# Patient Record
Sex: FEMALE | Race: WHITE | NOT HISPANIC OR LATINO | Employment: OTHER | RURAL
[De-identification: names, ages, dates, MRNs, and addresses within clinical notes are randomized per-mention and may not be internally consistent; named-entity substitution may affect disease eponyms.]

---

## 2020-03-16 ENCOUNTER — HISTORICAL (OUTPATIENT)
Dept: ADMINISTRATIVE | Facility: HOSPITAL | Age: 50
End: 2020-03-16

## 2020-03-16 LAB
ALBUMIN SERPL BCP-MCNC: 3.9 G/DL (ref 3.5–5)
ALP SERPL-CCNC: 92 U/L (ref 39–100)
ALT SERPL W P-5'-P-CCNC: 27 U/L (ref 13–56)
AST SERPL W P-5'-P-CCNC: 21 U/L (ref 15–37)
BASOPHILS # BLD AUTO: 0.09 X10E3/UL (ref 0–0.2)
BASOPHILS NFR BLD AUTO: 1.6 % (ref 0–1)
BILIRUB DIRECT SERPL-MCNC: <0.1 MG/DL (ref 0–0.2)
BILIRUB SERPL-MCNC: 0.3 MG/DL (ref 0–1.2)
BILIRUB UR QL STRIP: ABNORMAL MG/DL
BUN SERPL-MCNC: 9 MG/DL (ref 7–18)
CALCIUM SERPL-MCNC: 8.6 MG/DL (ref 8.5–10.1)
CHLORIDE SERPL-SCNC: 106 MMOL/L (ref 98–107)
CHOLEST SERPL-MCNC: 173 MG/DL
CHOLEST/HDLC SERPL: 3.7 {RATIO}
CLARITY UR: CLEAR
CO2 SERPL-SCNC: 27 MMOL/L (ref 21–32)
COLOR UR: YELLOW
CREAT SERPL-MCNC: 0.91 MG/DL (ref 0.5–1.02)
EOSINOPHIL # BLD AUTO: 0.14 X10E3/UL (ref 0–0.5)
EOSINOPHIL NFR BLD AUTO: 2.4 % (ref 1–4)
ERYTHROCYTE [DISTWIDTH] IN BLOOD BY AUTOMATED COUNT: 15.7 % (ref 11.5–14.5)
GLUCOSE SERPL-MCNC: 81 MG/DL (ref 74–106)
GLUCOSE UR STRIP-MCNC: NEGATIVE MG/DL
HCT VFR BLD AUTO: 42.6 % (ref 38–47)
HDLC SERPL-MCNC: 47 MG/DL
HGB BLD-MCNC: 12.7 G/DL (ref 12–16)
IMM GRANULOCYTES # BLD AUTO: 0.01 X10E3/UL (ref 0–0.04)
IMM GRANULOCYTES NFR BLD: 0.2 % (ref 0–0.4)
KETONES UR STRIP-SCNC: ABNORMAL MG/DL
LDLC SERPL CALC-MCNC: 94 MG/DL
LEUKOCYTE ESTERASE UR QL STRIP: NEGATIVE LEU/UL
LYMPHOCYTES # BLD AUTO: 2.28 X10E3/UL (ref 1–4.8)
LYMPHOCYTES NFR BLD AUTO: 39.3 % (ref 27–41)
MCH RBC QN AUTO: 27.3 PG (ref 27–31)
MCHC RBC AUTO-ENTMCNC: 29.8 G/DL (ref 32–36)
MCV RBC AUTO: 91.4 FL (ref 80–96)
MONOCYTES # BLD AUTO: 0.43 X10E3/UL (ref 0–0.8)
MONOCYTES NFR BLD AUTO: 7.4 % (ref 2–6)
MPC BLD CALC-MCNC: 10.4 FL (ref 9.4–12.4)
NEUTROPHILS # BLD AUTO: 2.85 X10E3/UL (ref 1.8–7.7)
NEUTROPHILS NFR BLD AUTO: 49.1 % (ref 53–65)
NITRITE UR QL STRIP: NEGATIVE
NRBC # BLD AUTO: 0 X10E3/UL (ref 0–0)
NRBC, AUTO (.00): 0 /100 (ref 0–0)
PH UR STRIP: 5.5 PH UNITS (ref 5–8)
PLATELET # BLD AUTO: 373 X10E3/UL (ref 150–400)
POTASSIUM SERPL-SCNC: 3.7 MMOL/L (ref 3.5–5.1)
PROT SERPL-MCNC: 7.6 G/DL (ref 6.4–8.2)
PROT UR QL STRIP: ABNORMAL MG/DL
RBC # BLD AUTO: 4.66 X10E6/UL (ref 4.2–5.4)
RBC # UR STRIP: ABNORMAL ERY/UL
SODIUM SERPL-SCNC: 137 MMOL/L (ref 136–145)
SP GR UR STRIP: 1.02 (ref 1–1.03)
TRIGL SERPL-MCNC: 158 MG/DL
TSH SERPL DL<=0.005 MIU/L-ACNC: 8.51 UIU/ML (ref 0.36–3.74)
UROBILINOGEN UR STRIP-ACNC: 0.2 MG/DL
WBC # BLD AUTO: 5.8 X10E3/UL (ref 4.5–11)

## 2020-05-19 ENCOUNTER — HISTORICAL (OUTPATIENT)
Dept: ADMINISTRATIVE | Facility: HOSPITAL | Age: 50
End: 2020-05-19

## 2020-05-19 LAB
ALBUMIN SERPL BCP-MCNC: 3.2 G/DL (ref 3.5–5)
ALP SERPL-CCNC: 90 U/L (ref 39–100)
ALT SERPL W P-5'-P-CCNC: 22 U/L (ref 13–56)
AST SERPL W P-5'-P-CCNC: 18 U/L (ref 15–37)
BASOPHILS # BLD AUTO: 0.06 X10E3/UL (ref 0–0.2)
BASOPHILS NFR BLD AUTO: 1.1 % (ref 0–1)
BILIRUB DIRECT SERPL-MCNC: <0.1 MG/DL (ref 0–0.2)
BILIRUB SERPL-MCNC: 0.2 MG/DL (ref 0–1.2)
BUN SERPL-MCNC: 9 MG/DL (ref 7–18)
CALCIUM SERPL-MCNC: 8 MG/DL (ref 8.5–10.1)
CHLORIDE SERPL-SCNC: 110 MMOL/L (ref 98–107)
CO2 SERPL-SCNC: 26 MMOL/L (ref 21–32)
CREAT SERPL-MCNC: 0.85 MG/DL (ref 0.5–1.02)
EOSINOPHIL # BLD AUTO: 0.11 X10E3/UL (ref 0–0.5)
EOSINOPHIL NFR BLD AUTO: 2.1 % (ref 1–4)
ERYTHROCYTE [DISTWIDTH] IN BLOOD BY AUTOMATED COUNT: 14.9 % (ref 11.5–14.5)
GLUCOSE SERPL-MCNC: 92 MG/DL (ref 74–106)
HCT VFR BLD AUTO: 38.8 % (ref 38–47)
HGB BLD-MCNC: 12.2 G/DL (ref 12–16)
IMM GRANULOCYTES # BLD AUTO: 0.02 X10E3/UL (ref 0–0.04)
IMM GRANULOCYTES NFR BLD: 0.4 % (ref 0–0.4)
LYMPHOCYTES # BLD AUTO: 2.2 X10E3/UL (ref 1–4.8)
LYMPHOCYTES NFR BLD AUTO: 41.2 % (ref 27–41)
MCH RBC QN AUTO: 27.9 PG (ref 27–31)
MCHC RBC AUTO-ENTMCNC: 31.4 G/DL (ref 32–36)
MCV RBC AUTO: 88.8 FL (ref 80–96)
MONOCYTES # BLD AUTO: 0.48 X10E3/UL (ref 0–0.8)
MONOCYTES NFR BLD AUTO: 9 % (ref 2–6)
MPC BLD CALC-MCNC: 10 FL (ref 9.4–12.4)
NEUTROPHILS # BLD AUTO: 2.47 X10E3/UL (ref 1.8–7.7)
NEUTROPHILS NFR BLD AUTO: 46.2 % (ref 53–65)
NRBC # BLD AUTO: 0 X10E3/UL (ref 0–0)
NRBC, AUTO (.00): 0 /100 (ref 0–0)
PLATELET # BLD AUTO: 328 X10E3/UL (ref 150–400)
POTASSIUM SERPL-SCNC: 4 MMOL/L (ref 3.5–5.1)
PROT SERPL-MCNC: 6.5 G/DL (ref 6.4–8.2)
RBC # BLD AUTO: 4.37 X10E6/UL (ref 4.2–5.4)
SODIUM SERPL-SCNC: 141 MMOL/L (ref 136–145)
TSH SERPL DL<=0.005 MIU/L-ACNC: 0.25 UIU/ML (ref 0.36–3.74)
WBC # BLD AUTO: 5.34 X10E3/UL (ref 4.5–11)

## 2020-06-16 ENCOUNTER — HISTORICAL (OUTPATIENT)
Dept: ADMINISTRATIVE | Facility: HOSPITAL | Age: 50
End: 2020-06-16

## 2020-06-18 ENCOUNTER — HISTORICAL (OUTPATIENT)
Dept: ADMINISTRATIVE | Facility: HOSPITAL | Age: 50
End: 2020-06-18

## 2020-06-18 LAB
BUN SERPL-MCNC: 11 MG/DL (ref 7–18)
CALCIUM SERPL-MCNC: 8.8 MG/DL (ref 8.5–10.1)
CHLORIDE SERPL-SCNC: 110 MMOL/L (ref 98–107)
CO2 SERPL-SCNC: 20 MMOL/L (ref 21–32)
CREAT SERPL-MCNC: 1.1 MG/DL (ref 0.5–1.02)
GLUCOSE SERPL-MCNC: 83 MG/DL (ref 74–106)
POTASSIUM SERPL-SCNC: 4.4 MMOL/L (ref 3.5–5.1)
SODIUM SERPL-SCNC: 139 MMOL/L (ref 136–145)
TSH SERPL DL<=0.005 MIU/L-ACNC: 1.15 UIU/ML (ref 0.36–3.74)

## 2020-07-27 ENCOUNTER — HISTORICAL (OUTPATIENT)
Dept: ADMINISTRATIVE | Facility: HOSPITAL | Age: 50
End: 2020-07-27

## 2020-08-31 ENCOUNTER — HISTORICAL (OUTPATIENT)
Dept: ADMINISTRATIVE | Facility: HOSPITAL | Age: 50
End: 2020-08-31

## 2020-09-02 LAB
LAB AP DIAGNOSIS - HISTORICAL: NORMAL
LAB AP GROSS PATHOLOGY - HISTORICAL: NORMAL
LAB AP SPECIMEN SUBMITTED - HISTORICAL: NORMAL

## 2020-09-06 ENCOUNTER — HISTORICAL (OUTPATIENT)
Dept: ADMINISTRATIVE | Facility: HOSPITAL | Age: 50
End: 2020-09-06

## 2020-11-05 ENCOUNTER — HISTORICAL (OUTPATIENT)
Dept: ADMINISTRATIVE | Facility: HOSPITAL | Age: 50
End: 2020-11-05

## 2020-11-05 LAB — SARS-COV+SARS-COV-2 AG RESP QL IA.RAPID: NEGATIVE

## 2020-11-24 ENCOUNTER — HISTORICAL (OUTPATIENT)
Dept: ADMINISTRATIVE | Facility: HOSPITAL | Age: 50
End: 2020-11-24

## 2020-11-30 ENCOUNTER — HISTORICAL (OUTPATIENT)
Dept: ADMINISTRATIVE | Facility: HOSPITAL | Age: 50
End: 2020-11-30

## 2020-12-01 LAB

## 2020-12-02 LAB
HCG UR QL IA.RAPID: NEGATIVE
HCG UR QL IA.RAPID: NEGATIVE

## 2020-12-11 ENCOUNTER — HISTORICAL (OUTPATIENT)
Dept: ADMINISTRATIVE | Facility: HOSPITAL | Age: 50
End: 2020-12-11

## 2020-12-22 ENCOUNTER — HISTORICAL (OUTPATIENT)
Dept: ADMINISTRATIVE | Facility: HOSPITAL | Age: 50
End: 2020-12-22

## 2020-12-22 LAB
ALBUMIN SERPL BCP-MCNC: 3.3 G/DL (ref 3.5–5)
ALP SERPL-CCNC: 86 U/L (ref 39–100)
ALT SERPL W P-5'-P-CCNC: 32 U/L (ref 13–56)
ANION GAP SERPL CALCULATED.3IONS-SCNC: 6.1 MMOL/L (ref 7–16)
AST SERPL W P-5'-P-CCNC: 23 U/L (ref 15–37)
BASOPHILS # BLD AUTO: 0.08 X10E3/UL (ref 0–0.2)
BASOPHILS NFR BLD AUTO: 1.5 % (ref 0–1)
BILIRUB DIRECT SERPL-MCNC: <0.1 MG/DL (ref 0–0.2)
BILIRUB SERPL-MCNC: 0.2 MG/DL (ref 0–1.2)
BUN SERPL-MCNC: 7 MG/DL (ref 7–18)
CALCIUM SERPL-MCNC: 8.1 MG/DL (ref 8.5–10.1)
CHLORIDE SERPL-SCNC: 110 MMOL/L (ref 98–107)
CO2 SERPL-SCNC: 28 MMOL/L (ref 21–32)
CREAT SERPL-MCNC: 0.87 MG/DL (ref 0.5–1.02)
EOSINOPHIL # BLD AUTO: 0.25 X10E3/UL (ref 0–0.5)
EOSINOPHIL NFR BLD AUTO: 4.8 % (ref 1–4)
ERYTHROCYTE [DISTWIDTH] IN BLOOD BY AUTOMATED COUNT: 14.9 % (ref 11.5–14.5)
GLUCOSE SERPL-MCNC: 81 MG/DL (ref 74–106)
HCT VFR BLD AUTO: 40.1 % (ref 38–47)
HGB BLD-MCNC: 12.2 G/DL (ref 12–16)
IMM GRANULOCYTES # BLD AUTO: 0.01 X10E3/UL (ref 0–0.04)
IMM GRANULOCYTES NFR BLD: 0.2 % (ref 0–0.4)
LYMPHOCYTES # BLD AUTO: 2.25 X10E3/UL (ref 1–4.8)
LYMPHOCYTES NFR BLD AUTO: 42.9 % (ref 27–41)
MCH RBC QN AUTO: 27.2 PG (ref 27–31)
MCHC RBC AUTO-ENTMCNC: 30.4 G/DL (ref 32–36)
MCV RBC AUTO: 89.3 FL (ref 80–96)
MONOCYTES # BLD AUTO: 0.45 X10E3/UL (ref 0–0.8)
MONOCYTES NFR BLD AUTO: 8.6 % (ref 2–6)
MPC BLD CALC-MCNC: 9.2 FL (ref 9.4–12.4)
NEUTROPHILS # BLD AUTO: 2.2 X10E3/UL (ref 1.8–7.7)
NEUTROPHILS NFR BLD AUTO: 42 % (ref 53–65)
NRBC # BLD AUTO: 0 X10E3/UL (ref 0–0)
NRBC, AUTO (.00): 0 /100 (ref 0–0)
PLATELET # BLD AUTO: 335 X10E3/UL (ref 150–400)
POTASSIUM SERPL-SCNC: 5.1 MMOL/L (ref 3.5–5.1)
PROT SERPL-MCNC: 6.5 G/DL (ref 6.4–8.2)
RBC # BLD AUTO: 4.49 X10E6/UL (ref 4.2–5.4)
SODIUM SERPL-SCNC: 139 MMOL/L (ref 136–145)
TSH SERPL DL<=0.005 MIU/L-ACNC: 24.5 UIU/ML (ref 0.36–3.74)
WBC # BLD AUTO: 5.24 X10E3/UL (ref 4.5–11)

## 2021-04-02 ENCOUNTER — HISTORICAL (OUTPATIENT)
Dept: ADMINISTRATIVE | Facility: HOSPITAL | Age: 51
End: 2021-04-02

## 2021-04-05 ENCOUNTER — TELEPHONE (OUTPATIENT)
Dept: PAIN MEDICINE | Facility: HOSPITAL | Age: 51
End: 2021-04-05

## 2021-04-06 ENCOUNTER — HOSPITAL ENCOUNTER (OUTPATIENT)
Dept: RADIOLOGY | Facility: HOSPITAL | Age: 51
Discharge: HOME | End: 2021-04-06
Attending: UROLOGY
Payer: MEDICARE

## 2021-04-06 ENCOUNTER — OFFICE VISIT (OUTPATIENT)
Dept: UROLOGY | Facility: CLINIC | Age: 51
End: 2021-04-06
Payer: MEDICARE

## 2021-04-06 VITALS
BODY MASS INDEX: 40.67 KG/M2 | DIASTOLIC BLOOD PRESSURE: 82 MMHG | SYSTOLIC BLOOD PRESSURE: 126 MMHG | HEART RATE: 78 BPM | HEIGHT: 62 IN | WEIGHT: 221 LBS

## 2021-04-06 DIAGNOSIS — N20.2 CALCULUS OF KIDNEY AND URETER: Primary | ICD-10-CM

## 2021-04-06 DIAGNOSIS — N39.0 URINARY TRACT INFECTION WITH HEMATURIA, SITE UNSPECIFIED: ICD-10-CM

## 2021-04-06 DIAGNOSIS — R31.9 URINARY TRACT INFECTION WITH HEMATURIA, SITE UNSPECIFIED: ICD-10-CM

## 2021-04-06 DIAGNOSIS — N20.0 KIDNEY STONE: ICD-10-CM

## 2021-04-06 DIAGNOSIS — N23 URETERAL COLIC: ICD-10-CM

## 2021-04-06 PROCEDURE — 3008F PR BODY MASS INDEX (BMI) DOCUMENTED: ICD-10-PCS | Mod: CPTII,,, | Performed by: UROLOGY

## 2021-04-06 PROCEDURE — 3008F BODY MASS INDEX DOCD: CPT | Mod: CPTII,,, | Performed by: UROLOGY

## 2021-04-06 PROCEDURE — 1125F PR PAIN SEVERITY QUANTIFIED, PAIN PRESENT: ICD-10-PCS | Mod: ,,, | Performed by: UROLOGY

## 2021-04-06 PROCEDURE — 96372 THER/PROPH/DIAG INJ SC/IM: CPT | Mod: PBBFAC | Performed by: UROLOGY

## 2021-04-06 PROCEDURE — 99214 PR OFFICE/OUTPT VISIT, EST, LEVL IV, 30-39 MIN: ICD-10-PCS | Mod: S$PBB,,, | Performed by: UROLOGY

## 2021-04-06 PROCEDURE — 1125F AMNT PAIN NOTED PAIN PRSNT: CPT | Mod: ,,, | Performed by: UROLOGY

## 2021-04-06 PROCEDURE — 99214 OFFICE O/P EST MOD 30 MIN: CPT | Mod: S$PBB,,, | Performed by: UROLOGY

## 2021-04-06 PROCEDURE — 74018 XR KUB: ICD-10-PCS | Mod: 26,,, | Performed by: RADIOLOGY

## 2021-04-06 PROCEDURE — 99999 PR PBB SHADOW E&M-EST. PATIENT-LVL V: ICD-10-PCS | Mod: PBBFAC,,, | Performed by: UROLOGY

## 2021-04-06 PROCEDURE — 74018 RADEX ABDOMEN 1 VIEW: CPT | Mod: TC

## 2021-04-06 PROCEDURE — 74018 RADEX ABDOMEN 1 VIEW: CPT | Mod: 26,,, | Performed by: RADIOLOGY

## 2021-04-06 PROCEDURE — 99215 OFFICE O/P EST HI 40 MIN: CPT | Mod: PBBFAC,25 | Performed by: UROLOGY

## 2021-04-06 PROCEDURE — 99999 PR PBB SHADOW E&M-EST. PATIENT-LVL V: CPT | Mod: PBBFAC,,, | Performed by: UROLOGY

## 2021-04-06 RX ORDER — LEVOTHYROXINE SODIUM 112 UG/1
112 TABLET ORAL DAILY
Status: ON HOLD | COMMUNITY
Start: 2020-12-30 | End: 2021-07-15

## 2021-04-06 RX ORDER — TOPIRAMATE 100 MG/1
1 TABLET, FILM COATED ORAL NIGHTLY
COMMUNITY
Start: 2021-04-02 | End: 2022-01-10 | Stop reason: SDUPTHER

## 2021-04-06 RX ORDER — PROMETHAZINE HYDROCHLORIDE 25 MG/ML
25 INJECTION, SOLUTION INTRAMUSCULAR; INTRAVENOUS
Status: COMPLETED | OUTPATIENT
Start: 2021-04-06 | End: 2021-04-06

## 2021-04-06 RX ORDER — HYDROCODONE BITARTRATE AND ACETAMINOPHEN 10; 325 MG/1; MG/1
1 TABLET ORAL NIGHTLY
COMMUNITY
Start: 2021-03-05 | End: 2021-05-17 | Stop reason: SDUPTHER

## 2021-04-06 RX ORDER — METOPROLOL TARTRATE 25 MG/1
25 TABLET, FILM COATED ORAL DAILY
COMMUNITY
Start: 2021-01-12 | End: 2022-10-21

## 2021-04-06 RX ORDER — HYDROMORPHONE HYDROCHLORIDE 2 MG/ML
2 INJECTION, SOLUTION INTRAMUSCULAR; INTRAVENOUS; SUBCUTANEOUS ONCE
Status: COMPLETED | OUTPATIENT
Start: 2021-04-06 | End: 2021-04-06

## 2021-04-06 RX ORDER — POTASSIUM CITRATE AND CITRIC ACID MONOHYDRATE 1100; 334 MG/5ML; MG/5ML
SOLUTION ORAL
COMMUNITY
End: 2023-03-08

## 2021-04-06 RX ORDER — TIZANIDINE 4 MG/1
4-8 TABLET ORAL NIGHTLY PRN
COMMUNITY
Start: 2021-01-28 | End: 2021-05-17 | Stop reason: SDUPTHER

## 2021-04-06 RX ORDER — GABAPENTIN 600 MG/1
600 TABLET ORAL NIGHTLY
COMMUNITY
Start: 2021-01-28 | End: 2021-05-17 | Stop reason: SDUPTHER

## 2021-04-06 RX ORDER — OMEPRAZOLE 20 MG/1
20 CAPSULE, DELAYED RELEASE ORAL DAILY
COMMUNITY
Start: 2021-03-05 | End: 2022-10-21

## 2021-04-06 RX ORDER — FLUOXETINE HYDROCHLORIDE 20 MG/1
20 CAPSULE ORAL DAILY
COMMUNITY
Start: 2021-01-28 | End: 2022-08-18

## 2021-04-06 RX ADMIN — PROMETHAZINE HYDROCHLORIDE 25 MG: 25 INJECTION INTRAMUSCULAR; INTRAVENOUS at 04:04

## 2021-04-06 RX ADMIN — HYDROMORPHONE HYDROCHLORIDE 2 MG: 2 INJECTION, SOLUTION INTRAMUSCULAR; INTRAVENOUS; SUBCUTANEOUS at 04:04

## 2021-04-08 ENCOUNTER — ANESTHESIA (OUTPATIENT)
Dept: SURGERY | Facility: HOSPITAL | Age: 51
End: 2021-04-08
Payer: MEDICARE

## 2021-04-08 ENCOUNTER — ANESTHESIA EVENT (OUTPATIENT)
Dept: SURGERY | Facility: HOSPITAL | Age: 51
End: 2021-04-08
Payer: MEDICARE

## 2021-04-08 ENCOUNTER — HOSPITAL ENCOUNTER (OUTPATIENT)
Facility: HOSPITAL | Age: 51
Discharge: HOME | End: 2021-04-08
Attending: UROLOGY | Admitting: UROLOGY
Payer: MEDICARE

## 2021-04-08 VITALS
TEMPERATURE: 97 F | OXYGEN SATURATION: 96 % | HEIGHT: 62 IN | BODY MASS INDEX: 40.12 KG/M2 | SYSTOLIC BLOOD PRESSURE: 125 MMHG | RESPIRATION RATE: 16 BRPM | HEART RATE: 77 BPM | DIASTOLIC BLOOD PRESSURE: 76 MMHG | WEIGHT: 218 LBS

## 2021-04-08 VITALS — RESPIRATION RATE: 22 BRPM | HEART RATE: 85 BPM | OXYGEN SATURATION: 98 %

## 2021-04-08 DIAGNOSIS — I10 HYPERTENSION: ICD-10-CM

## 2021-04-08 DIAGNOSIS — Z01.812 ENCOUNTER FOR PREPROCEDURE SCREENING LABORATORY TESTING FOR COVID-19: Primary | ICD-10-CM

## 2021-04-08 DIAGNOSIS — Z11.52 ENCOUNTER FOR PREPROCEDURE SCREENING LABORATORY TESTING FOR COVID-19: Primary | ICD-10-CM

## 2021-04-08 DIAGNOSIS — N20.2 CALCULUS OF KIDNEY AND URETER: Primary | ICD-10-CM

## 2021-04-08 LAB — HCG UR QL IA.RAPID: NEGATIVE

## 2021-04-08 PROCEDURE — 71000033 HC RECOVERY, INTIAL HOUR: Performed by: UROLOGY

## 2021-04-08 PROCEDURE — 81025 URINE PREGNANCY TEST: CPT

## 2021-04-08 PROCEDURE — D9220A PRA ANESTHESIA: Mod: CRNA,,, | Performed by: NURSE ANESTHETIST, CERTIFIED REGISTERED

## 2021-04-08 PROCEDURE — 63600175 PHARM REV CODE 636 W HCPCS: Performed by: NURSE ANESTHETIST, CERTIFIED REGISTERED

## 2021-04-08 PROCEDURE — D9220A PRA ANESTHESIA: ICD-10-PCS | Mod: ANES,,, | Performed by: ANESTHESIOLOGY

## 2021-04-08 PROCEDURE — 25000003 PHARM REV CODE 250: Performed by: UROLOGY

## 2021-04-08 PROCEDURE — 93005 ELECTROCARDIOGRAM TRACING: CPT | Mod: 59

## 2021-04-08 PROCEDURE — 52356 PR CYSTO/URETERO W/LITHOTRIPSY: ICD-10-PCS | Mod: LT,,, | Performed by: UROLOGY

## 2021-04-08 PROCEDURE — 36000706: Performed by: UROLOGY

## 2021-04-08 PROCEDURE — 36000707: Performed by: UROLOGY

## 2021-04-08 PROCEDURE — 27201423 OPTIME MED/SURG SUP & DEVICES STERILE SUPPLY: Performed by: UROLOGY

## 2021-04-08 PROCEDURE — C2617 STENT, NON-COR, TEM W/O DEL: HCPCS | Performed by: UROLOGY

## 2021-04-08 PROCEDURE — 63600175 PHARM REV CODE 636 W HCPCS: Performed by: ANESTHESIOLOGY

## 2021-04-08 PROCEDURE — 71000015 HC POSTOP RECOV 1ST HR: Performed by: UROLOGY

## 2021-04-08 PROCEDURE — 71000016 HC POSTOP RECOV ADDL HR: Performed by: UROLOGY

## 2021-04-08 PROCEDURE — 93010 ELECTROCARDIOGRAM REPORT: CPT | Mod: ,,, | Performed by: INTERNAL MEDICINE

## 2021-04-08 PROCEDURE — 52356 CYSTO/URETERO W/LITHOTRIPSY: CPT | Mod: LT,,, | Performed by: UROLOGY

## 2021-04-08 PROCEDURE — 37000009 HC ANESTHESIA EA ADD 15 MINS: Performed by: UROLOGY

## 2021-04-08 PROCEDURE — C1769 GUIDE WIRE: HCPCS | Performed by: UROLOGY

## 2021-04-08 PROCEDURE — C1729 CATH, DRAINAGE: HCPCS | Performed by: UROLOGY

## 2021-04-08 PROCEDURE — 93010 EKG 12-LEAD: ICD-10-PCS | Mod: ,,, | Performed by: INTERNAL MEDICINE

## 2021-04-08 PROCEDURE — D9220A PRA ANESTHESIA: ICD-10-PCS | Mod: CRNA,,, | Performed by: NURSE ANESTHETIST, CERTIFIED REGISTERED

## 2021-04-08 PROCEDURE — D9220A PRA ANESTHESIA: Mod: ANES,,, | Performed by: ANESTHESIOLOGY

## 2021-04-08 PROCEDURE — 37000008 HC ANESTHESIA 1ST 15 MINUTES: Performed by: UROLOGY

## 2021-04-08 DEVICE — STENT URETERAL VL CONTOUR 7FR X 22CM X 30CM: Type: IMPLANTABLE DEVICE | Site: URETER | Status: FUNCTIONAL

## 2021-04-08 RX ORDER — LIDOCAINE HYDROCHLORIDE 10 MG/ML
1 INJECTION, SOLUTION EPIDURAL; INFILTRATION; INTRACAUDAL; PERINEURAL ONCE
Status: DISCONTINUED | OUTPATIENT
Start: 2021-04-08 | End: 2021-04-08 | Stop reason: HOSPADM

## 2021-04-08 RX ORDER — PROMETHAZINE HYDROCHLORIDE 25 MG/1
25 TABLET ORAL DAILY
COMMUNITY
End: 2021-10-21 | Stop reason: SDUPTHER

## 2021-04-08 RX ORDER — FENTANYL CITRATE 50 UG/ML
INJECTION, SOLUTION INTRAMUSCULAR; INTRAVENOUS
Status: DISCONTINUED | OUTPATIENT
Start: 2021-04-08 | End: 2021-04-08

## 2021-04-08 RX ORDER — HYDROCODONE BITARTRATE AND ACETAMINOPHEN 10; 325 MG/1; MG/1
1 TABLET ORAL EVERY 4 HOURS PRN
Status: DISCONTINUED | OUTPATIENT
Start: 2021-04-08 | End: 2021-04-08 | Stop reason: HOSPADM

## 2021-04-08 RX ORDER — INSULIN ASPART 100 [IU]/ML
0-3 INJECTION, SOLUTION INTRAVENOUS; SUBCUTANEOUS EVERY 4 HOURS PRN
Status: DISCONTINUED | OUTPATIENT
Start: 2021-04-08 | End: 2021-04-08 | Stop reason: HOSPADM

## 2021-04-08 RX ORDER — GENTAMICIN SULFATE 80 MG/100ML
INJECTION, SOLUTION INTRAVENOUS
Status: DISCONTINUED | OUTPATIENT
Start: 2021-04-08 | End: 2021-04-08

## 2021-04-08 RX ORDER — LIDOCAINE HYDROCHLORIDE 20 MG/ML
INJECTION, SOLUTION EPIDURAL; INFILTRATION; INTRACAUDAL; PERINEURAL
Status: DISCONTINUED | OUTPATIENT
Start: 2021-04-08 | End: 2021-04-08

## 2021-04-08 RX ORDER — ONDANSETRON 2 MG/ML
4 INJECTION INTRAMUSCULAR; INTRAVENOUS EVERY 12 HOURS PRN
Status: DISCONTINUED | OUTPATIENT
Start: 2021-04-08 | End: 2021-04-08 | Stop reason: HOSPADM

## 2021-04-08 RX ORDER — ONDANSETRON 2 MG/ML
INJECTION INTRAMUSCULAR; INTRAVENOUS
Status: DISCONTINUED | OUTPATIENT
Start: 2021-04-08 | End: 2021-04-08

## 2021-04-08 RX ORDER — DIPHENHYDRAMINE HYDROCHLORIDE 50 MG/ML
25 INJECTION INTRAMUSCULAR; INTRAVENOUS EVERY 6 HOURS PRN
Status: DISCONTINUED | OUTPATIENT
Start: 2021-04-08 | End: 2021-04-08 | Stop reason: HOSPADM

## 2021-04-08 RX ORDER — MORPHINE SULFATE 10 MG/ML
4 INJECTION INTRAMUSCULAR; INTRAVENOUS; SUBCUTANEOUS EVERY 5 MIN PRN
Status: DISCONTINUED | OUTPATIENT
Start: 2021-04-08 | End: 2021-04-08 | Stop reason: HOSPADM

## 2021-04-08 RX ORDER — PROMETHAZINE HYDROCHLORIDE 25 MG/ML
INJECTION, SOLUTION INTRAMUSCULAR; INTRAVENOUS
Status: DISCONTINUED | OUTPATIENT
Start: 2021-04-08 | End: 2021-04-08

## 2021-04-08 RX ORDER — HYDROMORPHONE HYDROCHLORIDE 2 MG/ML
0.5 INJECTION, SOLUTION INTRAMUSCULAR; INTRAVENOUS; SUBCUTANEOUS EVERY 5 MIN PRN
Status: DISCONTINUED | OUTPATIENT
Start: 2021-04-08 | End: 2021-04-08 | Stop reason: HOSPADM

## 2021-04-08 RX ORDER — ONDANSETRON 2 MG/ML
4 INJECTION INTRAMUSCULAR; INTRAVENOUS DAILY PRN
Status: DISCONTINUED | OUTPATIENT
Start: 2021-04-08 | End: 2021-04-08 | Stop reason: HOSPADM

## 2021-04-08 RX ORDER — DEXAMETHASONE SODIUM PHOSPHATE 4 MG/ML
INJECTION, SOLUTION INTRA-ARTICULAR; INTRALESIONAL; INTRAMUSCULAR; INTRAVENOUS; SOFT TISSUE
Status: DISCONTINUED | OUTPATIENT
Start: 2021-04-08 | End: 2021-04-08

## 2021-04-08 RX ORDER — SODIUM CHLORIDE, SODIUM LACTATE, POTASSIUM CHLORIDE, CALCIUM CHLORIDE 600; 310; 30; 20 MG/100ML; MG/100ML; MG/100ML; MG/100ML
INJECTION, SOLUTION INTRAVENOUS CONTINUOUS
Status: DISCONTINUED | OUTPATIENT
Start: 2021-04-08 | End: 2021-04-08 | Stop reason: HOSPADM

## 2021-04-08 RX ORDER — PROPOFOL 10 MG/ML
VIAL (ML) INTRAVENOUS
Status: DISCONTINUED | OUTPATIENT
Start: 2021-04-08 | End: 2021-04-08

## 2021-04-08 RX ORDER — CEFAZOLIN SODIUM 1 G/3ML
INJECTION, POWDER, FOR SOLUTION INTRAMUSCULAR; INTRAVENOUS
Status: DISCONTINUED | OUTPATIENT
Start: 2021-04-08 | End: 2021-04-08

## 2021-04-08 RX ORDER — MEPERIDINE HYDROCHLORIDE 25 MG/ML
25 INJECTION INTRAMUSCULAR; INTRAVENOUS; SUBCUTANEOUS EVERY 10 MIN PRN
Status: DISCONTINUED | OUTPATIENT
Start: 2021-04-08 | End: 2021-04-08 | Stop reason: HOSPADM

## 2021-04-08 RX ORDER — SODIUM CHLORIDE, SODIUM LACTATE, POTASSIUM CHLORIDE, CALCIUM CHLORIDE 600; 310; 30; 20 MG/100ML; MG/100ML; MG/100ML; MG/100ML
INJECTION, SOLUTION INTRAVENOUS CONTINUOUS PRN
Status: DISCONTINUED | OUTPATIENT
Start: 2021-04-08 | End: 2021-04-08

## 2021-04-08 RX ORDER — MIDAZOLAM HYDROCHLORIDE 1 MG/ML
INJECTION, SOLUTION INTRAMUSCULAR; INTRAVENOUS
Status: DISCONTINUED | OUTPATIENT
Start: 2021-04-08 | End: 2021-04-08

## 2021-04-08 RX ADMIN — HYDROMORPHONE HYDROCHLORIDE 0.5 MG: 2 INJECTION, SOLUTION INTRAMUSCULAR; INTRAVENOUS; SUBCUTANEOUS at 03:04

## 2021-04-08 RX ADMIN — HYDROCODONE BITARTRATE AND ACETAMINOPHEN 1 TABLET: 10; 325 TABLET ORAL at 04:04

## 2021-04-08 RX ADMIN — ONDANSETRON 4 MG: 2 INJECTION INTRAMUSCULAR; INTRAVENOUS at 02:04

## 2021-04-08 RX ADMIN — FENTANYL CITRATE 25 MCG: 50 INJECTION, SOLUTION INTRAMUSCULAR; INTRAVENOUS at 02:04

## 2021-04-08 RX ADMIN — FENTANYL CITRATE 50 MCG: 50 INJECTION, SOLUTION INTRAMUSCULAR; INTRAVENOUS at 02:04

## 2021-04-08 RX ADMIN — GENTAMICIN SULFATE 80 MG: 80 INJECTION, SOLUTION INTRAVENOUS at 01:04

## 2021-04-08 RX ADMIN — DEXAMETHASONE SODIUM PHOSPHATE 8 MG: 4 INJECTION, SOLUTION INTRA-ARTICULAR; INTRALESIONAL; INTRAMUSCULAR; INTRAVENOUS; SOFT TISSUE at 02:04

## 2021-04-08 RX ADMIN — Medication 100 MG: at 01:04

## 2021-04-08 RX ADMIN — MIDAZOLAM HYDROCHLORIDE 2 MG: 1 INJECTION, SOLUTION INTRAMUSCULAR; INTRAVENOUS at 01:04

## 2021-04-08 RX ADMIN — Medication 50 MG: at 01:04

## 2021-04-08 RX ADMIN — CEFAZOLIN 2 G: 1 INJECTION, POWDER, FOR SOLUTION INTRAMUSCULAR; INTRAVENOUS; PARENTERAL at 01:04

## 2021-04-08 RX ADMIN — LIDOCAINE HYDROCHLORIDE 40 MG: 20 INJECTION, SOLUTION EPIDURAL; INFILTRATION; INTRACAUDAL; PERINEURAL at 01:04

## 2021-04-08 RX ADMIN — SODIUM CHLORIDE, POTASSIUM CHLORIDE, SODIUM LACTATE AND CALCIUM CHLORIDE: 600; 310; 30; 20 INJECTION, SOLUTION INTRAVENOUS at 01:04

## 2021-04-08 RX ADMIN — PROMETHAZINE HYDROCHLORIDE 12.5 MG: 25 INJECTION INTRAMUSCULAR; INTRAVENOUS at 02:04

## 2021-04-08 RX ADMIN — Medication 150 MG: at 01:04

## 2021-04-08 RX ADMIN — SODIUM CHLORIDE, POTASSIUM CHLORIDE, SODIUM LACTATE AND CALCIUM CHLORIDE: 600; 310; 30; 20 INJECTION, SOLUTION INTRAVENOUS at 11:04

## 2021-04-11 ENCOUNTER — HISTORICAL (OUTPATIENT)
Dept: ADMINISTRATIVE | Facility: HOSPITAL | Age: 51
End: 2021-04-11

## 2021-04-11 LAB
ALBUMIN SERPL BCP-MCNC: 3.3 G/DL (ref 3.5–5)
ALBUMIN/GLOB SERPL: 1 {RATIO}
ALP SERPL-CCNC: 83 U/L (ref 41–108)
ALT SERPL W P-5'-P-CCNC: 28 U/L (ref 13–56)
ANION GAP SERPL CALCULATED.3IONS-SCNC: 16 MMOL/L
AST SERPL W P-5'-P-CCNC: 27 U/L (ref 15–37)
BACTERIA #/AREA URNS HPF: ABNORMAL /HPF
BASOPHILS # BLD AUTO: 0.08 X10E3/UL (ref 0–0.2)
BASOPHILS NFR BLD AUTO: 1.3 % (ref 0–1)
BILIRUB SERPL-MCNC: 0.2 MG/DL (ref 0–1.2)
BUN SERPL-MCNC: 10 MG/DL (ref 7–18)
BUN/CREAT SERPL: 8.2
CALCIUM SERPL-MCNC: 8.4 MG/DL (ref 8.5–10.1)
CHLORIDE SERPL-SCNC: 105 MMOL/L (ref 98–107)
CLARITY UR: ABNORMAL
CO2 SERPL-SCNC: 24 MMOL/L (ref 21–32)
COLOR UR: YELLOW
CREAT SERPL-MCNC: 1.22 MG/DL (ref 0.55–1.02)
EOSINOPHIL # BLD AUTO: 0.37 X10E3/UL (ref 0–0.5)
EOSINOPHIL NFR BLD AUTO: 5.8 % (ref 1–4)
ERYTHROCYTE [DISTWIDTH] IN BLOOD BY AUTOMATED COUNT: 15.4 % (ref 11.5–14.5)
GLOBULIN SER-MCNC: 3.3 G/DL (ref 2–4)
GLUCOSE SERPL-MCNC: 134 MG/DL (ref 74–106)
HCT VFR BLD AUTO: 37.1 % (ref 38–47)
HGB BLD-MCNC: 11.5 G/DL (ref 12–16)
IMM GRANULOCYTES # BLD AUTO: 0.01 X10E3/UL (ref 0–0.04)
IMM GRANULOCYTES NFR BLD: 0.2 % (ref 0–0.4)
LYMPHOCYTES # BLD AUTO: 2.2 X10E3/UL (ref 1–4.8)
LYMPHOCYTES NFR BLD AUTO: 34.8 % (ref 27–41)
MCH RBC QN AUTO: 26.5 PG (ref 27–31)
MCHC RBC AUTO-ENTMCNC: 31 G/DL (ref 32–36)
MCV RBC AUTO: 85.5 FL (ref 80–96)
MONOCYTES # BLD AUTO: 0.56 X10E3/UL (ref 0–0.8)
MONOCYTES NFR BLD AUTO: 8.8 % (ref 2–6)
MPC BLD CALC-MCNC: 9.8 FL (ref 9.4–12.4)
NEUTROPHILS # BLD AUTO: 3.11 X10E3/UL (ref 1.8–7.7)
NEUTROPHILS NFR BLD AUTO: 49.1 % (ref 53–65)
PLATELET # BLD AUTO: 346 X10E3/UL (ref 150–400)
POTASSIUM SERPL-SCNC: 3.6 MMOL/L (ref 3.5–5.1)
PROT SERPL-MCNC: 6.6 G/DL (ref 6.4–8.2)
RBC # BLD AUTO: 4.34 X10E6/UL (ref 4.2–5.4)
RBC #/AREA URNS HPF: ABNORMAL /HPF (ref 0–3)
SODIUM SERPL-SCNC: 141 MMOL/L (ref 136–145)
SQUAMOUS #/AREA URNS LPF: ABNORMAL /LPF
WBC # BLD AUTO: 6.33 X10E3/UL (ref 4.5–11)
WBC #/AREA URNS HPF: ABNORMAL /HPF (ref 0–5)

## 2021-04-12 ENCOUNTER — OFFICE VISIT (OUTPATIENT)
Dept: UROLOGY | Facility: CLINIC | Age: 51
End: 2021-04-12
Payer: MEDICARE

## 2021-04-12 VITALS
DIASTOLIC BLOOD PRESSURE: 74 MMHG | WEIGHT: 223 LBS | BODY MASS INDEX: 41.04 KG/M2 | HEART RATE: 81 BPM | HEIGHT: 62 IN | SYSTOLIC BLOOD PRESSURE: 105 MMHG

## 2021-04-12 DIAGNOSIS — Z48.89 POSTOPERATIVE VISIT: ICD-10-CM

## 2021-04-12 DIAGNOSIS — R31.9 URINARY TRACT INFECTION WITH HEMATURIA, SITE UNSPECIFIED: ICD-10-CM

## 2021-04-12 DIAGNOSIS — N20.0 KIDNEY STONE: Primary | ICD-10-CM

## 2021-04-12 DIAGNOSIS — N39.0 URINARY TRACT INFECTION WITH HEMATURIA, SITE UNSPECIFIED: ICD-10-CM

## 2021-04-12 DIAGNOSIS — R10.9 LEFT FLANK PAIN: ICD-10-CM

## 2021-04-12 PROCEDURE — 99024 POSTOP FOLLOW-UP VISIT: CPT | Mod: ,,, | Performed by: UROLOGY

## 2021-04-12 PROCEDURE — 99024 PR POST-OP FOLLOW-UP VISIT: ICD-10-PCS | Mod: ,,, | Performed by: UROLOGY

## 2021-04-12 PROCEDURE — 99999 PR PBB SHADOW E&M-EST. PATIENT-LVL IV: ICD-10-PCS | Mod: PBBFAC,,, | Performed by: UROLOGY

## 2021-04-12 PROCEDURE — 99999 PR PBB SHADOW E&M-EST. PATIENT-LVL IV: CPT | Mod: PBBFAC,,, | Performed by: UROLOGY

## 2021-04-12 PROCEDURE — 99214 OFFICE O/P EST MOD 30 MIN: CPT | Mod: PBBFAC,25 | Performed by: UROLOGY

## 2021-04-12 PROCEDURE — 96372 THER/PROPH/DIAG INJ SC/IM: CPT | Mod: PBBFAC | Performed by: UROLOGY

## 2021-04-12 RX ORDER — KETOROLAC TROMETHAMINE 30 MG/ML
60 INJECTION, SOLUTION INTRAMUSCULAR; INTRAVENOUS
Status: COMPLETED | OUTPATIENT
Start: 2021-04-12 | End: 2021-04-12

## 2021-04-12 RX ORDER — GENTAMICIN SULFATE 40 MG/ML
80 INJECTION, SOLUTION INTRAMUSCULAR; INTRAVENOUS ONCE
Status: COMPLETED | OUTPATIENT
Start: 2021-04-12 | End: 2021-04-12

## 2021-04-12 RX ADMIN — GENTAMICIN SULFATE 80 MG: 40 INJECTION, SOLUTION INTRAMUSCULAR; INTRAVENOUS at 04:04

## 2021-04-12 RX ADMIN — KETOROLAC TROMETHAMINE 60 MG: 30 INJECTION, SOLUTION INTRAMUSCULAR at 04:04

## 2021-04-15 ENCOUNTER — TELEPHONE (OUTPATIENT)
Dept: UROLOGY | Facility: CLINIC | Age: 51
End: 2021-04-15

## 2021-04-16 LAB
REPORT: 38
REPORT: NORMAL

## 2021-05-04 ENCOUNTER — TELEPHONE (OUTPATIENT)
Dept: PAIN MEDICINE | Facility: CLINIC | Age: 51
End: 2021-05-04

## 2021-05-17 ENCOUNTER — OFFICE VISIT (OUTPATIENT)
Dept: PAIN MEDICINE | Facility: CLINIC | Age: 51
End: 2021-05-17
Payer: MEDICARE

## 2021-05-17 ENCOUNTER — HOSPITAL ENCOUNTER (OUTPATIENT)
Dept: RADIOLOGY | Facility: HOSPITAL | Age: 51
Discharge: HOME | End: 2021-05-17
Attending: UROLOGY
Payer: MEDICARE

## 2021-05-17 ENCOUNTER — OFFICE VISIT (OUTPATIENT)
Dept: UROLOGY | Facility: CLINIC | Age: 51
End: 2021-05-17
Payer: MEDICAID

## 2021-05-17 VITALS
DIASTOLIC BLOOD PRESSURE: 87 MMHG | SYSTOLIC BLOOD PRESSURE: 119 MMHG | HEART RATE: 84 BPM | WEIGHT: 216 LBS | BODY MASS INDEX: 39.75 KG/M2 | HEIGHT: 62 IN

## 2021-05-17 VITALS
HEART RATE: 78 BPM | HEIGHT: 62 IN | BODY MASS INDEX: 39.68 KG/M2 | WEIGHT: 215.63 LBS | DIASTOLIC BLOOD PRESSURE: 87 MMHG | RESPIRATION RATE: 20 BRPM | SYSTOLIC BLOOD PRESSURE: 136 MMHG

## 2021-05-17 DIAGNOSIS — R31.29 MICROSCOPIC HEMATURIA: ICD-10-CM

## 2021-05-17 DIAGNOSIS — M54.17 LUMBOSACRAL RADICULOPATHY: Chronic | ICD-10-CM

## 2021-05-17 DIAGNOSIS — N20.0 KIDNEY STONE: Primary | ICD-10-CM

## 2021-05-17 DIAGNOSIS — G89.4 CHRONIC PAIN SYNDROME: Chronic | ICD-10-CM

## 2021-05-17 DIAGNOSIS — Z09 POSTOP CHECK: ICD-10-CM

## 2021-05-17 DIAGNOSIS — M96.1 POSTLAMINECTOMY SYNDROME, LUMBAR REGION: Chronic | ICD-10-CM

## 2021-05-17 DIAGNOSIS — N20.0 KIDNEY STONE: ICD-10-CM

## 2021-05-17 PROCEDURE — 99214 PR OFFICE/OUTPT VISIT, EST, LEVL IV, 30-39 MIN: ICD-10-PCS | Mod: S$PBB,,, | Performed by: PHYSICIAN ASSISTANT

## 2021-05-17 PROCEDURE — 99214 OFFICE O/P EST MOD 30 MIN: CPT | Mod: PBBFAC,25 | Performed by: UROLOGY

## 2021-05-17 PROCEDURE — 99024 PR POST-OP FOLLOW-UP VISIT: ICD-10-PCS | Mod: ,,, | Performed by: UROLOGY

## 2021-05-17 PROCEDURE — 99214 OFFICE O/P EST MOD 30 MIN: CPT | Mod: S$PBB,,, | Performed by: PHYSICIAN ASSISTANT

## 2021-05-17 PROCEDURE — 3008F BODY MASS INDEX DOCD: CPT | Mod: CPTII,,, | Performed by: PHYSICIAN ASSISTANT

## 2021-05-17 PROCEDURE — 1125F AMNT PAIN NOTED PAIN PRSNT: CPT | Mod: ,,, | Performed by: PHYSICIAN ASSISTANT

## 2021-05-17 PROCEDURE — 74018 XR KUB: ICD-10-PCS | Mod: 26,,,

## 2021-05-17 PROCEDURE — 74018 RADEX ABDOMEN 1 VIEW: CPT | Mod: TC

## 2021-05-17 PROCEDURE — 74018 RADEX ABDOMEN 1 VIEW: CPT | Mod: 26,,,

## 2021-05-17 PROCEDURE — 3008F PR BODY MASS INDEX (BMI) DOCUMENTED: ICD-10-PCS | Mod: CPTII,,, | Performed by: PHYSICIAN ASSISTANT

## 2021-05-17 PROCEDURE — 99214 OFFICE O/P EST MOD 30 MIN: CPT | Mod: PBBFAC,25 | Performed by: PHYSICIAN ASSISTANT

## 2021-05-17 PROCEDURE — 99024 POSTOP FOLLOW-UP VISIT: CPT | Mod: ,,, | Performed by: UROLOGY

## 2021-05-17 PROCEDURE — 1125F PR PAIN SEVERITY QUANTIFIED, PAIN PRESENT: ICD-10-PCS | Mod: ,,, | Performed by: PHYSICIAN ASSISTANT

## 2021-05-17 RX ORDER — HYDROCODONE BITARTRATE AND ACETAMINOPHEN 10; 325 MG/1; MG/1
1 TABLET ORAL EVERY 12 HOURS
Qty: 60 TABLET | Refills: 0 | Status: SHIPPED | OUTPATIENT
Start: 2021-05-17 | End: 2021-06-16

## 2021-05-17 RX ORDER — TIZANIDINE 4 MG/1
4-8 TABLET ORAL EVERY 12 HOURS PRN
Qty: 45 TABLET | Refills: 0 | Status: SHIPPED | OUTPATIENT
Start: 2021-05-17 | End: 2021-06-16

## 2021-05-17 RX ORDER — GABAPENTIN 600 MG/1
600 TABLET ORAL 3 TIMES DAILY
Qty: 90 TABLET | Refills: 0 | Status: SHIPPED | OUTPATIENT
Start: 2021-05-17 | End: 2021-06-24 | Stop reason: SDUPTHER

## 2021-06-22 ENCOUNTER — TELEPHONE (OUTPATIENT)
Dept: PAIN MEDICINE | Facility: CLINIC | Age: 51
End: 2021-06-22

## 2021-06-24 ENCOUNTER — OFFICE VISIT (OUTPATIENT)
Dept: PAIN MEDICINE | Facility: CLINIC | Age: 51
End: 2021-06-24
Payer: MEDICARE

## 2021-06-24 ENCOUNTER — CLINICAL SUPPORT (OUTPATIENT)
Dept: UROLOGY | Facility: CLINIC | Age: 51
End: 2021-06-24
Payer: MEDICARE

## 2021-06-24 VITALS
WEIGHT: 215 LBS | HEART RATE: 65 BPM | SYSTOLIC BLOOD PRESSURE: 123 MMHG | DIASTOLIC BLOOD PRESSURE: 86 MMHG | HEIGHT: 62 IN | RESPIRATION RATE: 18 BRPM | BODY MASS INDEX: 39.56 KG/M2

## 2021-06-24 DIAGNOSIS — M96.1 POSTLAMINECTOMY SYNDROME, LUMBAR REGION: Chronic | ICD-10-CM

## 2021-06-24 DIAGNOSIS — G89.4 CHRONIC PAIN SYNDROME: Chronic | ICD-10-CM

## 2021-06-24 DIAGNOSIS — M54.17 LUMBOSACRAL RADICULOPATHY: Primary | Chronic | ICD-10-CM

## 2021-06-24 DIAGNOSIS — Z79.899 ENCOUNTER FOR LONG-TERM (CURRENT) USE OF OTHER MEDICATIONS: ICD-10-CM

## 2021-06-24 DIAGNOSIS — N39.0 URINARY TRACT INFECTION WITHOUT HEMATURIA, SITE UNSPECIFIED: Primary | ICD-10-CM

## 2021-06-24 LAB
CTP QC/QA: YES
POC (AMP) AMPHETAMINE: NEGATIVE
POC (BAR) BARBITURATES: NEGATIVE
POC (BUP) BUPRENORPHINE: NEGATIVE
POC (BZO) BENZODIAZEPINES: NEGATIVE
POC (COC) COCAINE: NEGATIVE
POC (MDMA) METHYLENEDIOXYMETHAMPHETAMINE 3,4: NEGATIVE
POC (MET) METHAMPHETAMINE: NEGATIVE
POC (MOP) OPIATES: ABNORMAL
POC (MTD) METHADONE: NEGATIVE
POC (OXY) OXYCODONE: NEGATIVE
POC (PCP) PHENCYCLIDINE: NEGATIVE
POC (TCA) TRICYCLIC ANTIDEPRESSANTS: NEGATIVE
POC TEMPERATURE (URINE): 92
POC THC: NEGATIVE

## 2021-06-24 PROCEDURE — 99214 PR OFFICE/OUTPT VISIT, EST, LEVL IV, 30-39 MIN: ICD-10-PCS | Mod: S$PBB,,, | Performed by: PHYSICIAN ASSISTANT

## 2021-06-24 PROCEDURE — 99214 OFFICE O/P EST MOD 30 MIN: CPT | Mod: S$PBB,,, | Performed by: PHYSICIAN ASSISTANT

## 2021-06-24 PROCEDURE — 87086 URINE CULTURE/COLONY COUNT: CPT | Mod: ,,, | Performed by: CLINICAL MEDICAL LABORATORY

## 2021-06-24 PROCEDURE — 80305 DRUG TEST PRSMV DIR OPT OBS: CPT | Mod: PBBFAC | Performed by: PHYSICIAN ASSISTANT

## 2021-06-24 PROCEDURE — 99212 OFFICE O/P EST SF 10 MIN: CPT | Mod: PBBFAC,25,27 | Performed by: UROLOGY

## 2021-06-24 PROCEDURE — 99214 OFFICE O/P EST MOD 30 MIN: CPT | Mod: PBBFAC,25 | Performed by: PHYSICIAN ASSISTANT

## 2021-06-24 PROCEDURE — 87086 CULTURE, URINE: ICD-10-PCS | Mod: ,,, | Performed by: CLINICAL MEDICAL LABORATORY

## 2021-06-24 RX ORDER — TIZANIDINE HYDROCHLORIDE 4 MG/1
1 CAPSULE, GELATIN COATED ORAL EVERY 12 HOURS
Qty: 60 CAPSULE | Refills: 0 | Status: SHIPPED | OUTPATIENT
Start: 2021-06-24 | End: 2021-07-20 | Stop reason: SDUPTHER

## 2021-06-24 RX ORDER — HYDROCODONE BITARTRATE AND ACETAMINOPHEN 10; 325 MG/1; MG/1
1 TABLET ORAL EVERY 12 HOURS
Qty: 60 TABLET | Refills: 0 | Status: SHIPPED | OUTPATIENT
Start: 2021-06-24 | End: 2021-07-20

## 2021-06-24 RX ORDER — HYDROCODONE BITARTRATE AND ACETAMINOPHEN 10; 325 MG/1; MG/1
1 TABLET ORAL
COMMUNITY
End: 2021-06-24 | Stop reason: SDUPTHER

## 2021-06-24 RX ORDER — GABAPENTIN 600 MG/1
600 TABLET ORAL 3 TIMES DAILY
Qty: 90 TABLET | Refills: 0 | Status: SHIPPED | OUTPATIENT
Start: 2021-06-24 | End: 2021-07-20 | Stop reason: SDUPTHER

## 2021-06-26 LAB — UA COMPLETE W REFLEX CULTURE PNL UR: NORMAL

## 2021-06-29 ENCOUNTER — TELEPHONE (OUTPATIENT)
Dept: PAIN MEDICINE | Facility: CLINIC | Age: 51
End: 2021-06-29

## 2021-06-29 ENCOUNTER — TELEPHONE (OUTPATIENT)
Dept: UROLOGY | Facility: CLINIC | Age: 51
End: 2021-06-29

## 2021-06-30 ENCOUNTER — TELEPHONE (OUTPATIENT)
Dept: PAIN MEDICINE | Facility: CLINIC | Age: 51
End: 2021-06-30

## 2021-07-01 DIAGNOSIS — N39.0 URINARY TRACT INFECTION WITHOUT HEMATURIA, SITE UNSPECIFIED: Primary | ICD-10-CM

## 2021-07-01 RX ORDER — AMOXICILLIN AND CLAVULANATE POTASSIUM 875; 125 MG/1; MG/1
1 TABLET, FILM COATED ORAL EVERY 12 HOURS
Qty: 20 TABLET | Refills: 0 | Status: ON HOLD | OUTPATIENT
Start: 2021-07-01 | End: 2021-07-15 | Stop reason: ALTCHOICE

## 2021-07-15 ENCOUNTER — HOSPITAL ENCOUNTER (OUTPATIENT)
Facility: HOSPITAL | Age: 51
Discharge: HOME | End: 2021-07-15
Attending: PAIN MEDICINE | Admitting: PAIN MEDICINE
Payer: MEDICARE

## 2021-07-15 ENCOUNTER — ANESTHESIA EVENT (OUTPATIENT)
Dept: PAIN MEDICINE | Facility: HOSPITAL | Age: 51
End: 2021-07-15
Payer: MEDICARE

## 2021-07-15 ENCOUNTER — ANESTHESIA (OUTPATIENT)
Dept: PAIN MEDICINE | Facility: HOSPITAL | Age: 51
End: 2021-07-15
Payer: MEDICARE

## 2021-07-15 VITALS
DIASTOLIC BLOOD PRESSURE: 69 MMHG | HEIGHT: 62 IN | HEART RATE: 76 BPM | TEMPERATURE: 99 F | OXYGEN SATURATION: 99 % | WEIGHT: 220 LBS | SYSTOLIC BLOOD PRESSURE: 143 MMHG | RESPIRATION RATE: 13 BRPM | BODY MASS INDEX: 40.48 KG/M2

## 2021-07-15 DIAGNOSIS — M54.17 LUMBOSACRAL RADICULOPATHY: Primary | Chronic | ICD-10-CM

## 2021-07-15 PROCEDURE — C1897 LEAD, NEUROSTIM TEST KIT: HCPCS | Performed by: PAIN MEDICINE

## 2021-07-15 PROCEDURE — 63650 PR PERCUT IMPLNT NEUROELECT,EPIDURAL: ICD-10-PCS | Mod: ,,, | Performed by: PAIN MEDICINE

## 2021-07-15 PROCEDURE — 25000003 PHARM REV CODE 250: Performed by: PAIN MEDICINE

## 2021-07-15 PROCEDURE — 27100168 OPTIME MED/SURG SUP & DEVICES NON-STERILE SUPPLY: Performed by: PAIN MEDICINE

## 2021-07-15 PROCEDURE — 27201423 OPTIME MED/SURG SUP & DEVICES STERILE SUPPLY: Performed by: PAIN MEDICINE

## 2021-07-15 PROCEDURE — 25000003 PHARM REV CODE 250: Performed by: NURSE ANESTHETIST, CERTIFIED REGISTERED

## 2021-07-15 PROCEDURE — D9220A PRA ANESTHESIA: ICD-10-PCS | Mod: ,,, | Performed by: NURSE ANESTHETIST, CERTIFIED REGISTERED

## 2021-07-15 PROCEDURE — 37000009 HC ANESTHESIA EA ADD 15 MINS: Performed by: PAIN MEDICINE

## 2021-07-15 PROCEDURE — 63600175 PHARM REV CODE 636 W HCPCS: Performed by: NURSE ANESTHETIST, CERTIFIED REGISTERED

## 2021-07-15 PROCEDURE — 63650 IMPLANT NEUROELECTRODES: CPT | Mod: ,,, | Performed by: PAIN MEDICINE

## 2021-07-15 PROCEDURE — 37000008 HC ANESTHESIA 1ST 15 MINUTES: Performed by: PAIN MEDICINE

## 2021-07-15 PROCEDURE — D9220A PRA ANESTHESIA: Mod: ,,, | Performed by: NURSE ANESTHETIST, CERTIFIED REGISTERED

## 2021-07-15 PROCEDURE — 63650 IMPLANT NEUROELECTRODES: CPT | Performed by: PAIN MEDICINE

## 2021-07-15 PROCEDURE — 27000284 HC CANNULA NASAL: Performed by: NURSE ANESTHETIST, CERTIFIED REGISTERED

## 2021-07-15 DEVICE — IMPLANTABLE DEVICE: Type: IMPLANTABLE DEVICE | Site: BACK | Status: FUNCTIONAL

## 2021-07-15 RX ORDER — LEVOTHYROXINE SODIUM 125 UG/1
125 TABLET ORAL
COMMUNITY
End: 2022-08-23

## 2021-07-15 RX ORDER — CEPHALEXIN 500 MG/1
500 CAPSULE ORAL EVERY 12 HOURS
Qty: 14 CAPSULE | Refills: 0 | Status: SHIPPED | OUTPATIENT
Start: 2021-07-15 | End: 2021-07-22

## 2021-07-15 RX ORDER — SODIUM CHLORIDE 9 MG/ML
INJECTION, SOLUTION INTRAVENOUS CONTINUOUS
Status: DISCONTINUED | OUTPATIENT
Start: 2021-07-15 | End: 2021-07-15 | Stop reason: HOSPADM

## 2021-07-15 RX ORDER — LIDOCAINE HYDROCHLORIDE 20 MG/ML
INJECTION, SOLUTION EPIDURAL; INFILTRATION; INTRACAUDAL; PERINEURAL
Status: DISCONTINUED | OUTPATIENT
Start: 2021-07-15 | End: 2021-07-15

## 2021-07-15 RX ORDER — FAMOTIDINE 10 MG/ML
INJECTION INTRAVENOUS
Status: DISCONTINUED | OUTPATIENT
Start: 2021-07-15 | End: 2021-07-15

## 2021-07-15 RX ORDER — BUPIVACAINE HYDROCHLORIDE 2.5 MG/ML
INJECTION, SOLUTION INFILTRATION; PERINEURAL
Status: DISCONTINUED | OUTPATIENT
Start: 2021-07-15 | End: 2021-07-15 | Stop reason: HOSPADM

## 2021-07-15 RX ORDER — DEXAMETHASONE SODIUM PHOSPHATE 4 MG/ML
INJECTION, SOLUTION INTRA-ARTICULAR; INTRALESIONAL; INTRAMUSCULAR; INTRAVENOUS; SOFT TISSUE
Status: DISCONTINUED | OUTPATIENT
Start: 2021-07-15 | End: 2021-07-15

## 2021-07-15 RX ORDER — SODIUM CHLORIDE 9 MG/ML
INJECTION, SOLUTION INTRAVENOUS CONTINUOUS PRN
Status: COMPLETED | OUTPATIENT
Start: 2021-07-15 | End: 2021-07-15

## 2021-07-15 RX ORDER — FENTANYL CITRATE 50 UG/ML
INJECTION, SOLUTION INTRAMUSCULAR; INTRAVENOUS
Status: DISCONTINUED | OUTPATIENT
Start: 2021-07-15 | End: 2021-07-15

## 2021-07-15 RX ORDER — GLYCOPYRROLATE 0.2 MG/ML
INJECTION INTRAMUSCULAR; INTRAVENOUS
Status: DISCONTINUED | OUTPATIENT
Start: 2021-07-15 | End: 2021-07-15

## 2021-07-15 RX ORDER — METOCLOPRAMIDE HYDROCHLORIDE 5 MG/ML
INJECTION INTRAMUSCULAR; INTRAVENOUS
Status: DISCONTINUED | OUTPATIENT
Start: 2021-07-15 | End: 2021-07-15

## 2021-07-15 RX ORDER — ONDANSETRON 2 MG/ML
INJECTION INTRAMUSCULAR; INTRAVENOUS
Status: DISCONTINUED | OUTPATIENT
Start: 2021-07-15 | End: 2021-07-15

## 2021-07-15 RX ORDER — PROPOFOL 10 MG/ML
INJECTION, EMULSION INTRAVENOUS
Status: DISCONTINUED | OUTPATIENT
Start: 2021-07-15 | End: 2021-07-15

## 2021-07-15 RX ADMIN — GLYCOPYRROLATE 0.2 MG: 0.2 INJECTION INTRAMUSCULAR; INTRAVENOUS at 03:07

## 2021-07-15 RX ADMIN — CEFAZOLIN 2 G: 1 INJECTION, POWDER, FOR SOLUTION INTRAMUSCULAR; INTRAVENOUS; PARENTERAL at 03:07

## 2021-07-15 RX ADMIN — PROPOFOL 30 MG: 10 INJECTION, EMULSION INTRAVENOUS at 03:07

## 2021-07-15 RX ADMIN — PROPOFOL 50 MG: 10 INJECTION, EMULSION INTRAVENOUS at 03:07

## 2021-07-15 RX ADMIN — SODIUM CHLORIDE: 9 INJECTION, SOLUTION INTRAVENOUS at 04:07

## 2021-07-15 RX ADMIN — DEXAMETHASONE SODIUM PHOSPHATE 8 MG: 4 INJECTION, SOLUTION INTRA-ARTICULAR; INTRALESIONAL; INTRAMUSCULAR; INTRAVENOUS; SOFT TISSUE at 04:07

## 2021-07-15 RX ADMIN — PROPOFOL 20 MG: 10 INJECTION, EMULSION INTRAVENOUS at 02:07

## 2021-07-15 RX ADMIN — METOCLOPRAMIDE 10 MG: 5 INJECTION, SOLUTION INTRAMUSCULAR; INTRAVENOUS at 03:07

## 2021-07-15 RX ADMIN — LIDOCAINE HYDROCHLORIDE 60 MG: 20 INJECTION, SOLUTION INTRAVENOUS at 03:07

## 2021-07-15 RX ADMIN — FAMOTIDINE 20 MG: 10 INJECTION, SOLUTION INTRAVENOUS at 04:07

## 2021-07-15 RX ADMIN — FENTANYL CITRATE 50 MCG: 50 INJECTION INTRAMUSCULAR; INTRAVENOUS at 03:07

## 2021-07-15 RX ADMIN — SODIUM CHLORIDE: 9 INJECTION, SOLUTION INTRAVENOUS at 03:07

## 2021-07-15 RX ADMIN — ONDANSETRON 4 MG: 2 INJECTION INTRAMUSCULAR; INTRAVENOUS at 03:07

## 2021-07-15 RX ADMIN — PROPOFOL 20 MG: 10 INJECTION, EMULSION INTRAVENOUS at 04:07

## 2021-07-15 RX ADMIN — PROPOFOL 30 MG: 10 INJECTION, EMULSION INTRAVENOUS at 04:07

## 2021-07-15 RX ADMIN — PROPOFOL 40 MG: 10 INJECTION, EMULSION INTRAVENOUS at 03:07

## 2021-07-15 RX ADMIN — PROPOFOL 60 MG: 10 INJECTION, EMULSION INTRAVENOUS at 04:07

## 2021-07-15 RX ADMIN — PROPOFOL 60 MG: 10 INJECTION, EMULSION INTRAVENOUS at 03:07

## 2021-07-20 ENCOUNTER — OFFICE VISIT (OUTPATIENT)
Dept: PAIN MEDICINE | Facility: CLINIC | Age: 51
End: 2021-07-20
Payer: MEDICARE

## 2021-07-20 VITALS
DIASTOLIC BLOOD PRESSURE: 69 MMHG | HEIGHT: 62 IN | SYSTOLIC BLOOD PRESSURE: 136 MMHG | HEART RATE: 81 BPM | WEIGHT: 221 LBS | BODY MASS INDEX: 40.67 KG/M2

## 2021-07-20 DIAGNOSIS — G89.4 CHRONIC PAIN SYNDROME: Chronic | ICD-10-CM

## 2021-07-20 DIAGNOSIS — M96.1 POSTLAMINECTOMY SYNDROME, LUMBAR REGION: Primary | Chronic | ICD-10-CM

## 2021-07-20 DIAGNOSIS — M54.17 LUMBOSACRAL RADICULOPATHY: Chronic | ICD-10-CM

## 2021-07-20 PROCEDURE — 99024 POSTOP FOLLOW-UP VISIT: CPT | Mod: ,,, | Performed by: PAIN MEDICINE

## 2021-07-20 PROCEDURE — 3008F BODY MASS INDEX DOCD: CPT | Mod: CPTII,,, | Performed by: PAIN MEDICINE

## 2021-07-20 PROCEDURE — 1125F PR PAIN SEVERITY QUANTIFIED, PAIN PRESENT: ICD-10-PCS | Mod: CPTII,,, | Performed by: PAIN MEDICINE

## 2021-07-20 PROCEDURE — 99214 OFFICE O/P EST MOD 30 MIN: CPT | Mod: PBBFAC | Performed by: PAIN MEDICINE

## 2021-07-20 PROCEDURE — 3008F PR BODY MASS INDEX (BMI) DOCUMENTED: ICD-10-PCS | Mod: CPTII,,, | Performed by: PAIN MEDICINE

## 2021-07-20 PROCEDURE — 1125F AMNT PAIN NOTED PAIN PRSNT: CPT | Mod: CPTII,,, | Performed by: PAIN MEDICINE

## 2021-07-20 PROCEDURE — 99024 PR POST-OP FOLLOW-UP VISIT: ICD-10-PCS | Mod: ,,, | Performed by: PAIN MEDICINE

## 2021-07-20 RX ORDER — TIZANIDINE HYDROCHLORIDE 4 MG/1
1 CAPSULE, GELATIN COATED ORAL EVERY 12 HOURS
Qty: 60 CAPSULE | Refills: 3 | Status: SHIPPED | OUTPATIENT
Start: 2021-07-20 | End: 2021-10-25 | Stop reason: SDUPTHER

## 2021-07-20 RX ORDER — HYDROCODONE BITARTRATE AND ACETAMINOPHEN 10; 325 MG/1; MG/1
1 TABLET ORAL EVERY 12 HOURS PRN
Qty: 60 TABLET | Refills: 0 | Status: SHIPPED | OUTPATIENT
Start: 2021-08-23 | End: 2021-09-20 | Stop reason: SDUPTHER

## 2021-07-20 RX ORDER — HYDROCODONE BITARTRATE AND ACETAMINOPHEN 10; 325 MG/1; MG/1
1 TABLET ORAL EVERY 12 HOURS PRN
Qty: 60 TABLET | Refills: 0 | Status: SHIPPED | OUTPATIENT
Start: 2021-07-23 | End: 2021-08-22

## 2021-07-20 RX ORDER — GABAPENTIN 600 MG/1
600 TABLET ORAL 3 TIMES DAILY
Qty: 90 TABLET | Refills: 3 | Status: SHIPPED | OUTPATIENT
Start: 2021-07-20 | End: 2021-09-20 | Stop reason: SDUPTHER

## 2021-09-20 ENCOUNTER — OFFICE VISIT (OUTPATIENT)
Dept: PAIN MEDICINE | Facility: CLINIC | Age: 51
End: 2021-09-20
Payer: MEDICARE

## 2021-09-20 VITALS
SYSTOLIC BLOOD PRESSURE: 141 MMHG | HEART RATE: 90 BPM | BODY MASS INDEX: 40.3 KG/M2 | WEIGHT: 219 LBS | HEIGHT: 62 IN | RESPIRATION RATE: 20 BRPM | DIASTOLIC BLOOD PRESSURE: 75 MMHG

## 2021-09-20 DIAGNOSIS — M96.1 POSTLAMINECTOMY SYNDROME, LUMBAR REGION: Chronic | ICD-10-CM

## 2021-09-20 DIAGNOSIS — Z79.899 ENCOUNTER FOR LONG-TERM (CURRENT) USE OF OTHER MEDICATIONS: ICD-10-CM

## 2021-09-20 DIAGNOSIS — M53.3 DISORDER OF SACRUM: Chronic | ICD-10-CM

## 2021-09-20 DIAGNOSIS — M54.17 LUMBOSACRAL RADICULOPATHY: Primary | Chronic | ICD-10-CM

## 2021-09-20 LAB
CTP QC/QA: YES
POC (AMP) AMPHETAMINE: NEGATIVE
POC (BAR) BARBITURATES: NEGATIVE
POC (BUP) BUPRENORPHINE: NEGATIVE
POC (BZO) BENZODIAZEPINES: ABNORMAL
POC (COC) COCAINE: NEGATIVE
POC (MDMA) METHYLENEDIOXYMETHAMPHETAMINE 3,4: NEGATIVE
POC (MET) METHAMPHETAMINE: NEGATIVE
POC (MOP) OPIATES: ABNORMAL
POC (MTD) METHADONE: NEGATIVE
POC (OXY) OXYCODONE: NEGATIVE
POC (PCP) PHENCYCLIDINE: NEGATIVE
POC (TCA) TRICYCLIC ANTIDEPRESSANTS: NEGATIVE
POC TEMPERATURE (URINE): 92
POC THC: NEGATIVE

## 2021-09-20 PROCEDURE — 99214 PR OFFICE/OUTPT VISIT, EST, LEVL IV, 30-39 MIN: ICD-10-PCS | Mod: S$PBB,,, | Performed by: PHYSICIAN ASSISTANT

## 2021-09-20 PROCEDURE — 99214 OFFICE O/P EST MOD 30 MIN: CPT | Mod: S$PBB,,, | Performed by: PHYSICIAN ASSISTANT

## 2021-09-20 PROCEDURE — G0481 DRUG TEST DEF 8-14 CLASSES: HCPCS | Mod: ,,, | Performed by: CLINICAL MEDICAL LABORATORY

## 2021-09-20 PROCEDURE — G0481 DRUG SCREEN DEFINITIVE 14, URINE: ICD-10-PCS | Mod: ,,, | Performed by: CLINICAL MEDICAL LABORATORY

## 2021-09-20 PROCEDURE — 99214 OFFICE O/P EST MOD 30 MIN: CPT | Mod: PBBFAC | Performed by: PHYSICIAN ASSISTANT

## 2021-09-20 PROCEDURE — 80305 DRUG TEST PRSMV DIR OPT OBS: CPT | Mod: PBBFAC | Performed by: PHYSICIAN ASSISTANT

## 2021-09-20 RX ORDER — GABAPENTIN 600 MG/1
600 TABLET ORAL EVERY 8 HOURS
Qty: 90 TABLET | Refills: 1 | Status: SHIPPED | OUTPATIENT
Start: 2021-09-20 | End: 2021-10-25 | Stop reason: SDUPTHER

## 2021-09-20 RX ORDER — HYDROCODONE BITARTRATE AND ACETAMINOPHEN 10; 325 MG/1; MG/1
1 TABLET ORAL EVERY 12 HOURS
Qty: 60 TABLET | Refills: 0 | Status: SHIPPED | OUTPATIENT
Start: 2021-10-20 | End: 2021-10-25 | Stop reason: SDUPTHER

## 2021-09-20 RX ORDER — METHOCARBAMOL 500 MG/1
500 TABLET, FILM COATED ORAL EVERY 8 HOURS
Qty: 40 TABLET | Refills: 1 | Status: SHIPPED | OUTPATIENT
Start: 2021-09-20 | End: 2021-10-25

## 2021-09-20 RX ORDER — HYDROCODONE BITARTRATE AND ACETAMINOPHEN 10; 325 MG/1; MG/1
1 TABLET ORAL EVERY 12 HOURS
Qty: 60 TABLET | Refills: 0 | Status: SHIPPED | OUTPATIENT
Start: 2021-09-20 | End: 2021-10-20

## 2021-09-22 LAB
6-ACETYLMORPHINE, URINE (RUSH): NEGATIVE 10 NG/ML
7-AMINOCLONAZEPAM, URINE (RUSH): NEGATIVE 25 NG/ML
A-HYDROXYALPRAZOLAM, URINE (RUSH): NEGATIVE 25 NG/ML
ACETYL FENTANYL, URINE (RUSH): NEGATIVE 2.5 NG/ML
ACETYL NORFENTANYL OXALATE, URINE (RUSH): NEGATIVE 5 NG/ML
AMPHET UR QL SCN: NEGATIVE 100 NG/ML
BENZOYLECGONINE, URINE (RUSH): NEGATIVE 100 NG/ML
BUPRENORPHINE UR QL SCN: NEGATIVE 25 NG/ML
CODEINE, URINE (RUSH): NEGATIVE 25 NG/ML
CREAT UR-MCNC: 304 MG/DL (ref 28–219)
EDDP, URINE (RUSH): NEGATIVE 25 NG/ML
FENTANYL, URINE (RUSH): NEGATIVE 2.5 NG/ML
HYDROCODONE, URINE (RUSH): >250 25 NG/ML
HYDROMORPHONE, URINE (RUSH): 63.1 25 NG/ML
LORAZEPAM, URINE (RUSH): NEGATIVE 25 NG/ML
METHADONE UR QL SCN: NEGATIVE 25 NG/ML
METHAMPHET UR QL SCN: NEGATIVE 100 NG/ML
MORPHINE, URINE (RUSH): NEGATIVE 25 NG/ML
NORBUPRENORPHINE, URINE (RUSH): NEGATIVE 25 NG/ML
NORDIAZEPAM, URINE (RUSH): NEGATIVE 25 NG/ML
NORFENTANYL OXALATE, URINE (RUSH): NEGATIVE 5 NG/ML
NORHYDROCODONE, URINE (RUSH): >500 50 NG/ML
NOROXYCODONE HCL, URINE (RUSH): NEGATIVE 50 NG/ML
OXAZEPAM, URINE (RUSH): NEGATIVE 25 NG/ML
OXYCODONE UR QL SCN: NEGATIVE 25 NG/ML
OXYMORPHONE, URINE (RUSH): NEGATIVE 25 NG/ML
PH UR STRIP: 6 PH UNITS
SP GR UR STRIP: >=1.03
TAPENTADOL, URINE (RUSH): NEGATIVE 25 NG/ML
TEMAZEPAM, URINE (RUSH): NEGATIVE 25 NG/ML
THC-COOH, URINE (RUSH): NEGATIVE 25 NG/ML
TRAMADOL, URINE (RUSH): NEGATIVE 100 NG/ML

## 2021-10-03 ENCOUNTER — HOSPITAL ENCOUNTER (EMERGENCY)
Facility: HOSPITAL | Age: 51
Discharge: HOME | End: 2021-10-03
Attending: SURGERY
Payer: MEDICARE

## 2021-10-03 VITALS
HEIGHT: 62 IN | OXYGEN SATURATION: 98 % | SYSTOLIC BLOOD PRESSURE: 120 MMHG | DIASTOLIC BLOOD PRESSURE: 85 MMHG | WEIGHT: 221.88 LBS | TEMPERATURE: 98 F | RESPIRATION RATE: 20 BRPM | BODY MASS INDEX: 40.83 KG/M2 | HEART RATE: 66 BPM

## 2021-10-03 DIAGNOSIS — J32.9 SINUSITIS, UNSPECIFIED CHRONICITY, UNSPECIFIED LOCATION: Primary | ICD-10-CM

## 2021-10-03 LAB
FLUAV AG UPPER RESP QL IA.RAPID: NEGATIVE
FLUBV AG UPPER RESP QL IA.RAPID: NEGATIVE
SARS-COV+SARS-COV-2 AG RESP QL IA.RAPID: NEGATIVE

## 2021-10-03 PROCEDURE — 99283 EMERGENCY DEPT VISIT LOW MDM: CPT | Performed by: SURGERY

## 2021-10-03 PROCEDURE — 99284 EMERGENCY DEPT VISIT MOD MDM: CPT | Mod: 25,CS

## 2021-10-03 PROCEDURE — 87428 SARSCOV & INF VIR A&B AG IA: CPT | Performed by: SURGERY

## 2021-10-03 RX ORDER — FLUTICASONE PROPIONATE 50 MCG
1 SPRAY, SUSPENSION (ML) NASAL 2 TIMES DAILY PRN
Qty: 15.8 ML | Refills: 0 | Status: SHIPPED | OUTPATIENT
Start: 2021-10-03 | End: 2022-08-19

## 2021-10-03 RX ORDER — AZITHROMYCIN 250 MG/1
500 TABLET, FILM COATED ORAL DAILY
COMMUNITY
End: 2022-08-19 | Stop reason: ALTCHOICE

## 2021-10-04 ENCOUNTER — TELEPHONE (OUTPATIENT)
Dept: EMERGENCY MEDICINE | Facility: HOSPITAL | Age: 51
End: 2021-10-04

## 2021-10-21 ENCOUNTER — TELEPHONE (OUTPATIENT)
Dept: NEUROLOGY | Facility: CLINIC | Age: 51
End: 2021-10-21

## 2021-10-21 DIAGNOSIS — G43.719 INTRACTABLE CHRONIC MIGRAINE WITHOUT AURA AND WITHOUT STATUS MIGRAINOSUS: Primary | ICD-10-CM

## 2021-10-21 RX ORDER — PROMETHAZINE HYDROCHLORIDE 25 MG/1
TABLET ORAL
Qty: 20 TABLET | Refills: 2 | Status: SHIPPED | OUTPATIENT
Start: 2021-10-21 | End: 2022-01-10 | Stop reason: SDUPTHER

## 2021-10-25 ENCOUNTER — OFFICE VISIT (OUTPATIENT)
Dept: PAIN MEDICINE | Facility: CLINIC | Age: 51
End: 2021-10-25
Payer: MEDICARE

## 2021-10-25 ENCOUNTER — HOSPITAL ENCOUNTER (OUTPATIENT)
Dept: RADIOLOGY | Facility: HOSPITAL | Age: 51
Discharge: HOME | End: 2021-10-25
Attending: PHYSICIAN ASSISTANT
Payer: MEDICARE

## 2021-10-25 VITALS
BODY MASS INDEX: 39.75 KG/M2 | HEIGHT: 62 IN | DIASTOLIC BLOOD PRESSURE: 81 MMHG | RESPIRATION RATE: 18 BRPM | SYSTOLIC BLOOD PRESSURE: 119 MMHG | HEART RATE: 63 BPM | WEIGHT: 216 LBS

## 2021-10-25 DIAGNOSIS — M54.6 THORACIC SPINE PAIN: ICD-10-CM

## 2021-10-25 DIAGNOSIS — M53.3 DISORDER OF SACRUM: Chronic | ICD-10-CM

## 2021-10-25 DIAGNOSIS — M54.9 DORSALGIA, UNSPECIFIED: ICD-10-CM

## 2021-10-25 DIAGNOSIS — M54.6 PAIN IN THORACIC SPINE: ICD-10-CM

## 2021-10-25 DIAGNOSIS — M54.17 LUMBOSACRAL RADICULOPATHY: Chronic | ICD-10-CM

## 2021-10-25 DIAGNOSIS — M54.6 THORACIC SPINE PAIN: Primary | ICD-10-CM

## 2021-10-25 DIAGNOSIS — M96.1 POSTLAMINECTOMY SYNDROME, LUMBAR REGION: Chronic | ICD-10-CM

## 2021-10-25 PROCEDURE — 72100 X-RAY EXAM L-S SPINE 2/3 VWS: CPT | Mod: TC

## 2021-10-25 PROCEDURE — 72070 XR THORACIC SPINE AP LATERAL: ICD-10-PCS | Mod: 26,,, | Performed by: RADIOLOGY

## 2021-10-25 PROCEDURE — 1159F PR MEDICATION LIST DOCUMENTED IN MEDICAL RECORD: ICD-10-PCS | Mod: CPTII,,, | Performed by: PHYSICIAN ASSISTANT

## 2021-10-25 PROCEDURE — 3079F DIAST BP 80-89 MM HG: CPT | Mod: CPTII,,, | Performed by: PHYSICIAN ASSISTANT

## 2021-10-25 PROCEDURE — 99214 OFFICE O/P EST MOD 30 MIN: CPT | Mod: S$PBB,25,, | Performed by: PHYSICIAN ASSISTANT

## 2021-10-25 PROCEDURE — 72100 XR LUMBAR SPINE 2 OR 3 VIEWS: ICD-10-PCS | Mod: 26,,, | Performed by: RADIOLOGY

## 2021-10-25 PROCEDURE — 96372 THER/PROPH/DIAG INJ SC/IM: CPT | Mod: PBBFAC | Performed by: PHYSICIAN ASSISTANT

## 2021-10-25 PROCEDURE — 72070 X-RAY EXAM THORAC SPINE 2VWS: CPT | Mod: 26,,, | Performed by: RADIOLOGY

## 2021-10-25 PROCEDURE — 72100 X-RAY EXAM L-S SPINE 2/3 VWS: CPT | Mod: 26,,, | Performed by: RADIOLOGY

## 2021-10-25 PROCEDURE — 3008F PR BODY MASS INDEX (BMI) DOCUMENTED: ICD-10-PCS | Mod: CPTII,,, | Performed by: PHYSICIAN ASSISTANT

## 2021-10-25 PROCEDURE — 3074F PR MOST RECENT SYSTOLIC BLOOD PRESSURE < 130 MM HG: ICD-10-PCS | Mod: CPTII,,, | Performed by: PHYSICIAN ASSISTANT

## 2021-10-25 PROCEDURE — 1159F MED LIST DOCD IN RCRD: CPT | Mod: CPTII,,, | Performed by: PHYSICIAN ASSISTANT

## 2021-10-25 PROCEDURE — 99214 PR OFFICE/OUTPT VISIT, EST, LEVL IV, 30-39 MIN: ICD-10-PCS | Mod: S$PBB,25,, | Performed by: PHYSICIAN ASSISTANT

## 2021-10-25 PROCEDURE — 99215 OFFICE O/P EST HI 40 MIN: CPT | Mod: PBBFAC | Performed by: PHYSICIAN ASSISTANT

## 2021-10-25 PROCEDURE — 3079F PR MOST RECENT DIASTOLIC BLOOD PRESSURE 80-89 MM HG: ICD-10-PCS | Mod: CPTII,,, | Performed by: PHYSICIAN ASSISTANT

## 2021-10-25 PROCEDURE — 3074F SYST BP LT 130 MM HG: CPT | Mod: CPTII,,, | Performed by: PHYSICIAN ASSISTANT

## 2021-10-25 PROCEDURE — 3008F BODY MASS INDEX DOCD: CPT | Mod: CPTII,,, | Performed by: PHYSICIAN ASSISTANT

## 2021-10-25 PROCEDURE — 72070 X-RAY EXAM THORAC SPINE 2VWS: CPT | Mod: TC

## 2021-10-25 RX ORDER — GABAPENTIN 600 MG/1
600 TABLET ORAL EVERY 8 HOURS
Qty: 90 TABLET | Refills: 1 | Status: SHIPPED | OUTPATIENT
Start: 2021-10-25 | End: 2021-12-16 | Stop reason: SDUPTHER

## 2021-10-25 RX ORDER — TIZANIDINE HYDROCHLORIDE 4 MG/1
1 CAPSULE, GELATIN COATED ORAL EVERY 8 HOURS
Qty: 90 CAPSULE | Refills: 1 | Status: SHIPPED | OUTPATIENT
Start: 2021-10-25 | End: 2021-12-16 | Stop reason: SDUPTHER

## 2021-10-25 RX ORDER — KETOROLAC TROMETHAMINE 30 MG/ML
60 INJECTION, SOLUTION INTRAMUSCULAR; INTRAVENOUS
Status: COMPLETED | OUTPATIENT
Start: 2021-10-25 | End: 2021-10-25

## 2021-10-25 RX ORDER — HYDROCODONE BITARTRATE AND ACETAMINOPHEN 10; 325 MG/1; MG/1
1 TABLET ORAL EVERY 12 HOURS
Qty: 60 TABLET | Refills: 0 | Status: SHIPPED | OUTPATIENT
Start: 2021-12-18 | End: 2021-12-16 | Stop reason: SDUPTHER

## 2021-10-25 RX ORDER — HYDROCODONE BITARTRATE AND ACETAMINOPHEN 10; 325 MG/1; MG/1
1 TABLET ORAL EVERY 12 HOURS
Qty: 60 TABLET | Refills: 0 | Status: SHIPPED | OUTPATIENT
Start: 2021-11-19 | End: 2021-12-16 | Stop reason: SDUPTHER

## 2021-10-25 RX ADMIN — KETOROLAC TROMETHAMINE 60 MG: 30 INJECTION, SOLUTION INTRAMUSCULAR at 10:10

## 2021-10-26 ENCOUNTER — TELEPHONE (OUTPATIENT)
Dept: PAIN MEDICINE | Facility: CLINIC | Age: 51
End: 2021-10-26
Payer: MEDICARE

## 2021-11-02 ENCOUNTER — HOSPITAL ENCOUNTER (OUTPATIENT)
Dept: RADIOLOGY | Facility: HOSPITAL | Age: 51
Discharge: HOME | End: 2021-11-02
Attending: NEUROLOGICAL SURGERY
Payer: MEDICARE

## 2021-11-02 DIAGNOSIS — T85.122A: ICD-10-CM

## 2021-11-02 PROCEDURE — 72131 CT LUMBAR SPINE W/O DYE: CPT | Mod: TC

## 2021-11-02 PROCEDURE — 72128 CT CHEST SPINE W/O DYE: CPT | Mod: TC

## 2021-11-16 ENCOUNTER — PATIENT MESSAGE (OUTPATIENT)
Dept: PAIN MEDICINE | Facility: CLINIC | Age: 51
End: 2021-11-16
Payer: MEDICARE

## 2021-12-22 ENCOUNTER — OFFICE VISIT (OUTPATIENT)
Dept: PAIN MEDICINE | Facility: CLINIC | Age: 51
End: 2021-12-22
Payer: MEDICARE

## 2021-12-22 VITALS
HEART RATE: 61 BPM | HEIGHT: 62 IN | RESPIRATION RATE: 18 BRPM | SYSTOLIC BLOOD PRESSURE: 146 MMHG | WEIGHT: 216 LBS | DIASTOLIC BLOOD PRESSURE: 65 MMHG | OXYGEN SATURATION: 98 % | BODY MASS INDEX: 39.75 KG/M2

## 2021-12-22 DIAGNOSIS — G89.29 CHEST WALL PAIN, CHRONIC: Chronic | ICD-10-CM

## 2021-12-22 DIAGNOSIS — Z79.899 ENCOUNTER FOR LONG-TERM (CURRENT) USE OF OTHER MEDICATIONS: ICD-10-CM

## 2021-12-22 DIAGNOSIS — M53.3 DISORDER OF SACRUM: Chronic | ICD-10-CM

## 2021-12-22 DIAGNOSIS — R07.89 CHEST WALL PAIN, CHRONIC: Chronic | ICD-10-CM

## 2021-12-22 DIAGNOSIS — M54.17 LUMBOSACRAL RADICULOPATHY: Primary | Chronic | ICD-10-CM

## 2021-12-22 DIAGNOSIS — M96.1 POSTLAMINECTOMY SYNDROME, LUMBAR REGION: Chronic | ICD-10-CM

## 2021-12-22 LAB
CTP QC/QA: YES
POC (AMP) AMPHETAMINE: NEGATIVE
POC (BAR) BARBITURATES: NEGATIVE
POC (BUP) BUPRENORPHINE: NEGATIVE
POC (BZO) BENZODIAZEPINES: NEGATIVE
POC (COC) COCAINE: NEGATIVE
POC (MDMA) METHYLENEDIOXYMETHAMPHETAMINE 3,4: NEGATIVE
POC (MET) METHAMPHETAMINE: NEGATIVE
POC (MOP) OPIATES: ABNORMAL
POC (MTD) METHADONE: NEGATIVE
POC (OXY) OXYCODONE: NEGATIVE
POC (PCP) PHENCYCLIDINE: NEGATIVE
POC (TCA) TRICYCLIC ANTIDEPRESSANTS: NEGATIVE
POC TEMPERATURE (URINE): 90
POC THC: NEGATIVE

## 2021-12-22 PROCEDURE — 99214 PR OFFICE/OUTPT VISIT, EST, LEVL IV, 30-39 MIN: ICD-10-PCS | Mod: S$PBB,,, | Performed by: PHYSICIAN ASSISTANT

## 2021-12-22 PROCEDURE — 99215 OFFICE O/P EST HI 40 MIN: CPT | Mod: PBBFAC | Performed by: PHYSICIAN ASSISTANT

## 2021-12-22 PROCEDURE — 80305 DRUG TEST PRSMV DIR OPT OBS: CPT | Mod: PBBFAC | Performed by: PHYSICIAN ASSISTANT

## 2021-12-22 PROCEDURE — 99214 OFFICE O/P EST MOD 30 MIN: CPT | Mod: S$PBB,,, | Performed by: PHYSICIAN ASSISTANT

## 2021-12-22 RX ORDER — TIZANIDINE HYDROCHLORIDE 4 MG/1
1 CAPSULE, GELATIN COATED ORAL EVERY 8 HOURS
Qty: 90 CAPSULE | Refills: 1 | Status: SHIPPED | OUTPATIENT
Start: 2021-12-22 | End: 2022-04-20

## 2021-12-22 RX ORDER — HYDROCODONE BITARTRATE AND ACETAMINOPHEN 10; 325 MG/1; MG/1
1 TABLET ORAL EVERY 12 HOURS
Qty: 60 TABLET | Refills: 0 | Status: SHIPPED | OUTPATIENT
Start: 2022-01-18 | End: 2022-02-14 | Stop reason: SDUPTHER

## 2021-12-22 RX ORDER — TRAZODONE HYDROCHLORIDE 100 MG/1
100 TABLET ORAL NIGHTLY
COMMUNITY
End: 2022-08-18

## 2021-12-22 RX ORDER — HYDROCODONE BITARTRATE AND ACETAMINOPHEN 10; 325 MG/1; MG/1
1 TABLET ORAL EVERY 12 HOURS
Qty: 60 TABLET | Refills: 0 | Status: SHIPPED | OUTPATIENT
Start: 2022-02-17 | End: 2022-02-14 | Stop reason: SDUPTHER

## 2021-12-22 RX ORDER — GABAPENTIN 600 MG/1
600 TABLET ORAL EVERY 8 HOURS
Qty: 90 TABLET | Refills: 1 | Status: SHIPPED | OUTPATIENT
Start: 2021-12-22 | End: 2022-02-14 | Stop reason: SDUPTHER

## 2022-01-10 ENCOUNTER — OFFICE VISIT (OUTPATIENT)
Dept: NEUROLOGY | Facility: CLINIC | Age: 52
End: 2022-01-10
Payer: MEDICARE

## 2022-01-10 VITALS
BODY MASS INDEX: 39.75 KG/M2 | DIASTOLIC BLOOD PRESSURE: 86 MMHG | RESPIRATION RATE: 18 BRPM | WEIGHT: 216 LBS | HEIGHT: 62 IN | OXYGEN SATURATION: 99 % | SYSTOLIC BLOOD PRESSURE: 138 MMHG | HEART RATE: 69 BPM

## 2022-01-10 DIAGNOSIS — G43.719 INTRACTABLE CHRONIC MIGRAINE WITHOUT AURA AND WITHOUT STATUS MIGRAINOSUS: Primary | ICD-10-CM

## 2022-01-10 PROCEDURE — 1160F PR REVIEW ALL MEDS BY PRESCRIBER/CLIN PHARMACIST DOCUMENTED: ICD-10-PCS | Mod: CPTII,,, | Performed by: NURSE PRACTITIONER

## 2022-01-10 PROCEDURE — 1159F PR MEDICATION LIST DOCUMENTED IN MEDICAL RECORD: ICD-10-PCS | Mod: CPTII,,, | Performed by: NURSE PRACTITIONER

## 2022-01-10 PROCEDURE — 1160F RVW MEDS BY RX/DR IN RCRD: CPT | Mod: CPTII,,, | Performed by: NURSE PRACTITIONER

## 2022-01-10 PROCEDURE — 3075F SYST BP GE 130 - 139MM HG: CPT | Mod: CPTII,,, | Performed by: NURSE PRACTITIONER

## 2022-01-10 PROCEDURE — 3079F PR MOST RECENT DIASTOLIC BLOOD PRESSURE 80-89 MM HG: ICD-10-PCS | Mod: CPTII,,, | Performed by: NURSE PRACTITIONER

## 2022-01-10 PROCEDURE — 99214 PR OFFICE/OUTPT VISIT, EST, LEVL IV, 30-39 MIN: ICD-10-PCS | Mod: S$PBB,,, | Performed by: NURSE PRACTITIONER

## 2022-01-10 PROCEDURE — 99214 OFFICE O/P EST MOD 30 MIN: CPT | Mod: S$PBB,,, | Performed by: NURSE PRACTITIONER

## 2022-01-10 PROCEDURE — 3008F BODY MASS INDEX DOCD: CPT | Mod: CPTII,,, | Performed by: NURSE PRACTITIONER

## 2022-01-10 PROCEDURE — 99215 OFFICE O/P EST HI 40 MIN: CPT | Mod: PBBFAC | Performed by: NURSE PRACTITIONER

## 2022-01-10 PROCEDURE — 3008F PR BODY MASS INDEX (BMI) DOCUMENTED: ICD-10-PCS | Mod: CPTII,,, | Performed by: NURSE PRACTITIONER

## 2022-01-10 PROCEDURE — 3075F PR MOST RECENT SYSTOLIC BLOOD PRESS GE 130-139MM HG: ICD-10-PCS | Mod: CPTII,,, | Performed by: NURSE PRACTITIONER

## 2022-01-10 PROCEDURE — 1159F MED LIST DOCD IN RCRD: CPT | Mod: CPTII,,, | Performed by: NURSE PRACTITIONER

## 2022-01-10 PROCEDURE — 3079F DIAST BP 80-89 MM HG: CPT | Mod: CPTII,,, | Performed by: NURSE PRACTITIONER

## 2022-01-10 RX ORDER — PROMETHAZINE HYDROCHLORIDE 25 MG/1
TABLET ORAL
Qty: 20 TABLET | Refills: 2 | Status: SHIPPED | OUTPATIENT
Start: 2022-01-10 | End: 2022-07-29 | Stop reason: SDUPTHER

## 2022-01-10 RX ORDER — TOPIRAMATE 100 MG/1
100 TABLET, FILM COATED ORAL NIGHTLY
Qty: 30 TABLET | Refills: 11 | Status: SHIPPED | OUTPATIENT
Start: 2022-01-10 | End: 2022-07-20

## 2022-01-10 NOTE — PATIENT INSTRUCTIONS
1. Continue current topamax 100mg at night    2. Will submit to insurance to see if once monthly injections are covered

## 2022-01-25 ENCOUNTER — CLINICAL SUPPORT (OUTPATIENT)
Dept: UROLOGY | Facility: CLINIC | Age: 52
End: 2022-01-25
Payer: MEDICARE

## 2022-01-25 DIAGNOSIS — N39.0 URINARY TRACT INFECTION WITHOUT HEMATURIA, SITE UNSPECIFIED: Primary | ICD-10-CM

## 2022-01-25 PROCEDURE — 87186 SC STD MICRODIL/AGAR DIL: CPT | Mod: ,,, | Performed by: CLINICAL MEDICAL LABORATORY

## 2022-01-25 PROCEDURE — 87186 CULTURE, URINE: ICD-10-PCS | Mod: ,,, | Performed by: CLINICAL MEDICAL LABORATORY

## 2022-01-25 PROCEDURE — 87077 CULTURE, URINE: ICD-10-PCS | Mod: ,,, | Performed by: CLINICAL MEDICAL LABORATORY

## 2022-01-25 PROCEDURE — 99212 OFFICE O/P EST SF 10 MIN: CPT | Mod: PBBFAC | Performed by: UROLOGY

## 2022-01-25 PROCEDURE — 87086 URINE CULTURE/COLONY COUNT: CPT | Mod: ,,, | Performed by: CLINICAL MEDICAL LABORATORY

## 2022-01-25 PROCEDURE — 87077 CULTURE AEROBIC IDENTIFY: CPT | Mod: ,,, | Performed by: CLINICAL MEDICAL LABORATORY

## 2022-01-25 PROCEDURE — 87086 CULTURE, URINE: ICD-10-PCS | Mod: ,,, | Performed by: CLINICAL MEDICAL LABORATORY

## 2022-01-25 NOTE — PROGRESS NOTES
Gloria Mitchell came to the office to collect an urine specimen to be sent to clinic lab for culture.  Symptoms: hurting and burning when urinating.  No medications were given pending lab results. Urine specimen sent to clinic lab for urine culture and sensitivity.

## 2022-01-26 ENCOUNTER — PATIENT MESSAGE (OUTPATIENT)
Dept: PAIN MEDICINE | Facility: CLINIC | Age: 52
End: 2022-01-26
Payer: MEDICARE

## 2022-01-28 ENCOUNTER — TELEPHONE (OUTPATIENT)
Dept: UROLOGY | Facility: CLINIC | Age: 52
End: 2022-01-28
Payer: MEDICARE

## 2022-01-28 DIAGNOSIS — N39.0 URINARY TRACT INFECTION WITHOUT HEMATURIA, SITE UNSPECIFIED: Primary | ICD-10-CM

## 2022-01-28 LAB — UA COMPLETE W REFLEX CULTURE PNL UR: ABNORMAL

## 2022-01-28 RX ORDER — SULFAMETHOXAZOLE AND TRIMETHOPRIM 800; 160 MG/1; MG/1
1 TABLET ORAL 2 TIMES DAILY
Qty: 20 TABLET | Refills: 0 | Status: SHIPPED | OUTPATIENT
Start: 2022-01-28 | End: 2022-02-07

## 2022-02-14 NOTE — PROGRESS NOTES
Disclaimer:  This note has been generated using voice recognition software.  There may be type of graft focal areas that have been missed during a proof reading      Subjective:      Patient ID: Gloria Mitchell is a 51 y.o. female.    Chief Complaint: Low-back Pain and Leg Pain      Low-back Pain  This is a chronic problem. The current episode started more than 1 year ago. The problem occurs daily. The problem is unchanged. Associated symptoms include leg pain. Pertinent negatives include no bladder incontinence, chest pain, dysuria or fever.   Medication Refill  Associated symptoms include arthralgias. Pertinent negatives include no change in bowel habit, chest pain, coughing, diaphoresis, fatigue, fever, rash, sore throat, vertigo or vomiting.     Review of Systems   Constitutional: Negative for activity change, appetite change, diaphoresis, fatigue and fever.   HENT: Negative for drooling, ear discharge, ear pain, facial swelling, nosebleeds, sore throat, trouble swallowing, voice change and goiter.    Eyes: Negative for photophobia, pain, discharge, redness and visual disturbance.   Respiratory: Negative for apnea, cough, choking, chest tightness, shortness of breath, wheezing and stridor.    Cardiovascular: Negative for chest pain, palpitations and leg swelling.   Gastrointestinal: Negative for abdominal distention, change in bowel habit, diarrhea, rectal pain, vomiting, fecal incontinence and change in bowel habit.   Endocrine: Negative for cold intolerance, heat intolerance, polydipsia, polyphagia and polyuria.   Genitourinary: Negative for bladder incontinence, dysuria, flank pain, frequency and hot flashes.   Musculoskeletal: Positive for arthralgias, back pain, leg pain and neck stiffness.   Integumentary:  Negative for color change, pallor and rash.   Allergic/Immunologic: Negative for immunocompromised state.   Neurological: Negative for dizziness, vertigo, seizures, syncope, facial asymmetry, speech  "difficulty, light-headedness, disturbances in coordination, memory loss and coordination difficulties.   Hematological: Negative for adenopathy. Does not bruise/bleed easily.   Psychiatric/Behavioral: Negative for agitation, behavioral problems, confusion, decreased concentration, dysphoric mood, hallucinations, self-injury and suicidal ideas. The patient is not nervous/anxious and is not hyperactive.             Objective:  Vitals:    02/16/22 1109 02/16/22 1110   BP: 126/87    Pulse: 66    Resp: 19    Weight: 95.3 kg (210 lb)    Height: 5' 2" (1.575 m)    PainSc: 10-Worst pain ever 10-Worst pain ever         Physical Exam  Vitals and nursing note reviewed. Exam conducted with a chaperone present.   Constitutional:       General: She is awake. She is not in acute distress.     Appearance: Normal appearance.   HENT:      Head: Normocephalic and atraumatic.      Nose: Nose normal.      Mouth/Throat:      Mouth: Mucous membranes are moist.      Pharynx: Oropharynx is clear.   Eyes:      Conjunctiva/sclera: Conjunctivae normal.      Pupils: Pupils are equal, round, and reactive to light.   Cardiovascular:      Rate and Rhythm: Normal rate.   Pulmonary:      Effort: Pulmonary effort is normal. No respiratory distress.   Abdominal:      Palpations: Abdomen is soft.      Tenderness: There is no guarding.   Musculoskeletal:         General: Normal range of motion.      Cervical back: Normal range of motion and neck supple. No rigidity.   Skin:     General: Skin is warm and dry.      Coloration: Skin is not jaundiced or pale.   Neurological:      General: No focal deficit present.      Mental Status: She is alert and oriented to person, place, and time. Mental status is at baseline.      Cranial Nerves: Cranial nerves are intact. No cranial nerve deficit (II-XII).   Psychiatric:         Mood and Affect: Mood normal.         Behavior: Behavior normal. Behavior is cooperative.         Thought Content: Thought content normal. "           Orders Placed This Encounter   Procedures    MRI Thoracic Spine W WO Cont     Standing Status:   Future     Standing Expiration Date:   2/16/2023     Order Specific Question:   Does the patient have a pacemaker or a defibrilator (Note: Some facilities may not be able to schedule an MRI for patients with pacemakers and defibrillators. You should contact your local radiology department to determine if this is the case.)?     Answer:   No     Order Specific Question:   Does the patient have an aneurysm or surgical clip, pump, nerve/brain stimulator, middle/inner ear prosthesis, or other metal implant or foreign object (bullet, shrapnel)? If they have a card related to their implant, ask them to bring it. Issues related to the implant may cause the MRI to be delayed.     Answer:   No     Order Specific Question:   Is the patient claustrophobic?     Answer:   No     Order Specific Question:   Will the patient require sedation?     Answer:   No     Order Specific Question:   Does the patient have any of the following conditions? Diabetes, History of Renal Disease or Hypertension requiring medical therapy?     Answer:   No     Order Specific Question:   Is the patient pregnant?     Answer:   No     Order Specific Question:   May the Radiologist modify the order per protocol to meet the clinical needs of the patient?     Answer:   Yes     Order Specific Question:   Is this part of a Research Study?     Answer:   No     Order Specific Question:   Recist criteria?     Answer:   No     Order Specific Question:   Will this service be billed to a Worker's Comp policy?     Answer:   No     Order Specific Question:   Does the patient have on a skin patch for medication with aluminized backing?     Answer:   No        CT Lumbar Spine Without Contrast  Narrative: EXAMINATION:  CT LUMBAR SPINE WITHOUT CONTRAST    CLINICAL HISTORY:  Displacement of implanted electronic neurostimulator of spinal cord electrode (lead),  initial encounter    TECHNIQUE:  Axial CT imaging of the lumbar spine is performed without contrast.  Computer reformatting is viewed in the sagittal and coronal planes.    CT dose reduction technique used - Dose Rite and tube current modulation.    COMPARISON:  25 October 2021    FINDINGS:  No fracture is seen.  Alignment of the spine is within normal limits.  Vertebral body heights are normal.    There is loss of disc space and degenerative change in mild to moderate at L4-5.  The remaining levels show mild changes.  Moderate amount of facet joint degenerative change present lower lumbar spine.  No other abnormality is demonstrated.  Impression: Degenerative changes as described above.    Electronically signed by: Hernán Bo  Date:    11/02/2021  Time:    14:18  CT Thoracic Spine Without Contrast  Narrative: EXAMINATION:  CT THORACIC SPINE WITHOUT CONTRAST    CLINICAL HISTORY:  Displacement of implanted electronic neurostimulator of spinal cord electrode (lead), initial encounter    TECHNIQUE:  Axial CT imaging of the thoracic spine is performed without contrast.  Computer reformatting is viewed in the sagittal and coronal planes.    CT dose reduction technique used - Dose Rite and tube current modulation.    COMPARISON:  None available    FINDINGS:  No fracture is seen.  Alignment of the thoracic spine is within normal limits.  Vertebral body heights are normal.  Spinal leads overlie the posterior spinal canal at the T7-T8 level.  Laminectomy changes posteriorly at the T8 level.  No other abnormality is demonstrated.  Impression: Spinal leads present in the posterior spinal canal at T7-T8 level.    Electronically signed by: Hernán Bo  Date:    11/02/2021  Time:    13:54       Clinical Support on 01/25/2022   Component Date Value Ref Range Status    Culture, Urine 01/25/2022 2,000 Escherichia coli*  Final   Office Visit on 12/22/2021   Component Date Value Ref Range Status    POC Amphetamines  12/22/2021 Negative  Negative, Inconclusive Final    POC Barbiturates 12/22/2021 Negative  Negative, Inconclusive Final    POC Benzodiazepines 12/22/2021 Negative  Negative, Inconclusive Final    POC Cocaine 12/22/2021 Negative  Negative, Inconclusive Final    POC THC 12/22/2021 Negative  Negative, Inconclusive Final    POC Methadone 12/22/2021 Negative  Negative, Inconclusive Final    POC Methamphetamine 12/22/2021 Negative  Negative, Inconclusive Final    POC Opiates 12/22/2021 Presumptive Positive* Negative, Inconclusive Final    POC Oxycodone 12/22/2021 Negative  Negative, Inconclusive Final    POC Phencyclidine 12/22/2021 Negative  Negative, Inconclusive Final    POC Methylenedioxymethamphetamine * 12/22/2021 Negative  Negative, Inconclusive Final    POC Tricyclic Antidepressants 12/22/2021 Negative  Negative, Inconclusive Final    POC Buprenorphine 12/22/2021 Negative   Final     Acceptable 12/22/2021 Yes   Final    POC Temperature (Urine) 12/22/2021 90   Final   Lab Requisition on 10/11/2021   Component Date Value Ref Range Status    Sodium 10/11/2021 135* 136 - 145 mmol/L Final    Potassium 10/11/2021 4.1  3.5 - 5.1 mmol/L Final    Chloride 10/11/2021 106  98 - 107 mmol/L Final    CO2 10/11/2021 27  21 - 32 mmol/L Final    Anion Gap 10/11/2021 6* 7 - 16 mmol/L Final    Glucose 10/11/2021 87  74 - 106 mg/dL Final    BUN 10/11/2021 9  7 - 18 mg/dL Final    Creatinine 10/11/2021 0.98  0.55 - 1.02 mg/dL Final    BUN/Creatinine Ratio 10/11/2021 9  6 - 20 Final    Calcium 10/11/2021 9.3  8.5 - 10.1 mg/dL Final    eGFR 10/11/2021 64  >=60 mL/min/1.73m² Final    Total Protein 10/11/2021 7.5  6.4 - 8.2 g/dL Final    Albumin 10/11/2021 3.6  3.5 - 5.0 g/dL Final    Bilirubin, Total 10/11/2021 0.3  >0.0 - 1.2 mg/dL Final    Bilirubin, Direct 10/11/2021 0.1  0.0 - 0.2 mg/dL Final    AST 10/11/2021 24  15 - 37 U/L Final    ALT 10/11/2021 33  13 - 56 U/L Final    Alk Phos  10/11/2021 94  41 - 108 U/L Final    Triglycerides 10/11/2021 198* 35 - 150 mg/dL Final    Cholesterol 10/11/2021 244* 0 - 200 mg/dL Final    HDL Cholesterol 10/11/2021 56  40 - 60 mg/dL Final    Cholesterol/HDL Ratio (Risk Factor) 10/11/2021 4.4   Final    Non-HDL 10/11/2021 188  mg/dL Final    LDL Calculated 10/11/2021 148  mg/dL Final    LDL/HDL 10/11/2021 2.6   Final    VLDL 10/11/2021 40  mg/dL Final    Free T4 10/11/2021 1.18  0.76 - 1.46 ng/dL Final    TSH 10/11/2021 2.190  0.358 - 3.740 uIU/mL Final    WBC 10/11/2021 5.37  4.50 - 11.00 K/uL Final    RBC 10/11/2021 4.28  4.20 - 5.40 M/uL Final    Hemoglobin 10/11/2021 11.1* 12.0 - 16.0 g/dL Final    Hematocrit 10/11/2021 37.0* 38.0 - 47.0 % Final    MCV 10/11/2021 86.4  80.0 - 96.0 fL Final    MCH 10/11/2021 25.9* 27.0 - 31.0 pg Final    MCHC 10/11/2021 30.0* 32.0 - 36.0 g/dL Final    RDW 10/11/2021 16.1* 11.5 - 14.5 % Final    Platelet Count 10/11/2021 322  150 - 400 K/uL Final    MPV 10/11/2021 10.1  9.4 - 12.4 fL Final    Neutrophils % 10/11/2021 56.2  53.0 - 65.0 % Final    Lymphocytes % 10/11/2021 32.6  27.0 - 41.0 % Final    Monocytes % 10/11/2021 6.0  2.0 - 6.0 % Final    Eosinophils % 10/11/2021 3.5  1.0 - 4.0 % Final    Basophils % 10/11/2021 1.3* 0.0 - 1.0 % Final    Immature Granulocytes % 10/11/2021 0.4  0.0 - 0.4 % Final    nRBC, Auto 10/11/2021 0.0  <=0.0 % Final    Neutrophils, Abs 10/11/2021 3.02  1.80 - 7.70 K/uL Final    Lymphocytes, Absolute 10/11/2021 1.75  1.00 - 4.80 K/uL Final    Monocytes, Absolute 10/11/2021 0.32  0.00 - 0.80 K/uL Final    Eosinophils, Absolute 10/11/2021 0.19  0.00 - 0.50 K/uL Final    Basophils, Absolute 10/11/2021 0.07  0.00 - 0.20 K/uL Final    Immature Granulocytes, Absolute 10/11/2021 0.02  0.00 - 0.04 K/uL Final    nRBC, Absolute 10/11/2021 0.00  <=0.00 x10e3/uL Final    Diff Type 10/11/2021 Auto   Final   Admission on 10/03/2021, Discharged on 10/03/2021   Component Date  Value Ref Range Status    Influenza A 10/03/2021 Negative  Negative, Invalid Final    Influenza B 10/03/2021 Negative  Negative, Invalid Final    COVID-19 Ag 10/03/2021 Negative  Negative, Invalid Final   Appointment on 09/30/2021   Component Date Value Ref Range Status    SARS Coronavirus 2 Antigen 09/30/2021 Negative  Negative Final     Acceptable 09/30/2021 Yes   Final    Rapid Influenza A Ag 09/30/2021 Negative  Negative Final    Rapid Influenza B Ag 09/30/2021 Negative  Negative Final     Acceptable 09/30/2021 Yes   Final   Office Visit on 09/20/2021   Component Date Value Ref Range Status    POC Amphetamines 09/20/2021 Negative  Negative, Inconclusive Final    POC Barbiturates 09/20/2021 Negative  Negative, Inconclusive Final    POC Benzodiazepines 09/20/2021 Presumptive Positive* Negative, Inconclusive Final    POC Cocaine 09/20/2021 Negative  Negative, Inconclusive Final    POC THC 09/20/2021 Negative  Negative, Inconclusive Final    POC Methadone 09/20/2021 Negative  Negative, Inconclusive Final    POC Methamphetamine 09/20/2021 Negative  Negative, Inconclusive Final    POC Opiates 09/20/2021 Presumptive Positive* Negative, Inconclusive Final    POC Oxycodone 09/20/2021 Negative  Negative, Inconclusive Final    POC Phencyclidine 09/20/2021 Negative  Negative, Inconclusive Final    POC Methylenedioxymethamphetamine * 09/20/2021 Negative  Negative, Inconclusive Final    POC Tricyclic Antidepressants 09/20/2021 Negative  Negative, Inconclusive Final    POC Buprenorphine 09/20/2021 Negative   Final     Acceptable 09/20/2021 Yes   Final    POC Temperature (Urine) 09/20/2021 92   Final    pH, UA 09/20/2021 6.0  5.0, 5.5, 6.0, 6.5, 7.0, 7.5, 8.0 pH Units Final    Specific Gravity, UA 09/20/2021 >=1.030* <=1.005, 1.010, 1.015, 1.020, 1.025, 1.030 Final    Creatinine, Urine 09/20/2021 304* 28 - 219 mg/dL Final    6-Acetylmorphine 09/20/2021  Negative  10 ng/mL Final    7-Aminoclonazepam 09/20/2021 Negative  Negative 25 ng/mL Final    a-Hydroxyalprazolam 09/20/2021 Negative  25 ng/mL Final    Acetyl Fentanyl 09/20/2021 Negative  2.5 ng/mL Final    Acetyl Norfentanyl Oxalate 09/20/2021 Negative  5 ng/mL Final    Benzoylecgonine 09/20/2021 Negative  100 ng/mL Final    Buprenorphine 09/20/2021 Negative  25 ng/mL Final    Codeine 09/20/2021 Negative  25 ng/mL Final    EDDP 09/20/2021 Negative  25 ng/mL Final    Fentanyl 09/20/2021 Negative  2.5 ng/mL Final    Hydrocodone 09/20/2021 >250.0* <25.0 25 ng/mL Final    Hydromorphone 09/20/2021 63.1* <25.0 25 ng/mL Final    Lorazepam 09/20/2021 Negative  25 ng/mL Final    Morphine 09/20/2021 Negative  25 ng/mL Final    Norbuprenorphine 09/20/2021 Negative  25 ng/mL Final    Nordiazepam 09/20/2021 Negative  25 ng/mL Final    Norfentanyl Oxalate 09/20/2021 Negative  5 ng/mL Final    Norhydrocodone 09/20/2021 >500.0* <50.0 50 ng/mL Final    Noroxycodone HCL 09/20/2021 Negative  50 ng/mL Final    Oxazepam 09/20/2021 Negative  25 ng/mL Final    Oxymorphone 09/20/2021 Negative  25 ng/mL Final    Tapentadol 09/20/2021 Negative  25 ng/mL Final    Temazepam 09/20/2021 Negative  25 ng/mL Final    THC-COOH 09/20/2021 Negative  25 ng/mL Final    Tramadol 09/20/2021 Negative  100 ng/mL Final    Amphetamine, Urine 09/20/2021 Negative  Negative 100 ng/mL Final    Methamphetamines, Urine 09/20/2021 Negative  Negative 100 ng/mL Final    Methadone, Urine 09/20/2021 Negative  Negative 25 ng/mL Final    Oxycodone, Urine 09/20/2021 Negative  Negative 25 ng/mL Final           Assessment:      1. Thoracic radiculopathy    2. Postlaminectomy syndrome, lumbar region    3. Lumbosacral radiculopathy    4. Disorder of sacrum    5. Pain in thoracic spine            A's of Opioid Risk Assessment  Activity:Patient can perform ADL.   Analgesia:Patients pain is partially controlled by current medication. Patient has  tried OTC medications such as Tylenol and Ibuprofen with out relief.   Adverse Effects: Patient denies constipation or sedation.  Aberrant Behavior:  reviewed with no aberrant drug seeking/taking behavior.  Overdose reversal drug naloxone discussed    Drug screen reviewed      Requested Prescriptions     Signed Prescriptions Disp Refills    gabapentin (NEURONTIN) 600 MG tablet 90 tablet 1     Sig: Take 1 tablet (600 mg total) by mouth every 8 (eight) hours.    HYDROcodone-acetaminophen (NORCO)  mg per tablet 90 tablet 0     Sig: Take 1 tablet by mouth every 8 (eight) hours.    HYDROcodone-acetaminophen (NORCO)  mg per tablet 90 tablet 0     Sig: Take 1 tablet by mouth every 8 (eight) hours.         Plan:    Postop spinal cord stimulator placement, having thoracic pain mostly right-sided radicular in nature she states that painis becoming worse since last visit    Radiating from the site of insertion midthoracic area around to the chest wall anterior chest wall area left greater than right     Has appointment to see neurology [facility] evaluation of this pain    Neuro surgery [facility] would not see her until 3 years postop date    Requesting further evaluation for thoracic radiculopathy    Requesting adjustment pain medication due to severe left-sided thoracic radiculopathy    Increase [place] 10 1 p.o. q.8 hours    MRI thoracic spine with and without contrast    MRI for consideration procedure/surgery     I have given the patient medically directed home exercise program    She will take the MRI and the disc with her to neurology [facility]    Would like to continue with conservative management from our standpoint    Follow-up 2 months , discuss resuming [place] 10 1 p.o. q.12 hours    Dr. Moon, July 2022    Bring original prescription medication bottles/container/box with labels to each visit

## 2022-02-16 ENCOUNTER — OFFICE VISIT (OUTPATIENT)
Dept: PAIN MEDICINE | Facility: CLINIC | Age: 52
End: 2022-02-16
Payer: MEDICARE

## 2022-02-16 VITALS
HEIGHT: 62 IN | RESPIRATION RATE: 19 BRPM | BODY MASS INDEX: 38.64 KG/M2 | SYSTOLIC BLOOD PRESSURE: 126 MMHG | HEART RATE: 66 BPM | DIASTOLIC BLOOD PRESSURE: 87 MMHG | WEIGHT: 210 LBS

## 2022-02-16 DIAGNOSIS — M53.3 DISORDER OF SACRUM: Chronic | ICD-10-CM

## 2022-02-16 DIAGNOSIS — M54.17 LUMBOSACRAL RADICULOPATHY: Chronic | ICD-10-CM

## 2022-02-16 DIAGNOSIS — M54.14 THORACIC RADICULOPATHY: Primary | Chronic | ICD-10-CM

## 2022-02-16 DIAGNOSIS — M54.6 PAIN IN THORACIC SPINE: ICD-10-CM

## 2022-02-16 DIAGNOSIS — M96.1 POSTLAMINECTOMY SYNDROME, LUMBAR REGION: Chronic | ICD-10-CM

## 2022-02-16 PROCEDURE — 3074F SYST BP LT 130 MM HG: CPT | Mod: CPTII,,, | Performed by: PHYSICIAN ASSISTANT

## 2022-02-16 PROCEDURE — 1159F MED LIST DOCD IN RCRD: CPT | Mod: CPTII,,, | Performed by: PHYSICIAN ASSISTANT

## 2022-02-16 PROCEDURE — 99215 OFFICE O/P EST HI 40 MIN: CPT | Mod: PBBFAC | Performed by: PHYSICIAN ASSISTANT

## 2022-02-16 PROCEDURE — 3008F PR BODY MASS INDEX (BMI) DOCUMENTED: ICD-10-PCS | Mod: CPTII,,, | Performed by: PHYSICIAN ASSISTANT

## 2022-02-16 PROCEDURE — 99214 PR OFFICE/OUTPT VISIT, EST, LEVL IV, 30-39 MIN: ICD-10-PCS | Mod: S$PBB,,, | Performed by: PHYSICIAN ASSISTANT

## 2022-02-16 PROCEDURE — 3079F PR MOST RECENT DIASTOLIC BLOOD PRESSURE 80-89 MM HG: ICD-10-PCS | Mod: CPTII,,, | Performed by: PHYSICIAN ASSISTANT

## 2022-02-16 PROCEDURE — 3074F PR MOST RECENT SYSTOLIC BLOOD PRESSURE < 130 MM HG: ICD-10-PCS | Mod: CPTII,,, | Performed by: PHYSICIAN ASSISTANT

## 2022-02-16 PROCEDURE — 1159F PR MEDICATION LIST DOCUMENTED IN MEDICAL RECORD: ICD-10-PCS | Mod: CPTII,,, | Performed by: PHYSICIAN ASSISTANT

## 2022-02-16 PROCEDURE — 3079F DIAST BP 80-89 MM HG: CPT | Mod: CPTII,,, | Performed by: PHYSICIAN ASSISTANT

## 2022-02-16 PROCEDURE — 3008F BODY MASS INDEX DOCD: CPT | Mod: CPTII,,, | Performed by: PHYSICIAN ASSISTANT

## 2022-02-16 PROCEDURE — 99214 OFFICE O/P EST MOD 30 MIN: CPT | Mod: S$PBB,,, | Performed by: PHYSICIAN ASSISTANT

## 2022-02-16 RX ORDER — GABAPENTIN 600 MG/1
600 TABLET ORAL EVERY 8 HOURS
Qty: 90 TABLET | Refills: 1 | Status: SHIPPED | OUTPATIENT
Start: 2022-02-16 | End: 2022-04-20

## 2022-02-16 RX ORDER — HYDROCODONE BITARTRATE AND ACETAMINOPHEN 10; 325 MG/1; MG/1
1 TABLET ORAL EVERY 8 HOURS
Qty: 90 TABLET | Refills: 0 | Status: SHIPPED | OUTPATIENT
Start: 2022-03-22 | End: 2022-04-20 | Stop reason: SDUPTHER

## 2022-02-16 RX ORDER — HYDROCODONE BITARTRATE AND ACETAMINOPHEN 10; 325 MG/1; MG/1
1 TABLET ORAL EVERY 8 HOURS
Qty: 90 TABLET | Refills: 0 | Status: SHIPPED | OUTPATIENT
Start: 2022-02-19 | End: 2022-03-21

## 2022-03-02 ENCOUNTER — TELEPHONE (OUTPATIENT)
Dept: UROLOGY | Facility: CLINIC | Age: 52
End: 2022-03-02
Payer: MEDICARE

## 2022-03-02 NOTE — TELEPHONE ENCOUNTER
On Tuesday 2/22 Patient called stating she had been to see Dr. Lange for abdominal and bladder pain and was started on an antibiotic for a possible UTI and was told it was probably a stone since she had blood in her urine that was excessive and has been hurting to left side for a week. She called us asking if she needed an xray and if she could have it done at Dr. Lange's office.  Dr. Ruvalcaba notified, order given for KUB to be done at Dr. Lange's office; however once done the KUB was negative.  Patient was called back and notified and she stated she was feeling better at that time on 2/24 except when she voids, she stated she is taking Cipro as prescribed by Dr. Lange. Urine culture came back negative. Dr. Ruvalcaba was notified, he stated she will need a CT scan if she continues to have pain; patient was called and notified, she requested to make an appointment to come in to speak with him about her pain. Appt made for Monday 3/21/22 at 2:15, patient notified

## 2022-03-03 ENCOUNTER — CLINICAL SUPPORT (OUTPATIENT)
Dept: UROLOGY | Facility: CLINIC | Age: 52
End: 2022-03-03
Payer: MEDICARE

## 2022-03-03 DIAGNOSIS — N39.0 URINARY TRACT INFECTION WITHOUT HEMATURIA, SITE UNSPECIFIED: Primary | ICD-10-CM

## 2022-03-03 PROCEDURE — 87086 URINE CULTURE/COLONY COUNT: CPT | Mod: ,,, | Performed by: CLINICAL MEDICAL LABORATORY

## 2022-03-03 PROCEDURE — 87086 CULTURE, URINE: ICD-10-PCS | Mod: ,,, | Performed by: CLINICAL MEDICAL LABORATORY

## 2022-03-03 PROCEDURE — 99212 OFFICE O/P EST SF 10 MIN: CPT | Mod: PBBFAC | Performed by: UROLOGY

## 2022-03-05 LAB — UA COMPLETE W REFLEX CULTURE PNL UR: NORMAL

## 2022-03-12 ENCOUNTER — HOSPITAL ENCOUNTER (EMERGENCY)
Facility: HOSPITAL | Age: 52
Discharge: HOME | End: 2022-03-12
Payer: MEDICARE

## 2022-03-12 VITALS
RESPIRATION RATE: 18 BRPM | TEMPERATURE: 98 F | HEART RATE: 89 BPM | HEIGHT: 62 IN | SYSTOLIC BLOOD PRESSURE: 111 MMHG | BODY MASS INDEX: 38.64 KG/M2 | OXYGEN SATURATION: 99 % | DIASTOLIC BLOOD PRESSURE: 57 MMHG | WEIGHT: 210 LBS

## 2022-03-12 DIAGNOSIS — N39.0 URINARY TRACT INFECTION WITH HEMATURIA, SITE UNSPECIFIED: ICD-10-CM

## 2022-03-12 DIAGNOSIS — J13 PNEUMONIA OF RIGHT LOWER LOBE DUE TO STREPTOCOCCUS PNEUMONIAE: Primary | ICD-10-CM

## 2022-03-12 DIAGNOSIS — R31.9 URINARY TRACT INFECTION WITH HEMATURIA, SITE UNSPECIFIED: ICD-10-CM

## 2022-03-12 LAB
ALBUMIN SERPL BCP-MCNC: 3.6 G/DL (ref 3.5–5)
ALBUMIN/GLOB SERPL: 1.1 {RATIO}
ALP SERPL-CCNC: 88 U/L (ref 41–108)
ALT SERPL W P-5'-P-CCNC: 58 U/L (ref 13–56)
ANION GAP SERPL CALCULATED.3IONS-SCNC: 15 MMOL/L (ref 7–16)
AST SERPL W P-5'-P-CCNC: 82 U/L (ref 15–37)
BACTERIA #/AREA URNS HPF: ABNORMAL /HPF
BASOPHILS # BLD AUTO: 0.03 K/UL (ref 0–0.2)
BASOPHILS NFR BLD AUTO: 0.4 % (ref 0–1)
BILIRUB SERPL-MCNC: 0.7 MG/DL (ref 0–1.2)
BILIRUB UR QL STRIP: NEGATIVE
BUN SERPL-MCNC: 10 MG/DL (ref 7–18)
BUN/CREAT SERPL: 9 (ref 6–20)
CALCIUM SERPL-MCNC: 8.2 MG/DL (ref 8.5–10.1)
CHLORIDE SERPL-SCNC: 103 MMOL/L (ref 98–107)
CLARITY UR: CLEAR
CO2 SERPL-SCNC: 23 MMOL/L (ref 21–32)
COLOR UR: YELLOW
CREAT SERPL-MCNC: 1.12 MG/DL (ref 0.55–1.02)
DIFFERENTIAL METHOD BLD: ABNORMAL
EOSINOPHIL # BLD AUTO: 0.09 K/UL (ref 0–0.5)
EOSINOPHIL NFR BLD AUTO: 1.3 % (ref 1–4)
ERYTHROCYTE [DISTWIDTH] IN BLOOD BY AUTOMATED COUNT: 16.6 % (ref 11.5–14.5)
FLUAV AG UPPER RESP QL IA.RAPID: NEGATIVE
FLUBV AG UPPER RESP QL IA.RAPID: NEGATIVE
GLOBULIN SER-MCNC: 3.2 G/DL (ref 2–4)
GLUCOSE SERPL-MCNC: 112 MG/DL (ref 74–106)
GLUCOSE UR STRIP-MCNC: NEGATIVE MG/DL
HCT VFR BLD AUTO: 36.4 % (ref 38–47)
HGB BLD-MCNC: 11.3 G/DL (ref 12–16)
IMM GRANULOCYTES # BLD AUTO: 0.01 K/UL (ref 0–0.04)
IMM GRANULOCYTES NFR BLD: 0.1 % (ref 0–0.4)
KETONES UR STRIP-SCNC: NEGATIVE MG/DL
LACTATE SERPL-SCNC: 0.8 MMOL/L (ref 0.4–2)
LEUKOCYTE ESTERASE UR QL STRIP: NEGATIVE
LYMPHOCYTES # BLD AUTO: 0.91 K/UL (ref 1–4.8)
LYMPHOCYTES NFR BLD AUTO: 13 % (ref 27–41)
MCH RBC QN AUTO: 27.6 PG (ref 27–31)
MCHC RBC AUTO-ENTMCNC: 31 G/DL (ref 32–36)
MCV RBC AUTO: 89 FL (ref 80–96)
MONOCYTES # BLD AUTO: 0.49 K/UL (ref 0–0.8)
MONOCYTES NFR BLD AUTO: 7 % (ref 2–6)
MPC BLD CALC-MCNC: 9.3 FL (ref 9.4–12.4)
NEUTROPHILS # BLD AUTO: 5.48 K/UL (ref 1.8–7.7)
NEUTROPHILS NFR BLD AUTO: 78.2 % (ref 53–65)
NITRITE UR QL STRIP: NEGATIVE
PH UR STRIP: 8.5 PH UNITS
PLATELET # BLD AUTO: 210 K/UL (ref 150–400)
POTASSIUM SERPL-SCNC: 3.8 MMOL/L (ref 3.5–5.1)
PROT SERPL-MCNC: 6.8 G/DL (ref 6.4–8.2)
PROT UR QL STRIP: 30
RAPID GROUP A STREP: NEGATIVE
RBC # BLD AUTO: 4.09 M/UL (ref 4.2–5.4)
RBC # UR STRIP: ABNORMAL /UL
RBC #/AREA URNS HPF: ABNORMAL /HPF
SARS-COV+SARS-COV-2 AG RESP QL IA.RAPID: NEGATIVE
SODIUM SERPL-SCNC: 137 MMOL/L (ref 136–145)
SP GR UR STRIP: 1.02
SQUAMOUS #/AREA URNS LPF: ABNORMAL /LPF
UROBILINOGEN UR STRIP-ACNC: 0.2 MG/DL
WBC # BLD AUTO: 7.01 K/UL (ref 4.5–11)
WBC #/AREA URNS HPF: ABNORMAL /HPF

## 2022-03-12 PROCEDURE — 94640 AIRWAY INHALATION TREATMENT: CPT

## 2022-03-12 PROCEDURE — 25000003 PHARM REV CODE 250: Performed by: FAMILY MEDICINE

## 2022-03-12 PROCEDURE — 63600175 PHARM REV CODE 636 W HCPCS: Performed by: FAMILY MEDICINE

## 2022-03-12 PROCEDURE — 83605 ASSAY OF LACTIC ACID: CPT | Performed by: FAMILY MEDICINE

## 2022-03-12 PROCEDURE — 87880 STREP A ASSAY W/OPTIC: CPT | Performed by: FAMILY MEDICINE

## 2022-03-12 PROCEDURE — 87040 BLOOD CULTURE FOR BACTERIA: CPT | Performed by: FAMILY MEDICINE

## 2022-03-12 PROCEDURE — 94760 N-INVAS EAR/PLS OXIMETRY 1: CPT

## 2022-03-12 PROCEDURE — 36415 COLL VENOUS BLD VENIPUNCTURE: CPT | Performed by: FAMILY MEDICINE

## 2022-03-12 PROCEDURE — 80053 COMPREHEN METABOLIC PANEL: CPT | Performed by: FAMILY MEDICINE

## 2022-03-12 PROCEDURE — 87428 SARSCOV & INF VIR A&B AG IA: CPT | Performed by: FAMILY MEDICINE

## 2022-03-12 PROCEDURE — 96365 THER/PROPH/DIAG IV INF INIT: CPT

## 2022-03-12 PROCEDURE — 85025 COMPLETE CBC W/AUTO DIFF WBC: CPT | Performed by: FAMILY MEDICINE

## 2022-03-12 PROCEDURE — 81001 URINALYSIS AUTO W/SCOPE: CPT | Performed by: FAMILY MEDICINE

## 2022-03-12 PROCEDURE — 87081 CULTURE SCREEN ONLY: CPT | Performed by: FAMILY MEDICINE

## 2022-03-12 PROCEDURE — 99285 EMERGENCY DEPT VISIT HI MDM: CPT | Mod: 25,CS

## 2022-03-12 PROCEDURE — 96375 TX/PRO/DX INJ NEW DRUG ADDON: CPT

## 2022-03-12 PROCEDURE — 25000242 PHARM REV CODE 250 ALT 637 W/ HCPCS: Mod: GY | Performed by: FAMILY MEDICINE

## 2022-03-12 PROCEDURE — 99284 EMERGENCY DEPT VISIT MOD MDM: CPT | Performed by: FAMILY MEDICINE

## 2022-03-12 RX ORDER — LEVOFLOXACIN 500 MG/1
500 TABLET, FILM COATED ORAL DAILY
Qty: 5 TABLET | Refills: 0 | Status: SHIPPED | OUTPATIENT
Start: 2022-03-12 | End: 2022-03-17

## 2022-03-12 RX ORDER — METHYLPREDNISOLONE 4 MG/1
TABLET ORAL
Qty: 12 TABLET | Refills: 0 | Status: SHIPPED | OUTPATIENT
Start: 2022-03-12 | End: 2022-03-24

## 2022-03-12 RX ORDER — GABAPENTIN 800 MG/1
TABLET ORAL
COMMUNITY
End: 2022-04-20

## 2022-03-12 RX ORDER — OMEPRAZOLE 20 MG/1
CAPSULE, DELAYED RELEASE ORAL
COMMUNITY

## 2022-03-12 RX ORDER — IPRATROPIUM BROMIDE AND ALBUTEROL SULFATE 2.5; .5 MG/3ML; MG/3ML
3 SOLUTION RESPIRATORY (INHALATION)
Status: COMPLETED | OUTPATIENT
Start: 2022-03-12 | End: 2022-03-12

## 2022-03-12 RX ORDER — ALBUTEROL SULFATE 90 UG/1
2 AEROSOL, METERED RESPIRATORY (INHALATION) EVERY 6 HOURS PRN
Qty: 1 G | Refills: 0 | Status: SHIPPED | OUTPATIENT
Start: 2022-03-12 | End: 2022-08-23 | Stop reason: ALTCHOICE

## 2022-03-12 RX ORDER — LEVOTHYROXINE SODIUM 75 UG/1
TABLET ORAL
COMMUNITY
End: 2022-08-23

## 2022-03-12 RX ORDER — TOPIRAMATE 100 MG/1
TABLET, FILM COATED ORAL
COMMUNITY
End: 2022-07-20

## 2022-03-12 RX ORDER — KETOROLAC TROMETHAMINE 30 MG/ML
30 INJECTION, SOLUTION INTRAMUSCULAR; INTRAVENOUS
Status: COMPLETED | OUTPATIENT
Start: 2022-03-12 | End: 2022-03-12

## 2022-03-12 RX ORDER — METHYLPREDNISOLONE SOD SUCC 125 MG
125 VIAL (EA) INJECTION
Status: COMPLETED | OUTPATIENT
Start: 2022-03-12 | End: 2022-03-12

## 2022-03-12 RX ORDER — TIZANIDINE 4 MG/1
TABLET ORAL
COMMUNITY
Start: 2022-02-21 | End: 2022-04-20

## 2022-03-12 RX ORDER — TRAMADOL HYDROCHLORIDE 50 MG/1
50 TABLET ORAL
Status: COMPLETED | OUTPATIENT
Start: 2022-03-12 | End: 2022-03-12

## 2022-03-12 RX ORDER — TRAZODONE HYDROCHLORIDE 50 MG/1
TABLET ORAL NIGHTLY
COMMUNITY
Start: 2021-09-30 | End: 2022-08-18

## 2022-03-12 RX ADMIN — KETOROLAC TROMETHAMINE 30 MG: 30 INJECTION, SOLUTION INTRAMUSCULAR at 04:03

## 2022-03-12 RX ADMIN — IPRATROPIUM BROMIDE AND ALBUTEROL SULFATE 3 ML: .5; 3 SOLUTION RESPIRATORY (INHALATION) at 05:03

## 2022-03-12 RX ADMIN — TRAMADOL HYDROCHLORIDE 50 MG: 50 TABLET, COATED ORAL at 05:03

## 2022-03-12 RX ADMIN — METHYLPREDNISOLONE SODIUM SUCCINATE 125 MG: 125 INJECTION, POWDER, FOR SOLUTION INTRAMUSCULAR; INTRAVENOUS at 05:03

## 2022-03-12 RX ADMIN — CEFTRIAXONE SODIUM 1 G: 1 INJECTION, POWDER, FOR SOLUTION INTRAMUSCULAR; INTRAVENOUS at 05:03

## 2022-03-12 NOTE — ED TRIAGE NOTES
Pt received to er with c/o n,v, and d that started 2 nights ago- pt c/o fever 104 at home- pt states she took a laxative 2 nights ago for constipation- pt reports headache and sob now- now sob resolved after ems arrived- pt c/o headache now - pt requesting med- pt receiving ns bolus to right hand- pt states she took a home covid test and it was negative

## 2022-03-12 NOTE — ED PROVIDER NOTES
Encounter Date: 3/12/2022       History     Chief Complaint   Patient presents with    Fever    Nausea    Vomiting    Diarrhea    Shortness of Breath     Pt received via [facility] with c/o fever 104 at home with n,v, and d that started 2 nights ago - last vomited 1200 today - last loose stool this am - pt states she took mag 07 2 days ago states worked on yesterday     Headache     Presents with a c/o fever at 104, sinus drainage and loose stools x 2 days pta.        Review of patient's allergies indicates:   Allergen Reactions    Oxycodone-acetaminophen Swelling and Edema     Past Medical History:   Diagnosis Date    Acid reflux     Calculus of kidney     Depression     Hypertension     Hypothyroidism     Low back pain     Lumbosacral radiculopathy 5/17/2021    Migraines     Pain     Postlaminectomy syndrome, lumbar region 5/17/2021     Past Surgical History:   Procedure Laterality Date    BACK SURGERY      INSERTION OF DORSAL COLUMN NERVE STIMULATOR FOR TRIAL N/A 7/15/2021    Procedure: INSERTION, NEUROSTIMULATOR, SPINAL CORD, DORSAL COLUMN, FOR TRIAL;  Surgeon: Elba Moon MD;  Location: [facility];  Service: Pain Management;  Laterality: N/A;  spoke to Dr Calin Burgess nurse, urine culture negative (results in Epic), pt cleared.    LITHOTRIPSY       Family History   Problem Relation Age of Onset    Stroke Father     Heart attack Father     Heart attack Brother     Cancer Brother      Social History     Tobacco Use    Smoking status: Never Smoker    Smokeless tobacco: Never Used   Substance Use Topics    Alcohol use: Never    Drug use: Never     Review of Systems   Constitutional: Negative.    HENT: Negative.    Eyes: Negative.    Respiratory: Negative.    Cardiovascular: Negative.    Gastrointestinal: Negative.    Endocrine: Negative.    Genitourinary: Negative.    Musculoskeletal: Negative.    Skin: Negative.    Allergic/Immunologic: Negative.    Neurological: Negative.     Hematological: Negative.    Psychiatric/Behavioral: Negative.        Physical Exam     Initial Vitals   BP Pulse Resp Temp SpO2   03/12/22 1523 03/12/22 1523 03/12/22 1523 03/12/22 1523 03/12/22 1535   110/63 90 18 99.2 °F (37.3 °C) 99 %      MAP       --                Physical Exam    Nursing note and vitals reviewed.  Constitutional: She appears well-developed and well-nourished.   HENT:   Head: Normocephalic and atraumatic.   Right Ear: External ear normal.   Left Ear: External ear normal.   Nose: Nose normal.   Mouth/Throat: Oropharynx is clear and moist.   Eyes: Conjunctivae and EOM are normal. Pupils are equal, round, and reactive to light.   Neck: Neck supple.   Normal range of motion.  Cardiovascular: Normal rate, regular rhythm, normal heart sounds and intact distal pulses.   Pulmonary/Chest: Breath sounds normal.   Abdominal: Abdomen is soft. Bowel sounds are normal.   Musculoskeletal:         General: Normal range of motion.      Cervical back: Normal range of motion and neck supple.     Neurological: She is alert and oriented to person, place, and time. She has normal strength and normal reflexes. GCS score is 15. GCS eye subscore is 4. GCS verbal subscore is 5. GCS motor subscore is 6.   Skin: Skin is warm and dry. Capillary refill takes 2 to 3 seconds.   Psychiatric: She has a normal mood and affect. Her behavior is normal. Judgment and thought content normal.         Medical Screening Exam   See Full Note    ED Course   Procedures  Labs Reviewed - No data to display       Imaging Results    None          Medications - No data to display                    Clinical Impression:        1. Pneumonia  2. UTI        Farhan Granados MD  03/12/22 4937       Farhan Granados MD  03/12/22 2478

## 2022-03-13 ENCOUNTER — TELEPHONE (OUTPATIENT)
Dept: EMERGENCY MEDICINE | Facility: HOSPITAL | Age: 52
End: 2022-03-13
Payer: MEDICARE

## 2022-03-15 LAB — DEPRECATED S PYO AG THROAT QL EIA: NORMAL

## 2022-03-19 LAB
BACTERIA BLD CULT: NORMAL
BACTERIA BLD CULT: NORMAL

## 2022-04-20 ENCOUNTER — OFFICE VISIT (OUTPATIENT)
Dept: NEUROLOGY | Facility: CLINIC | Age: 52
End: 2022-04-20
Payer: MEDICARE

## 2022-04-20 ENCOUNTER — OFFICE VISIT (OUTPATIENT)
Dept: PAIN MEDICINE | Facility: CLINIC | Age: 52
End: 2022-04-20
Payer: MEDICARE

## 2022-04-20 VITALS
HEIGHT: 62 IN | WEIGHT: 202 LBS | HEART RATE: 62 BPM | DIASTOLIC BLOOD PRESSURE: 73 MMHG | RESPIRATION RATE: 16 BRPM | SYSTOLIC BLOOD PRESSURE: 112 MMHG | BODY MASS INDEX: 37.17 KG/M2

## 2022-04-20 VITALS
SYSTOLIC BLOOD PRESSURE: 110 MMHG | BODY MASS INDEX: 37.17 KG/M2 | HEART RATE: 75 BPM | WEIGHT: 202 LBS | DIASTOLIC BLOOD PRESSURE: 78 MMHG | OXYGEN SATURATION: 99 % | RESPIRATION RATE: 18 BRPM | HEIGHT: 62 IN

## 2022-04-20 DIAGNOSIS — E55.9 VITAMIN D DEFICIENCY: ICD-10-CM

## 2022-04-20 DIAGNOSIS — E55.9 VITAMIN D DEFICIENCY: Primary | ICD-10-CM

## 2022-04-20 DIAGNOSIS — M54.17 LUMBOSACRAL RADICULOPATHY: Primary | Chronic | ICD-10-CM

## 2022-04-20 DIAGNOSIS — Z79.899 ENCOUNTER FOR LONG-TERM (CURRENT) USE OF OTHER MEDICATIONS: ICD-10-CM

## 2022-04-20 DIAGNOSIS — M96.1 POSTLAMINECTOMY SYNDROME, LUMBAR REGION: Chronic | ICD-10-CM

## 2022-04-20 DIAGNOSIS — G43.719 INTRACTABLE CHRONIC MIGRAINE WITHOUT AURA AND WITHOUT STATUS MIGRAINOSUS: Primary | ICD-10-CM

## 2022-04-20 DIAGNOSIS — M53.3 DISORDER OF SACRUM: Chronic | ICD-10-CM

## 2022-04-20 PROCEDURE — 3074F SYST BP LT 130 MM HG: CPT | Mod: CPTII,,, | Performed by: PHYSICIAN ASSISTANT

## 2022-04-20 PROCEDURE — 80305 DRUG TEST PRSMV DIR OPT OBS: CPT | Mod: PBBFAC | Performed by: PHYSICIAN ASSISTANT

## 2022-04-20 PROCEDURE — 1159F MED LIST DOCD IN RCRD: CPT | Mod: CPTII,,, | Performed by: PHYSICIAN ASSISTANT

## 2022-04-20 PROCEDURE — 1159F PR MEDICATION LIST DOCUMENTED IN MEDICAL RECORD: ICD-10-PCS | Mod: CPTII,,, | Performed by: PHYSICIAN ASSISTANT

## 2022-04-20 PROCEDURE — 3074F PR MOST RECENT SYSTOLIC BLOOD PRESSURE < 130 MM HG: ICD-10-PCS | Mod: CPTII,,, | Performed by: PHYSICIAN ASSISTANT

## 2022-04-20 PROCEDURE — 3074F PR MOST RECENT SYSTOLIC BLOOD PRESSURE < 130 MM HG: ICD-10-PCS | Mod: CPTII,,, | Performed by: NURSE PRACTITIONER

## 2022-04-20 PROCEDURE — 99214 OFFICE O/P EST MOD 30 MIN: CPT | Mod: PBBFAC | Performed by: NURSE PRACTITIONER

## 2022-04-20 PROCEDURE — 3078F PR MOST RECENT DIASTOLIC BLOOD PRESSURE < 80 MM HG: ICD-10-PCS | Mod: CPTII,,, | Performed by: NURSE PRACTITIONER

## 2022-04-20 PROCEDURE — 99213 PR OFFICE/OUTPT VISIT, EST, LEVL III, 20-29 MIN: ICD-10-PCS | Mod: S$PBB,,, | Performed by: NURSE PRACTITIONER

## 2022-04-20 PROCEDURE — 3008F BODY MASS INDEX DOCD: CPT | Mod: CPTII,,, | Performed by: NURSE PRACTITIONER

## 2022-04-20 PROCEDURE — 99214 OFFICE O/P EST MOD 30 MIN: CPT | Mod: S$PBB,,, | Performed by: PHYSICIAN ASSISTANT

## 2022-04-20 PROCEDURE — 3008F PR BODY MASS INDEX (BMI) DOCUMENTED: ICD-10-PCS | Mod: CPTII,,, | Performed by: PHYSICIAN ASSISTANT

## 2022-04-20 PROCEDURE — 3008F PR BODY MASS INDEX (BMI) DOCUMENTED: ICD-10-PCS | Mod: CPTII,,, | Performed by: NURSE PRACTITIONER

## 2022-04-20 PROCEDURE — 3074F SYST BP LT 130 MM HG: CPT | Mod: CPTII,,, | Performed by: NURSE PRACTITIONER

## 2022-04-20 PROCEDURE — 3078F DIAST BP <80 MM HG: CPT | Mod: CPTII,,, | Performed by: NURSE PRACTITIONER

## 2022-04-20 PROCEDURE — 3078F DIAST BP <80 MM HG: CPT | Mod: CPTII,,, | Performed by: PHYSICIAN ASSISTANT

## 2022-04-20 PROCEDURE — 99214 OFFICE O/P EST MOD 30 MIN: CPT | Mod: PBBFAC | Performed by: PHYSICIAN ASSISTANT

## 2022-04-20 PROCEDURE — 3008F BODY MASS INDEX DOCD: CPT | Mod: CPTII,,, | Performed by: PHYSICIAN ASSISTANT

## 2022-04-20 PROCEDURE — 99213 OFFICE O/P EST LOW 20 MIN: CPT | Mod: S$PBB,,, | Performed by: NURSE PRACTITIONER

## 2022-04-20 PROCEDURE — 3078F PR MOST RECENT DIASTOLIC BLOOD PRESSURE < 80 MM HG: ICD-10-PCS | Mod: CPTII,,, | Performed by: PHYSICIAN ASSISTANT

## 2022-04-20 PROCEDURE — 99214 PR OFFICE/OUTPT VISIT, EST, LEVL IV, 30-39 MIN: ICD-10-PCS | Mod: S$PBB,,, | Performed by: PHYSICIAN ASSISTANT

## 2022-04-20 RX ORDER — GABAPENTIN 600 MG/1
600 TABLET ORAL EVERY 8 HOURS
Qty: 90 TABLET | Refills: 1 | Status: SHIPPED | OUTPATIENT
Start: 2022-04-20 | End: 2022-06-16 | Stop reason: SDUPTHER

## 2022-04-20 RX ORDER — ERGOCALCIFEROL 1.25 MG/1
50000 CAPSULE ORAL
Qty: 4 CAPSULE | Refills: 11 | Status: SHIPPED | OUTPATIENT
Start: 2022-04-20 | End: 2022-10-21

## 2022-04-20 RX ORDER — HYDROCODONE BITARTRATE AND ACETAMINOPHEN 10; 325 MG/1; MG/1
1 TABLET ORAL EVERY 8 HOURS
Qty: 90 TABLET | Refills: 0 | Status: SHIPPED | OUTPATIENT
Start: 2022-04-22 | End: 2022-05-22

## 2022-04-20 RX ORDER — HYDROCODONE BITARTRATE AND ACETAMINOPHEN 10; 325 MG/1; MG/1
1 TABLET ORAL EVERY 12 HOURS
Qty: 60 TABLET | Refills: 0 | Status: SHIPPED | OUTPATIENT
Start: 2022-05-20 | End: 2022-06-16 | Stop reason: SDUPTHER

## 2022-04-20 RX ORDER — TIZANIDINE HYDROCHLORIDE 4 MG/1
1 CAPSULE, GELATIN COATED ORAL EVERY 8 HOURS
Qty: 90 CAPSULE | Refills: 1 | Status: SHIPPED | OUTPATIENT
Start: 2022-04-20 | End: 2022-06-16 | Stop reason: SDUPTHER

## 2022-04-20 NOTE — PROGRESS NOTES
Disclaimer:  This note has been generated using voice recognition software.  There may be type of graft focal areas that have been missed during a proof reading      Subjective:      Patient ID: Gloria Mitchell is a 52 y.o. female.    Chief Complaint: Low-back Pain and Leg Pain      Low-back Pain  This is a chronic problem. The current episode started more than 1 year ago. The problem occurs daily. The problem has been waxing and waning since onset. Associated symptoms include leg pain. Pertinent negatives include no bladder incontinence, chest pain or dysuria.   Medication Refill  Associated symptoms include arthralgias. Pertinent negatives include no change in bowel habit, chest pain, coughing, diaphoresis, fatigue, rash, sore throat, vertigo or vomiting.     Review of Systems   Constitutional: Negative for activity change, appetite change, diaphoresis, fatigue and unexpected weight change.   HENT: Negative for drooling, ear discharge, ear pain, facial swelling, nosebleeds, sore throat, trouble swallowing, voice change and goiter.    Eyes: Negative for photophobia, pain, discharge, redness and visual disturbance.   Respiratory: Negative for apnea, cough, choking, chest tightness, shortness of breath, wheezing and stridor.    Cardiovascular: Negative for chest pain, palpitations and leg swelling.   Gastrointestinal: Negative for abdominal distention, change in bowel habit, diarrhea, rectal pain, vomiting, fecal incontinence and change in bowel habit.   Endocrine: Negative for cold intolerance, heat intolerance, polydipsia, polyphagia and polyuria.   Genitourinary: Negative for bladder incontinence, dysuria, flank pain, frequency and hot flashes.   Musculoskeletal: Positive for arthralgias, back pain, leg pain and neck stiffness.   Integumentary:  Negative for color change, pallor and rash.   Allergic/Immunologic: Negative for immunocompromised state.   Neurological: Negative for dizziness, vertigo, seizures,  "syncope, facial asymmetry, speech difficulty, light-headedness, disturbances in coordination, memory loss and coordination difficulties.   Hematological: Negative for adenopathy. Does not bruise/bleed easily.   Psychiatric/Behavioral: Negative for agitation, behavioral problems, confusion, decreased concentration, dysphoric mood, hallucinations, self-injury and suicidal ideas. The patient is not nervous/anxious and is not hyperactive.             Objective:  Vitals:    04/20/22 1022   BP: 112/73   Pulse: 62   Resp: 16   Weight: 91.6 kg (202 lb)   Height: 5' 2" (1.575 m)   PainSc:   9         Physical Exam  Vitals and nursing note reviewed. Exam conducted with a chaperone present.   Constitutional:       General: She is awake. She is not in acute distress.     Appearance: Normal appearance. She is not toxic-appearing.   HENT:      Head: Normocephalic and atraumatic.      Nose: Nose normal.      Mouth/Throat:      Mouth: Mucous membranes are moist.      Pharynx: Oropharynx is clear.   Eyes:      Conjunctiva/sclera: Conjunctivae normal.      Pupils: Pupils are equal, round, and reactive to light.   Cardiovascular:      Rate and Rhythm: Normal rate.   Pulmonary:      Effort: Pulmonary effort is normal. No respiratory distress.   Abdominal:      Palpations: Abdomen is soft.      Tenderness: There is no guarding.   Musculoskeletal:         General: Normal range of motion.      Cervical back: Normal range of motion and neck supple. Tenderness present. No rigidity.      Thoracic back: Tenderness present.      Lumbar back: Tenderness present.   Skin:     General: Skin is warm and dry.      Coloration: Skin is not jaundiced or pale.   Neurological:      General: No focal deficit present.      Mental Status: She is alert and oriented to person, place, and time. Mental status is at baseline.      Cranial Nerves: Cranial nerves are intact. No cranial nerve deficit (II-XII).   Psychiatric:         Mood and Affect: Mood normal.    "      Behavior: Behavior normal. Behavior is cooperative.         Thought Content: Thought content normal.           Orders Placed This Encounter   Procedures    POCT Urine Drug Screen Presump     Interpretive Information:     Negative:  No drug detected at the cut off level.   Positive:  This result represents presumptive positive for the   tested drug, other substances may yield a positive response other   than the analyte of interest. This result should be utilized for   diagnostic purpose only. Confirmation testing will be performed upon physician request only.           X-Ray Chest AP Portable  Narrative: EXAMINATION:  XR CHEST AP PORTABLE    CLINICAL HISTORY:  fever, cough;.    COMPARISON:  October 3, 2021    TECHNIQUE:  Chest x-ray single view    FINDINGS:  The cardiac silhouette is not enlarged.  There is no mediastinal mass.  There is no pulmonary vascular engorgement.    There is some mild patchy infiltrate in the right upper lung.  Lungs and pleural spaces are otherwise clear.    Neurostimulator leads overlie the mid to lower thoracic spinal canal.    Osseous structures are unchanged  Impression: Right upper lung pneumonia    Electronically signed by: Helder Pemberton  Date:    03/12/2022  Time:    16:24  CT Head Without Contrast  Narrative: EXAMINATION:  CT head without contrast    CLINICAL HISTORY:  Headache, new or worsening (Age >= 50y); nausea and vomiting.  Fever    TECHNIQUE:  Transaxial CT sections were obtained through the brain without contrast.    The CT examination was performed using one or more of the following dose reduction techniques: Automated exposure control, adjustment of the mA and kV according to patient's size, use of acute or iterative reconstruction techniques.    COMPARISON:  No previous head CT available    FINDINGS:  The ventricles are midline in position without evidence of hydrocephalus. There is no mass or area of parenchymal hemorrhage. There is no gross CT evidence of  acute cortical stroke. There is no extra-axial hematoma. The partially visualized sinuses are generally clear. There is no obvious skull fracture.  Impression: No acute intracranial process    Electronically signed by: Helder Pemberton  Date:    03/12/2022  Time:    16:22       Admission on 03/12/2022, Discharged on 03/12/2022   Component Date Value Ref Range Status    Color, UA 03/12/2022 Yellow  Colorless, Straw, Yellow, Dark Yellow Final    Clarity, UA 03/12/2022 Clear  Clear Final    pH, UA 03/12/2022 8.5 (A) 5.0, 5.5, 6.0, 6.5, 7.0, 7.5, 8.0 pH Units Final    Leukocytes, UA 03/12/2022 Negative  Negative Final    Nitrites, UA 03/12/2022 Negative  Negative Final    Protein, UA 03/12/2022 30  (A) Negative Final    Glucose, UA 03/12/2022 Negative  Negative mg/dL Final    Ketones, UA 03/12/2022 Negative  Negative, Trace mg/dL Final    Urobilinogen, UA 03/12/2022 0.2  0.2, 1.0 mg/dL Final    Bilirubin, UA 03/12/2022 Negative  Negative Final    Blood, UA 03/12/2022 Moderate (A) Negative Final    Specific Gravity, UA 03/12/2022 1.020  <=1.005, 1.010, 1.015, 1.020, 1.025, 1.030 Final    Culture, Blood 03/12/2022 No Growth at 5 days   Final    Culture, Blood 03/12/2022 No Growth at 5 days   Final    Rapid Group A Strep 03/12/2022 Negative  Negative, Invalid Final    Influenza A 03/12/2022 Negative  Negative, Invalid Final    Influenza B 03/12/2022 Negative  Negative, Invalid Final    COVID-19 Ag 03/12/2022 Negative  Negative, Invalid Final    Sodium 03/12/2022 137  136 - 145 mmol/L Final    Potassium 03/12/2022 3.8  3.5 - 5.1 mmol/L Final    Chloride 03/12/2022 103  98 - 107 mmol/L Final    CO2 03/12/2022 23  21 - 32 mmol/L Final    Anion Gap 03/12/2022 15  7 - 16 mmol/L Final    Glucose 03/12/2022 112 (A) 74 - 106 mg/dL Final    BUN 03/12/2022 10  7 - 18 mg/dL Final    Creatinine 03/12/2022 1.12 (A) 0.55 - 1.02 mg/dL Final    BUN/Creatinine Ratio 03/12/2022 9  6 - 20 Final    Calcium  03/12/2022 8.2 (A) 8.5 - 10.1 mg/dL Final    Total Protein 03/12/2022 6.8  6.4 - 8.2 g/dL Final    Albumin 03/12/2022 3.6  3.5 - 5.0 g/dL Final    Globulin 03/12/2022 3.2  2.0 - 4.0 g/dL Final    A/G Ratio 03/12/2022 1.1   Final    Bilirubin, Total 03/12/2022 0.7  0.0 - 1.2 mg/dL Final    Alk Phos 03/12/2022 88  41 - 108 U/L Final    ALT 03/12/2022 58 (A) 13 - 56 U/L Final    AST 03/12/2022 82 (A) 15 - 37 U/L Final    eGFR 03/12/2022 55 (A) >=60 mL/min/1.73m² Final    WBC 03/12/2022 7.01  4.50 - 11.00 K/uL Final    RBC 03/12/2022 4.09 (A) 4.20 - 5.40 M/uL Final    Hemoglobin 03/12/2022 11.3 (A) 12.0 - 16.0 g/dL Final    Hematocrit 03/12/2022 36.4 (A) 38.0 - 47.0 % Final    MCV 03/12/2022 89.0  80.0 - 96.0 fL Final    MCH 03/12/2022 27.6  27.0 - 31.0 pg Final    MCHC 03/12/2022 31.0 (A) 32.0 - 36.0 g/dL Final    RDW 03/12/2022 16.6 (A) 11.5 - 14.5 % Final    Platelet Count 03/12/2022 210  150 - 400 K/uL Final    MPV 03/12/2022 9.3 (A) 9.4 - 12.4 fL Final    Neutrophils % 03/12/2022 78.2 (A) 53.0 - 65.0 % Final    Lymphocytes % 03/12/2022 13.0 (A) 27.0 - 41.0 % Final    Neutrophils, Abs 03/12/2022 5.48  1.80 - 7.70 K/uL Final    Lymphocytes, Absolute 03/12/2022 0.91 (A) 1.00 - 4.80 K/uL Final    Diff Type 03/12/2022 Auto   Final    Monocytes % 03/12/2022 7.0 (A) 2.0 - 6.0 % Final    Eosinophils % 03/12/2022 1.3  1.0 - 4.0 % Final    Basophils % 03/12/2022 0.4  0.0 - 1.0 % Final    Immature Granulocytes % 03/12/2022 0.1  0.0 - 0.4 % Final    Monocytes, Absolute 03/12/2022 0.49  0.00 - 0.80 K/uL Final    Eosinophils, Absolute 03/12/2022 0.09  0.00 - 0.50 K/uL Final    Immature Granulocytes, Absolute 03/12/2022 0.01  0.00 - 0.04 K/uL Final    Basophils, Absolute 03/12/2022 0.03  0.00 - 0.20 K/uL Final    WBC, UA 03/12/2022 0-5  None Seen, 0-5 /hpf Final    RBC, UA 03/12/2022 25-50 (A) None Seen, 0-3 /hpf Final    Bacteria, UA 03/12/2022 Few (A) None Seen, None Seen To Occasional, Rare  /hpf Final    Squamous Epithelial Cells, UA 03/12/2022 Moderate (A) None Seen, Rare, None Seen To Occasional /lpf Final    Culture, Group A Strep 03/12/2022 Negative for Group A Streptococcus   Corrected    Lactic Acid 03/12/2022 0.8  0.4 - 2.0 mmol/L Final   Clinical Support on 03/03/2022   Component Date Value Ref Range Status    Culture, Urine 03/03/2022 Skin/Urogenital Lisa Isolated, no further workup.   Final   Lab Requisition on 02/22/2022   Component Date Value Ref Range Status    Sodium 02/22/2022 140  136 - 145 mmol/L Final    Potassium 02/22/2022 4.6  3.5 - 5.1 mmol/L Final    Chloride 02/22/2022 108 (A) 98 - 107 mmol/L Final    CO2 02/22/2022 26  21 - 32 mmol/L Final    Anion Gap 02/22/2022 11  7 - 16 mmol/L Final    Glucose 02/22/2022 84  74 - 106 mg/dL Final    BUN 02/22/2022 11  7 - 18 mg/dL Final    Creatinine 02/22/2022 1.07 (A) 0.55 - 1.02 mg/dL Final    BUN/Creatinine Ratio 02/22/2022 10  6 - 20 Final    Calcium 02/22/2022 9.0  8.5 - 10.1 mg/dL Final    eGFR 02/22/2022 57 (A) >=60 mL/min/1.73m² Final   Appointment on 02/22/2022   Component Date Value Ref Range Status    Color, UA 02/22/2022 Yellow   Final    Spec Grav UA 02/22/2022 1.025   Final    pH, UA 02/22/2022 5.0   Final    WBC, UA 02/22/2022 TRACE   Final    Nitrite, UA 02/22/2022 NEG   Final    Protein, POC 02/22/2022 30   Final    Glucose, UA 02/22/2022 NEG   Final    Ketones, UA 02/22/2022 TRACE   Final    Bilirubin, POC 02/22/2022 NEG   Final    Urobilinogen, UA 02/22/2022 0.2   Final    Blood, UA 02/22/2022 SMALL   Final   Lab Requisition on 02/22/2022   Component Date Value Ref Range Status    Culture, Urine 02/22/2022 Mixed Skin/Urogenital Lisa Isolated, no further workup.   Final   Clinical Support on 01/25/2022   Component Date Value Ref Range Status    Culture, Urine 01/25/2022 2,000 Escherichia coli (A)  Final   Office Visit on 12/22/2021   Component Date Value Ref Range Status    POC Amphetamines  12/22/2021 Negative  Negative, Inconclusive Final    POC Barbiturates 12/22/2021 Negative  Negative, Inconclusive Final    POC Benzodiazepines 12/22/2021 Negative  Negative, Inconclusive Final    POC Cocaine 12/22/2021 Negative  Negative, Inconclusive Final    POC THC 12/22/2021 Negative  Negative, Inconclusive Final    POC Methadone 12/22/2021 Negative  Negative, Inconclusive Final    POC Methamphetamine 12/22/2021 Negative  Negative, Inconclusive Final    POC Opiates 12/22/2021 Presumptive Positive (A) Negative, Inconclusive Final    POC Oxycodone 12/22/2021 Negative  Negative, Inconclusive Final    POC Phencyclidine 12/22/2021 Negative  Negative, Inconclusive Final    POC Methylenedioxymethamphetamine * 12/22/2021 Negative  Negative, Inconclusive Final    POC Tricyclic Antidepressants 12/22/2021 Negative  Negative, Inconclusive Final    POC Buprenorphine 12/22/2021 Negative   Final     Acceptable 12/22/2021 Yes   Final    POC Temperature (Urine) 12/22/2021 90   Final           Assessment:      1. Lumbosacral radiculopathy    2. Encounter for long-term (current) use of other medications    3. Postlaminectomy syndrome, lumbar region    4. Disorder of sacrum            A's of Opioid Risk Assessment  Activity:Patient can perform ADL.   Analgesia:Patients pain is partially controlled by current medication. Patient has tried OTC medications such as Tylenol and Ibuprofen with out relief.   Adverse Effects: Patient denies constipation or sedation.  Aberrant Behavior:  reviewed with no aberrant drug seeking/taking behavior.  Overdose reversal drug naloxone discussed    Drug screen reviewed      Requested Prescriptions     Signed Prescriptions Disp Refills    gabapentin (NEURONTIN) 600 MG tablet 90 tablet 1     Sig: Take 1 tablet (600 mg total) by mouth every 8 (eight) hours.    tiZANidine 4 mg Cap 90 capsule 1     Sig: Take 1 capsule by mouth every 8 (eight) hours.     HYDROcodone-acetaminophen (NORCO)  mg per tablet 90 tablet 0     Sig: Take 1 tablet by mouth every 8 (eight) hours.    HYDROcodone-acetaminophen (NORCO)  mg per tablet 60 tablet 0     Sig: Take 1 tablet by mouth every 12 (twelve) hours.         Plan:    Spinal cord stimulator helping control her lower extremity discomfort    She states her thoracic spine pain is improved    We discussed the MRI thoracic spine results degenerative changes stimulator in place no significant neural foraminal/central canal stenosis noted    Continue Norco 10 1 p.o. q.8 hours    Next month's refill [place] 10 1 p.o. q.12 hours    Would like to continue with conservative management from our standpoint    Follow-up 2 months     Dr. Moon, July 2022    Bring original prescription medication bottles/container/box with labels to each visit

## 2022-04-20 NOTE — PATIENT INSTRUCTIONS
1. Continue current topamax 100mg at night for now    2. Will submit to insurance to see if once monthly injections are covered    3. CMP, vitamin d level    4. Giving Aimovig injection sample in clinic

## 2022-04-20 NOTE — PROGRESS NOTES
Subjective:       Patient ID: Gloria Mitchell is a 52 y.o. female     Chief Complaint:    Chief Complaint   Patient presents with    Follow-up        Allergies:  Oxycodone-acetaminophen    Current Medications:    Outpatient Encounter Medications as of 4/20/2022   Medication Sig Dispense Refill    albuterol (PROVENTIL/VENTOLIN HFA) 90 mcg/actuation inhaler Inhale 2 puffs into the lungs every 6 (six) hours as needed for Wheezing. Rescue 1 g 0    azithromycin (Z-FLAQUITO) 250 MG tablet Take 500 mg by mouth once daily.      citric acid-potassium citrate (POLYCITRA) 1,100-334 mg/5 mL solution Take by mouth 3 (three) times daily with meals.      FLUoxetine 20 MG capsule Take 20 mg by mouth once daily.      fluticasone propionate (FLONASE) 50 mcg/actuation nasal spray 1 spray (50 mcg total) by Each Nostril route 2 (two) times daily as needed for Rhinitis. 15.8 mL 0    levothyroxine (SYNTHROID) 125 MCG tablet Take 125 mcg by mouth before breakfast.      levothyroxine (SYNTHROID) 75 MCG tablet 1 tablet in the morning on an empty stomach      metoprolol tartrate (LOPRESSOR) 25 MG tablet Take 25 mg by mouth once daily.      omeprazole (PRILOSEC) 20 MG capsule Take 20 mg by mouth once daily.      omeprazole (PRILOSEC) 20 MG capsule 1 capsule 30 minutes before morning meal      promethazine (PHENERGAN) 25 MG tablet Take 1 tablet every 8 hours as needed for migraine.  Not more than 3 days of the week 20 tablet 2    topiramate (TOPAMAX) 100 MG tablet Take 1 tablet (100 mg total) by mouth every evening. 30 tablet 11    topiramate (TOPAMAX) 100 MG tablet 1 tablet      traZODone (DESYREL) 100 MG tablet Take 100 mg by mouth every evening.      traZODone (DESYREL) 50 MG tablet Take by mouth nightly.      [DISCONTINUED] gabapentin (NEURONTIN) 600 MG tablet Take 1 tablet (600 mg total) by mouth every 8 (eight) hours. 90 tablet 1    [DISCONTINUED] gabapentin (NEURONTIN) 800 MG tablet 1 tablet      [DISCONTINUED]  HYDROcodone-acetaminophen (NORCO)  mg per tablet Take 1 tablet by mouth every 8 (eight) hours. 90 tablet 0    [DISCONTINUED] tiZANidine (ZANAFLEX) 4 MG tablet       [DISCONTINUED] tiZANidine 4 mg Cap Take 1 capsule by mouth every 8 (eight) hours. 90 capsule 1     No facility-administered encounter medications on file as of 4/20/2022.       History of Present Illness  51 yr old white female presents with headaches that started as a teenager.     Prior to topamax she reports was having between 12 and 16 headache days a month, with the topamax 100mg QHS she has maybe 3.  She tolerates very well, no reported side effects.  However, in October 2021 was found to have kidney stones.  Initially she declined to change medication, but at last followup in January I had discussed option of once monthly injections and she was open to idea of seeing if insurance would cover.  She was also on metoprolol per primary care.  Prior on Effexor in [id number].    I do not see since that visit where we got any information from her insurance.  Have already requested again about her injection.  She is not on any other CGRP meds.  Today I am going ahead with giving her an Aimovig injection here in the clinic to get her started.    She asks me about rechecking her labs.  Reports was in the hospital in [place], [place] in march with pneumonia and states they told her that her liver enzymes were elevated.  She is due for labs at any rate so we will get CMP along with vitamin d level.         Review of Systems  Review of Systems   Constitutional: Negative for diaphoresis and fever.   HENT: Negative for congestion, hearing loss and tinnitus.    Eyes: Negative for blurred vision, double vision, photophobia, discharge and redness.   Respiratory: Negative for cough and shortness of breath.    Cardiovascular: Negative for chest pain.   Gastrointestinal: Negative for abdominal pain, nausea and vomiting.   Musculoskeletal: Negative for back pain,  joint pain, myalgias and neck pain.   Skin: Negative for itching and rash.   Neurological: Positive for headaches. Negative for dizziness, tremors, sensory change, speech change, focal weakness, seizures, loss of consciousness and weakness.   Psychiatric/Behavioral: Negative for depression, hallucinations and memory loss. The patient does not have insomnia.    All other systems reviewed and are negative.     Objective:     NEUROLOGICAL EXAMINATION:     MENTAL STATUS   Oriented to person, place, and time.   Registration: recalls 3 of 3 objects. Recall at 5 minutes: recalls 3 of 3 objects.   Attention: normal. Concentration: normal.   Speech: speech is normal   Level of consciousness: alert  Knowledge: good and consistent with education.   Normal comprehension.     CRANIAL NERVES     CN II   Visual fields full to confrontation.   Visual acuity: normal  Right visual field deficit: none  Left visual field deficit: none     CN III, IV, VI   Pupils are equal, round, and reactive to light.  Extraocular motions are normal.   Right pupil: Size: 3 mm. Shape: regular. Reactivity: brisk. Consensual response: intact. Accommodation: intact.   Left pupil: Size: 3 mm. Shape: regular. Reactivity: brisk. Consensual response: intact. Accommodation: intact.   CN III: no CN III palsy  CN VI: no CN VI palsy  Nystagmus: none   Diplopia: none  Upgaze: normal  Downgaze: normal  Conjugate gaze: present  Vestibulo-ocular reflex: present    CN V   Facial sensation intact.   Right facial sensation deficit: none  Left facial sensation deficit: none  Right corneal reflex: normal  Left corneal reflex: normal    CN VII   Facial expression full, symmetric.   Right facial weakness: none  Left facial weakness: none  Right taste: normal  Left taste: normal    CN VIII   CN VIII normal.   Hearing: intact    CN IX, X   CN IX normal.   CN X normal.   Palate: symmetric    CN XI   CN XI normal.   Right sternocleidomastoid strength: normal  Left  sternocleidomastoid strength: normal  Right trapezius strength: normal  Left trapezius strength: normal    CN XII   CN XII normal.   Tongue: not atrophic  Fasciculations: absent  Tongue deviation: none    MOTOR EXAM   Muscle bulk: normal  Overall muscle tone: normal  Right arm tone: normal  Left arm tone: normal  Right arm pronator drift: absent  Left arm pronator drift: absent  Right leg tone: normal  Left leg tone: normal    Strength   Right neck flexion: 5/5  Left neck flexion: 5/5  Right neck extension: 5/5  Left neck extension: 5/5  Right deltoid: 5/5  Left deltoid: 5/5  Right biceps: 5/5  Left biceps: 5/5  Right triceps: 5/5  Left triceps: 5/5  Right wrist flexion: 5/5  Left wrist flexion: 5/5  Right wrist extension: 5/5  Left wrist extension: 5/5  Right interossei: 5/5  Left interossei: 5/5  Right iliopsoas: 5/5  Left iliopsoas: 5/5  Right quadriceps: 5/5  Left quadriceps: 5/5  Right hamstrin/5  Left hamstrin/5  Right anterior tibial: 5/5  Left anterior tibial: 5/5  Right posterior tibial: 5/5  Left posterior tibial: 5/5  Right gastroc: 5/5  Left gastroc: 5/5    REFLEXES     Reflexes   Right brachioradialis: 2+  Left brachioradialis: 2+  Right biceps: 2+  Left biceps: 2+  Right triceps: 2+  Left triceps: 2+  Right patellar: 2+  Left patellar: 2+  Right achilles: 2+  Left achilles: 2+  Right plantar: normal  Left plantar: normal  Right Hernandez: absent  Left Hernandez: absent  Right ankle clonus: absent  Left ankle clonus: absent  Right pendular knee jerk: absent  Left pendular knee jerk: absent    SENSORY EXAM   Light touch normal.   Right arm light touch: normal  Left arm light touch: normal  Right leg light touch: normal  Left leg light touch: normal  Vibration normal.   Right arm vibration: normal  Left arm vibration: normal  Right leg vibration: normal  Left leg vibration: normal  Proprioception normal.   Right arm proprioception: normal  Left arm proprioception: normal  Right leg proprioception:  normal  Left leg proprioception: normal  Pinprick normal.   Right arm pinprick: normal  Left arm pinprick: normal  Right leg pinprick: normal  Left leg pinprick: normal  Graphesthesia: normal  Romberg: negative  Stereognosis: normal    GAIT AND COORDINATION     Gait  Gait: normal     Coordination   Finger to nose coordination: normal  Heel to shin coordination: normal  Tandem walking coordination: normal    Tremor   Resting tremor: absent  Intention tremor: absent  Action tremor: absent       Physical Exam  Vitals and nursing note reviewed.   Constitutional:       Appearance: Normal appearance.   HENT:      Head: Normocephalic.   Eyes:      Extraocular Movements: Extraocular movements intact and EOM normal.      Pupils: Pupils are equal, round, and reactive to light.   Cardiovascular:      Rate and Rhythm: Normal rate and regular rhythm.   Pulmonary:      Effort: Pulmonary effort is normal.      Breath sounds: Normal breath sounds.   Musculoskeletal:         General: No swelling or tenderness. Normal range of motion.      Cervical back: Normal range of motion and neck supple.      Right lower leg: No edema.      Left lower leg: No edema.   Skin:     General: Skin is warm and dry.      Coloration: Skin is not jaundiced.      Findings: No rash.   Neurological:      General: No focal deficit present.      Mental Status: She is alert and oriented to person, place, and time.      GCS: GCS eye subscore is 4. GCS verbal subscore is 5. GCS motor subscore is 6.      Cranial Nerves: No cranial nerve deficit.      Sensory: No sensory deficit.      Motor: Motor function is intact. No weakness.      Coordination: Coordination is intact. Coordination normal. Finger-Nose-Finger Test, Heel to Shin Test and Romberg Test normal.      Gait: Gait is intact. Gait and tandem walk normal.      Deep Tendon Reflexes: Reflexes normal.      Reflex Scores:       Tricep reflexes are 2+ on the right side and 2+ on the left side.       Bicep  reflexes are 2+ on the right side and 2+ on the left side.       Brachioradialis reflexes are 2+ on the right side and 2+ on the left side.       Patellar reflexes are 2+ on the right side and 2+ on the left side.       Achilles reflexes are 2+ on the right side and 2+ on the left side.  Psychiatric:         Mood and Affect: Mood normal.         Speech: Speech normal.         Behavior: Behavior normal.          Assessment:     Problem List Items Addressed This Visit        Neuro    Intractable chronic migraine without aura and without status migrainosus - Primary    Relevant Orders    Comprehensive Metabolic Panel (Completed)      Other Visit Diagnoses     Vitamin D deficiency        Relevant Orders    Vitamin D (Completed)           Primary Diagnosis and ICD10  Intractable chronic migraine without aura and without status migrainosus [G43.719]    Plan:     Patient Instructions   1. Continue current topamax 100mg at night for now    2. Will submit to insurance to see if once monthly injections are covered    3. CMP, vitamin d level    4. Giving Aimovig injection sample in clinic      There are no discontinued medications.    Requested Prescriptions      No prescriptions requested or ordered in this encounter       Orders Placed This Encounter   Procedures    Comprehensive Metabolic Panel    Vitamin D

## 2022-04-20 NOTE — PROGRESS NOTES
Reviewed, demonstrated and administered sample of Aimovig injection 140mg/ml in clinic. Patient gave verbal understanding. Tolerated injection well, no s/s of reaction noted.

## 2022-04-21 DIAGNOSIS — G43.719 INTRACTABLE CHRONIC MIGRAINE WITHOUT AURA AND WITHOUT STATUS MIGRAINOSUS: Primary | ICD-10-CM

## 2022-04-21 RX ORDER — ERENUMAB-AOOE 140 MG/ML
140 INJECTION, SOLUTION SUBCUTANEOUS
Qty: 1 EACH | Refills: 11 | Status: SHIPPED | OUTPATIENT
Start: 2022-04-21 | End: 2023-03-28 | Stop reason: SDUPTHER

## 2022-05-06 ENCOUNTER — PATIENT MESSAGE (OUTPATIENT)
Dept: PAIN MEDICINE | Facility: CLINIC | Age: 52
End: 2022-05-06
Payer: MEDICARE

## 2022-05-11 ENCOUNTER — TELEPHONE (OUTPATIENT)
Dept: PAIN MEDICINE | Facility: CLINIC | Age: 52
End: 2022-05-11
Payer: MEDICARE

## 2022-05-11 NOTE — TELEPHONE ENCOUNTER
"Patient called and was inquiring about "gummies" and to know if [facility] was giving out medical marijuana cards and I told her no. And she said she wanted to try them cause she heard that they was good to help her pain. I informed her that she will not be able to take those and her meds without the card and she states she was gone try to obtain one. I told her if her DDS come back and she has THC in it she could possibly lose her pain medications. She verbalize understanding.    ----- Message from Zulma Bedoya sent at 5/10/2022 11:43 AM CDT -----  Regarding: Question on meds  Pt. Has question about some medicines. Please call [phone]      "

## 2022-05-12 ENCOUNTER — HOSPITAL ENCOUNTER (OUTPATIENT)
Dept: RADIOLOGY | Facility: HOSPITAL | Age: 52
Discharge: HOME | End: 2022-05-12
Attending: FAMILY MEDICINE
Payer: MEDICARE

## 2022-05-12 DIAGNOSIS — R79.89 ELEVATED TSH: ICD-10-CM

## 2022-05-12 DIAGNOSIS — R79.89 ELEVATED TSH: Primary | ICD-10-CM

## 2022-05-12 PROCEDURE — 76536 US EXAM OF HEAD AND NECK: CPT | Mod: TC

## 2022-06-06 ENCOUNTER — HOSPITAL ENCOUNTER (OUTPATIENT)
Dept: RADIOLOGY | Facility: HOSPITAL | Age: 52
Discharge: HOME | End: 2022-06-06
Attending: UROLOGY
Payer: MEDICARE

## 2022-06-06 ENCOUNTER — OFFICE VISIT (OUTPATIENT)
Dept: UROLOGY | Facility: CLINIC | Age: 52
End: 2022-06-06
Payer: MEDICARE

## 2022-06-06 VITALS
SYSTOLIC BLOOD PRESSURE: 116 MMHG | DIASTOLIC BLOOD PRESSURE: 87 MMHG | WEIGHT: 197 LBS | HEIGHT: 62 IN | BODY MASS INDEX: 36.25 KG/M2 | HEART RATE: 88 BPM

## 2022-06-06 DIAGNOSIS — R31.29 MICROSCOPIC HEMATURIA: ICD-10-CM

## 2022-06-06 DIAGNOSIS — R31.9 URINARY TRACT INFECTION WITH HEMATURIA, SITE UNSPECIFIED: ICD-10-CM

## 2022-06-06 DIAGNOSIS — N20.0 KIDNEY STONE: Primary | ICD-10-CM

## 2022-06-06 DIAGNOSIS — N39.0 URINARY TRACT INFECTION WITH HEMATURIA, SITE UNSPECIFIED: ICD-10-CM

## 2022-06-06 DIAGNOSIS — N20.0 KIDNEY STONE: ICD-10-CM

## 2022-06-06 PROCEDURE — 3008F BODY MASS INDEX DOCD: CPT | Mod: CPTII,,, | Performed by: UROLOGY

## 2022-06-06 PROCEDURE — 74018 XR KUB: ICD-10-PCS | Mod: 26,,, | Performed by: STUDENT IN AN ORGANIZED HEALTH CARE EDUCATION/TRAINING PROGRAM

## 2022-06-06 PROCEDURE — 74018 RADEX ABDOMEN 1 VIEW: CPT | Mod: TC

## 2022-06-06 PROCEDURE — 3074F PR MOST RECENT SYSTOLIC BLOOD PRESSURE < 130 MM HG: ICD-10-PCS | Mod: CPTII,,, | Performed by: UROLOGY

## 2022-06-06 PROCEDURE — 87086 URINE CULTURE/COLONY COUNT: CPT | Mod: ,,, | Performed by: CLINICAL MEDICAL LABORATORY

## 2022-06-06 PROCEDURE — 1159F PR MEDICATION LIST DOCUMENTED IN MEDICAL RECORD: ICD-10-PCS | Mod: CPTII,,, | Performed by: UROLOGY

## 2022-06-06 PROCEDURE — 99213 OFFICE O/P EST LOW 20 MIN: CPT | Mod: S$PBB,,, | Performed by: UROLOGY

## 2022-06-06 PROCEDURE — 74018 RADEX ABDOMEN 1 VIEW: CPT | Mod: 26,,, | Performed by: STUDENT IN AN ORGANIZED HEALTH CARE EDUCATION/TRAINING PROGRAM

## 2022-06-06 PROCEDURE — 87086 CULTURE, URINE: ICD-10-PCS | Mod: ,,, | Performed by: CLINICAL MEDICAL LABORATORY

## 2022-06-06 PROCEDURE — 99215 OFFICE O/P EST HI 40 MIN: CPT | Mod: PBBFAC | Performed by: UROLOGY

## 2022-06-06 PROCEDURE — 99213 PR OFFICE/OUTPT VISIT, EST, LEVL III, 20-29 MIN: ICD-10-PCS | Mod: S$PBB,,, | Performed by: UROLOGY

## 2022-06-06 PROCEDURE — 3079F DIAST BP 80-89 MM HG: CPT | Mod: CPTII,,, | Performed by: UROLOGY

## 2022-06-06 PROCEDURE — 3079F PR MOST RECENT DIASTOLIC BLOOD PRESSURE 80-89 MM HG: ICD-10-PCS | Mod: CPTII,,, | Performed by: UROLOGY

## 2022-06-06 PROCEDURE — 3008F PR BODY MASS INDEX (BMI) DOCUMENTED: ICD-10-PCS | Mod: CPTII,,, | Performed by: UROLOGY

## 2022-06-06 PROCEDURE — 1159F MED LIST DOCD IN RCRD: CPT | Mod: CPTII,,, | Performed by: UROLOGY

## 2022-06-06 PROCEDURE — 3074F SYST BP LT 130 MM HG: CPT | Mod: CPTII,,, | Performed by: UROLOGY

## 2022-06-06 RX ORDER — VORTIOXETINE 10 MG/1
10 TABLET, FILM COATED ORAL DAILY
COMMUNITY
End: 2022-08-18

## 2022-06-06 NOTE — PATIENT INSTRUCTIONS
KUB and previous CT reviewed.  There are at least 2 stones present with 1 being in the right upper pole and 1  in the left lower pole.  According to previous CT there are other stones present also.  No definite stone seen in line with the ureters.   Urine sent for culture.  We will treat specifically if still infected.  Gave samples of Cipro and culture cup see in case she needs to submit another culture.  She will collect a culture and refrigerate until she can bring it to the office before starting the Cipro if needed.  1 year appointment with KUB or sooner if needed.

## 2022-06-06 NOTE — PROGRESS NOTES
Subjective:       Patient ID: Gloria Mitchell is a 52 y.o. female.    Chief Complaint: Follow-up (1 year KUB with OV)    Ms. Mitchell presented to the clinic today because of left ureteral colic.  She had onset pain on April 2nd and went to the emergency room at [facility].  She was found to have a large stone at the left ureteropelvic junction it measures about 6 x 10 mm.  CT scan was done but she did not have a KUB.  She had a lot pain that day and then did okay for couple of days but today has been having increasing left flank pain again.  She did require Dilaudid and has only a few Dilaudid left.  Presented to the clinic in acute pain as she says she did not hurt any until after she arrived in [place].  She desires to go ahead with definitive treatment because the amount of pain she has had on intermittent basis.  Patient had left ureteroscopy with laser lithotripsy of stone in the distal left ureter in July 2020.  That was the last active stones she has had until this one.  She has a long history of stone disease dating back a good many years.  Previous stone risk profile revealed her to have low citrate and she has been taking her potassium citrate as directed which is two 10 mEq tablets b.i.d..  She admits she does not drink as much water she should.  Has had multiple procedures in the past.  (April 6, 2021  ---------------------------------------------------------------------------------------------------------------------------------------------------------------------     Ms. Mitchell underwent left ureteroscopy with laser lithotripsy of stone and stent insertion on April 8. She was supposed to keep her stent until Monday but accidentally pulled it out on Saturday.  She has had a lot of pain since then and continues to have a lot of pain.  She presented clinic with severe pain on left side.  She took her last Dilaudid at 9:30 a.m. today.  She is also concerned she may have some infection.  The urine  culture that was collected initially at her clinic visit grew 30,000 colonies Citrobacter and E coli and 20,000 colonies of Enterococcus faecalis.  She did get Bactrim DS which is appropriate and she is not febrile.  Presented to clinic with her mother.  (April 12, 2021)     Ms. Mitchell is in for KUB and follow-up since her left ureteroscopy.  She is not having any more stone pain.  She passed a stone on or about April 25th which apparently was a small fragment.  She did not have any pain associated with it.  Comfortable now and back to her baseline.  She is going to pain clinic for her back problems.  Nothing that sounds like any additional stone issues.  She is drinking only water and trying to drink more.  She is taking her potassium citrate to help with stone prevention..  In today for KUB and follow-up.  Her mother was with her today.  (May 17, 2021)    Ms. Mitchell is seen in clinic for the 1st time in over a year but has had at least 3 urinary tract infection during the past year.  We treated her for at least 2 and she was treated in [facility] for infection about 2 weeks ago.  That treatment was with Cipro and clinically seemed to improve although we are not sure what culture showed or if a culture was done.  Now off the Cipro for the last few days with no symptoms.  She has had no recent stone problems.  No other health problems that she is aware of except for pneumonia that was treated as outpatient.  (June 6, 2022)    Review of Systems      Objective:      Physical Exam  Constitutional:       Appearance: Normal appearance. She is normal weight.   Neurological:      Mental Status: She is alert.   Psychiatric:         Mood and Affect: Mood normal.         Behavior: Behavior normal.       urinalysis revealed several red blood cells occasional pus.  Dipstick had moderate amount of blood with trace of protein, pH 6.0, specific gravity 1.020  Assessment:       Problem List Items Addressed This Visit     None     Visit Diagnoses     Kidney stone    -  Primary    Relevant Orders    X-Ray KUB    Urinary tract infection with hematuria, site unspecified        Relevant Orders    Urine culture    Microscopic hematuria              Plan:     KUB and previous CT reviewed.  There are at least 2 stones present with 1 being in the right upper pole and 1  in the left lower pole.  According to previous CT there are other stones present also.  No definite stone seen in line with the ureters.   Urine sent for culture.  We will treat specifically if still infected.  Gave samples of Cipro and culture cup see in case she needs to submit another culture.  She will collect a culture and refrigerate until she can bring it to the office before starting the Cipro if needed.  1 year appointment with KUB or sooner if needed.  *       show

## 2022-06-08 LAB — UA COMPLETE W REFLEX CULTURE PNL UR: NORMAL

## 2022-06-16 NOTE — PROGRESS NOTES
"Subjective:      Patient ID: Golria Mitchell is a 52 y.o. female.    Chief Complaint: Low-back Pain      Pain  This is a chronic problem. The current episode started more than 1 year ago. The problem occurs daily. The problem has been waxing and waning. Associated symptoms include arthralgias and neck pain.     Review of Systems   Constitutional: Negative for activity change, appetite change and unexpected weight change.   HENT: Negative for drooling, ear discharge, ear pain, facial swelling, nosebleeds, trouble swallowing, voice change and goiter.    Eyes: Negative for photophobia, pain, discharge, redness and visual disturbance.   Respiratory: Negative for apnea, choking, chest tightness, shortness of breath, wheezing and stridor.    Cardiovascular: Negative for palpitations and leg swelling.   Gastrointestinal: Negative for abdominal distention, diarrhea, rectal pain and fecal incontinence.   Endocrine: Negative for cold intolerance, heat intolerance, polydipsia, polyphagia and polyuria.   Genitourinary: Negative for flank pain, frequency and hot flashes.   Musculoskeletal: Positive for arthralgias, back pain, neck pain and neck stiffness.   Integumentary:  Negative for color change and pallor.   Allergic/Immunologic: Negative for immunocompromised state.   Neurological: Negative for dizziness, seizures, syncope, facial asymmetry, speech difficulty, light-headedness, disturbances in coordination, memory loss and coordination difficulties.   Hematological: Negative for adenopathy. Does not bruise/bleed easily.   Psychiatric/Behavioral: Negative for agitation, behavioral problems, confusion, decreased concentration, dysphoric mood, hallucinations, self-injury and suicidal ideas. The patient is not nervous/anxious and is not hyperactive.             Objective:  Vitals:    06/20/22 1103   BP: 119/75   Pulse: 69   Resp: 18   Weight: 89.4 kg (197 lb)   Height: 5' 2" (1.575 m)   PainSc:   8         Physical " Exam  Vitals and nursing note reviewed. Exam conducted with a chaperone present.   Constitutional:       General: She is awake. She is not in acute distress.     Appearance: Normal appearance. She is not toxic-appearing.   HENT:      Head: Normocephalic and atraumatic.      Nose: Nose normal.      Mouth/Throat:      Mouth: Mucous membranes are moist.      Pharynx: Oropharynx is clear.   Eyes:      Conjunctiva/sclera: Conjunctivae normal.      Pupils: Pupils are equal, round, and reactive to light.   Cardiovascular:      Rate and Rhythm: Normal rate.   Pulmonary:      Effort: Pulmonary effort is normal. No respiratory distress.   Abdominal:      Palpations: Abdomen is soft.      Tenderness: There is no guarding.   Musculoskeletal:         General: Normal range of motion.      Cervical back: Normal range of motion and neck supple. Tenderness present. No rigidity.      Thoracic back: Tenderness present.      Lumbar back: Tenderness present.   Skin:     General: Skin is warm and dry.      Coloration: Skin is not jaundiced or pale.   Neurological:      General: No focal deficit present.      Mental Status: She is alert and oriented to person, place, and time. Mental status is at baseline.      Cranial Nerves: Cranial nerves are intact. No cranial nerve deficit (II-XII).   Psychiatric:         Mood and Affect: Mood normal.         Behavior: Behavior normal. Behavior is cooperative.         Thought Content: Thought content normal.           No orders of the defined types were placed in this encounter.       X-Ray KUB  Narrative: EXAMINATION:  XR KUB    CLINICAL HISTORY:  Calculus of kidney    TECHNIQUE:  XR KUB    COMPARISON:  2021    FINDINGS:  Multiple subcentimeter nonobstructing renal calculi.    No bowel obstruction or free air.    Degenerative change.  Impression: Multiple subcentimeter nonobstructing renal calculi.    Electronically signed by: Colton Camacho  Date:    06/06/2022  Time:    14:40       Office Visit  on 06/06/2022   Component Date Value Ref Range Status    Culture, Urine 06/06/2022 Skin/Urogenital Lisa Isolated, no further workup.   Final   Appointment on 05/11/2022   Component Date Value Ref Range Status    Color, UA 05/11/2022 Yellow   Final    Spec Grav UA 05/11/2022 1.025   Final    pH, UA 05/11/2022 5.5   Final    WBC, UA 05/11/2022 NEG   Final    Nitrite, UA 05/11/2022 NEG   Final    Protein, POC 05/11/2022 NEG   Final    Glucose, UA 05/11/2022 NEG   Final    Ketones, UA 05/11/2022 TRACE   Final    Bilirubin, POC 05/11/2022 NEG   Final    Urobilinogen, UA 05/11/2022 0.2   Final    Blood, UA 05/11/2022 TRACE   Final   Lab Requisition on 05/11/2022   Component Date Value Ref Range Status    Triglycerides 05/11/2022 123  35 - 150 mg/dL Final    Cholesterol 05/11/2022 180  0 - 200 mg/dL Final    HDL Cholesterol 05/11/2022 40  40 - 60 mg/dL Final    Cholesterol/HDL Ratio (Risk Factor) 05/11/2022 4.5   Final    Non-HDL 05/11/2022 140  mg/dL Final    LDL Calculated 05/11/2022 115  mg/dL Final    LDL/HDL 05/11/2022 2.9   Final    VLDL 05/11/2022 25  mg/dL Final    Sodium 05/11/2022 138  136 - 145 mmol/L Final    Potassium 05/11/2022 4.0  3.5 - 5.1 mmol/L Final    Chloride 05/11/2022 108 (A) 98 - 107 mmol/L Final    CO2 05/11/2022 22  21 - 32 mmol/L Final    Anion Gap 05/11/2022 12  7 - 16 mmol/L Final    Glucose 05/11/2022 126 (A) 74 - 106 mg/dL Final    BUN 05/11/2022 10  7 - 18 mg/dL Final    Creatinine 05/11/2022 1.18 (A) 0.55 - 1.02 mg/dL Final    BUN/Creatinine Ratio 05/11/2022 8  6 - 20 Final    Calcium 05/11/2022 8.9  8.5 - 10.1 mg/dL Final    eGFR 05/11/2022 51 (A) >=60 mL/min/1.73m² Final    TSH 05/11/2022 34.100 (A) 0.358 - 3.740 uIU/mL Final    Amylase 05/11/2022 50  25 - 115 U/L Final    Lipase 05/11/2022 252  73 - 393 U/L Final    Total Protein 05/11/2022 6.9  6.4 - 8.2 g/dL Final    Albumin 05/11/2022 3.8  3.5 - 5.0 g/dL Final    Bilirubin, Total 05/11/2022 0.3   0.0 - 1.2 mg/dL Final    Bilirubin, Direct 05/11/2022 0.1  0.0 - 0.2 mg/dL Final    AST 05/11/2022 27  15 - 37 U/L Final    ALT 05/11/2022 35  13 - 56 U/L Final    Alk Phos 05/11/2022 81  41 - 108 U/L Final    WBC 05/11/2022 4.05 (A) 4.50 - 11.00 K/uL Final    RBC 05/11/2022 4.46  4.20 - 5.40 M/uL Final    Hemoglobin 05/11/2022 13.0  12.0 - 16.0 g/dL Final    Hematocrit 05/11/2022 40.7  38.0 - 47.0 % Final    MCV 05/11/2022 91.3  80.0 - 96.0 fL Final    MCH 05/11/2022 29.1  27.0 - 31.0 pg Final    MCHC 05/11/2022 31.9 (A) 32.0 - 36.0 g/dL Final    RDW 05/11/2022 16.1 (A) 11.5 - 14.5 % Final    Platelet Count 05/11/2022 257  150 - 400 K/uL Final    MPV 05/11/2022 10.5  9.4 - 12.4 fL Final    Neutrophils % 05/11/2022 46.2 (A) 53.0 - 65.0 % Final    Lymphocytes % 05/11/2022 41.5 (A) 27.0 - 41.0 % Final    Monocytes % 05/11/2022 6.4 (A) 2.0 - 6.0 % Final    Eosinophils % 05/11/2022 4.2 (A) 1.0 - 4.0 % Final    Basophils % 05/11/2022 1.5 (A) 0.0 - 1.0 % Final    Immature Granulocytes % 05/11/2022 0.2  0.0 - 0.4 % Final    nRBC, Auto 05/11/2022 0.0  <=0.0 % Final    Neutrophils, Abs 05/11/2022 1.87  1.80 - 7.70 K/uL Final    Lymphocytes, Absolute 05/11/2022 1.68  1.00 - 4.80 K/uL Final    Monocytes, Absolute 05/11/2022 0.26  0.00 - 0.80 K/uL Final    Eosinophils, Absolute 05/11/2022 0.17  0.00 - 0.50 K/uL Final    Basophils, Absolute 05/11/2022 0.06  0.00 - 0.20 K/uL Final    Immature Granulocytes, Absolute 05/11/2022 0.01  0.00 - 0.04 K/uL Final    nRBC, Absolute 05/11/2022 0.00  <=0.00 x10e3/uL Final    Diff Type 05/11/2022 Auto   Final   Office Visit on 04/20/2022   Component Date Value Ref Range Status    POC Amphetamines 04/20/2022 Negative  Negative, Inconclusive Final    POC Barbiturates 04/20/2022 Negative  Negative, Inconclusive Final    POC Benzodiazepines 04/20/2022 Negative  Negative, Inconclusive Final    POC Cocaine 04/20/2022 Negative  Negative, Inconclusive Final    POC  THC 04/20/2022 Negative  Negative, Inconclusive Final    POC Methadone 04/20/2022 Negative  Negative, Inconclusive Final    POC Methamphetamine 04/20/2022 Negative  Negative, Inconclusive Final    POC Opiates 04/20/2022 Presumptive Positive (A) Negative, Inconclusive Final    POC Oxycodone 04/20/2022 Negative  Negative, Inconclusive Final    POC Phencyclidine 04/20/2022 Negative  Negative, Inconclusive Final    POC Methylenedioxymethamphetamine * 04/20/2022 Negative  Negative, Inconclusive Final    POC Tricyclic Antidepressants 04/20/2022 Negative  Negative, Inconclusive Final    POC Buprenorphine 04/20/2022 Negative   Final     Acceptable 04/20/2022 Yes   Final    POC Temperature (Urine) 04/20/2022 90   Final   Lab Visit on 04/20/2022   Component Date Value Ref Range Status    Sodium 04/20/2022 143  136 - 145 mmol/L Final    Potassium 04/20/2022 3.9  3.5 - 5.1 mmol/L Final    Chloride 04/20/2022 113 (A) 98 - 107 mmol/L Final    CO2 04/20/2022 28  21 - 32 mmol/L Final    Anion Gap 04/20/2022 6 (A) 7 - 16 mmol/L Final    Glucose 04/20/2022 87  74 - 106 mg/dL Final    BUN 04/20/2022 10  7 - 18 mg/dL Final    Creatinine 04/20/2022 1.20 (A) 0.55 - 1.02 mg/dL Final    BUN/Creatinine Ratio 04/20/2022 8  6 - 20 Final    Calcium 04/20/2022 8.7  8.5 - 10.1 mg/dL Final    Total Protein 04/20/2022 6.5  6.4 - 8.2 g/dL Final    Albumin 04/20/2022 3.5  3.5 - 5.0 g/dL Final    Globulin 04/20/2022 3.0  2.0 - 4.0 g/dL Final    A/G Ratio 04/20/2022 1.2   Final    Bilirubin, Total 04/20/2022 0.3  0.0 - 1.2 mg/dL Final    Alk Phos 04/20/2022 78  41 - 108 U/L Final    ALT 04/20/2022 33  13 - 56 U/L Final    AST 04/20/2022 26  15 - 37 U/L Final    eGFR 04/20/2022 50 (A) >=60 mL/min/1.73m² Final    Vitamin D 25-Hydroxy, Blood 04/20/2022 17.3  ng/mL Final   Admission on 03/12/2022, Discharged on 03/12/2022   Component Date Value Ref Range Status    Color, UA 03/12/2022 Yellow  Colorless, Straw,  Yellow, Dark Yellow Final    Clarity, UA 03/12/2022 Clear  Clear Final    pH, UA 03/12/2022 8.5 (A) 5.0, 5.5, 6.0, 6.5, 7.0, 7.5, 8.0 pH Units Final    Leukocytes, UA 03/12/2022 Negative  Negative Final    Nitrites, UA 03/12/2022 Negative  Negative Final    Protein, UA 03/12/2022 30  (A) Negative Final    Glucose, UA 03/12/2022 Negative  Negative mg/dL Final    Ketones, UA 03/12/2022 Negative  Negative, Trace mg/dL Final    Urobilinogen, UA 03/12/2022 0.2  0.2, 1.0 mg/dL Final    Bilirubin, UA 03/12/2022 Negative  Negative Final    Blood, UA 03/12/2022 Moderate (A) Negative Final    Specific Gravity, UA 03/12/2022 1.020  <=1.005, 1.010, 1.015, 1.020, 1.025, 1.030 Final    Culture, Blood 03/12/2022 No Growth at 5 days   Final    Culture, Blood 03/12/2022 No Growth at 5 days   Final    Rapid Group A Strep 03/12/2022 Negative  Negative, Invalid Final    Influenza A 03/12/2022 Negative  Negative, Invalid Final    Influenza B 03/12/2022 Negative  Negative, Invalid Final    COVID-19 Ag 03/12/2022 Negative  Negative, Invalid Final    Sodium 03/12/2022 137  136 - 145 mmol/L Final    Potassium 03/12/2022 3.8  3.5 - 5.1 mmol/L Final    Chloride 03/12/2022 103  98 - 107 mmol/L Final    CO2 03/12/2022 23  21 - 32 mmol/L Final    Anion Gap 03/12/2022 15  7 - 16 mmol/L Final    Glucose 03/12/2022 112 (A) 74 - 106 mg/dL Final    BUN 03/12/2022 10  7 - 18 mg/dL Final    Creatinine 03/12/2022 1.12 (A) 0.55 - 1.02 mg/dL Final    BUN/Creatinine Ratio 03/12/2022 9  6 - 20 Final    Calcium 03/12/2022 8.2 (A) 8.5 - 10.1 mg/dL Final    Total Protein 03/12/2022 6.8  6.4 - 8.2 g/dL Final    Albumin 03/12/2022 3.6  3.5 - 5.0 g/dL Final    Globulin 03/12/2022 3.2  2.0 - 4.0 g/dL Final    A/G Ratio 03/12/2022 1.1   Final    Bilirubin, Total 03/12/2022 0.7  0.0 - 1.2 mg/dL Final    Alk Phos 03/12/2022 88  41 - 108 U/L Final    ALT 03/12/2022 58 (A) 13 - 56 U/L Final    AST 03/12/2022 82 (A) 15 - 37 U/L Final     eGFR 03/12/2022 55 (A) >=60 mL/min/1.73m² Final    WBC 03/12/2022 7.01  4.50 - 11.00 K/uL Final    RBC 03/12/2022 4.09 (A) 4.20 - 5.40 M/uL Final    Hemoglobin 03/12/2022 11.3 (A) 12.0 - 16.0 g/dL Final    Hematocrit 03/12/2022 36.4 (A) 38.0 - 47.0 % Final    MCV 03/12/2022 89.0  80.0 - 96.0 fL Final    MCH 03/12/2022 27.6  27.0 - 31.0 pg Final    MCHC 03/12/2022 31.0 (A) 32.0 - 36.0 g/dL Final    RDW 03/12/2022 16.6 (A) 11.5 - 14.5 % Final    Platelet Count 03/12/2022 210  150 - 400 K/uL Final    MPV 03/12/2022 9.3 (A) 9.4 - 12.4 fL Final    Neutrophils % 03/12/2022 78.2 (A) 53.0 - 65.0 % Final    Lymphocytes % 03/12/2022 13.0 (A) 27.0 - 41.0 % Final    Neutrophils, Abs 03/12/2022 5.48  1.80 - 7.70 K/uL Final    Lymphocytes, Absolute 03/12/2022 0.91 (A) 1.00 - 4.80 K/uL Final    Diff Type 03/12/2022 Auto   Final    Monocytes % 03/12/2022 7.0 (A) 2.0 - 6.0 % Final    Eosinophils % 03/12/2022 1.3  1.0 - 4.0 % Final    Basophils % 03/12/2022 0.4  0.0 - 1.0 % Final    Immature Granulocytes % 03/12/2022 0.1  0.0 - 0.4 % Final    Monocytes, Absolute 03/12/2022 0.49  0.00 - 0.80 K/uL Final    Eosinophils, Absolute 03/12/2022 0.09  0.00 - 0.50 K/uL Final    Immature Granulocytes, Absolute 03/12/2022 0.01  0.00 - 0.04 K/uL Final    Basophils, Absolute 03/12/2022 0.03  0.00 - 0.20 K/uL Final    WBC, UA 03/12/2022 0-5  None Seen, 0-5 /hpf Final    RBC, UA 03/12/2022 25-50 (A) None Seen, 0-3 /hpf Final    Bacteria, UA 03/12/2022 Few (A) None Seen, None Seen To Occasional, Rare /hpf Final    Squamous Epithelial Cells, UA 03/12/2022 Moderate (A) None Seen, Rare, None Seen To Occasional /lpf Final    Culture, Group A Strep 03/12/2022 Negative for Group A Streptococcus   Corrected    Lactic Acid 03/12/2022 0.8  0.4 - 2.0 mmol/L Final   Clinical Support on 03/03/2022   Component Date Value Ref Range Status    Culture, Urine 03/03/2022 Skin/Urogenital Lisa Isolated, no further workup.   Final   Lab  Requisition on 02/22/2022   Component Date Value Ref Range Status    Sodium 02/22/2022 140  136 - 145 mmol/L Final    Potassium 02/22/2022 4.6  3.5 - 5.1 mmol/L Final    Chloride 02/22/2022 108 (A) 98 - 107 mmol/L Final    CO2 02/22/2022 26  21 - 32 mmol/L Final    Anion Gap 02/22/2022 11  7 - 16 mmol/L Final    Glucose 02/22/2022 84  74 - 106 mg/dL Final    BUN 02/22/2022 11  7 - 18 mg/dL Final    Creatinine 02/22/2022 1.07 (A) 0.55 - 1.02 mg/dL Final    BUN/Creatinine Ratio 02/22/2022 10  6 - 20 Final    Calcium 02/22/2022 9.0  8.5 - 10.1 mg/dL Final    eGFR 02/22/2022 57 (A) >=60 mL/min/1.73m² Final   Appointment on 02/22/2022   Component Date Value Ref Range Status    Color, UA 02/22/2022 Yellow   Final    Spec Grav UA 02/22/2022 1.025   Final    pH, UA 02/22/2022 5.0   Final    WBC, UA 02/22/2022 TRACE   Final    Nitrite, UA 02/22/2022 NEG   Final    Protein, POC 02/22/2022 30   Final    Glucose, UA 02/22/2022 NEG   Final    Ketones, UA 02/22/2022 TRACE   Final    Bilirubin, POC 02/22/2022 NEG   Final    Urobilinogen, UA 02/22/2022 0.2   Final    Blood, UA 02/22/2022 SMALL   Final   Lab Requisition on 02/22/2022   Component Date Value Ref Range Status    Culture, Urine 02/22/2022 Mixed Skin/Urogenital Lisa Isolated, no further workup.   Final   There may be more visits with results that are not included.           Assessment:      1. Lumbosacral radiculopathy    2. Postlaminectomy syndrome, lumbar region    3. Disorder of sacrum            A's of Opioid Risk Assessment  Activity:Patient can perform ADL.   Analgesia:Patients pain is partially controlled by current medication. Patient has tried OTC medications such as Tylenol and Ibuprofen with out relief.   Adverse Effects: Patient denies constipation or sedation.  Aberrant Behavior:  reviewed with no aberrant drug seeking/taking behavior.  Overdose reversal drug naloxone discussed    Drug screen reviewed      Requested Prescriptions      Signed Prescriptions Disp Refills    gabapentin (NEURONTIN) 600 MG tablet 90 tablet 1     Sig: Take 1 tablet (600 mg total) by mouth every 8 (eight) hours.    HYDROcodone-acetaminophen (NORCO)  mg per tablet 60 tablet 0     Sig: Take 1 tablet by mouth every 12 (twelve) hours.    tiZANidine 4 mg Cap 90 capsule 1     Sig: Take 1 capsule by mouth every 8 (eight) hours.    HYDROcodone-acetaminophen (NORCO)  mg per tablet 60 tablet 0     Sig: Take 1 tablet by mouth every 12 (twelve) hours.         Plan:    Spinal cord stimulator helping control her lower extremity discomfort    She states her thoracic spine pain is improved    Currently dealing with kidney stone requesting a Toradol injection    Toradol 60 mg IM, tolerated well    Follows urology [facility] she will contact our office    Otherwise she would like to continue with conservative management    Continue home exercise program as directed    Follow-up 2 months     Dr. Moon, July 2022    Bring original prescription medication bottles/container/box with labels to each visit

## 2022-06-20 ENCOUNTER — OFFICE VISIT (OUTPATIENT)
Dept: UROLOGY | Facility: CLINIC | Age: 52
End: 2022-06-20
Payer: MEDICARE

## 2022-06-20 ENCOUNTER — OFFICE VISIT (OUTPATIENT)
Dept: PAIN MEDICINE | Facility: CLINIC | Age: 52
End: 2022-06-20
Payer: MEDICARE

## 2022-06-20 ENCOUNTER — HOSPITAL ENCOUNTER (OUTPATIENT)
Dept: RADIOLOGY | Facility: HOSPITAL | Age: 52
Discharge: HOME | End: 2022-06-20
Attending: UROLOGY
Payer: MEDICARE

## 2022-06-20 VITALS
HEIGHT: 62 IN | TEMPERATURE: 98 F | HEART RATE: 77 BPM | WEIGHT: 199 LBS | BODY MASS INDEX: 36.62 KG/M2 | SYSTOLIC BLOOD PRESSURE: 121 MMHG | DIASTOLIC BLOOD PRESSURE: 85 MMHG

## 2022-06-20 VITALS
DIASTOLIC BLOOD PRESSURE: 75 MMHG | SYSTOLIC BLOOD PRESSURE: 119 MMHG | HEIGHT: 62 IN | BODY MASS INDEX: 36.25 KG/M2 | WEIGHT: 197 LBS | HEART RATE: 69 BPM | RESPIRATION RATE: 18 BRPM

## 2022-06-20 DIAGNOSIS — R31.9 URINARY TRACT INFECTION WITH HEMATURIA, SITE UNSPECIFIED: ICD-10-CM

## 2022-06-20 DIAGNOSIS — R31.29 MICROSCOPIC HEMATURIA: ICD-10-CM

## 2022-06-20 DIAGNOSIS — N20.0 KIDNEY STONES: Primary | ICD-10-CM

## 2022-06-20 DIAGNOSIS — R10.9 LEFT FLANK PAIN: Primary | ICD-10-CM

## 2022-06-20 DIAGNOSIS — N20.0 KIDNEY STONES: ICD-10-CM

## 2022-06-20 DIAGNOSIS — M96.1 POSTLAMINECTOMY SYNDROME, LUMBAR REGION: Chronic | ICD-10-CM

## 2022-06-20 DIAGNOSIS — M53.3 DISORDER OF SACRUM: Chronic | ICD-10-CM

## 2022-06-20 DIAGNOSIS — M54.17 LUMBOSACRAL RADICULOPATHY: Primary | Chronic | ICD-10-CM

## 2022-06-20 DIAGNOSIS — N39.0 URINARY TRACT INFECTION WITH HEMATURIA, SITE UNSPECIFIED: ICD-10-CM

## 2022-06-20 DIAGNOSIS — N20.0 NEPHROLITHIASIS: ICD-10-CM

## 2022-06-20 PROCEDURE — 74018 RADEX ABDOMEN 1 VIEW: CPT | Mod: TC

## 2022-06-20 PROCEDURE — 1159F PR MEDICATION LIST DOCUMENTED IN MEDICAL RECORD: ICD-10-PCS | Mod: CPTII,,, | Performed by: PHYSICIAN ASSISTANT

## 2022-06-20 PROCEDURE — 74018 RADEX ABDOMEN 1 VIEW: CPT | Mod: 26,,, | Performed by: RADIOLOGY

## 2022-06-20 PROCEDURE — 87186 CULTURE, URINE: ICD-10-PCS | Mod: ,,, | Performed by: CLINICAL MEDICAL LABORATORY

## 2022-06-20 PROCEDURE — 3008F PR BODY MASS INDEX (BMI) DOCUMENTED: ICD-10-PCS | Mod: CPTII,,, | Performed by: UROLOGY

## 2022-06-20 PROCEDURE — 3079F PR MOST RECENT DIASTOLIC BLOOD PRESSURE 80-89 MM HG: ICD-10-PCS | Mod: CPTII,,, | Performed by: UROLOGY

## 2022-06-20 PROCEDURE — 1159F PR MEDICATION LIST DOCUMENTED IN MEDICAL RECORD: ICD-10-PCS | Mod: CPTII,,, | Performed by: UROLOGY

## 2022-06-20 PROCEDURE — 99215 OFFICE O/P EST HI 40 MIN: CPT | Mod: PBBFAC | Performed by: UROLOGY

## 2022-06-20 PROCEDURE — 99214 PR OFFICE/OUTPT VISIT, EST, LEVL IV, 30-39 MIN: ICD-10-PCS | Mod: S$PBB,,, | Performed by: UROLOGY

## 2022-06-20 PROCEDURE — 1159F MED LIST DOCD IN RCRD: CPT | Mod: CPTII,,, | Performed by: PHYSICIAN ASSISTANT

## 2022-06-20 PROCEDURE — 99214 PR OFFICE/OUTPT VISIT, EST, LEVL IV, 30-39 MIN: ICD-10-PCS | Mod: S$PBB,25,, | Performed by: PHYSICIAN ASSISTANT

## 2022-06-20 PROCEDURE — 99214 OFFICE O/P EST MOD 30 MIN: CPT | Mod: PBBFAC | Performed by: PHYSICIAN ASSISTANT

## 2022-06-20 PROCEDURE — 87086 URINE CULTURE/COLONY COUNT: CPT | Mod: ,,, | Performed by: CLINICAL MEDICAL LABORATORY

## 2022-06-20 PROCEDURE — 74018 XR KUB: ICD-10-PCS | Mod: 26,,, | Performed by: RADIOLOGY

## 2022-06-20 PROCEDURE — 3074F PR MOST RECENT SYSTOLIC BLOOD PRESSURE < 130 MM HG: ICD-10-PCS | Mod: CPTII,,, | Performed by: PHYSICIAN ASSISTANT

## 2022-06-20 PROCEDURE — 87086 CULTURE, URINE: ICD-10-PCS | Mod: ,,, | Performed by: CLINICAL MEDICAL LABORATORY

## 2022-06-20 PROCEDURE — 87186 SC STD MICRODIL/AGAR DIL: CPT | Mod: ,,, | Performed by: CLINICAL MEDICAL LABORATORY

## 2022-06-20 PROCEDURE — 87077 CULTURE, URINE: ICD-10-PCS | Mod: ,,, | Performed by: CLINICAL MEDICAL LABORATORY

## 2022-06-20 PROCEDURE — 3074F PR MOST RECENT SYSTOLIC BLOOD PRESSURE < 130 MM HG: ICD-10-PCS | Mod: CPTII,,, | Performed by: UROLOGY

## 2022-06-20 PROCEDURE — 99214 OFFICE O/P EST MOD 30 MIN: CPT | Mod: S$PBB,25,, | Performed by: PHYSICIAN ASSISTANT

## 2022-06-20 PROCEDURE — 3074F SYST BP LT 130 MM HG: CPT | Mod: CPTII,,, | Performed by: PHYSICIAN ASSISTANT

## 2022-06-20 PROCEDURE — 1159F MED LIST DOCD IN RCRD: CPT | Mod: CPTII,,, | Performed by: UROLOGY

## 2022-06-20 PROCEDURE — 3008F BODY MASS INDEX DOCD: CPT | Mod: CPTII,,, | Performed by: PHYSICIAN ASSISTANT

## 2022-06-20 PROCEDURE — 3008F PR BODY MASS INDEX (BMI) DOCUMENTED: ICD-10-PCS | Mod: CPTII,,, | Performed by: PHYSICIAN ASSISTANT

## 2022-06-20 PROCEDURE — 96372 THER/PROPH/DIAG INJ SC/IM: CPT | Mod: PBBFAC | Performed by: PHYSICIAN ASSISTANT

## 2022-06-20 PROCEDURE — 3074F SYST BP LT 130 MM HG: CPT | Mod: CPTII,,, | Performed by: UROLOGY

## 2022-06-20 PROCEDURE — 3078F DIAST BP <80 MM HG: CPT | Mod: CPTII,,, | Performed by: PHYSICIAN ASSISTANT

## 2022-06-20 PROCEDURE — 3008F BODY MASS INDEX DOCD: CPT | Mod: CPTII,,, | Performed by: UROLOGY

## 2022-06-20 PROCEDURE — 3078F PR MOST RECENT DIASTOLIC BLOOD PRESSURE < 80 MM HG: ICD-10-PCS | Mod: CPTII,,, | Performed by: PHYSICIAN ASSISTANT

## 2022-06-20 PROCEDURE — 99214 OFFICE O/P EST MOD 30 MIN: CPT | Mod: S$PBB,,, | Performed by: UROLOGY

## 2022-06-20 PROCEDURE — 87077 CULTURE AEROBIC IDENTIFY: CPT | Mod: ,,, | Performed by: CLINICAL MEDICAL LABORATORY

## 2022-06-20 PROCEDURE — 3079F DIAST BP 80-89 MM HG: CPT | Mod: CPTII,,, | Performed by: UROLOGY

## 2022-06-20 RX ORDER — KETOROLAC TROMETHAMINE 30 MG/ML
60 INJECTION, SOLUTION INTRAMUSCULAR; INTRAVENOUS
Status: COMPLETED | OUTPATIENT
Start: 2022-06-20 | End: 2022-06-20

## 2022-06-20 RX ORDER — HYDROMORPHONE HYDROCHLORIDE 2 MG/1
2 TABLET ORAL EVERY 4 HOURS PRN
Qty: 20 TABLET | Refills: 0 | OUTPATIENT
Start: 2022-06-20 | End: 2022-07-29

## 2022-06-20 RX ORDER — HYDROCODONE BITARTRATE AND ACETAMINOPHEN 10; 325 MG/1; MG/1
1 TABLET ORAL EVERY 12 HOURS
Qty: 60 TABLET | Refills: 0 | Status: SHIPPED | OUTPATIENT
Start: 2022-07-22 | End: 2022-08-22 | Stop reason: SDUPTHER

## 2022-06-20 RX ORDER — GABAPENTIN 600 MG/1
600 TABLET ORAL EVERY 8 HOURS
Qty: 90 TABLET | Refills: 1 | Status: SHIPPED | OUTPATIENT
Start: 2022-06-20 | End: 2022-08-22 | Stop reason: SDUPTHER

## 2022-06-20 RX ORDER — TIZANIDINE HYDROCHLORIDE 4 MG/1
1 CAPSULE, GELATIN COATED ORAL EVERY 8 HOURS
Qty: 90 CAPSULE | Refills: 1 | Status: SHIPPED | OUTPATIENT
Start: 2022-06-20 | End: 2022-08-23 | Stop reason: SDUPTHER

## 2022-06-20 RX ORDER — HYDROCODONE BITARTRATE AND ACETAMINOPHEN 10; 325 MG/1; MG/1
1 TABLET ORAL EVERY 12 HOURS
Qty: 60 TABLET | Refills: 0 | Status: SHIPPED | OUTPATIENT
Start: 2022-06-23 | End: 2022-07-23

## 2022-06-20 RX ORDER — PROMETHAZINE HYDROCHLORIDE 25 MG/1
25 TABLET ORAL EVERY 4 HOURS PRN
Qty: 20 TABLET | Refills: 0 | Status: SHIPPED | OUTPATIENT
Start: 2022-06-20 | End: 2022-07-29 | Stop reason: SDUPTHER

## 2022-06-20 RX ADMIN — KETOROLAC TROMETHAMINE 60 MG: 30 INJECTION, SOLUTION INTRAMUSCULAR at 11:06

## 2022-06-20 NOTE — PROGRESS NOTES
Subjective:       Patient ID: Gloria Mitchell is a 52 y.o. female.    Chief Complaint: Flank Pain (Patient came in today  with left sided pain, nausea; KUB  )    Ms. Mitchell presented to the clinic today because of left ureteral colic.  She had onset pain on April 2nd and went to the emergency room at [facility].  She was found to have a large stone at the left ureteropelvic junction it measures about 6 x 10 mm.  CT scan was done but she did not have a KUB.  She had a lot pain that day and then did okay for couple of days but today has been having increasing left flank pain again.  She did require Dilaudid and has only a few Dilaudid left.  Presented to the clinic in acute pain as she says she did not hurt any until after she arrived in [place].  She desires to go ahead with definitive treatment because the amount of pain she has had on intermittent basis.  Patient had left ureteroscopy with laser lithotripsy of stone in the distal left ureter in July 2020.  That was the last active stones she has had until this one.  She has a long history of stone disease dating back a good many years.  Previous stone risk profile revealed her to have low citrate and she has been taking her potassium citrate as directed which is two 10 mEq tablets b.i.d..  She admits she does not drink as much water she should.  Has had multiple procedures in the past.  (April 6, 2021  ---------------------------------------------------------------------------------------------------------------------------------------------------------------------     Ms. Mitchell underwent left ureteroscopy with laser lithotripsy of stone and stent insertion on April 8. She was supposed to keep her stent until Monday but accidentally pulled it out on Saturday.  She has had a lot of pain since then and continues to have a lot of pain.  She presented clinic with severe pain on left side.  She took her last Dilaudid at 9:30 a.m. today.  She is also concerned  she may have some infection.  The urine culture that was collected initially at her clinic visit grew 30,000 colonies Citrobacter and E coli and 20,000 colonies of Enterococcus faecalis.  She did get Bactrim DS which is appropriate and she is not febrile.  Presented to clinic with her mother.  (April 12, 2021)     Ms. Mitchell is in for KUB and follow-up since her left ureteroscopy.  She is not having any more stone pain.  She passed a stone on or about April 25th which apparently was a small fragment.  She did not have any pain associated with it.  Comfortable now and back to her baseline.  She is going to pain clinic for her back problems.  Nothing that sounds like any additional stone issues.  She is drinking only water and trying to drink more.  She is taking her potassium citrate to help with stone prevention..  In today for KUB and follow-up.  Her mother was with her today.  (May 17, 2021)     Ms. Mitchell is seen in clinic for the 1st time in over a year but has had at least 3 urinary tract infection during the past year.  We treated her for at least 2 and she was treated in [facility] for infection about 2 weeks ago.  That treatment was with Cipro and clinically seemed to improve although we are not sure what culture showed or if a culture was done.  Now off the Cipro for the last few days with no symptoms.  She has had no recent stone problems.  No other health problems that she is aware of except for pneumonia that was treated as outpatient.  (June 6, 2022)     Ms. Mitchell was seen is work-in patient today because she says she feels she has a stone as she has been hurting in her left flank since it began Saturday night and also feels she has another infection.  She is also having dysuria. (June 20, 2022)    Review of Systems      Objective:      Physical Exam  Constitutional:       Appearance: She is normal weight.   Abdominal:      Tenderness: There is left CVA tenderness.   Neurological:      Mental  Status: She is alert.   Psychiatric:         Mood and Affect: Mood normal.         Behavior: Behavior normal.       urinalysis shows 2 or 3 pus cells and 2-3 red cells per high-powered field.  Do not see bacteria.  The dipstick had 2+ blood, 2+ leukocytes, 1+ protein, pH of 6.0 and specific gravity 1.020.  Stool  Assessment:       Problem List Items Addressed This Visit    None     Visit Diagnoses     Left flank pain    -  Primary    Urinary tract infection with hematuria, site unspecified        Relevant Orders    Urine culture    Nephrolithiasis        Microscopic hematuria              Plan:     Urine sent for culture.  KUB shows no definite stone in line with the ureter but we know she has several stones in the left kidney so clinically suspect 1 of those has dropped out.  She is to let us know she does not pass stone in the next couple of days we will get a CT scan as she has not had 1 for a year.   done.  She has a pain contract with pain clinic.  Patient given prescription for Dilaudid and Phenergan.   Shows notified of written prescription.  Patient given Cipro 500 mg b.i.d. for 3 days while awaiting culture results.  She is to let us know if she passes the stone otherwise will see back with CT scan.  Another strainer given.  *

## 2022-06-21 NOTE — PATIENT INSTRUCTIONS
Urine sent for culture.  KUB shows no definite stone in line with the ureter but we know she has several stones in the left kidney so clinically suspect 1 of those has dropped out.  She is to let us know she does not pass stone in the next couple of days we will get a CT scan as she has not had 1 for a year.   done.  She has a pain contract with pain clinic.  Patient given prescription for Dilaudid and Phenergan.   Shows notified of written prescription.  Patient given Cipro 500 mg b.i.d. for 3 days while awaiting culture results.  She is to let us know if she passes the stone otherwise will see back with CT scan.  Another strainer given.

## 2022-06-22 DIAGNOSIS — R31.9 URINARY TRACT INFECTION WITH HEMATURIA, SITE UNSPECIFIED: Primary | ICD-10-CM

## 2022-06-22 DIAGNOSIS — N39.0 URINARY TRACT INFECTION WITH HEMATURIA, SITE UNSPECIFIED: Primary | ICD-10-CM

## 2022-06-22 LAB — UA COMPLETE W REFLEX CULTURE PNL UR: ABNORMAL

## 2022-06-22 RX ORDER — AMOXICILLIN AND CLAVULANATE POTASSIUM 875; 125 MG/1; MG/1
1 TABLET, FILM COATED ORAL EVERY 12 HOURS
Qty: 20 TABLET | Refills: 0 | Status: SHIPPED | OUTPATIENT
Start: 2022-06-22 | End: 2022-07-02

## 2022-07-20 ENCOUNTER — OFFICE VISIT (OUTPATIENT)
Dept: NEUROLOGY | Facility: CLINIC | Age: 52
End: 2022-07-20
Payer: MEDICARE

## 2022-07-20 VITALS
SYSTOLIC BLOOD PRESSURE: 130 MMHG | BODY MASS INDEX: 36.62 KG/M2 | HEIGHT: 62 IN | RESPIRATION RATE: 18 BRPM | HEART RATE: 102 BPM | OXYGEN SATURATION: 99 % | DIASTOLIC BLOOD PRESSURE: 82 MMHG | WEIGHT: 199 LBS

## 2022-07-20 DIAGNOSIS — G43.719 INTRACTABLE CHRONIC MIGRAINE WITHOUT AURA AND WITHOUT STATUS MIGRAINOSUS: Primary | ICD-10-CM

## 2022-07-20 PROCEDURE — 1159F MED LIST DOCD IN RCRD: CPT | Mod: CPTII,,, | Performed by: NURSE PRACTITIONER

## 2022-07-20 PROCEDURE — 3008F PR BODY MASS INDEX (BMI) DOCUMENTED: ICD-10-PCS | Mod: CPTII,,, | Performed by: NURSE PRACTITIONER

## 2022-07-20 PROCEDURE — 99215 OFFICE O/P EST HI 40 MIN: CPT | Mod: PBBFAC | Performed by: NURSE PRACTITIONER

## 2022-07-20 PROCEDURE — 1160F PR REVIEW ALL MEDS BY PRESCRIBER/CLIN PHARMACIST DOCUMENTED: ICD-10-PCS | Mod: CPTII,,, | Performed by: NURSE PRACTITIONER

## 2022-07-20 PROCEDURE — 3079F DIAST BP 80-89 MM HG: CPT | Mod: CPTII,,, | Performed by: NURSE PRACTITIONER

## 2022-07-20 PROCEDURE — 99212 OFFICE O/P EST SF 10 MIN: CPT | Mod: S$PBB,,, | Performed by: NURSE PRACTITIONER

## 2022-07-20 PROCEDURE — 1160F RVW MEDS BY RX/DR IN RCRD: CPT | Mod: CPTII,,, | Performed by: NURSE PRACTITIONER

## 2022-07-20 PROCEDURE — 1159F PR MEDICATION LIST DOCUMENTED IN MEDICAL RECORD: ICD-10-PCS | Mod: CPTII,,, | Performed by: NURSE PRACTITIONER

## 2022-07-20 PROCEDURE — 3079F PR MOST RECENT DIASTOLIC BLOOD PRESSURE 80-89 MM HG: ICD-10-PCS | Mod: CPTII,,, | Performed by: NURSE PRACTITIONER

## 2022-07-20 PROCEDURE — 3075F PR MOST RECENT SYSTOLIC BLOOD PRESS GE 130-139MM HG: ICD-10-PCS | Mod: CPTII,,, | Performed by: NURSE PRACTITIONER

## 2022-07-20 PROCEDURE — 3008F BODY MASS INDEX DOCD: CPT | Mod: CPTII,,, | Performed by: NURSE PRACTITIONER

## 2022-07-20 PROCEDURE — 3075F SYST BP GE 130 - 139MM HG: CPT | Mod: CPTII,,, | Performed by: NURSE PRACTITIONER

## 2022-07-20 PROCEDURE — 99212 PR OFFICE/OUTPT VISIT, EST, LEVL II, 10-19 MIN: ICD-10-PCS | Mod: S$PBB,,, | Performed by: NURSE PRACTITIONER

## 2022-07-20 RX ORDER — LEVOTHYROXINE SODIUM 112 UG/1
112 TABLET ORAL
COMMUNITY

## 2022-07-20 NOTE — PROGRESS NOTES
Subjective:       Patient ID: Gloria Mitchell is a 52 y.o. female     Chief Complaint:    Chief Complaint   Patient presents with    Follow-up    Migraine        Allergies:  Oxycodone-acetaminophen    Current Medications:    Outpatient Encounter Medications as of 7/20/2022   Medication Sig Dispense Refill    citric acid-potassium citrate (POLYCITRA) 1,100-334 mg/5 mL solution Take by mouth 3 (three) times daily with meals.      erenumab-aooe (AIMOVIG AUTOINJECTOR) 140 mg/mL autoinjector Inject 1 mL (140 mg total) into the skin every 28 days. 1 each 11    ergocalciferol (ERGOCALCIFEROL) 50,000 unit Cap Take 1 capsule (50,000 Units total) by mouth every 7 days. 4 capsule 11    gabapentin (NEURONTIN) 600 MG tablet Take 1 tablet (600 mg total) by mouth every 8 (eight) hours. 90 tablet 1    HYDROcodone-acetaminophen (NORCO)  mg per tablet Take 1 tablet by mouth every 12 (twelve) hours. 60 tablet 0    [START ON 7/22/2022] HYDROcodone-acetaminophen (NORCO)  mg per tablet Take 1 tablet by mouth every 12 (twelve) hours. 60 tablet 0    levothyroxine (SYNTHROID) 112 MCG tablet Take 112 mcg by mouth before breakfast.      metoprolol tartrate (LOPRESSOR) 25 MG tablet Take 25 mg by mouth once daily.      omeprazole (PRILOSEC) 20 MG capsule Take 20 mg by mouth once daily.      promethazine (PHENERGAN) 25 MG tablet Take 1 tablet every 8 hours as needed for migraine.  Not more than 3 days of the week 20 tablet 2    tiZANidine 4 mg Cap Take 1 capsule by mouth every 8 (eight) hours. 90 capsule 1    [DISCONTINUED] topiramate (TOPAMAX) 100 MG tablet Take 1 tablet (100 mg total) by mouth every evening. 30 tablet 11    albuterol (PROVENTIL/VENTOLIN HFA) 90 mcg/actuation inhaler Inhale 2 puffs into the lungs every 6 (six) hours as needed for Wheezing. Rescue 1 g 0    azithromycin (Z-FLAQUITO) 250 MG tablet Take 500 mg by mouth once daily.      FLUoxetine 20 MG capsule Take 20 mg by mouth once daily.       fluticasone propionate (FLONASE) 50 mcg/actuation nasal spray 1 spray (50 mcg total) by Each Nostril route 2 (two) times daily as needed for Rhinitis. 15.8 mL 0    HYDROmorphone (DILAUDID) 2 MG tablet Take 1 tablet (2 mg total) by mouth every 4 (four) hours as needed for Pain. (Patient not taking: Reported on 7/20/2022) 20 tablet 0    levothyroxine (SYNTHROID) 125 MCG tablet Take 125 mcg by mouth before breakfast.      levothyroxine (SYNTHROID) 75 MCG tablet 1 tablet in the morning on an empty stomach      omeprazole (PRILOSEC) 20 MG capsule 1 capsule 30 minutes before morning meal      promethazine (PHENERGAN) 25 MG tablet Take 1 tablet (25 mg total) by mouth every 4 (four) hours as needed for Nausea. (Patient not taking: Reported on 7/20/2022) 20 tablet 0    traZODone (DESYREL) 100 MG tablet Take 100 mg by mouth every evening.      traZODone (DESYREL) 50 MG tablet Take by mouth nightly.      vortioxetine (TRINTELLIX) 10 mg Tab Take 10 mg by mouth once daily.      [DISCONTINUED] topiramate (TOPAMAX) 100 MG tablet 1 tablet       No facility-administered encounter medications on file as of 7/20/2022.       History of Present Illness  52 yr old white female presents with headaches that started as a teenager.     Prior to topamax she reports was having between 12 and 16 headache days a month, with the topamax 100mg QHS she has maybe 3.  She tolerated very well, no reported side effects.  However, in October 2021 was found to have kidney stones.  Prior treated with Effexor in [id number] but was found not effective for migraines.  Initially she declined to change medication, but at last followup in January I had discussed option of once monthly injections and she was open to this idea.  Her current PA has her covered for this month, she will need new PA done for insurance.  She feels it is working very well for her headaches.  States had one milder headache last week, and really other than that not had any severe  headaches since her last visit 3 months ago.    She is not on any other CGRP medications.  Denied overuse headaches.  No current botox.         Review of Systems  Review of Systems   Constitutional: Negative for diaphoresis and fever.   HENT: Negative for congestion, hearing loss and tinnitus.    Eyes: Negative for blurred vision, double vision, photophobia, discharge and redness.   Respiratory: Negative for cough and shortness of breath.    Cardiovascular: Negative for chest pain.   Gastrointestinal: Negative for abdominal pain, nausea and vomiting.   Musculoskeletal: Negative for back pain, joint pain, myalgias and neck pain.   Skin: Negative for itching and rash.   Neurological: Positive for headaches. Negative for dizziness, tremors, sensory change, speech change, focal weakness, seizures, loss of consciousness and weakness.   Psychiatric/Behavioral: Negative for depression, hallucinations and memory loss. The patient does not have insomnia.    All other systems reviewed and are negative.     Objective:     NEUROLOGICAL EXAMINATION:     MENTAL STATUS   Oriented to person, place, and time.   Registration: recalls 3 of 3 objects. Recall at 5 minutes: recalls 3 of 3 objects.   Attention: normal. Concentration: normal.   Speech: speech is normal   Level of consciousness: alert  Knowledge: good and consistent with education.   Normal comprehension.     CRANIAL NERVES     CN II   Visual fields full to confrontation.   Visual acuity: normal  Right visual field deficit: none  Left visual field deficit: none     CN III, IV, VI   Pupils are equal, round, and reactive to light.  Extraocular motions are normal.   Right pupil: Size: 3 mm. Shape: regular. Reactivity: brisk. Consensual response: intact. Accommodation: intact.   Left pupil: Size: 3 mm. Shape: regular. Reactivity: brisk. Consensual response: intact. Accommodation: intact.   CN III: no CN III palsy  CN VI: no CN VI palsy  Nystagmus: none   Diplopia: none  Upgaze:  normal  Downgaze: normal  Conjugate gaze: present  Vestibulo-ocular reflex: present    CN V   Facial sensation intact.   Right facial sensation deficit: none  Left facial sensation deficit: none  Right corneal reflex: normal  Left corneal reflex: normal    CN VII   Facial expression full, symmetric.   Right facial weakness: none  Left facial weakness: none  Right taste: normal  Left taste: normal    CN VIII   CN VIII normal.   Hearing: intact    CN IX, X   CN IX normal.   CN X normal.   Palate: symmetric    CN XI   CN XI normal.   Right sternocleidomastoid strength: normal  Left sternocleidomastoid strength: normal  Right trapezius strength: normal  Left trapezius strength: normal    CN XII   CN XII normal.   Tongue: not atrophic  Fasciculations: absent  Tongue deviation: none    MOTOR EXAM   Muscle bulk: normal  Overall muscle tone: normal  Right arm tone: normal  Left arm tone: normal  Right arm pronator drift: absent  Left arm pronator drift: absent  Right leg tone: normal  Left leg tone: normal    Strength   Right neck flexion: 5/5  Left neck flexion: 5/5  Right neck extension: 5/5  Left neck extension: 5/5  Right deltoid: 5/5  Left deltoid: 5/5  Right biceps: 5/5  Left biceps: 5/5  Right triceps: 5/5  Left triceps: 5/5  Right wrist flexion: 5/5  Left wrist flexion: 5/5  Right wrist extension: 5/5  Left wrist extension: 5/5  Right interossei: 5/5  Left interossei: 5/5  Right iliopsoas: 5/5  Left iliopsoas: 5/5  Right quadriceps: 5/5  Left quadriceps: 5/5  Right hamstrin/5  Left hamstrin/5  Right anterior tibial: 5/5  Left anterior tibial: 5/5  Right posterior tibial: 5/5  Left posterior tibial: 5/5  Right gastroc: 5/5  Left gastroc: 5/5    REFLEXES     Reflexes   Right brachioradialis: 2+  Left brachioradialis: 2+  Right biceps: 2+  Left biceps: 2+  Right triceps: 2+  Left triceps: 2+  Right patellar: 2+  Left patellar: 2+  Right achilles: 2+  Left achilles: 2+  Right plantar: normal  Left plantar:  normal  Right Ehrnandez: absent  Left Hernandez: absent  Right ankle clonus: absent  Left ankle clonus: absent  Right pendular knee jerk: absent  Left pendular knee jerk: absent    SENSORY EXAM   Light touch normal.   Right arm light touch: normal  Left arm light touch: normal  Right leg light touch: normal  Left leg light touch: normal  Vibration normal.   Right arm vibration: normal  Left arm vibration: normal  Right leg vibration: normal  Left leg vibration: normal  Proprioception normal.   Right arm proprioception: normal  Left arm proprioception: normal  Right leg proprioception: normal  Left leg proprioception: normal  Pinprick normal.   Right arm pinprick: normal  Left arm pinprick: normal  Right leg pinprick: normal  Left leg pinprick: normal  Graphesthesia: normal  Romberg: negative  Stereognosis: normal    GAIT AND COORDINATION     Gait  Gait: normal     Coordination   Finger to nose coordination: normal  Heel to shin coordination: normal  Tandem walking coordination: normal    Tremor   Resting tremor: absent  Intention tremor: absent  Action tremor: absent       Physical Exam  Vitals and nursing note reviewed.   Constitutional:       Appearance: Normal appearance.   HENT:      Head: Normocephalic.   Eyes:      Extraocular Movements: Extraocular movements intact and EOM normal.      Pupils: Pupils are equal, round, and reactive to light.   Cardiovascular:      Rate and Rhythm: Normal rate and regular rhythm.   Pulmonary:      Effort: Pulmonary effort is normal.      Breath sounds: Normal breath sounds.   Musculoskeletal:         General: No swelling or tenderness. Normal range of motion.      Cervical back: Normal range of motion and neck supple.      Right lower leg: No edema.      Left lower leg: No edema.   Skin:     General: Skin is warm and dry.      Coloration: Skin is not jaundiced.      Findings: No rash.   Neurological:      General: No focal deficit present.      Mental Status: She is alert and  oriented to person, place, and time.      GCS: GCS eye subscore is 4. GCS verbal subscore is 5. GCS motor subscore is 6.      Cranial Nerves: No cranial nerve deficit.      Sensory: No sensory deficit.      Motor: Motor function is intact. No weakness.      Coordination: Coordination is intact. Coordination normal. Finger-Nose-Finger Test, Heel to Shin Test and Romberg Test normal.      Gait: Gait is intact. Gait and tandem walk normal.      Deep Tendon Reflexes: Reflexes normal.      Reflex Scores:       Tricep reflexes are 2+ on the right side and 2+ on the left side.       Bicep reflexes are 2+ on the right side and 2+ on the left side.       Brachioradialis reflexes are 2+ on the right side and 2+ on the left side.       Patellar reflexes are 2+ on the right side and 2+ on the left side.       Achilles reflexes are 2+ on the right side and 2+ on the left side.  Psychiatric:         Mood and Affect: Mood normal.         Speech: Speech normal.         Behavior: Behavior normal.          Assessment:     Problem List Items Addressed This Visit        Neuro    Intractable chronic migraine without aura and without status migrainosus - Primary           Primary Diagnosis and ICD10  Intractable chronic migraine without aura and without status migrainosus [G43.719]    Plan:     Patient Instructions   1. Continue the current Aimovig 140mg once monthly  2. Decrease topamax to 50mg at night for 2 weeks and then stop  3. Continue other medications as directed      Medications Discontinued During This Encounter   Medication Reason    topiramate (TOPAMAX) 100 MG tablet     topiramate (TOPAMAX) 100 MG tablet        Requested Prescriptions      No prescriptions requested or ordered in this encounter       No orders of the defined types were placed in this encounter.

## 2022-07-20 NOTE — PATIENT INSTRUCTIONS
Continue the current Aimovig 140mg once monthly  Decrease topamax to 50mg at night for 2 weeks and then stop  Continue other medications as directed

## 2022-07-29 ENCOUNTER — HOSPITAL ENCOUNTER (EMERGENCY)
Facility: HOSPITAL | Age: 52
Discharge: HOME | End: 2022-07-29
Attending: SPECIALIST
Payer: MEDICARE

## 2022-07-29 VITALS
WEIGHT: 195.38 LBS | TEMPERATURE: 100 F | HEART RATE: 78 BPM | SYSTOLIC BLOOD PRESSURE: 116 MMHG | RESPIRATION RATE: 20 BRPM | DIASTOLIC BLOOD PRESSURE: 77 MMHG | BODY MASS INDEX: 35.95 KG/M2 | OXYGEN SATURATION: 98 % | HEIGHT: 62 IN

## 2022-07-29 DIAGNOSIS — U07.1 COVID: ICD-10-CM

## 2022-07-29 DIAGNOSIS — R05.9 COUGH: ICD-10-CM

## 2022-07-29 DIAGNOSIS — J06.9 UPPER RESPIRATORY TRACT INFECTION, UNSPECIFIED TYPE: Primary | ICD-10-CM

## 2022-07-29 LAB
BASOPHILS # BLD AUTO: 0.02 K/UL (ref 0–0.2)
BASOPHILS NFR BLD AUTO: 0.6 % (ref 0–1)
DIFFERENTIAL METHOD BLD: ABNORMAL
EOSINOPHIL # BLD AUTO: 0.06 K/UL (ref 0–0.5)
EOSINOPHIL NFR BLD AUTO: 1.8 % (ref 1–4)
ERYTHROCYTE [DISTWIDTH] IN BLOOD BY AUTOMATED COUNT: 14.4 % (ref 11.5–14.5)
FLUAV AG UPPER RESP QL IA.RAPID: NEGATIVE
FLUBV AG UPPER RESP QL IA.RAPID: NEGATIVE
HCT VFR BLD AUTO: 39 % (ref 38–47)
HGB BLD-MCNC: 12.4 G/DL (ref 12–16)
IMM GRANULOCYTES # BLD AUTO: 0.01 K/UL (ref 0–0.04)
IMM GRANULOCYTES NFR BLD: 0.3 % (ref 0–0.4)
LYMPHOCYTES # BLD AUTO: 0.98 K/UL (ref 1–4.8)
LYMPHOCYTES NFR BLD AUTO: 29.3 % (ref 27–41)
MCH RBC QN AUTO: 29.7 PG (ref 27–31)
MCHC RBC AUTO-ENTMCNC: 31.8 G/DL (ref 32–36)
MCV RBC AUTO: 93.3 FL (ref 80–96)
MONOCYTES # BLD AUTO: 0.25 K/UL (ref 0–0.8)
MONOCYTES NFR BLD AUTO: 7.5 % (ref 2–6)
MPC BLD CALC-MCNC: 9.9 FL (ref 9.4–12.4)
NEUTROPHILS # BLD AUTO: 2.02 K/UL (ref 1.8–7.7)
NEUTROPHILS NFR BLD AUTO: 60.5 % (ref 53–65)
PLATELET # BLD AUTO: 207 K/UL (ref 150–400)
RBC # BLD AUTO: 4.18 M/UL (ref 4.2–5.4)
SARS-COV+SARS-COV-2 AG RESP QL IA.RAPID: POSITIVE
WBC # BLD AUTO: 3.34 K/UL (ref 4.5–11)

## 2022-07-29 PROCEDURE — 99284 EMERGENCY DEPT VISIT MOD MDM: CPT | Mod: 25,CS

## 2022-07-29 PROCEDURE — 87428 SARSCOV & INF VIR A&B AG IA: CPT | Performed by: SPECIALIST

## 2022-07-29 PROCEDURE — 36415 COLL VENOUS BLD VENIPUNCTURE: CPT | Performed by: SPECIALIST

## 2022-07-29 PROCEDURE — 85025 COMPLETE CBC W/AUTO DIFF WBC: CPT | Performed by: SPECIALIST

## 2022-07-29 PROCEDURE — 99283 EMERGENCY DEPT VISIT LOW MDM: CPT | Performed by: SPECIALIST

## 2022-07-29 PROCEDURE — 63600175 PHARM REV CODE 636 W HCPCS: Performed by: SPECIALIST

## 2022-07-29 PROCEDURE — 96372 THER/PROPH/DIAG INJ SC/IM: CPT

## 2022-07-29 RX ORDER — PROMETHAZINE HYDROCHLORIDE AND DEXTROMETHORPHAN HYDROBROMIDE 6.25; 15 MG/5ML; MG/5ML
5 SYRUP ORAL EVERY 4 HOURS PRN
Qty: 180 ML | Refills: 0 | Status: SHIPPED | OUTPATIENT
Start: 2022-07-29 | End: 2022-08-03 | Stop reason: ALTCHOICE

## 2022-07-29 RX ORDER — METOCLOPRAMIDE HYDROCHLORIDE 5 MG/ML
10 INJECTION INTRAMUSCULAR; INTRAVENOUS
Status: COMPLETED | OUTPATIENT
Start: 2022-07-29 | End: 2022-07-29

## 2022-07-29 RX ORDER — KETOROLAC TROMETHAMINE 30 MG/ML
30 INJECTION, SOLUTION INTRAMUSCULAR; INTRAVENOUS
Status: COMPLETED | OUTPATIENT
Start: 2022-07-29 | End: 2022-07-29

## 2022-07-29 RX ORDER — METHYLPREDNISOLONE SOD SUCC 125 MG
125 VIAL (EA) INJECTION
Status: COMPLETED | OUTPATIENT
Start: 2022-07-29 | End: 2022-07-29

## 2022-07-29 RX ADMIN — METOCLOPRAMIDE HYDROCHLORIDE 10 MG: 5 INJECTION INTRAMUSCULAR; INTRAVENOUS at 10:07

## 2022-07-29 RX ADMIN — KETOROLAC TROMETHAMINE 30 MG: 30 INJECTION, SOLUTION INTRAMUSCULAR at 10:07

## 2022-07-29 RX ADMIN — METHYLPREDNISOLONE SODIUM SUCCINATE 125 MG: 125 INJECTION, POWDER, FOR SOLUTION INTRAMUSCULAR; INTRAVENOUS at 10:07

## 2022-07-29 NOTE — DISCHARGE INSTRUCTIONS
Please  your prescriptions awaiting at pharmacy from dr. Lange for COVID  Additionally,  phenergan DM cough syrup from ER visit  Increase fluids  Take Tylenol or Motrin for fever as directed  You received a steroid, Reglan and Toradol shot for your headache - DO NOT TAKE ANY MOTRIN TODAY

## 2022-07-29 NOTE — ED PROVIDER NOTES
Encounter Date: 7/29/2022       History     Chief Complaint   Patient presents with    Cough     Patient is a very nice 53 yo wf who is having severe frontal headaches, fever (started weds) and body aches.  She was given the medicine for COVID by her physician but told not to take it until she has a positive test.  She has tested 2 - 3 times and negative but patient appears ill.  She is congested and coughing.  Temp is 99.9.  Sat is 98%.  Her main concern is that she has had pneumonia previously and is worried that she may be heading in that directed.  Patient has seen neurology for headache this week.  Dr. Lange is out of her office on fridays.        Review of patient's allergies indicates:   Allergen Reactions    Oxycodone-acetaminophen Swelling and Edema     Past Medical History:   Diagnosis Date    Acid reflux     Calculus of kidney     Depression     Hypertension     Hypothyroidism     Low back pain     Lumbosacral radiculopathy 5/17/2021    Migraines     Pain     Postlaminectomy syndrome, lumbar region 5/17/2021     Past Surgical History:   Procedure Laterality Date    BACK SURGERY      INSERTION OF DORSAL COLUMN NERVE STIMULATOR FOR TRIAL N/A 7/15/2021    Procedure: INSERTION, NEUROSTIMULATOR, SPINAL CORD, DORSAL COLUMN, FOR TRIAL;  Surgeon: Elba Moon MD;  Location: [facility];  Service: Pain Management;  Laterality: N/A;  spoke to Dr Calin Burgess nurse, urine culture negative (results in Epic), pt cleared.    LITHOTRIPSY       Family History   Problem Relation Age of Onset    Stroke Father     Heart attack Father     Heart attack Brother     Cancer Brother      Social History     Tobacco Use    Smoking status: Never Smoker    Smokeless tobacco: Never Used   Substance Use Topics    Alcohol use: Never    Drug use: Never     Review of Systems   Constitutional: Positive for chills, fatigue and fever.   HENT: Positive for congestion, postnasal drip, rhinorrhea and sinus  pressure.    Eyes: Negative.    Respiratory: Positive for cough and shortness of breath.    Cardiovascular: Negative.    Gastrointestinal: Negative.    Endocrine: Negative.    Genitourinary: Negative.    Musculoskeletal: Negative.         Bodyaches    Neurological: Positive for headaches.   Hematological: Negative.    Psychiatric/Behavioral: Negative.        Physical Exam     Initial Vitals [07/29/22 0932]   BP Pulse Resp Temp SpO2   116/77 78 20 99.9 °F (37.7 °C) 98 %      MAP       --         Physical Exam    Nursing note and vitals reviewed.  Constitutional: She appears well-developed and well-nourished. No distress.   HENT:   Head: Normocephalic and atraumatic.   Eyes: Pupils are equal, round, and reactive to light.   Neck: Neck supple.   Normal range of motion.  Cardiovascular: Normal rate, regular rhythm and normal heart sounds.   Pulmonary/Chest: Breath sounds normal.   Abdominal: Abdomen is soft.   Musculoskeletal:         General: No tenderness. Normal range of motion.      Cervical back: Normal range of motion and neck supple.     Neurological: She is alert and oriented to person, place, and time. She has normal strength.   Skin: Skin is warm and dry.   Psychiatric: She has a normal mood and affect. Her behavior is normal.         Medical Screening Exam   See Full Note    ED Course   Procedures  Labs Reviewed   SARS-COV2 (COVID) W/ FLU ANTIGEN - Abnormal; Notable for the following components:       Result Value    COVID-19 Ag Positive (*)     All other components within normal limits    Narrative:     Positive SARS-CoV antigen results indicate the presence of viral antigens; correlation with patient history and presence of clinical signs & symptoms consistent with COVID-19 are necessary to determine infection status.   CBC WITH DIFFERENTIAL - Abnormal; Notable for the following components:    WBC 3.34 (*)     RBC 4.18 (*)     MCHC 31.8 (*)     Lymphocytes, Absolute 0.98 (*)     Monocytes % 7.5 (*)     All  other components within normal limits   CBC W/ AUTO DIFFERENTIAL    Narrative:     The following orders were created for panel order CBC auto differential.  Procedure                               Abnormality         Status                     ---------                               -----------         ------                     CBC with Differential[[id number]]        Abnormal            Final result                 Please view results for these tests on the individual orders.          Imaging Results          X-Ray Chest PA And Lateral (Final result)  Result time 07/29/22 09:50:26    Final result by Colton Camacho DO (07/29/22 09:50:26)                 Impression:      No acute pulmonary disease      Electronically signed by: Colton Camacho  Date:    07/29/2022  Time:    09:50             Narrative:    EXAMINATION:  XR CHEST PA AND LATERAL    CLINICAL HISTORY:  Cough, unspecified    TECHNIQUE:  XR CHEST PA AND LATERAL    COMPARISON:  Comparisons were reviewed, if available. 3/12/22    FINDINGS:  No lines or tubes.    Lungs are clear.    Normal pleura.    Cardiac silhouette is similar to comparison exam.    No new acute bone findings.                                 Medications   methylPREDNISolone sodium succinate injection 125 mg (125 mg Intramuscular Given 7/29/22 1012)   metoclopramide HCl injection 10 mg (10 mg Intramuscular Given 7/29/22 1013)   ketorolac injection 30 mg (30 mg Intramuscular Given 7/29/22 1013)                       Clinical Impression:   Final diagnoses:  [R05.9] Cough  [J06.9] Upper respiratory tract infection, unspecified type (Primary)  [U07.1] COVID          ED Disposition Condition    Discharge Stable        ED Prescriptions     Medication Sig Dispense Start Date End Date Auth. Provider    promethazine-dextromethorphan (PROMETHAZINE-DM) 6.25-15 mg/5 mL Syrp Take 5 mLs by mouth every 4 (four) hours as needed. 180 mL 7/29/2022 8/8/2022 Rani Diaz MD        Follow-up  Information     Follow up With Specialties Details Why Contact Info    Shannon Lange  In 3 days If symptoms worsen [address]  [place] [id number]             Rani Diaz MD  07/29/22 9228

## 2022-08-02 ENCOUNTER — TELEPHONE (OUTPATIENT)
Dept: EMERGENCY MEDICINE | Facility: HOSPITAL | Age: 52
End: 2022-08-02

## 2022-08-03 ENCOUNTER — HOSPITAL ENCOUNTER (EMERGENCY)
Facility: HOSPITAL | Age: 52
Discharge: HOME | End: 2022-08-03
Attending: EMERGENCY MEDICINE
Payer: MEDICARE

## 2022-08-03 VITALS
WEIGHT: 194 LBS | HEART RATE: 72 BPM | OXYGEN SATURATION: 10 % | RESPIRATION RATE: 18 BRPM | BODY MASS INDEX: 35.7 KG/M2 | SYSTOLIC BLOOD PRESSURE: 142 MMHG | DIASTOLIC BLOOD PRESSURE: 80 MMHG | TEMPERATURE: 99 F | HEIGHT: 62 IN

## 2022-08-03 DIAGNOSIS — A49.9 BACTERIAL URINARY INFECTION: ICD-10-CM

## 2022-08-03 DIAGNOSIS — N39.0 BACTERIAL URINARY INFECTION: ICD-10-CM

## 2022-08-03 DIAGNOSIS — R11.2 NON-INTRACTABLE VOMITING WITH NAUSEA, UNSPECIFIED VOMITING TYPE: Primary | ICD-10-CM

## 2022-08-03 DIAGNOSIS — E87.6 HYPOKALEMIA: ICD-10-CM

## 2022-08-03 LAB
ALBUMIN SERPL BCP-MCNC: 2.8 G/DL (ref 3.5–5)
ALBUMIN/GLOB SERPL: 1 {RATIO}
ALP SERPL-CCNC: 55 U/L (ref 41–108)
ALT SERPL W P-5'-P-CCNC: 27 U/L (ref 13–56)
ANION GAP SERPL CALCULATED.3IONS-SCNC: 14 MMOL/L (ref 7–16)
AST SERPL W P-5'-P-CCNC: 19 U/L (ref 15–37)
BACTERIA #/AREA URNS HPF: ABNORMAL /HPF
BASOPHILS # BLD AUTO: 0.03 K/UL (ref 0–0.2)
BASOPHILS NFR BLD AUTO: 0.5 % (ref 0–1)
BILIRUB SERPL-MCNC: 0.2 MG/DL (ref 0–1.2)
BILIRUB UR QL STRIP: NEGATIVE
BUN SERPL-MCNC: 11 MG/DL (ref 7–18)
BUN/CREAT SERPL: 12 (ref 6–20)
CALCIUM SERPL-MCNC: 7.6 MG/DL (ref 8.5–10.1)
CHLORIDE SERPL-SCNC: 105 MMOL/L (ref 98–107)
CLARITY UR: CLEAR
CO2 SERPL-SCNC: 29 MMOL/L (ref 21–32)
COLOR UR: YELLOW
CREAT SERPL-MCNC: 0.95 MG/DL (ref 0.55–1.02)
DIFFERENTIAL METHOD BLD: ABNORMAL
EOSINOPHIL # BLD AUTO: 0.27 K/UL (ref 0–0.5)
EOSINOPHIL NFR BLD AUTO: 4.8 % (ref 1–4)
ERYTHROCYTE [DISTWIDTH] IN BLOOD BY AUTOMATED COUNT: 14.2 % (ref 11.5–14.5)
GLOBULIN SER-MCNC: 2.9 G/DL (ref 2–4)
GLUCOSE SERPL-MCNC: 98 MG/DL (ref 74–106)
GLUCOSE UR STRIP-MCNC: NEGATIVE MG/DL
HCT VFR BLD AUTO: 38.9 % (ref 38–47)
HGB BLD-MCNC: 12.6 G/DL (ref 12–16)
IMM GRANULOCYTES # BLD AUTO: 0.01 K/UL (ref 0–0.04)
IMM GRANULOCYTES NFR BLD: 0.2 % (ref 0–0.4)
KETONES UR STRIP-SCNC: NEGATIVE MG/DL
LEUKOCYTE ESTERASE UR QL STRIP: NEGATIVE
LYMPHOCYTES # BLD AUTO: 1.73 K/UL (ref 1–4.8)
LYMPHOCYTES NFR BLD AUTO: 30.5 % (ref 27–41)
MAGNESIUM SERPL-MCNC: 1.8 MG/DL (ref 1.7–2.3)
MCH RBC QN AUTO: 29.9 PG (ref 27–31)
MCHC RBC AUTO-ENTMCNC: 32.4 G/DL (ref 32–36)
MCV RBC AUTO: 92.2 FL (ref 80–96)
MONOCYTES # BLD AUTO: 0.37 K/UL (ref 0–0.8)
MONOCYTES NFR BLD AUTO: 6.5 % (ref 2–6)
MPC BLD CALC-MCNC: 9.5 FL (ref 9.4–12.4)
NEUTROPHILS # BLD AUTO: 3.27 K/UL (ref 1.8–7.7)
NEUTROPHILS NFR BLD AUTO: 57.5 % (ref 53–65)
NITRITE UR QL STRIP: NEGATIVE
PH UR STRIP: 7 PH UNITS
PLATELET # BLD AUTO: 292 K/UL (ref 150–400)
POTASSIUM SERPL-SCNC: 3 MMOL/L (ref 3.5–5.1)
PROT SERPL-MCNC: 5.7 G/DL (ref 6.4–8.2)
PROT UR QL STRIP: NEGATIVE
RBC # BLD AUTO: 4.22 M/UL (ref 4.2–5.4)
RBC # UR STRIP: ABNORMAL /UL
RBC #/AREA URNS HPF: ABNORMAL /HPF
SODIUM SERPL-SCNC: 145 MMOL/L (ref 136–145)
SP GR UR STRIP: 1.01
SQUAMOUS #/AREA URNS LPF: ABNORMAL /LPF
UROBILINOGEN UR STRIP-ACNC: 0.2 MG/DL
WBC # BLD AUTO: 5.68 K/UL (ref 4.5–11)
WBC #/AREA URNS HPF: ABNORMAL /HPF
YEAST #/AREA URNS HPF: ABNORMAL /HPF

## 2022-08-03 PROCEDURE — 63600175 PHARM REV CODE 636 W HCPCS: Performed by: EMERGENCY MEDICINE

## 2022-08-03 PROCEDURE — 96368 THER/DIAG CONCURRENT INF: CPT

## 2022-08-03 PROCEDURE — 80053 COMPREHEN METABOLIC PANEL: CPT | Performed by: EMERGENCY MEDICINE

## 2022-08-03 PROCEDURE — 85025 COMPLETE CBC W/AUTO DIFF WBC: CPT | Performed by: EMERGENCY MEDICINE

## 2022-08-03 PROCEDURE — 83735 ASSAY OF MAGNESIUM: CPT | Performed by: EMERGENCY MEDICINE

## 2022-08-03 PROCEDURE — 99283 EMERGENCY DEPT VISIT LOW MDM: CPT | Performed by: EMERGENCY MEDICINE

## 2022-08-03 PROCEDURE — 36415 COLL VENOUS BLD VENIPUNCTURE: CPT | Performed by: EMERGENCY MEDICINE

## 2022-08-03 PROCEDURE — 25000003 PHARM REV CODE 250: Performed by: EMERGENCY MEDICINE

## 2022-08-03 PROCEDURE — 96361 HYDRATE IV INFUSION ADD-ON: CPT

## 2022-08-03 PROCEDURE — 99284 EMERGENCY DEPT VISIT MOD MDM: CPT | Mod: 25

## 2022-08-03 PROCEDURE — 81001 URINALYSIS AUTO W/SCOPE: CPT | Performed by: EMERGENCY MEDICINE

## 2022-08-03 PROCEDURE — 96365 THER/PROPH/DIAG IV INF INIT: CPT

## 2022-08-03 RX ORDER — POTASSIUM CHLORIDE 20 MEQ/1
20 TABLET, EXTENDED RELEASE ORAL
Status: COMPLETED | OUTPATIENT
Start: 2022-08-03 | End: 2022-08-03

## 2022-08-03 RX ORDER — CEFDINIR 300 MG/1
300 CAPSULE ORAL 2 TIMES DAILY
Qty: 20 CAPSULE | Refills: 0 | Status: SHIPPED | OUTPATIENT
Start: 2022-08-03 | End: 2022-08-13

## 2022-08-03 RX ORDER — ONDANSETRON 4 MG/1
4 TABLET, ORALLY DISINTEGRATING ORAL EVERY 6 HOURS PRN
Qty: 12 TABLET | Refills: 0 | Status: SHIPPED | OUTPATIENT
Start: 2022-08-03 | End: 2022-08-06

## 2022-08-03 RX ORDER — POTASSIUM CHLORIDE 7.45 MG/ML
10 INJECTION INTRAVENOUS
Status: COMPLETED | OUTPATIENT
Start: 2022-08-03 | End: 2022-08-03

## 2022-08-03 RX ADMIN — POTASSIUM CHLORIDE 20 MEQ: 20 TABLET, EXTENDED RELEASE ORAL at 10:08

## 2022-08-03 RX ADMIN — SODIUM CHLORIDE 500 ML: 9 INJECTION, SOLUTION INTRAVENOUS at 10:08

## 2022-08-03 RX ADMIN — SODIUM CHLORIDE 1000 ML: 9 INJECTION, SOLUTION INTRAVENOUS at 08:08

## 2022-08-03 RX ADMIN — POTASSIUM CHLORIDE 10 MEQ: 7.46 INJECTION, SOLUTION INTRAVENOUS at 10:08

## 2022-08-03 RX ADMIN — CEFTRIAXONE SODIUM 1 G: 1 INJECTION, POWDER, FOR SOLUTION INTRAMUSCULAR; INTRAVENOUS at 10:08

## 2022-08-04 ENCOUNTER — TELEPHONE (OUTPATIENT)
Dept: EMERGENCY MEDICINE | Facility: HOSPITAL | Age: 52
End: 2022-08-04
Payer: MEDICARE

## 2022-08-04 NOTE — ED PROVIDER NOTES
Encounter Date: 8/3/2022       History     Chief Complaint   Patient presents with    Vomiting    Nausea    Diarrhea     Diarrhea x5 in past 24 hours.  Severe nausea with dry heaves and vomited x1 in past 24hours     Patient presents with nausea vomiting and diarrhea as well as body aches.  She has been feeling poorly with fever body aches for 8 days, had a positive COVID test on 07/29/2022.  She just completed a course of Paxlovid yesterday.        Review of patient's allergies indicates:   Allergen Reactions    Oxycodone-acetaminophen Swelling and Edema    Oxycodone hcl      Past Medical History:   Diagnosis Date    Acid reflux     Calculus of kidney     Depression     Hypertension     Hypothyroidism     Low back pain     Lumbosacral radiculopathy 5/17/2021    Migraines     Pain     Postlaminectomy syndrome, lumbar region 5/17/2021     Past Surgical History:   Procedure Laterality Date    BACK SURGERY      INSERTION OF DORSAL COLUMN NERVE STIMULATOR FOR TRIAL N/A 7/15/2021    Procedure: INSERTION, NEUROSTIMULATOR, SPINAL CORD, DORSAL COLUMN, FOR TRIAL;  Surgeon: Elba Moon MD;  Location: [facility];  Service: Pain Management;  Laterality: N/A;  spoke to Dr Calin Burgess nurse, urine culture negative (results in Epic), pt cleared.    LITHOTRIPSY       Family History   Problem Relation Age of Onset    Stroke Father     Heart attack Father     Heart attack Brother     Cancer Brother      Social History     Tobacco Use    Smoking status: Never Smoker    Smokeless tobacco: Never Used   Substance Use Topics    Alcohol use: Never    Drug use: Never     Review of Systems   Constitutional: Positive for fatigue and fever. Negative for appetite change, chills, diaphoresis and unexpected weight change.   HENT: Positive for congestion. Negative for ear discharge, ear pain, facial swelling, hearing loss, mouth sores, nosebleeds, postnasal drip, rhinorrhea, sinus pressure, sinus pain, sore  throat and trouble swallowing.    Eyes: Negative.    Respiratory: Negative for apnea, cough, choking, chest tightness, shortness of breath, wheezing and stridor.    Cardiovascular: Negative.  Negative for chest pain.   Gastrointestinal: Positive for abdominal pain (Has had some mild abdominal cramping.), diarrhea, nausea and vomiting. Negative for abdominal distention, blood in stool and constipation.   Genitourinary: Negative.    Musculoskeletal: Negative.    Skin: Negative.    Neurological: Positive for headaches.   Psychiatric/Behavioral: Negative.    All other systems reviewed and are negative.      Physical Exam     Initial Vitals [08/03/22 2010]   BP Pulse Resp Temp SpO2   (!) 127/99 81 18 98.7 °F (37.1 °C) 100 %      MAP       --         Physical Exam    Nursing note and vitals reviewed.  Constitutional: She appears well-developed and well-nourished.   Patient has a calculated BMI of 35.5   HENT:   Head: Normocephalic.   Right Ear: External ear normal.   Left Ear: External ear normal.   Nose: Nose normal.   Mouth/Throat: Mucous membranes are dry.   Eyes: Conjunctivae and EOM are normal. Pupils are equal, round, and reactive to light.   Neck: Neck supple. No JVD present.   Normal range of motion.  Cardiovascular: Normal rate, regular rhythm, normal heart sounds and intact distal pulses.   No murmur heard.  Pulmonary/Chest: Breath sounds normal. No stridor. No respiratory distress. She has no wheezes. She has no rhonchi. She has no rales.   Abdominal: Abdomen is soft. Bowel sounds are normal. She exhibits no distension. There is no abdominal tenderness.   Musculoskeletal:         General: No tenderness or edema. Normal range of motion.      Cervical back: Normal range of motion and neck supple.     Lymphadenopathy:     She has no cervical adenopathy.   Neurological: She is alert and oriented to person, place, and time. She has normal strength. No cranial nerve deficit. GCS score is 15. GCS eye subscore is 4.  GCS verbal subscore is 5. GCS motor subscore is 6.   Skin: Skin is warm and dry. Capillary refill takes 2 to 3 seconds. No rash noted. No erythema. No pallor.   Psychiatric: She has a normal mood and affect. Her behavior is normal.         Medical Screening Exam   See Full Note    ED Course   Procedures  Labs Reviewed   COMPREHENSIVE METABOLIC PANEL - Abnormal; Notable for the following components:       Result Value    Potassium 3.0 (*)     Calcium 7.6 (*)     Total Protein 5.7 (*)     Albumin 2.8 (*)     All other components within normal limits   URINALYSIS, REFLEX TO URINE CULTURE - Abnormal; Notable for the following components:    Blood, UA Trace-Intact (*)     All other components within normal limits   CBC WITH DIFFERENTIAL - Abnormal; Notable for the following components:    Monocytes % 6.5 (*)     Eosinophils % 4.8 (*)     All other components within normal limits   URINALYSIS, MICROSCOPIC - Abnormal; Notable for the following components:    Bacteria, UA Occasional (*)     Yeast, UA Occasional (*)     All other components within normal limits   MAGNESIUM - Normal   CBC W/ AUTO DIFFERENTIAL    Narrative:     The following orders were created for panel order CBC auto differential.  Procedure                               Abnormality         Status                     ---------                               -----------         ------                     CBC with Differential[[id number]]        Abnormal            Final result                 Please view results for these tests on the individual orders.          Imaging Results    None          Medications   potassium chloride 10 mEq in 100 mL IVPB (has no administration in time range)   potassium chloride SA CR tablet 20 mEq (has no administration in time range)   cefTRIAXone (ROCEPHIN) 1 g in dextrose 5 % in water (D5W) 5 % 50 mL IVPB (MB+) (has no administration in time range)   sodium chloride 0.9% bolus 500 mL (has no administration in time range)   sodium  chloride 0.9% bolus 1,000 mL (0 mLs Intravenous Stopped 8/3/22 2151)     Medical Decision Making:   Clinical Tests:   Lab Tests: Reviewed       <> Summary of Lab: Potassium was low at 3.0, patient treated with IV and oral potassium.  Urinalysis was consistent with urinary infection.  Await culture results, begin empiric treatment with Rocephin followed by p.o. cefdinir.  ED Management:  Patient was given IV fluids, IV Rocephin, and IV potassium as well as IV Zofran.                   Clinical Impression:   Final diagnoses:  [R11.2] Non-intractable vomiting with nausea, unspecified vomiting type - Patient's symptoms may be due to Paxlovid treatment or due to COVID infection itself (Primary)  [E87.6] Hypokalemia  [N39.0, A49.9] Bacterial urinary infection          ED Disposition Condition    Discharge Stable        ED Prescriptions     Medication Sig Dispense Start Date End Date Auth. Provider    cefdinir (OMNICEF) 300 MG capsule Take 1 capsule (300 mg total) by mouth 2 (two) times daily. for 10 days 20 capsule 8/3/2022 8/13/2022 Srini Angulo DO    ondansetron (ZOFRAN-ODT) 4 MG TbDL Take 1 tablet (4 mg total) by mouth every 6 (six) hours as needed (Nausea or vomiting.). 12 tablet 8/3/2022 8/6/2022 Srini Angulo DO        Follow-up Information     Follow up With Specialties Details Why Contact Info    Shannon Lange MD Family Medicine Schedule an appointment as soon as possible for a visit in 1 day To recheck; sooner if worse, not improving, or if any new symptoms. [id number]  [facility]   [place] [id number]  [phone]             Srini Angulo DO  08/03/22 2262

## 2022-08-04 NOTE — DISCHARGE INSTRUCTIONS
Recheck potassium level with your doctor next week.  Repeat urinalysis in 10 days to be sure it has returned to normal.

## 2022-08-16 DIAGNOSIS — R39.89 BLADDER PAIN: Primary | ICD-10-CM

## 2022-08-16 RX ORDER — PHENAZOPYRIDINE HYDROCHLORIDE 200 MG/1
200 TABLET, FILM COATED ORAL 3 TIMES DAILY PRN
Qty: 30 TABLET | Refills: 5 | Status: SHIPPED | OUTPATIENT
Start: 2022-08-16 | End: 2022-10-21

## 2022-08-18 ENCOUNTER — OFFICE VISIT (OUTPATIENT)
Dept: NEUROLOGY | Facility: CLINIC | Age: 52
End: 2022-08-18
Payer: MEDICARE

## 2022-08-18 VITALS
SYSTOLIC BLOOD PRESSURE: 138 MMHG | HEIGHT: 62 IN | RESPIRATION RATE: 18 BRPM | DIASTOLIC BLOOD PRESSURE: 82 MMHG | WEIGHT: 194 LBS | BODY MASS INDEX: 35.7 KG/M2

## 2022-08-18 DIAGNOSIS — G43.719 INTRACTABLE CHRONIC MIGRAINE WITHOUT AURA AND WITHOUT STATUS MIGRAINOSUS: Primary | ICD-10-CM

## 2022-08-18 DIAGNOSIS — N20.0 KIDNEY STONE: Primary | ICD-10-CM

## 2022-08-18 PROCEDURE — 1160F RVW MEDS BY RX/DR IN RCRD: CPT | Mod: CPTII,,, | Performed by: NURSE PRACTITIONER

## 2022-08-18 PROCEDURE — 99213 PR OFFICE/OUTPT VISIT, EST, LEVL III, 20-29 MIN: ICD-10-PCS | Mod: S$PBB,,, | Performed by: NURSE PRACTITIONER

## 2022-08-18 PROCEDURE — 3079F PR MOST RECENT DIASTOLIC BLOOD PRESSURE 80-89 MM HG: ICD-10-PCS | Mod: CPTII,,, | Performed by: NURSE PRACTITIONER

## 2022-08-18 PROCEDURE — 99213 OFFICE O/P EST LOW 20 MIN: CPT | Mod: S$PBB,,, | Performed by: NURSE PRACTITIONER

## 2022-08-18 PROCEDURE — 3075F PR MOST RECENT SYSTOLIC BLOOD PRESS GE 130-139MM HG: ICD-10-PCS | Mod: CPTII,,, | Performed by: NURSE PRACTITIONER

## 2022-08-18 PROCEDURE — 1160F PR REVIEW ALL MEDS BY PRESCRIBER/CLIN PHARMACIST DOCUMENTED: ICD-10-PCS | Mod: CPTII,,, | Performed by: NURSE PRACTITIONER

## 2022-08-18 PROCEDURE — 3008F BODY MASS INDEX DOCD: CPT | Mod: CPTII,,, | Performed by: NURSE PRACTITIONER

## 2022-08-18 PROCEDURE — 3079F DIAST BP 80-89 MM HG: CPT | Mod: CPTII,,, | Performed by: NURSE PRACTITIONER

## 2022-08-18 PROCEDURE — 1159F PR MEDICATION LIST DOCUMENTED IN MEDICAL RECORD: ICD-10-PCS | Mod: CPTII,,, | Performed by: NURSE PRACTITIONER

## 2022-08-18 PROCEDURE — 3075F SYST BP GE 130 - 139MM HG: CPT | Mod: CPTII,,, | Performed by: NURSE PRACTITIONER

## 2022-08-18 PROCEDURE — 1159F MED LIST DOCD IN RCRD: CPT | Mod: CPTII,,, | Performed by: NURSE PRACTITIONER

## 2022-08-18 PROCEDURE — 3008F PR BODY MASS INDEX (BMI) DOCUMENTED: ICD-10-PCS | Mod: CPTII,,, | Performed by: NURSE PRACTITIONER

## 2022-08-18 PROCEDURE — 99214 OFFICE O/P EST MOD 30 MIN: CPT | Mod: PBBFAC | Performed by: NURSE PRACTITIONER

## 2022-08-18 RX ORDER — TOPIRAMATE 50 MG/1
50 TABLET, FILM COATED ORAL 2 TIMES DAILY
Qty: 60 TABLET | Refills: 11 | Status: SHIPPED | OUTPATIENT
Start: 2022-08-18 | End: 2022-09-21

## 2022-08-18 NOTE — PROGRESS NOTES
Subjective:       Patient ID: Gloria Mitchell is a 52 y.o. female     Chief Complaint:    Chief Complaint   Patient presents with    Follow-up    Migraine        Allergies:  Oxycodone-acetaminophen and Oxycodone hcl    Current Medications:    Outpatient Encounter Medications as of 2022   Medication Sig Dispense Refill    citric acid-potassium citrate (POLYCITRA) 1,100-334 mg/5 mL solution Take by mouth 3 (three) times daily with meals.      erenumab-aooe (AIMOVIG AUTOINJECTOR) 140 mg/mL autoinjector Inject 1 mL (140 mg total) into the skin every 28 days. 1 each 11    ergocalciferol (ERGOCALCIFEROL) 50,000 unit Cap Take 1 capsule (50,000 Units total) by mouth every 7 days. 4 capsule 11    gabapentin (NEURONTIN) 600 MG tablet Take 1 tablet (600 mg total) by mouth every 8 (eight) hours. 90 tablet 1    HYDROcodone-acetaminophen (NORCO)  mg per tablet Take 1 tablet by mouth every 12 (twelve) hours. 60 tablet 0    levothyroxine (SYNTHROID) 112 MCG tablet Take 112 mcg by mouth before breakfast.      metoprolol tartrate (LOPRESSOR) 25 MG tablet Take 25 mg by mouth once daily.      omeprazole (PRILOSEC) 20 MG capsule Take 20 mg by mouth once daily.      phenazopyridine (PYRIDIUM) 200 MG tablet Take 1 tablet (200 mg total) by mouth 3 (three) times daily as needed for Pain. 30 tablet 5    tiZANidine 4 mg Cap Take 1 capsule by mouth every 8 (eight) hours. 90 capsule 1    albuterol (PROVENTIL/VENTOLIN HFA) 90 mcg/actuation inhaler Inhale 2 puffs into the lungs every 6 (six) hours as needed for Wheezing. Rescue 1 g 0    azithromycin (Z-FLAQUITO) 250 MG tablet Take 500 mg by mouth once daily.      [] cefdinir (OMNICEF) 300 MG capsule Take 1 capsule (300 mg total) by mouth 2 (two) times daily. for 10 days 20 capsule 0    fluticasone propionate (FLONASE) 50 mcg/actuation nasal spray 1 spray (50 mcg total) by Each Nostril route 2 (two) times daily as needed for Rhinitis. 15.8 mL 0     levothyroxine (SYNTHROID) 125 MCG tablet Take 125 mcg by mouth before breakfast.      levothyroxine (SYNTHROID) 75 MCG tablet 1 tablet in the morning on an empty stomach      omeprazole (PRILOSEC) 20 MG capsule 1 capsule 30 minutes before morning meal      [] ondansetron (ZOFRAN-ODT) 4 MG TbDL Take 1 tablet (4 mg total) by mouth every 6 (six) hours as needed (Nausea or vomiting.). 12 tablet 0    topiramate (TOPAMAX) 50 MG tablet Take 1 tablet (50 mg total) by mouth 2 (two) times daily. 60 tablet 11    [DISCONTINUED] FLUoxetine 20 MG capsule Take 20 mg by mouth once daily.      [DISCONTINUED] promethazine-dextromethorphan (PROMETHAZINE-DM) 6.25-15 mg/5 mL Syrp Take 5 mLs by mouth every 4 (four) hours as needed. 180 mL 0    [DISCONTINUED] traZODone (DESYREL) 100 MG tablet Take 100 mg by mouth every evening.      [DISCONTINUED] traZODone (DESYREL) 50 MG tablet Take by mouth nightly.      [DISCONTINUED] vortioxetine (TRINTELLIX) 10 mg Tab Take 10 mg by mouth once daily.       No facility-administered encounter medications on file as of 2022.       History of Present Illness  52 yr old white female presents with headaches that started as a teenager.     Prior to topamax she reports was having between 12 and 16 headache days a month, with the topamax 100mg QHS she has maybe 3.  She tolerated very well, no reported side effects.  However, in 2021 was found to have kidney stones.  Prior treated with Effexor in [id number] but was found not effective for migraines.  Initially she declined to change medication, but in January I had discussed option of once monthly injections and she was open to this idea.  She was started on Aimovig once monthly injections and reported significant improvement, reported essentially her migraines had stopped, only one actually in the prior 3 months.  She needs new PA done.    We decreased her topamax to 50mg BID with intent to slow taper off to see if it was still  needed.  However, she got covid the week after she saw us, was put on paxlovid and was told by her primary care to suddenly stop her topamax?  Topamax is not one of the 3 antiepileptic medications recommended to stop that is tegretol, dilantin, and phenobarb.  She was reporting symptoms of shakes, diaphoresis, and was thankfully told by ED physician to restart it and this has helped symptoms.  Currently on 50mg bid and will continue this for now and let her get fully over the infection and current UTI before trying further.    She does ask about something for insomnia, will recommend starting with melatonin.  She has underlying depression, if this is not effective will start her with Elavil next visit    She is not on any other CGRP medications.  Denied overuse headaches.  No current botox.         Review of Systems  Review of Systems   Constitutional: Negative for diaphoresis and fever.   HENT: Negative for congestion, hearing loss and tinnitus.    Eyes: Negative for blurred vision, double vision, photophobia, discharge and redness.   Respiratory: Negative for cough and shortness of breath.    Cardiovascular: Negative for chest pain.   Gastrointestinal: Negative for abdominal pain, nausea and vomiting.   Musculoskeletal: Negative for back pain, joint pain, myalgias and neck pain.   Skin: Negative for itching and rash.   Neurological: Positive for headaches. Negative for dizziness, tremors, sensory change, speech change, focal weakness, seizures, loss of consciousness and weakness.   Psychiatric/Behavioral: Negative for depression, hallucinations and memory loss. The patient does not have insomnia.    All other systems reviewed and are negative.     Objective:     NEUROLOGICAL EXAMINATION:     MENTAL STATUS   Oriented to person, place, and time.   Registration: recalls 3 of 3 objects. Recall at 5 minutes: recalls 3 of 3 objects.   Attention: normal. Concentration: normal.   Speech: speech is normal   Level of  consciousness: alert  Knowledge: good and consistent with education.   Normal comprehension.     CRANIAL NERVES     CN II   Visual fields full to confrontation.   Visual acuity: normal  Right visual field deficit: none  Left visual field deficit: none     CN III, IV, VI   Pupils are equal, round, and reactive to light.  Extraocular motions are normal.   Right pupil: Size: 3 mm. Shape: regular. Reactivity: brisk. Consensual response: intact. Accommodation: intact.   Left pupil: Size: 3 mm. Shape: regular. Reactivity: brisk. Consensual response: intact. Accommodation: intact.   CN III: no CN III palsy  CN VI: no CN VI palsy  Nystagmus: none   Diplopia: none  Upgaze: normal  Downgaze: normal  Conjugate gaze: present  Vestibulo-ocular reflex: present    CN V   Facial sensation intact.   Right facial sensation deficit: none  Left facial sensation deficit: none  Right corneal reflex: normal  Left corneal reflex: normal    CN VII   Facial expression full, symmetric.   Right facial weakness: none  Left facial weakness: none  Right taste: normal  Left taste: normal    CN VIII   CN VIII normal.   Hearing: intact    CN IX, X   CN IX normal.   CN X normal.   Palate: symmetric    CN XI   CN XI normal.   Right sternocleidomastoid strength: normal  Left sternocleidomastoid strength: normal  Right trapezius strength: normal  Left trapezius strength: normal    CN XII   CN XII normal.   Tongue: not atrophic  Fasciculations: absent  Tongue deviation: none    MOTOR EXAM   Muscle bulk: normal  Overall muscle tone: normal  Right arm tone: normal  Left arm tone: normal  Right arm pronator drift: absent  Left arm pronator drift: absent  Right leg tone: normal  Left leg tone: normal    Strength   Right neck flexion: 5/5  Left neck flexion: 5/5  Right neck extension: 5/5  Left neck extension: 5/5  Right deltoid: 5/5  Left deltoid: 5/5  Right biceps: 5/5  Left biceps: 5/5  Right triceps: 5/5  Left triceps: 5/5  Right wrist flexion: 5/5  Left  wrist flexion: 5/5  Right wrist extension: 5/5  Left wrist extension: 5/5  Right interossei: 5/5  Left interossei: 5/5  Right iliopsoas: 5/5  Left iliopsoas: 5/5  Right quadriceps: 5/5  Left quadriceps: 5/5  Right hamstrin/5  Left hamstrin/5  Right anterior tibial: 5/5  Left anterior tibial: 5/5  Right posterior tibial: 5/5  Left posterior tibial: 5/5  Right gastroc: 5/5  Left gastroc: 5/5    REFLEXES     Reflexes   Right brachioradialis: 2+  Left brachioradialis: 2+  Right biceps: 2+  Left biceps: 2+  Right triceps: 2+  Left triceps: 2+  Right patellar: 2+  Left patellar: 2+  Right achilles: 2+  Left achilles: 2+  Right plantar: normal  Left plantar: normal  Right Hernandez: absent  Left Hernandez: absent  Right ankle clonus: absent  Left ankle clonus: absent  Right pendular knee jerk: absent  Left pendular knee jerk: absent    SENSORY EXAM   Light touch normal.   Right arm light touch: normal  Left arm light touch: normal  Right leg light touch: normal  Left leg light touch: normal  Vibration normal.   Right arm vibration: normal  Left arm vibration: normal  Right leg vibration: normal  Left leg vibration: normal  Proprioception normal.   Right arm proprioception: normal  Left arm proprioception: normal  Right leg proprioception: normal  Left leg proprioception: normal  Pinprick normal.   Right arm pinprick: normal  Left arm pinprick: normal  Right leg pinprick: normal  Left leg pinprick: normal  Graphesthesia: normal  Romberg: negative  Stereognosis: normal    GAIT AND COORDINATION     Gait  Gait: normal     Coordination   Finger to nose coordination: normal  Heel to shin coordination: normal  Tandem walking coordination: normal    Tremor   Resting tremor: absent  Intention tremor: absent  Action tremor: absent       Physical Exam  Vitals and nursing note reviewed.   Constitutional:       Appearance: Normal appearance.   HENT:      Head: Normocephalic.   Eyes:      Extraocular Movements: Extraocular movements  intact and EOM normal.      Pupils: Pupils are equal, round, and reactive to light.   Cardiovascular:      Rate and Rhythm: Normal rate and regular rhythm.   Pulmonary:      Effort: Pulmonary effort is normal.      Breath sounds: Normal breath sounds.   Musculoskeletal:         General: No swelling or tenderness. Normal range of motion.      Cervical back: Normal range of motion and neck supple.      Right lower leg: No edema.      Left lower leg: No edema.   Skin:     General: Skin is warm and dry.      Coloration: Skin is not jaundiced.      Findings: No rash.   Neurological:      General: No focal deficit present.      Mental Status: She is alert and oriented to person, place, and time.      GCS: GCS eye subscore is 4. GCS verbal subscore is 5. GCS motor subscore is 6.      Cranial Nerves: No cranial nerve deficit.      Sensory: No sensory deficit.      Motor: Motor function is intact. No weakness.      Coordination: Coordination is intact. Coordination normal. Finger-Nose-Finger Test, Heel to Shin Test and Romberg Test normal.      Gait: Gait is intact. Gait and tandem walk normal.      Deep Tendon Reflexes: Reflexes normal.      Reflex Scores:       Tricep reflexes are 2+ on the right side and 2+ on the left side.       Bicep reflexes are 2+ on the right side and 2+ on the left side.       Brachioradialis reflexes are 2+ on the right side and 2+ on the left side.       Patellar reflexes are 2+ on the right side and 2+ on the left side.       Achilles reflexes are 2+ on the right side and 2+ on the left side.  Psychiatric:         Mood and Affect: Mood normal.         Speech: Speech normal.         Behavior: Behavior normal.          Assessment:     Problem List Items Addressed This Visit        Neuro    Intractable chronic migraine without aura and without status migrainosus - Primary    Relevant Medications    topiramate (TOPAMAX) 50 MG tablet           Primary Diagnosis and ICD10  Intractable chronic migraine  without aura and without status migrainosus [G43.719]    Plan:     Patient Instructions   1. Continue current topamax 50mg twice daily  2. Continue Aimovig 140mg once monthly  3. Start Melatonin 5mg nightly      Medications Discontinued During This Encounter   Medication Reason    traZODone (DESYREL) 100 MG tablet     traZODone (DESYREL) 50 MG tablet     vortioxetine (TRINTELLIX) 10 mg Tab     FLUoxetine 20 MG capsule        Requested Prescriptions     Signed Prescriptions Disp Refills    topiramate (TOPAMAX) 50 MG tablet 60 tablet 11     Sig: Take 1 tablet (50 mg total) by mouth 2 (two) times daily.       No orders of the defined types were placed in this encounter.

## 2022-08-18 NOTE — PATIENT INSTRUCTIONS
Continue current topamax 50mg twice daily  Continue Aimovig 140mg once monthly  Start Melatonin 5mg nightly

## 2022-08-19 ENCOUNTER — HOSPITAL ENCOUNTER (OUTPATIENT)
Dept: RADIOLOGY | Facility: HOSPITAL | Age: 52
Discharge: HOME | End: 2022-08-19
Attending: UROLOGY
Payer: MEDICARE

## 2022-08-19 ENCOUNTER — OFFICE VISIT (OUTPATIENT)
Dept: UROLOGY | Facility: CLINIC | Age: 52
End: 2022-08-19
Payer: MEDICARE

## 2022-08-19 VITALS
HEIGHT: 62 IN | SYSTOLIC BLOOD PRESSURE: 122 MMHG | BODY MASS INDEX: 34.96 KG/M2 | TEMPERATURE: 98 F | DIASTOLIC BLOOD PRESSURE: 90 MMHG | OXYGEN SATURATION: 98 % | WEIGHT: 190 LBS | HEART RATE: 73 BPM

## 2022-08-19 DIAGNOSIS — R10.9 LEFT FLANK PAIN: Primary | ICD-10-CM

## 2022-08-19 DIAGNOSIS — G89.4 CHRONIC PAIN SYNDROME: Chronic | ICD-10-CM

## 2022-08-19 DIAGNOSIS — B37.41 YEAST CYSTITIS: ICD-10-CM

## 2022-08-19 DIAGNOSIS — N39.0 URINARY TRACT INFECTION WITHOUT HEMATURIA, SITE UNSPECIFIED: ICD-10-CM

## 2022-08-19 DIAGNOSIS — N20.0 KIDNEY STONE: ICD-10-CM

## 2022-08-19 DIAGNOSIS — N20.0 CALCULUS OF KIDNEY: ICD-10-CM

## 2022-08-19 DIAGNOSIS — R10.9 ABDOMINAL PAIN, UNSPECIFIED ABDOMINAL LOCATION: ICD-10-CM

## 2022-08-19 PROBLEM — N20.2 CALCULUS OF KIDNEY AND URETER: Status: RESOLVED | Noted: 2021-04-08 | Resolved: 2022-08-19

## 2022-08-19 LAB
BACTERIA #/AREA URNS HPF: ABNORMAL /HPF
BILIRUB UR QL STRIP: NEGATIVE
CAOX CRY URNS QL MICRO: ABNORMAL /HPF
CLARITY UR: ABNORMAL
COLOR UR: ABNORMAL
GLUCOSE UR STRIP-MCNC: NORMAL MG/DL
KETONES UR STRIP-SCNC: NEGATIVE MG/DL
LEUKOCYTE ESTERASE UR QL STRIP: ABNORMAL
MUCOUS, UA: ABNORMAL /LPF
NITRITE UR QL STRIP: POSITIVE
PH UR STRIP: 6 PH UNITS
PROT UR QL STRIP: 50
RBC # UR STRIP: ABNORMAL /UL
RBC #/AREA URNS HPF: 10 /HPF
SP GR UR STRIP: 1.01
SQUAMOUS #/AREA URNS LPF: ABNORMAL /HPF
UROBILINOGEN UR STRIP-ACNC: NORMAL MG/DL
WBC #/AREA URNS HPF: 31 /HPF
YEAST #/AREA URNS HPF: ABNORMAL /HPF

## 2022-08-19 PROCEDURE — 87086 URINE CULTURE/COLONY COUNT: CPT | Mod: ,,, | Performed by: CLINICAL MEDICAL LABORATORY

## 2022-08-19 PROCEDURE — 99214 PR OFFICE/OUTPT VISIT, EST, LEVL IV, 30-39 MIN: ICD-10-PCS | Mod: S$PBB,,, | Performed by: NURSE PRACTITIONER

## 2022-08-19 PROCEDURE — 87077 CULTURE AEROBIC IDENTIFY: CPT | Mod: ,,, | Performed by: CLINICAL MEDICAL LABORATORY

## 2022-08-19 PROCEDURE — 3008F BODY MASS INDEX DOCD: CPT | Mod: CPTII,,, | Performed by: NURSE PRACTITIONER

## 2022-08-19 PROCEDURE — 87186 SC STD MICRODIL/AGAR DIL: CPT | Mod: ,,, | Performed by: CLINICAL MEDICAL LABORATORY

## 2022-08-19 PROCEDURE — 99214 OFFICE O/P EST MOD 30 MIN: CPT | Mod: PBBFAC | Performed by: NURSE PRACTITIONER

## 2022-08-19 PROCEDURE — 1159F MED LIST DOCD IN RCRD: CPT | Mod: CPTII,,, | Performed by: NURSE PRACTITIONER

## 2022-08-19 PROCEDURE — 3080F PR MOST RECENT DIASTOLIC BLOOD PRESSURE >= 90 MM HG: ICD-10-PCS | Mod: CPTII,,, | Performed by: NURSE PRACTITIONER

## 2022-08-19 PROCEDURE — 81001 URINALYSIS AUTO W/SCOPE: CPT | Mod: ,,, | Performed by: CLINICAL MEDICAL LABORATORY

## 2022-08-19 PROCEDURE — 3074F SYST BP LT 130 MM HG: CPT | Mod: CPTII,,, | Performed by: NURSE PRACTITIONER

## 2022-08-19 PROCEDURE — 3074F PR MOST RECENT SYSTOLIC BLOOD PRESSURE < 130 MM HG: ICD-10-PCS | Mod: CPTII,,, | Performed by: NURSE PRACTITIONER

## 2022-08-19 PROCEDURE — 87086 CULTURE, URINE: ICD-10-PCS | Mod: ,,, | Performed by: CLINICAL MEDICAL LABORATORY

## 2022-08-19 PROCEDURE — 74018 XR KUB: ICD-10-PCS | Mod: 26,,, | Performed by: RADIOLOGY

## 2022-08-19 PROCEDURE — 87186 CULTURE, URINE: ICD-10-PCS | Mod: ,,, | Performed by: CLINICAL MEDICAL LABORATORY

## 2022-08-19 PROCEDURE — 87077 CULTURE, URINE: ICD-10-PCS | Mod: ,,, | Performed by: CLINICAL MEDICAL LABORATORY

## 2022-08-19 PROCEDURE — 99214 OFFICE O/P EST MOD 30 MIN: CPT | Mod: S$PBB,,, | Performed by: NURSE PRACTITIONER

## 2022-08-19 PROCEDURE — 81001 URINALYSIS: ICD-10-PCS | Mod: ,,, | Performed by: CLINICAL MEDICAL LABORATORY

## 2022-08-19 PROCEDURE — 3080F DIAST BP >= 90 MM HG: CPT | Mod: CPTII,,, | Performed by: NURSE PRACTITIONER

## 2022-08-19 PROCEDURE — 74018 RADEX ABDOMEN 1 VIEW: CPT | Mod: 26,,, | Performed by: RADIOLOGY

## 2022-08-19 PROCEDURE — 1160F RVW MEDS BY RX/DR IN RCRD: CPT | Mod: CPTII,,, | Performed by: NURSE PRACTITIONER

## 2022-08-19 PROCEDURE — 1160F PR REVIEW ALL MEDS BY PRESCRIBER/CLIN PHARMACIST DOCUMENTED: ICD-10-PCS | Mod: CPTII,,, | Performed by: NURSE PRACTITIONER

## 2022-08-19 PROCEDURE — 74018 RADEX ABDOMEN 1 VIEW: CPT | Mod: TC

## 2022-08-19 PROCEDURE — 3008F PR BODY MASS INDEX (BMI) DOCUMENTED: ICD-10-PCS | Mod: CPTII,,, | Performed by: NURSE PRACTITIONER

## 2022-08-19 PROCEDURE — 1159F PR MEDICATION LIST DOCUMENTED IN MEDICAL RECORD: ICD-10-PCS | Mod: CPTII,,, | Performed by: NURSE PRACTITIONER

## 2022-08-19 NOTE — PROGRESS NOTES
Subjective:       Patient ID: Gloria Mitchell is a 52 y.o. female.    Chief Complaint: Other (Pt of Dr Ruvalcaba--here for possible UTI or possible kidney stone--rep[orts urgency, leakage of urine, little dysuria,, low back pain all the way across back, sometimes difficulty emptying bladder, strong smell to urine, urgency, up 2 to 3 times a night to urinate and has history of kidney stones-)    PAST UROLOGICAL HISTORY DR. RUVALCABA:    Ms. Mitchell presented to the clinic today because of left ureteral colic.  She had onset pain on April 2nd and went to the emergency room at [facility].  She was found to have a large stone at the left ureteropelvic junction it measures about 6 x 10 mm.  CT scan was done but she did not have a KUB.  She had a lot pain that day and then did okay for couple of days but today has been having increasing left flank pain again.  She did require Dilaudid and has only a few Dilaudid left.  Presented to the clinic in acute pain as she says she did not hurt any until after she arrived in [place].  She desires to go ahead with definitive treatment because the amount of pain she has had on intermittent basis.  Patient had left ureteroscopy with laser lithotripsy of stone in the distal left ureter in July 2020.  That was the last active stones she has had until this one.  She has a long history of stone disease dating back a good many years.  Previous stone risk profile revealed her to have low citrate and she has been taking her potassium citrate as directed which is two 10 mEq tablets b.i.d..  She admits she does not drink as much water she should.  Has had multiple procedures in the past.  (April 6, 2021  ---------------------------------------------------------------------------------------------------------------------------------------------------------------------     Ms. Mitchell underwent left ureteroscopy with laser lithotripsy of stone and stent insertion on April 8. She was supposed to keep  her stent until Monday but accidentally pulled it out on Saturday.  She has had a lot of pain since then and continues to have a lot of pain.  She presented clinic with severe pain on left side.  She took her last Dilaudid at 9:30 a.m. today.  She is also concerned she may have some infection.  The urine culture that was collected initially at her clinic visit grew 30,000 colonies Citrobacter and E coli and 20,000 colonies of Enterococcus faecalis.  She did get Bactrim DS which is appropriate and she is not febrile.  Presented to clinic with her mother.  (April 12, 2021)     Ms. Mitchell is in for KUB and follow-up since her left ureteroscopy.  She is not having any more stone pain.  She passed a stone on or about April 25th which apparently was a small fragment.  She did not have any pain associated with it.  Comfortable now and back to her baseline.  She is going to pain clinic for her back problems.  Nothing that sounds like any additional stone issues.  She is drinking only water and trying to drink more.  She is taking her potassium citrate to help with stone prevention..  In today for KUB and follow-up.  Her mother was with her today.  (May 17, 2021)     Ms. Mitchell is seen in clinic for the 1st time in over a year but has had at least 3 urinary tract infection during the past year.  We treated her for at least 2 and she was treated in [facility] for infection about 2 weeks ago.  That treatment was with Cipro and clinically seemed to improve although we are not sure what culture showed or if a culture was done.  Now off the Cipro for the last few days with no symptoms.  She has had no recent stone problems.  No other health problems that she is aware of except for pneumonia that was treated as outpatient.  (June 6, 2022)     Ms. Mitchell was seen is work-in patient today because she says she feels she has a stone as she has been hurting in her left flank since it began Saturday night and also feels she has  another infection.  She is also having dysuria. (June 20, 2022)  [id number]XXXXXXXXXXXXXXXXXXXXXXXXXXXX    Ms. Mitchell is an established patient of Dr. Onofre with history of renal stones.  She has been worked into clinic today for c/o left flank and pelvic pain x 4 days.  She   Left Ureteroscopy with laser lithotripsy 4/8/21  Seen for left flank pain 6/20, no ureteral stone seen on KUB, and was to f/u if pain persisted for updated CT - last one year prior.  Urine positive for 30,000 E. Coli    Review of Systems   Constitutional: Negative.    Gastrointestinal: Negative.    Genitourinary: Positive for dysuria, flank pain, pelvic pain and urgency. Negative for decreased urine volume, difficulty urinating, frequency, genital sores, hematuria, menstrual problem, vaginal bleeding, vaginal discharge and vaginal pain.   Skin: Negative.    Neurological: Negative.        Objective:      Physical Exam  Vitals and nursing note reviewed.   Constitutional:       Appearance: Normal appearance. She is obese.   Cardiovascular:      Rate and Rhythm: Normal rate.   Pulmonary:      Effort: Pulmonary effort is normal.   Abdominal:      General: There is no distension.      Palpations: Abdomen is soft.      Tenderness: There is no abdominal tenderness. There is no right CVA tenderness or left CVA tenderness.   Neurological:      Mental Status: She is alert and oriented to person, place, and time.   Psychiatric:         Mood and Affect: Mood normal.         Behavior: Behavior normal.         Assessment:       1. Left flank pain    2. Calculus of kidney    3. Urinary tract infection without hematuria, site unspecified    4. Abdominal pain, unspecified abdominal location    5. Chronic pain syndrome    6. Yeast cystitis        Plan:       Chronic pain syndrome  · Followed by pain tx for chronic low back pain, treated with Norco 10 BID.    Left flank pain  · UA, CX  · No stones  identified on KUB  · CT Stone Protocol, f/u after    UTI (urinary tract infection)  · Urine culture returned positive for significant E. Coli and Yeast infections.  Will treat with 10 day course of Ceftin 250 mg po BID with daily probiotics.  · Instruct patient not to take Omeprazole (Prilosec) in conjunction with this antx.  · Urine was also positive for microhematuria.  This may be present b/c of UTI, and/or secondary UTI from active renal stone.    Yeast cystitis  · Significant growth seen in urine studies.  tx with diflucan x one.

## 2022-08-21 LAB
UA COMPLETE W REFLEX CULTURE PNL UR: ABNORMAL
UA COMPLETE W REFLEX CULTURE PNL UR: ABNORMAL

## 2022-08-22 ENCOUNTER — TELEPHONE (OUTPATIENT)
Dept: UROLOGY | Facility: CLINIC | Age: 52
End: 2022-08-22
Payer: MEDICARE

## 2022-08-22 NOTE — TELEPHONE ENCOUNTER
----- Message from Darlene Blair sent at 8/22/2022 11:41 AM CDT -----  Regarding: call back  Hey this pt is calling about the CT scan yall ordered. She has questions about it. Her call back number is [phone]  I spoke with zack ept.  She has a 10:30am appointment with jameel Kerr NP in morning so she has to come to [place] anyway.  She request CT Scan in [place], to see if they can do her tomorrow and she will come by here to see DAKOTA Gamino tomorrow after having CT done in [place].  I called [facility] in [place] and spoke with  Krystal.  Krystal can do pt in morning at 8:30am.  I relayed this to pt.  Nothing to eat or drink after midnight braden  Told her NP Stevenson's schedule is pretty full in the morning but come on up here and we will try to get her worked in, BUT, it may be a bit of a wait.  Pt voiced understanding, says she will come on to urology, and when it is time for her 10:30am appointment she will leave and may have to come back later.

## 2022-08-22 NOTE — PROGRESS NOTES
Subjective:      Patient ID: Gloria Mitchell is a 52 y.o. female.    Chief Complaint: Low-back Pain      Pain  This is a chronic problem. The current episode started more than 1 year ago. The problem occurs daily. The problem has been waxing and waning. Associated symptoms include arthralgias and neck pain.     Review of Systems   Constitutional: Negative for activity change, appetite change and unexpected weight change.   HENT: Negative for drooling, ear discharge, ear pain, facial swelling, nosebleeds, trouble swallowing, voice change and goiter.    Eyes: Negative for photophobia, pain, discharge, redness and visual disturbance.   Respiratory: Negative for apnea, choking, chest tightness, shortness of breath, wheezing and stridor.    Cardiovascular: Negative for palpitations and leg swelling.   Gastrointestinal: Negative for abdominal distention, diarrhea, rectal pain and fecal incontinence.   Endocrine: Negative for cold intolerance, heat intolerance, polydipsia, polyphagia and polyuria.   Genitourinary: Negative for flank pain, frequency and hot flashes.   Musculoskeletal: Positive for arthralgias, back pain, neck pain and neck stiffness.   Integumentary:  Negative for color change and pallor.   Allergic/Immunologic: Negative for immunocompromised state.   Neurological: Negative for dizziness, seizures, syncope, facial asymmetry, speech difficulty, light-headedness, disturbances in coordination, memory loss and coordination difficulties.   Hematological: Negative for adenopathy. Does not bruise/bleed easily.   Psychiatric/Behavioral: Negative for agitation, behavioral problems, confusion, decreased concentration, dysphoric mood, hallucinations, self-injury and suicidal ideas. The patient is not nervous/anxious and is not hyperactive.            Past Surgical History:   Procedure Laterality Date    BACK SURGERY      INSERTION OF DORSAL COLUMN NERVE STIMULATOR FOR TRIAL N/A 7/15/2021    Procedure: INSERTION,  "NEUROSTIMULATOR, SPINAL CORD, DORSAL COLUMN, FOR TRIAL;  Surgeon: Elba Moon MD;  Location: [facility];  Service: Pain Management;  Laterality: N/A;  spoke to Dr Calin Burgess nurse, urine culture negative (results in Epic), pt cleared.    LITHOTRIPSY            Objective:  Vitals:    08/23/22 1034 08/23/22 1035   BP: (!) 130/93    Pulse: 70    Weight: 86.9 kg (191 lb 9.6 oz)    Height: 5' 2.4" (1.585 m)    PainSc:   8   8         Physical Exam  Vitals and nursing note reviewed. Exam conducted with a chaperone present.   Constitutional:       General: She is awake. She is not in acute distress.     Appearance: Normal appearance. She is not toxic-appearing.   HENT:      Head: Normocephalic and atraumatic.      Nose: Nose normal.      Mouth/Throat:      Mouth: Mucous membranes are moist.      Pharynx: Oropharynx is clear.   Eyes:      Conjunctiva/sclera: Conjunctivae normal.      Pupils: Pupils are equal, round, and reactive to light.   Cardiovascular:      Rate and Rhythm: Normal rate.   Pulmonary:      Effort: Pulmonary effort is normal. No respiratory distress.   Abdominal:      Palpations: Abdomen is soft.      Tenderness: There is no guarding.   Musculoskeletal:         General: Normal range of motion.      Cervical back: Normal range of motion and neck supple. Tenderness present. No rigidity.      Thoracic back: Tenderness present.      Lumbar back: Tenderness present.   Skin:     General: Skin is warm and dry.      Coloration: Skin is not jaundiced or pale.   Neurological:      General: No focal deficit present.      Mental Status: She is alert and oriented to person, place, and time. Mental status is at baseline.      Cranial Nerves: No cranial nerve deficit (II-XII).   Psychiatric:         Mood and Affect: Mood normal.         Behavior: Behavior normal. Behavior is cooperative.         Thought Content: Thought content normal.           Orders Placed This Encounter   Procedures    Ambulatory " referral/consult to Physical/Occupational Therapy     Standing Status:   Future     Standing Expiration Date:   9/23/2023     Referral Priority:   Routine     Referral Type:   Physical Medicine     Referral Reason:   Specialty Services Required     Number of Visits Requested:   1    POCT Urine Drug Screen Presump     Interpretive Information:     Negative:  No drug detected at the cut off level.   Positive:  This result represents presumptive positive for the   tested drug, other substances may yield a positive response other   than the analyte of interest. This result should be utilized for   diagnostic purpose only. Confirmation testing will be performed upon physician request only.           CT Abdomen Pelvis  Without Contrast  Narrative: EXAMINATION:  CT ABDOMEN PELVIS WITHOUT CONTRAST    CLINICAL HISTORY:  Unspecified abdominal painFlank pain, kidney stone suspected;left flank pain;    COMPARISON:  CT abdomen pelvis April 2, 2021    TECHNIQUE:  Multiple axial tomographic images of the abdomen and pelvis were obtained without the use of intravenous contrast.    FINDINGS:  Mild dependent changes of the lungs noted.    Hypoattenuation of the liver suspicious for steatosis.  Prior cholecystectomy.  Visualized pancreas appears unremarkable.  Granulomatous calcification of the spleen.    Bilateral adrenal glands grossly unremarkable.  Similar mild-to-moderate left-sided hydronephrosis.  There has been some distal migration of previously described left UPJ stone measuring a mm which is now at the S1 level.  A few punctate calculi are adjacent to this within the more proximal left ureter.  Similar 2 mm calculus within the distal right ureter at the inferior sacral level without significant hydronephrosis on the right.  Multiple subcentimeter nonobstructing bilateral renal calculi are present.  Urinary bladder incompletely distended.  Uterus and adnexa grossly unremarkable.    No convincing evidence of gastrointestinal  obstruction or acute appendicitis.  Vasculature grossly unremarkable.  Neurostimulator device implanted within the left lower back with electrodes extending into the dorsal lower thoracic spinal canal and partially visualized.  Impression: Similar mild-to-moderate left-sided hydronephrosis. There has been some distal migration of previously described left UPJ stone measuring a mm which is now at the S1 level. A few punctate calculi are adjacent to this within the more proximal left ureter.  Similar 2 mm calculus within the distal right ureter at the inferior sacral level without significant hydronephrosis on the right. Multiple subcentimeter nonobstructing bilateral renal calculi are present.  Hepatic steatosis, prior cholecystectomy, and other detailed findings as above.    The CT exam was performed using one or more of the following dose    reduction techniques- Automated exposure control, adjustment of the mA    and/or kV according to patient size, and/or use of iterative    reconstructed technique.    Point of Service: [facility]    Electronically signed by: Carlos Still  Date:    08/23/2022  Time:    08:59       Office Visit on 08/19/2022   Component Date Value Ref Range Status    Color, UA 08/19/2022 Dark-Yellow  Colorless, Straw, Yellow, Light Yellow, Dark Yellow Final    Clarity, UA 08/19/2022 Turbid  Clear Final    pH, UA 08/19/2022 6.0  5.0 to 8.0 pH Units Final    Leukocytes, UA 08/19/2022 Large (A) Negative Final    Nitrites, UA 08/19/2022 Positive (A) Negative Final    Protein, UA 08/19/2022 50  (A) Negative Final    Glucose, UA 08/19/2022 Normal  Normal mg/dL Final    Ketones, UA 08/19/2022 Negative  Negative mg/dL Final    Urobilinogen, UA 08/19/2022 Normal  0.2, 1.0, Normal mg/dL Final    Bilirubin, UA 08/19/2022 Negative  Negative Final    Blood, UA 08/19/2022 Small (A) Negative Final    Specific Gravity, UA 08/19/2022 1.015  <=1.030 Final    Culture, Urine 08/19/2022  >100,000 Escherichia coli (A)  Final    Culture, Urine 08/19/2022 >100,000 Yeast (A)  Final    WBC, UA 08/19/2022 31 (A) <=5 /hpf Final    RBC, UA 08/19/2022 10 (A) <=3 /hpf Final    Bacteria, UA 08/19/2022 Occasional (A) None Seen /hpf Final    Squamous Epithelial Cells, UA 08/19/2022 Occasional (A) None Seen /HPF Final    Calcium Oxalate Crystals, UA 08/19/2022 Moderate (A) None Seen /HPF Final    Yeast, UA 08/19/2022 Few (A) None Seen /hpf Final    Mucous 08/19/2022 Occasional (A) None Seen /LPF Final   Admission on 08/03/2022, Discharged on 08/03/2022   Component Date Value Ref Range Status    Sodium 08/03/2022 145  136 - 145 mmol/L Final    Potassium 08/03/2022 3.0 (A) 3.5 - 5.1 mmol/L Final    Chloride 08/03/2022 105  98 - 107 mmol/L Final    CO2 08/03/2022 29  21 - 32 mmol/L Final    Anion Gap 08/03/2022 14  7 - 16 mmol/L Final    Glucose 08/03/2022 98  74 - 106 mg/dL Final    BUN 08/03/2022 11  7 - 18 mg/dL Final    Creatinine 08/03/2022 0.95  0.55 - 1.02 mg/dL Final    BUN/Creatinine Ratio 08/03/2022 12  6 - 20 Final    Calcium 08/03/2022 7.6 (A) 8.5 - 10.1 mg/dL Final    Total Protein 08/03/2022 5.7 (A) 6.4 - 8.2 g/dL Final    Albumin 08/03/2022 2.8 (A) 3.5 - 5.0 g/dL Final    Globulin 08/03/2022 2.9  2.0 - 4.0 g/dL Final    A/G Ratio 08/03/2022 1.0   Final    Bilirubin, Total 08/03/2022 0.2  0.0 - 1.2 mg/dL Final    Alk Phos 08/03/2022 55  41 - 108 U/L Final    ALT 08/03/2022 27  13 - 56 U/L Final    AST 08/03/2022 19  15 - 37 U/L Final    eGFR 08/03/2022 66  >=60 mL/min/1.73m² Final    Magnesium 08/03/2022 1.8  1.7 - 2.3 mg/dL Final    Color, UA 08/03/2022 Yellow  Colorless, Straw, Yellow, Light Yellow, Dark Yellow Final    Clarity, UA 08/03/2022 Clear  Clear Final    pH, UA 08/03/2022 7.0  5.0 to 8.0 pH Units Final    Leukocytes, UA 08/03/2022 Negative  Negative Final    Nitrites, UA 08/03/2022 Negative  Negative Final    Protein, UA 08/03/2022 Negative  Negative Final     Glucose, UA 08/03/2022 Negative  Normal mg/dL Final    Ketones, UA 08/03/2022 Negative  Negative mg/dL Final    Urobilinogen, UA 08/03/2022 0.2  0.2, 1.0, Normal mg/dL Final    Bilirubin, UA 08/03/2022 Negative  Negative Final    Blood, UA 08/03/2022 Trace-Intact (A) Negative Final    Specific Gravity, UA 08/03/2022 1.015  <=1.005, 1.010, 1.015, 1.020, 1.025, 1.030 Final    WBC 08/03/2022 5.68  4.50 - 11.00 K/uL Final    RBC 08/03/2022 4.22  4.20 - 5.40 M/uL Final    Hemoglobin 08/03/2022 12.6  12.0 - 16.0 g/dL Final    Hematocrit 08/03/2022 38.9  38.0 - 47.0 % Final    MCV 08/03/2022 92.2  80.0 - 96.0 fL Final    MCH 08/03/2022 29.9  27.0 - 31.0 pg Final    MCHC 08/03/2022 32.4  32.0 - 36.0 g/dL Final    RDW 08/03/2022 14.2  11.5 - 14.5 % Final    Platelet Count 08/03/2022 292  150 - 400 K/uL Final    MPV 08/03/2022 9.5  9.4 - 12.4 fL Final    Neutrophils % 08/03/2022 57.5  53.0 - 65.0 % Final    Lymphocytes % 08/03/2022 30.5  27.0 - 41.0 % Final    Neutrophils, Abs 08/03/2022 3.27  1.80 - 7.70 K/uL Final    Lymphocytes, Absolute 08/03/2022 1.73  1.00 - 4.80 K/uL Final    Diff Type 08/03/2022 Auto   Final    Monocytes % 08/03/2022 6.5 (A) 2.0 - 6.0 % Final    Eosinophils % 08/03/2022 4.8 (A) 1.0 - 4.0 % Final    Basophils % 08/03/2022 0.5  0.0 - 1.0 % Final    Immature Granulocytes % 08/03/2022 0.2  0.0 - 0.4 % Final    Monocytes, Absolute 08/03/2022 0.37  0.00 - 0.80 K/uL Final    Eosinophils, Absolute 08/03/2022 0.27  0.00 - 0.50 K/uL Final    Immature Granulocytes, Absolute 08/03/2022 0.01  0.00 - 0.04 K/uL Final    Basophils, Absolute 08/03/2022 0.03  0.00 - 0.20 K/uL Final    WBC, UA 08/03/2022 0-5  None Seen, 0-5 /hpf Final    RBC, UA 08/03/2022 0-3  None Seen, 0-3 /hpf Final    Bacteria, UA 08/03/2022 Occasional (A) None Seen /hpf Final    Yeast, UA 08/03/2022 Occasional (A) None Seen /hpf Final    Squamous Epithelial Cells, UA 08/03/2022 Rare  None Seen, Rare, None Seen  To Occasional /lpf Final   Admission on 07/29/2022, Discharged on 07/29/2022   Component Date Value Ref Range Status    Influenza A 07/29/2022 Negative  Negative, Invalid Final    Influenza B 07/29/2022 Negative  Negative, Invalid Final    COVID-19 Ag 07/29/2022 Positive (A) Negative, Invalid Final    WBC 07/29/2022 3.34 (A) 4.50 - 11.00 K/uL Final    RBC 07/29/2022 4.18 (A) 4.20 - 5.40 M/uL Final    Hemoglobin 07/29/2022 12.4  12.0 - 16.0 g/dL Final    Hematocrit 07/29/2022 39.0  38.0 - 47.0 % Final    MCV 07/29/2022 93.3  80.0 - 96.0 fL Final    MCH 07/29/2022 29.7  27.0 - 31.0 pg Final    MCHC 07/29/2022 31.8 (A) 32.0 - 36.0 g/dL Final    RDW 07/29/2022 14.4  11.5 - 14.5 % Final    Platelet Count 07/29/2022 207  150 - 400 K/uL Final    MPV 07/29/2022 9.9  9.4 - 12.4 fL Final    Neutrophils % 07/29/2022 60.5  53.0 - 65.0 % Final    Lymphocytes % 07/29/2022 29.3  27.0 - 41.0 % Final    Neutrophils, Abs 07/29/2022 2.02  1.80 - 7.70 K/uL Final    Lymphocytes, Absolute 07/29/2022 0.98 (A) 1.00 - 4.80 K/uL Final    Diff Type 07/29/2022 Auto   Final    Monocytes % 07/29/2022 7.5 (A) 2.0 - 6.0 % Final    Eosinophils % 07/29/2022 1.8  1.0 - 4.0 % Final    Basophils % 07/29/2022 0.6  0.0 - 1.0 % Final    Immature Granulocytes % 07/29/2022 0.3  0.0 - 0.4 % Final    Monocytes, Absolute 07/29/2022 0.25  0.00 - 0.80 K/uL Final    Eosinophils, Absolute 07/29/2022 0.06  0.00 - 0.50 K/uL Final    Immature Granulocytes, Absolute 07/29/2022 0.01  0.00 - 0.04 K/uL Final    Basophils, Absolute 07/29/2022 0.02  0.00 - 0.20 K/uL Final   Lab Requisition on 07/13/2022   Component Date Value Ref Range Status    Total Protein 07/13/2022 6.7  6.4 - 8.2 g/dL Final    Albumin 07/13/2022 3.9  3.5 - 5.0 g/dL Final    Bilirubin, Total 07/13/2022 0.3  0.0 - 1.2 mg/dL Final    Bilirubin, Direct 07/13/2022 0.2  0.0 - 0.2 mg/dL Final    AST 07/13/2022 21  15 - 37 U/L Final    ALT 07/13/2022 33  13 - 56 U/L Final     Alk Phos 07/13/2022 82  41 - 108 U/L Final    Sodium 07/13/2022 141  136 - 145 mmol/L Final    Potassium 07/13/2022 4.4  3.5 - 5.1 mmol/L Final    Chloride 07/13/2022 110 (A) 98 - 107 mmol/L Final    CO2 07/13/2022 21  21 - 32 mmol/L Final    Anion Gap 07/13/2022 14  7 - 16 mmol/L Final    Glucose 07/13/2022 108 (A) 74 - 106 mg/dL Final    BUN 07/13/2022 8  7 - 18 mg/dL Final    Creatinine 07/13/2022 0.90  0.55 - 1.02 mg/dL Final    BUN/Creatinine Ratio 07/13/2022 9  6 - 20 Final    Calcium 07/13/2022 9.2  8.5 - 10.1 mg/dL Final    eGFR 07/13/2022 70  >=60 mL/min/1.73m² Final    TSH 07/13/2022 0.099 (A) 0.358 - 3.740 uIU/mL Final    Amylase 07/13/2022 46  25 - 115 U/L Final    Lipase 07/13/2022 168  73 - 393 U/L Final    Magnesium 07/13/2022 2.7 (A) 1.7 - 2.3 mg/dL Final    WBC 07/13/2022 5.68  4.50 - 11.00 K/uL Final    RBC 07/13/2022 4.65  4.20 - 5.40 M/uL Final    Hemoglobin 07/13/2022 14.0  12.0 - 16.0 g/dL Final    Hematocrit 07/13/2022 42.4  38.0 - 47.0 % Final    MCV 07/13/2022 91.2  80.0 - 96.0 fL Final    MCH 07/13/2022 30.1  27.0 - 31.0 pg Final    MCHC 07/13/2022 33.0  32.0 - 36.0 g/dL Final    RDW 07/13/2022 14.6 (A) 11.5 - 14.5 % Final    Platelet Count 07/13/2022 396  150 - 400 K/uL Final    MPV 07/13/2022 10.8  9.4 - 12.4 fL Final    Neutrophils % 07/13/2022 51.2 (A) 53.0 - 65.0 % Final    Lymphocytes % 07/13/2022 39.1  27.0 - 41.0 % Final    Monocytes % 07/13/2022 5.8  2.0 - 6.0 % Final    Eosinophils % 07/13/2022 2.5  1.0 - 4.0 % Final    Basophils % 07/13/2022 1.2 (A) 0.0 - 1.0 % Final    Immature Granulocytes % 07/13/2022 0.2  0.0 - 0.4 % Final    nRBC, Auto 07/13/2022 0.0  <=0.0 % Final    Neutrophils, Abs 07/13/2022 2.91  1.80 - 7.70 K/uL Final    Lymphocytes, Absolute 07/13/2022 2.22  1.00 - 4.80 K/uL Final    Monocytes, Absolute 07/13/2022 0.33  0.00 - 0.80 K/uL Final    Eosinophils, Absolute 07/13/2022 0.14  0.00 - 0.50 K/uL Final    Basophils, Absolute  07/13/2022 0.07  0.00 - 0.20 K/uL Final    Immature Granulocytes, Absolute 07/13/2022 0.01  0.00 - 0.04 K/uL Final    nRBC, Absolute 07/13/2022 0.00  <=0.00 x10e3/uL Final    Diff Type 07/13/2022 Auto   Final   Office Visit on 06/20/2022   Component Date Value Ref Range Status    Culture, Urine 06/20/2022 30,000 Escherichia coli (A)  Final   Office Visit on 06/06/2022   Component Date Value Ref Range Status    Culture, Urine 06/06/2022 Skin/Urogenital Lisa Isolated, no further workup.   Final   Appointment on 05/11/2022   Component Date Value Ref Range Status    Color, UA 05/11/2022 Yellow   Final    Spec Grav UA 05/11/2022 1.025   Final    pH, UA 05/11/2022 5.5   Final    WBC, UA 05/11/2022 NEG   Final    Nitrite, UA 05/11/2022 NEG   Final    Protein, POC 05/11/2022 NEG   Final    Glucose, UA 05/11/2022 NEG   Final    Ketones, UA 05/11/2022 TRACE   Final    Bilirubin, POC 05/11/2022 NEG   Final    Urobilinogen, UA 05/11/2022 0.2   Final    Blood, UA 05/11/2022 TRACE   Final   Lab Requisition on 05/11/2022   Component Date Value Ref Range Status    Triglycerides 05/11/2022 123  35 - 150 mg/dL Final    Cholesterol 05/11/2022 180  0 - 200 mg/dL Final    HDL Cholesterol 05/11/2022 40  40 - 60 mg/dL Final    Cholesterol/HDL Ratio (Risk Factor) 05/11/2022 4.5   Final    Non-HDL 05/11/2022 140  mg/dL Final    LDL Calculated 05/11/2022 115  mg/dL Final    LDL/HDL 05/11/2022 2.9   Final    VLDL 05/11/2022 25  mg/dL Final    Sodium 05/11/2022 138  136 - 145 mmol/L Final    Potassium 05/11/2022 4.0  3.5 - 5.1 mmol/L Final    Chloride 05/11/2022 108 (A) 98 - 107 mmol/L Final    CO2 05/11/2022 22  21 - 32 mmol/L Final    Anion Gap 05/11/2022 12  7 - 16 mmol/L Final    Glucose 05/11/2022 126 (A) 74 - 106 mg/dL Final    BUN 05/11/2022 10  7 - 18 mg/dL Final    Creatinine 05/11/2022 1.18 (A) 0.55 - 1.02 mg/dL Final    BUN/Creatinine Ratio 05/11/2022 8  6 - 20 Final    Calcium 05/11/2022 8.9  8.5  - 10.1 mg/dL Final    eGFR 05/11/2022 51 (A) >=60 mL/min/1.73m² Final    TSH 05/11/2022 34.100 (A) 0.358 - 3.740 uIU/mL Final    Amylase 05/11/2022 50  25 - 115 U/L Final    Lipase 05/11/2022 252  73 - 393 U/L Final    Total Protein 05/11/2022 6.9  6.4 - 8.2 g/dL Final    Albumin 05/11/2022 3.8  3.5 - 5.0 g/dL Final    Bilirubin, Total 05/11/2022 0.3  0.0 - 1.2 mg/dL Final    Bilirubin, Direct 05/11/2022 0.1  0.0 - 0.2 mg/dL Final    AST 05/11/2022 27  15 - 37 U/L Final    ALT 05/11/2022 35  13 - 56 U/L Final    Alk Phos 05/11/2022 81  41 - 108 U/L Final    WBC 05/11/2022 4.05 (A) 4.50 - 11.00 K/uL Final    RBC 05/11/2022 4.46  4.20 - 5.40 M/uL Final    Hemoglobin 05/11/2022 13.0  12.0 - 16.0 g/dL Final    Hematocrit 05/11/2022 40.7  38.0 - 47.0 % Final    MCV 05/11/2022 91.3  80.0 - 96.0 fL Final    MCH 05/11/2022 29.1  27.0 - 31.0 pg Final    MCHC 05/11/2022 31.9 (A) 32.0 - 36.0 g/dL Final    RDW 05/11/2022 16.1 (A) 11.5 - 14.5 % Final    Platelet Count 05/11/2022 257  150 - 400 K/uL Final    MPV 05/11/2022 10.5  9.4 - 12.4 fL Final    Neutrophils % 05/11/2022 46.2 (A) 53.0 - 65.0 % Final    Lymphocytes % 05/11/2022 41.5 (A) 27.0 - 41.0 % Final    Monocytes % 05/11/2022 6.4 (A) 2.0 - 6.0 % Final    Eosinophils % 05/11/2022 4.2 (A) 1.0 - 4.0 % Final    Basophils % 05/11/2022 1.5 (A) 0.0 - 1.0 % Final    Immature Granulocytes % 05/11/2022 0.2  0.0 - 0.4 % Final    nRBC, Auto 05/11/2022 0.0  <=0.0 % Final    Neutrophils, Abs 05/11/2022 1.87  1.80 - 7.70 K/uL Final    Lymphocytes, Absolute 05/11/2022 1.68  1.00 - 4.80 K/uL Final    Monocytes, Absolute 05/11/2022 0.26  0.00 - 0.80 K/uL Final    Eosinophils, Absolute 05/11/2022 0.17  0.00 - 0.50 K/uL Final    Basophils, Absolute 05/11/2022 0.06  0.00 - 0.20 K/uL Final    Immature Granulocytes, Absolute 05/11/2022 0.01  0.00 - 0.04 K/uL Final    nRBC, Absolute 05/11/2022 0.00  <=0.00 x10e3/uL Final    Diff Type 05/11/2022 Auto   Final    Office Visit on 04/20/2022   Component Date Value Ref Range Status    POC Amphetamines 04/20/2022 Negative  Negative, Inconclusive Final    POC Barbiturates 04/20/2022 Negative  Negative, Inconclusive Final    POC Benzodiazepines 04/20/2022 Negative  Negative, Inconclusive Final    POC Cocaine 04/20/2022 Negative  Negative, Inconclusive Final    POC THC 04/20/2022 Negative  Negative, Inconclusive Final    POC Methadone 04/20/2022 Negative  Negative, Inconclusive Final    POC Methamphetamine 04/20/2022 Negative  Negative, Inconclusive Final    POC Opiates 04/20/2022 Presumptive Positive (A) Negative, Inconclusive Final    POC Oxycodone 04/20/2022 Negative  Negative, Inconclusive Final    POC Phencyclidine 04/20/2022 Negative  Negative, Inconclusive Final    POC Methylenedioxymethamphetamine * 04/20/2022 Negative  Negative, Inconclusive Final    POC Tricyclic Antidepressants 04/20/2022 Negative  Negative, Inconclusive Final    POC Buprenorphine 04/20/2022 Negative   Final     Acceptable 04/20/2022 Yes   Final    POC Temperature (Urine) 04/20/2022 90   Final   Lab Visit on 04/20/2022   Component Date Value Ref Range Status    Sodium 04/20/2022 143  136 - 145 mmol/L Final    Potassium 04/20/2022 3.9  3.5 - 5.1 mmol/L Final    Chloride 04/20/2022 113 (A) 98 - 107 mmol/L Final    CO2 04/20/2022 28  21 - 32 mmol/L Final    Anion Gap 04/20/2022 6 (A) 7 - 16 mmol/L Final    Glucose 04/20/2022 87  74 - 106 mg/dL Final    BUN 04/20/2022 10  7 - 18 mg/dL Final    Creatinine 04/20/2022 1.20 (A) 0.55 - 1.02 mg/dL Final    BUN/Creatinine Ratio 04/20/2022 8  6 - 20 Final    Calcium 04/20/2022 8.7  8.5 - 10.1 mg/dL Final    Total Protein 04/20/2022 6.5  6.4 - 8.2 g/dL Final    Albumin 04/20/2022 3.5  3.5 - 5.0 g/dL Final    Globulin 04/20/2022 3.0  2.0 - 4.0 g/dL Final    A/G Ratio 04/20/2022 1.2   Final    Bilirubin, Total 04/20/2022 0.3  0.0 - 1.2 mg/dL Final    Alk Phos 04/20/2022  78  41 - 108 U/L Final    ALT 04/20/2022 33  13 - 56 U/L Final    AST 04/20/2022 26  15 - 37 U/L Final    eGFR 04/20/2022 50 (A) >=60 mL/min/1.73m² Final    Vitamin D 25-Hydroxy, Blood 04/20/2022 17.3  ng/mL Final   There may be more visits with results that are not included.           Assessment:      1. Lumbosacral radiculopathy    2. Postlaminectomy syndrome, lumbar region    3. Disorder of sacrum    4. Encounter for long-term (current) use of other medications    5. Neck pain            A's of Opioid Risk Assessment  Activity:Patient can perform ADL.   Analgesia:Patients pain is partially controlled by current medication. Patient has tried OTC medications such as Tylenol and Ibuprofen with out relief.   Adverse Effects: Patient denies constipation or sedation.  Aberrant Behavior:  reviewed with no aberrant drug seeking/taking behavior.  Overdose reversal drug naloxone discussed    Drug screen reviewed      Requested Prescriptions     Signed Prescriptions Disp Refills    gabapentin (NEURONTIN) 600 MG tablet 90 tablet 1     Sig: Take 1 tablet (600 mg total) by mouth every 8 (eight) hours.    HYDROcodone-acetaminophen (NORCO)  mg per tablet 60 tablet 0     Sig: Take 1 tablet by mouth every 12 (twelve) hours.    HYDROcodone-acetaminophen (NORCO)  mg per tablet 60 tablet 0     Sig: Take 1 tablet by mouth every 12 (twelve) hours.    tiZANidine 4 mg Cap 90 capsule 1     Sig: Take 1 capsule by mouth every 8 (eight) hours.         Plan:    Pharmacy closed on weekends    She may pick her September prescription of September 23rd     Will use September 25th as refill date next visit    Spinal cord stimulator helping control her lower extremity discomfort    Would like to resume physical therapy     [facility] prescription sent to [facility] [place]    Continue current medication    Follow-up 2 months     Dr. Moon, July 2022    Pill count     Physical therapy [facility] August 23,  2022    Bring original prescription medication bottles/container/box with labels to each visit

## 2022-08-22 NOTE — TELEPHONE ENCOUNTER
----- Message from Tammy Torrez sent at 8/22/2022  8:22 AM CDT -----  Regarding: CT  Patient want to know if you can set up CT in [place]. Please call her @ [phone]  I called and spoke with pt.  Told her that DAKTOA Gamino wants to see her immediately after CT Scan, so if I could get it set up for Josue Patel, she would still have to come to [place] the same day.  Pt said OK, just set it up in [place].  Got CT Scan abdomen/pelvis without contrast scheduled for Tuesday 8-30-22 at 8:00am, and do not eat or drink anything after midnight the night before.  Will come to urology afterwards for results and evaluation by DAKOTA Gamino.  Told pt she is placed in 1pm slot for DAKOTA Gamino, because that is the only open spot to put her in, but she will not have to wait until 1om to be seen, she will be seen that morning.  Pt voiced understanding.

## 2022-08-23 ENCOUNTER — HOSPITAL ENCOUNTER (OUTPATIENT)
Dept: RADIOLOGY | Facility: HOSPITAL | Age: 52
Discharge: HOME | End: 2022-08-23
Attending: NURSE PRACTITIONER
Payer: MEDICARE

## 2022-08-23 ENCOUNTER — TELEPHONE (OUTPATIENT)
Dept: UROLOGY | Facility: CLINIC | Age: 52
End: 2022-08-23
Payer: MEDICARE

## 2022-08-23 ENCOUNTER — OFFICE VISIT (OUTPATIENT)
Dept: PAIN MEDICINE | Facility: CLINIC | Age: 52
End: 2022-08-23
Payer: MEDICARE

## 2022-08-23 ENCOUNTER — OFFICE VISIT (OUTPATIENT)
Dept: UROLOGY | Facility: CLINIC | Age: 52
End: 2022-08-23
Payer: MEDICARE

## 2022-08-23 VITALS
BODY MASS INDEX: 35.26 KG/M2 | WEIGHT: 191.63 LBS | SYSTOLIC BLOOD PRESSURE: 130 MMHG | DIASTOLIC BLOOD PRESSURE: 93 MMHG | HEART RATE: 70 BPM | HEIGHT: 62 IN

## 2022-08-23 VITALS
HEART RATE: 72 BPM | WEIGHT: 190 LBS | SYSTOLIC BLOOD PRESSURE: 122 MMHG | OXYGEN SATURATION: 98 % | HEIGHT: 62 IN | DIASTOLIC BLOOD PRESSURE: 82 MMHG | TEMPERATURE: 98 F | BODY MASS INDEX: 34.96 KG/M2

## 2022-08-23 DIAGNOSIS — M53.3 DISORDER OF SACRUM: Chronic | ICD-10-CM

## 2022-08-23 DIAGNOSIS — N20.1 URETERAL CALCULUS: Primary | ICD-10-CM

## 2022-08-23 DIAGNOSIS — M54.17 LUMBOSACRAL RADICULOPATHY: Primary | Chronic | ICD-10-CM

## 2022-08-23 DIAGNOSIS — N23 BILATERAL RENAL COLIC: Primary | ICD-10-CM

## 2022-08-23 DIAGNOSIS — M54.2 NECK PAIN: ICD-10-CM

## 2022-08-23 DIAGNOSIS — R10.9 BILATERAL FLANK PAIN: ICD-10-CM

## 2022-08-23 DIAGNOSIS — N20.1 LEFT URETERAL STONE: Primary | ICD-10-CM

## 2022-08-23 DIAGNOSIS — Z79.899 ENCOUNTER FOR LONG-TERM (CURRENT) USE OF OTHER MEDICATIONS: ICD-10-CM

## 2022-08-23 DIAGNOSIS — M96.1 POSTLAMINECTOMY SYNDROME, LUMBAR REGION: Chronic | ICD-10-CM

## 2022-08-23 DIAGNOSIS — G89.4 CHRONIC PAIN SYNDROME: Chronic | ICD-10-CM

## 2022-08-23 DIAGNOSIS — R10.9 ABDOMINAL PAIN, UNSPECIFIED ABDOMINAL LOCATION: ICD-10-CM

## 2022-08-23 DIAGNOSIS — B37.41 YEAST CYSTITIS: ICD-10-CM

## 2022-08-23 DIAGNOSIS — N20.0 KIDNEY STONE: ICD-10-CM

## 2022-08-23 DIAGNOSIS — N20.0 CALCULUS OF KIDNEY: ICD-10-CM

## 2022-08-23 DIAGNOSIS — Z01.818 PREOP TESTING: ICD-10-CM

## 2022-08-23 DIAGNOSIS — R10.9 LEFT FLANK PAIN: ICD-10-CM

## 2022-08-23 DIAGNOSIS — N30.01 ACUTE CYSTITIS WITH HEMATURIA: ICD-10-CM

## 2022-08-23 PROBLEM — N39.0 UTI (URINARY TRACT INFECTION): Status: ACTIVE | Noted: 2022-08-23

## 2022-08-23 PROCEDURE — 3074F PR MOST RECENT SYSTOLIC BLOOD PRESSURE < 130 MM HG: ICD-10-PCS | Mod: CPTII,,, | Performed by: NURSE PRACTITIONER

## 2022-08-23 PROCEDURE — 3080F PR MOST RECENT DIASTOLIC BLOOD PRESSURE >= 90 MM HG: ICD-10-PCS | Mod: CPTII,,, | Performed by: PHYSICIAN ASSISTANT

## 2022-08-23 PROCEDURE — 99215 OFFICE O/P EST HI 40 MIN: CPT | Mod: PBBFAC,25 | Performed by: NURSE PRACTITIONER

## 2022-08-23 PROCEDURE — 99213 OFFICE O/P EST LOW 20 MIN: CPT | Mod: PBBFAC,25 | Performed by: UROLOGY

## 2022-08-23 PROCEDURE — 3075F SYST BP GE 130 - 139MM HG: CPT | Mod: CPTII,,, | Performed by: PHYSICIAN ASSISTANT

## 2022-08-23 PROCEDURE — 1160F PR REVIEW ALL MEDS BY PRESCRIBER/CLIN PHARMACIST DOCUMENTED: ICD-10-PCS | Mod: CPTII,,, | Performed by: NURSE PRACTITIONER

## 2022-08-23 PROCEDURE — 1159F MED LIST DOCD IN RCRD: CPT | Mod: CPTII,,, | Performed by: NURSE PRACTITIONER

## 2022-08-23 PROCEDURE — 3079F PR MOST RECENT DIASTOLIC BLOOD PRESSURE 80-89 MM HG: ICD-10-PCS | Mod: CPTII,,, | Performed by: NURSE PRACTITIONER

## 2022-08-23 PROCEDURE — 80305 DRUG TEST PRSMV DIR OPT OBS: CPT | Mod: PBBFAC | Performed by: PHYSICIAN ASSISTANT

## 2022-08-23 PROCEDURE — 3074F SYST BP LT 130 MM HG: CPT | Mod: CPTII,,, | Performed by: NURSE PRACTITIONER

## 2022-08-23 PROCEDURE — 3079F DIAST BP 80-89 MM HG: CPT | Mod: CPTII,,, | Performed by: NURSE PRACTITIONER

## 2022-08-23 PROCEDURE — 3008F PR BODY MASS INDEX (BMI) DOCUMENTED: ICD-10-PCS | Mod: CPTII,,, | Performed by: NURSE PRACTITIONER

## 2022-08-23 PROCEDURE — 99215 OFFICE O/P EST HI 40 MIN: CPT | Mod: S$PBB,,, | Performed by: NURSE PRACTITIONER

## 2022-08-23 PROCEDURE — 3008F BODY MASS INDEX DOCD: CPT | Mod: CPTII,,, | Performed by: PHYSICIAN ASSISTANT

## 2022-08-23 PROCEDURE — 1159F PR MEDICATION LIST DOCUMENTED IN MEDICAL RECORD: ICD-10-PCS | Mod: CPTII,,, | Performed by: PHYSICIAN ASSISTANT

## 2022-08-23 PROCEDURE — 99024 PR POST-OP FOLLOW-UP VISIT: ICD-10-PCS | Mod: ,,, | Performed by: UROLOGY

## 2022-08-23 PROCEDURE — 1159F PR MEDICATION LIST DOCUMENTED IN MEDICAL RECORD: ICD-10-PCS | Mod: CPTII,,, | Performed by: NURSE PRACTITIONER

## 2022-08-23 PROCEDURE — 3075F PR MOST RECENT SYSTOLIC BLOOD PRESS GE 130-139MM HG: ICD-10-PCS | Mod: CPTII,,, | Performed by: PHYSICIAN ASSISTANT

## 2022-08-23 PROCEDURE — 74176 CT ABD & PELVIS W/O CONTRAST: CPT | Mod: TC

## 2022-08-23 PROCEDURE — 1160F RVW MEDS BY RX/DR IN RCRD: CPT | Mod: CPTII,,, | Performed by: NURSE PRACTITIONER

## 2022-08-23 PROCEDURE — 99214 PR OFFICE/OUTPT VISIT, EST, LEVL IV, 30-39 MIN: ICD-10-PCS | Mod: S$PBB,,, | Performed by: PHYSICIAN ASSISTANT

## 2022-08-23 PROCEDURE — 99215 PR OFFICE/OUTPT VISIT, EST, LEVL V, 40-54 MIN: ICD-10-PCS | Mod: S$PBB,,, | Performed by: NURSE PRACTITIONER

## 2022-08-23 PROCEDURE — 99024 POSTOP FOLLOW-UP VISIT: CPT | Mod: ,,, | Performed by: UROLOGY

## 2022-08-23 PROCEDURE — 1159F MED LIST DOCD IN RCRD: CPT | Mod: CPTII,,, | Performed by: PHYSICIAN ASSISTANT

## 2022-08-23 PROCEDURE — 96372 THER/PROPH/DIAG INJ SC/IM: CPT | Mod: PBBFAC | Performed by: UROLOGY

## 2022-08-23 PROCEDURE — 3008F PR BODY MASS INDEX (BMI) DOCUMENTED: ICD-10-PCS | Mod: CPTII,,, | Performed by: PHYSICIAN ASSISTANT

## 2022-08-23 PROCEDURE — 3008F BODY MASS INDEX DOCD: CPT | Mod: CPTII,,, | Performed by: NURSE PRACTITIONER

## 2022-08-23 PROCEDURE — 3080F DIAST BP >= 90 MM HG: CPT | Mod: CPTII,,, | Performed by: PHYSICIAN ASSISTANT

## 2022-08-23 PROCEDURE — 99215 OFFICE O/P EST HI 40 MIN: CPT | Mod: PBBFAC,25,27 | Performed by: PHYSICIAN ASSISTANT

## 2022-08-23 PROCEDURE — 99214 OFFICE O/P EST MOD 30 MIN: CPT | Mod: S$PBB,,, | Performed by: PHYSICIAN ASSISTANT

## 2022-08-23 RX ORDER — CEFUROXIME AXETIL 250 MG/1
250 TABLET ORAL EVERY 12 HOURS
Qty: 20 TABLET | Refills: 0 | Status: SHIPPED | OUTPATIENT
Start: 2022-08-23 | End: 2022-08-23

## 2022-08-23 RX ORDER — TIZANIDINE HYDROCHLORIDE 4 MG/1
1 CAPSULE, GELATIN COATED ORAL EVERY 8 HOURS
Qty: 90 CAPSULE | Refills: 1 | Status: SHIPPED | OUTPATIENT
Start: 2022-08-23 | End: 2022-11-23 | Stop reason: SDUPTHER

## 2022-08-23 RX ORDER — HYDROMORPHONE HYDROCHLORIDE 2 MG/ML
2 INJECTION, SOLUTION INTRAMUSCULAR; INTRAVENOUS; SUBCUTANEOUS ONCE
Status: COMPLETED | OUTPATIENT
Start: 2022-08-23 | End: 2022-08-23

## 2022-08-23 RX ORDER — HYDROCODONE BITARTRATE AND ACETAMINOPHEN 10; 325 MG/1; MG/1
1 TABLET ORAL EVERY 12 HOURS
Qty: 60 TABLET | Refills: 0 | Status: SHIPPED | OUTPATIENT
Start: 2022-08-26 | End: 2022-09-25

## 2022-08-23 RX ORDER — PROMETHAZINE HYDROCHLORIDE 25 MG/1
25 TABLET ORAL EVERY 4 HOURS
Qty: 15 TABLET | Refills: 0 | Status: SHIPPED | OUTPATIENT
Start: 2022-08-23 | End: 2022-09-21

## 2022-08-23 RX ORDER — FLUCONAZOLE 150 MG/1
150 TABLET ORAL ONCE
Qty: 1 TABLET | Refills: 0 | Status: SHIPPED | OUTPATIENT
Start: 2022-08-23 | End: 2022-08-23

## 2022-08-23 RX ORDER — GABAPENTIN 600 MG/1
600 TABLET ORAL EVERY 8 HOURS
Qty: 90 TABLET | Refills: 1 | Status: SHIPPED | OUTPATIENT
Start: 2022-08-23 | End: 2022-11-01

## 2022-08-23 RX ORDER — PROMETHAZINE HYDROCHLORIDE 25 MG/ML
25 INJECTION, SOLUTION INTRAMUSCULAR; INTRAVENOUS
Status: COMPLETED | OUTPATIENT
Start: 2022-08-23 | End: 2022-08-23

## 2022-08-23 RX ORDER — CEFUROXIME AXETIL 250 MG/1
250 TABLET ORAL EVERY 12 HOURS
Qty: 20 TABLET | Refills: 0 | Status: SHIPPED | OUTPATIENT
Start: 2022-08-23 | End: 2022-09-02

## 2022-08-23 RX ORDER — HYDROMORPHONE HYDROCHLORIDE 2 MG/1
2 TABLET ORAL EVERY 4 HOURS PRN
Qty: 15 TABLET | Refills: 0 | OUTPATIENT
Start: 2022-08-23

## 2022-08-23 RX ORDER — HYDROCODONE BITARTRATE AND ACETAMINOPHEN 10; 325 MG/1; MG/1
1 TABLET ORAL EVERY 12 HOURS
Qty: 60 TABLET | Refills: 0 | Status: SHIPPED | OUTPATIENT
Start: 2022-09-23 | End: 2022-10-23

## 2022-08-23 RX ADMIN — PROMETHAZINE HYDROCHLORIDE 25 MG: 25 INJECTION INTRAMUSCULAR; INTRAVENOUS at 02:08

## 2022-08-23 RX ADMIN — HYDROMORPHONE HYDROCHLORIDE 2 MG: 2 INJECTION INTRAMUSCULAR; INTRAVENOUS; SUBCUTANEOUS at 03:08

## 2022-08-23 NOTE — ASSESSMENT & PLAN NOTE
· Managed by HEMAL Petersen.  Dr. Ruvalcaba contacted to alert him that she was being treated for acute bilateral renal stones with Dilaudid.

## 2022-08-23 NOTE — H&P (VIEW-ONLY)
Subjective:       Patient ID: Gloria Mitchell is a 52 y.o. female.    Chief Complaint: Other (F/U after CT Scan  in [place] this morning-)    PAST UROLOGICAL HISTORY DR. AVLLADARES:    Ms. Mitchell presented to the clinic today because of left ureteral colic.  She had onset pain on April 2nd and went to the emergency room [facility].  She was found to have a large stone at the left ureteropelvic junction it measures about 6 x 10 mm.  CT scan was done but she did not have a KUB.  She had a lot pain that day and then did okay for couple of days but today has been having increasing left flank pain again.  She did require Dilaudid and has only a few Dilaudid left.  Presented to the clinic in acute pain as she says she did not hurt any until after she arrived in [place].  She desires to go ahead with definitive treatment because the amount of pain she has had on intermittent basis.  Patient had left ureteroscopy with laser lithotripsy of stone in the distal left ureter in July 2020.  That was the last active stones she has had until this one.  She has a long history of stone disease dating back a good many years.  Previous stone risk profile revealed her to have low citrate and she has been taking her potassium citrate as directed which is two 10 mEq tablets b.i.d..  She admits she does not drink as much water she should.  Has had multiple procedures in the past.  (April 6, 2021  ---------------------------------------------------------------------------------------------------------------------------------------------------------------------     Ms. Mitchell underwent left ureteroscopy with laser lithotripsy of stone and stent insertion on April 8. She was supposed to keep her stent until Monday but accidentally pulled it out on Saturday.  She has had a lot of pain since then and continues to have a lot of pain.  She presented clinic with severe pain on left side.  She took her last Dilaudid at 9:30 a.m. today.  She  is also concerned she may have some infection.  The urine culture that was collected initially at her clinic visit grew 30,000 colonies Citrobacter and E coli and 20,000 colonies of Enterococcus faecalis.  She did get Bactrim DS which is appropriate and she is not febrile.  Presented to clinic with her mother.  (April 12, 2021)     Ms. Mitchell is in for KUB and follow-up since her left ureteroscopy.  She is not having any more stone pain.  She passed a stone on or about April 25th which apparently was a small fragment.  She did not have any pain associated with it.  Comfortable now and back to her baseline.  She is going to pain clinic for her back problems.  Nothing that sounds like any additional stone issues.  She is drinking only water and trying to drink more.  She is taking her potassium citrate to help with stone prevention..  In today for KUB and follow-up.  Her mother was with her today.  (May 17, 2021)     Ms. Mitchell is seen in clinic for the 1st time in over a year but has had at least 3 urinary tract infection during the past year.  We treated her for at least 2 and she was treated in [facility] for infection about 2 weeks ago.  That treatment was with Cipro and clinically seemed to improve although we are not sure what culture showed or if a culture was done.  Now off the Cipro for the last few days with no symptoms.  She has had no recent stone problems.  No other health problems that she is aware of except for pneumonia that was treated as outpatient.  (June 6, 2022)     Ms. Mitchell was seen is work-in patient today because she says she feels she has a stone as she has been hurting in her left flank since it began Saturday night and also feels she has another infection.  She is also having dysuria. (June 20, 2022)  [id number]    Ms. Mitchell is an established patient of Dr. Ruvalcaba's with history of renal  "stones.  She has been worked into clinic today for c/o left flank and pelvic pain x 4 days.  She   Left Ureteroscopy with laser lithotripsy 4/8/21  Seen for left flank pain 6/20, no ureteral stone seen on KUB, and was to f/u if pain persisted for updated CT - last one year prior.  Urine positive for 30,000 E. Coli.  (8/19/2022)----------------------------------------------------------------      Ms. Mitchell is a 53 yo female who has returned today with her mother after obtaining CT Imaging this morning in [place] for L/R renal colic.  Dr. Ruvalcaba to room to speak with patient and review imaging.  Patient was COVID positive 7/29.  States resolution of symptoms.      -  Chronic pain syndrome:  Followed by pain tx for chronic low back pain, treated with Norco 10 BID.    -  Left /Right flank pain:  Unable to confirm on KUB last visit.  CX positive, tx started today (see below)  CT Stone Protocol today resulted:  "Similar mild-to-moderate left-sided hydronephrosis. There has been some distal migration of previously described left UPJ stone measuring a mm which is now at the S1 level. A few punctate calculi are adjacent to this within the more proximal left ureter.  Similar 2 mm calculus within the distal right ureter at the inferior sacral level without significant hydronephrosis on the right. Multiple subcentimeter nonobstructing bilateral renal calculi are present."    -  UTI (urinary tract infection):  Urine culture returned positive for significant E. Coli and Yeast infections.  Will treat with 10 day course of Ceftin 250 mg po BID with daily probiotics.  Instruct patient not to take Omeprazole (Prilosec) in conjunction with this antx.  Urine was also positive for microhematuria.  This may be present b/c of UTI, and/or secondary UTI from active renal stone.    -  Yeast cystitis:    Significant growth seen in urine studies.  tx with diflucan x one (ordered this am " [facility]).  (8/23/2022)--------------------------------------------------------------------------          Review of Systems   Constitutional: Negative.    HENT: Negative.    Eyes: Negative.    Respiratory: Negative.    Cardiovascular: Negative.    Gastrointestinal: Negative.    Endocrine: Negative.    Genitourinary: Positive for dysuria, flank pain, frequency, hematuria, pelvic pain, urgency and vaginal discharge. Negative for decreased urine volume, difficulty urinating, genital sores, menstrual problem, vaginal bleeding and vaginal pain.        L/R bilateral flank pain;  Vaginal itching   Musculoskeletal: Positive for back pain.        Chronic low back pain - followed by [facility].   Skin: Negative.    Allergic/Immunologic: Negative.    Neurological: Negative.    Hematological: Negative.    Psychiatric/Behavioral: Negative.        Objective:      Physical Exam  Vitals and nursing note reviewed.   Constitutional:       Appearance: Normal appearance. She is obese.   HENT:      Head: Normocephalic.      Right Ear: External ear normal.      Left Ear: External ear normal.      Nose: Nose normal.      Mouth/Throat:      Mouth: Mucous membranes are moist.      Pharynx: Oropharynx is clear.   Eyes:      General: No scleral icterus.        Right eye: No discharge.         Left eye: No discharge.      Conjunctiva/sclera: Conjunctivae normal.      Pupils: Pupils are equal, round, and reactive to light.   Neck:      Vascular: No carotid bruit.   Cardiovascular:      Rate and Rhythm: Normal rate and regular rhythm.      Pulses: Normal pulses.      Heart sounds: Normal heart sounds. No murmur heard.  Pulmonary:      Effort: Pulmonary effort is normal. No respiratory distress.      Breath sounds: Normal breath sounds. No stridor. No wheezing, rhonchi or rales.   Chest:      Chest wall: No tenderness.   Abdominal:      General: There is no distension.      Palpations: Abdomen is soft. There is no mass.      Tenderness:  There is no abdominal tenderness. There is no right CVA tenderness, left CVA tenderness or guarding.   Musculoskeletal:      Cervical back: Normal range of motion and neck supple. No rigidity or tenderness.      Right lower leg: No edema.      Left lower leg: No edema.   Lymphadenopathy:      Cervical: No cervical adenopathy.   Skin:     General: Skin is warm and dry.      Coloration: Skin is not jaundiced or pale.      Findings: No bruising, erythema, lesion or rash.   Neurological:      General: No focal deficit present.      Mental Status: She is alert and oriented to person, place, and time. Mental status is at baseline.      Comments: CN II - XII grossly intact   Psychiatric:         Mood and Affect: Mood normal.         Behavior: Behavior normal.         Thought Content: Thought content normal.         Judgment: Judgment normal.         Assessment:       1. Bilateral renal colic    2. Bilateral flank pain    3. Calculus of kidney    4. Acute cystitis with hematuria    5. Chronic pain syndrome    6. Yeast cystitis    7. Preop testing        Plan:       UTI (urinary tract infection)  · Urine culture returned positive for significant E. Coli and Yeast infections.  Will treat with 10 day course of Ceftin 250 mg po BID with daily probiotics.  · Instruct patient not to take Omeprazole (Prilosec) in conjunction with this antx.  · Urine was also positive for microhematuria.  This may be present b/c of UTI, and/or secondary UTI from active renal stone.      Bilateral flank pain  · Patient elects to proceed with left Ureteroscopy with laser lithotripsy of multiple stones with stent placement.  Largest of these stones measures 7.4 x 4.5 mm.  · Right 2mm ureteral stone will be left to pass on it's own.  · COVID, EKG tomorrow.  · Urine culture positive, treatment started today;  Last CBC and CMP 8/3 okay per Dr. Ruvalcaba.  · Dr. Ruvalcaba recommends giving short course of Dilaudid for uncontrolled renal colic.  Patient instructed  not to take Hydrocodone in combination with or within 4 hours of Dilaudid.  · Phenergan prn N/V  · Push at least 2L of water daily.        Chronic pain syndrome  · Managed by HEMAL Petersen.  Dr. Ruvalcaba contacted to alert him that she was being treated for acute bilateral renal stones with Dilaudid.    Calculus of kidney  · Bilateral subcentimeter stone burden remaining.    Yeast cystitis  · Diflucan x one

## 2022-08-23 NOTE — PROGRESS NOTES
Subjective:       Patient ID: Gloria Mitchell is a 52 y.o. female.    Chief Complaint: Other (F/U after CT Scan  in [place] this morning-)    PAST UROLOGICAL HISTORY DR. VALLADARES:    Ms. Mitchell presented to the clinic today because of left ureteral colic.  She had onset pain on April 2nd and went to the emergency room at [facility].  She was found to have a large stone at the left ureteropelvic junction it measures about 6 x 10 mm.  CT scan was done but she did not have a KUB.  She had a lot pain that day and then did okay for couple of days but today has been having increasing left flank pain again.  She did require Dilaudid and has only a few Dilaudid left.  Presented to the clinic in acute pain as she says she did not hurt any until after she arrived in [place].  She desires to go ahead with definitive treatment because the amount of pain she has had on intermittent basis.  Patient had left ureteroscopy with laser lithotripsy of stone in the distal left ureter in July 2020.  That was the last active stones she has had until this one.  She has a long history of stone disease dating back a good many years.  Previous stone risk profile revealed her to have low citrate and she has been taking her potassium citrate as directed which is two 10 mEq tablets b.i.d..  She admits she does not drink as much water she should.  Has had multiple procedures in the past.  (April 6, 2021  ---------------------------------------------------------------------------------------------------------------------------------------------------------------------     Ms. Mitchell underwent left ureteroscopy with laser lithotripsy of stone and stent insertion on April 8. She was supposed to keep her stent until Monday but accidentally pulled it out on Saturday.  She has had a lot of pain since then and continues to have a lot of pain.  She presented clinic with severe pain on left side.  She took her last Dilaudid at 9:30 a.m. today.  She  is also concerned she may have some infection.  The urine culture that was collected initially at her clinic visit grew 30,000 colonies Citrobacter and E coli and 20,000 colonies of Enterococcus faecalis.  She did get Bactrim DS which is appropriate and she is not febrile.  Presented to clinic with her mother.  (April 12, 2021)     Ms. Mitchell is in for KUB and follow-up since her left ureteroscopy.  She is not having any more stone pain.  She passed a stone on or about April 25th which apparently was a small fragment.  She did not have any pain associated with it.  Comfortable now and back to her baseline.  She is going to pain clinic for her back problems.  Nothing that sounds like any additional stone issues.  She is drinking only water and trying to drink more.  She is taking her potassium citrate to help with stone prevention..  In today for KUB and follow-up.  Her mother was with her today.  (May 17, 2021)     Ms. Mitchell is seen in clinic for the 1st time in over a year but has had at least 3 urinary tract infection during the past year.  We treated her for at least 2 and she was treated in [facility] for infection about 2 weeks ago.  That treatment was with Cipro and clinically seemed to improve although we are not sure what culture showed or if a culture was done.  Now off the Cipro for the last few days with no symptoms.  She has had no recent stone problems.  No other health problems that she is aware of except for pneumonia that was treated as outpatient.  (June 6, 2022)     Ms. Mitchell was seen is work-in patient today because she says she feels she has a stone as she has been hurting in her left flank since it began Saturday night and also feels she has another infection.  She is also having dysuria. (June 20, 2022)  [id number]XXXXXXXX    Ms. Mitchell is an established patient of Dr. Ruvalcaba's with history of renal  "stones.  She has been worked into clinic today for c/o left flank and pelvic pain x 4 days.  She   Left Ureteroscopy with laser lithotripsy 4/8/21  Seen for left flank pain 6/20, no ureteral stone seen on KUB, and was to f/u if pain persisted for updated CT - last one year prior.  Urine positive for 30,000 E. Coli.  (8/19/2022)----------------------------------------------------------------      Ms. Mitchell is a 53 yo female who has returned today with her mother after obtaining CT Imaging this morning in [place] for L/R renal colic.  Dr. Ruvalcaba to room to speak with patient and review imaging.  Patient was COVID positive 7/29.  States resolution of symptoms.      -  Chronic pain syndrome:  Followed by pain tx for chronic low back pain, treated with Norco 10 BID.    -  Left /Right flank pain:  Unable to confirm on KUB last visit.  CX positive, tx started today (see below)  CT Stone Protocol today resulted:  "Similar mild-to-moderate left-sided hydronephrosis. There has been some distal migration of previously described left UPJ stone measuring a mm which is now at the S1 level. A few punctate calculi are adjacent to this within the more proximal left ureter.  Similar 2 mm calculus within the distal right ureter at the inferior sacral level without significant hydronephrosis on the right. Multiple subcentimeter nonobstructing bilateral renal calculi are present."    -  UTI (urinary tract infection):  Urine culture returned positive for significant E. Coli and Yeast infections.  Will treat with 10 day course of Ceftin 250 mg po BID with daily probiotics.  Instruct patient not to take Omeprazole (Prilosec) in conjunction with this antx.  Urine was also positive for microhematuria.  This may be present b/c of UTI, and/or secondary UTI from active renal stone.    -  Yeast cystitis:    Significant growth seen in urine studies.  tx with diflucan x one (ordered this am " [facility]).  (8/23/2022)--------------------------------------------------------------------------          Review of Systems   Constitutional: Negative.    HENT: Negative.    Eyes: Negative.    Respiratory: Negative.    Cardiovascular: Negative.    Gastrointestinal: Negative.    Endocrine: Negative.    Genitourinary: Positive for dysuria, flank pain, frequency, hematuria, pelvic pain, urgency and vaginal discharge. Negative for decreased urine volume, difficulty urinating, genital sores, menstrual problem, vaginal bleeding and vaginal pain.        L/R bilateral flank pain;  Vaginal itching   Musculoskeletal: Positive for back pain.        Chronic low back pain - followed by [facility].   Skin: Negative.    Allergic/Immunologic: Negative.    Neurological: Negative.    Hematological: Negative.    Psychiatric/Behavioral: Negative.        Objective:      Physical Exam  Vitals and nursing note reviewed.   Constitutional:       Appearance: Normal appearance. She is obese.   HENT:      Head: Normocephalic.      Right Ear: External ear normal.      Left Ear: External ear normal.      Nose: Nose normal.      Mouth/Throat:      Mouth: Mucous membranes are moist.      Pharynx: Oropharynx is clear.   Eyes:      General: No scleral icterus.        Right eye: No discharge.         Left eye: No discharge.      Conjunctiva/sclera: Conjunctivae normal.      Pupils: Pupils are equal, round, and reactive to light.   Neck:      Vascular: No carotid bruit.   Cardiovascular:      Rate and Rhythm: Normal rate and regular rhythm.      Pulses: Normal pulses.      Heart sounds: Normal heart sounds. No murmur heard.  Pulmonary:      Effort: Pulmonary effort is normal. No respiratory distress.      Breath sounds: Normal breath sounds. No stridor. No wheezing, rhonchi or rales.   Chest:      Chest wall: No tenderness.   Abdominal:      General: There is no distension.      Palpations: Abdomen is soft. There is no mass.      Tenderness:  There is no abdominal tenderness. There is no right CVA tenderness, left CVA tenderness or guarding.   Musculoskeletal:      Cervical back: Normal range of motion and neck supple. No rigidity or tenderness.      Right lower leg: No edema.      Left lower leg: No edema.   Lymphadenopathy:      Cervical: No cervical adenopathy.   Skin:     General: Skin is warm and dry.      Coloration: Skin is not jaundiced or pale.      Findings: No bruising, erythema, lesion or rash.   Neurological:      General: No focal deficit present.      Mental Status: She is alert and oriented to person, place, and time. Mental status is at baseline.      Comments: CN II - XII grossly intact   Psychiatric:         Mood and Affect: Mood normal.         Behavior: Behavior normal.         Thought Content: Thought content normal.         Judgment: Judgment normal.         Assessment:       1. Bilateral renal colic    2. Bilateral flank pain    3. Calculus of kidney    4. Acute cystitis with hematuria    5. Chronic pain syndrome    6. Yeast cystitis    7. Preop testing        Plan:       UTI (urinary tract infection)  · Urine culture returned positive for significant E. Coli and Yeast infections.  Will treat with 10 day course of Ceftin 250 mg po BID with daily probiotics.  · Instruct patient not to take Omeprazole (Prilosec) in conjunction with this antx.  · Urine was also positive for microhematuria.  This may be present b/c of UTI, and/or secondary UTI from active renal stone.      Bilateral flank pain  · Patient elects to proceed with left Ureteroscopy with laser lithotripsy of multiple stones with stent placement.  Largest of these stones measures 7.4 x 4.5 mm.  · Right 2mm ureteral stone will be left to pass on it's own.  · COVID, EKG tomorrow.  · Urine culture positive, treatment started today;  Last CBC and CMP 8/3 okay per Dr. Ruvalcaba.  · Dr. Ruvalcaba recommends giving short course of Dilaudid for uncontrolled renal colic.  Patient instructed  not to take Hydrocodone in combination with or within 4 hours of Dilaudid.  · Phenergan prn N/V  · Push at least 2L of water daily.        Chronic pain syndrome  · Managed by HEMAL Petersen.  Dr. Ruvalcaba contacted to alert him that she was being treated for acute bilateral renal stones with Dilaudid.    Calculus of kidney  · Bilateral subcentimeter stone burden remaining.    Yeast cystitis  · Diflucan x one

## 2022-08-23 NOTE — ASSESSMENT & PLAN NOTE
· Patient elects to proceed with left Ureteroscopy with laser lithotripsy of multiple stones with stent placement.  Largest of these stones measures 7.4 x 4.5 mm.  · Right 2mm ureteral stone will be left to pass on it's own.  · COVID, EKG tomorrow.  · Urine culture positive, treatment started today;  Last CBC and CMP 8/3 okay per Dr. Ruvalcaba.  · Dr. Ruvalcaba recommends giving short course of Dilaudid for uncontrolled renal colic.  Patient instructed not to take Hydrocodone in combination with or within 4 hours of Dilaudid.  · Phenergan prn N/V  · Push at least 2L of water daily.

## 2022-08-23 NOTE — TELEPHONE ENCOUNTER
Unable to make prescription for Dilaudid go through to [facility].  Problem with the format of the electronic prescription.

## 2022-08-23 NOTE — ASSESSMENT & PLAN NOTE
· Urine culture returned positive for significant E. Coli and Yeast infections.  Will treat with 10 day course of Ceftin 250 mg po BID with daily probiotics.  · Instruct patient not to take Omeprazole (Prilosec) in conjunction with this antx.  · Urine was also positive for microhematuria.  This may be present b/c of UTI, and/or secondary UTI from active renal stone.

## 2022-08-23 NOTE — PROGRESS NOTES
Urine culture returned positive for significant E. Coli and Yeast infections.  Will treat with 10 day course of Ceftin 250 mg po BID with daily probiotics.  Instruct patient not to take Omeprazole (Prilosec) in conjunction with this antx.  Urine was also positive for microhematuria.  This may be present b/c of UTI, and/or secondary UTI from active renal stone.

## 2022-08-25 DIAGNOSIS — N20.1 LEFT URETERAL STONE: Primary | ICD-10-CM

## 2022-08-25 RX ORDER — SODIUM CHLORIDE, SODIUM LACTATE, POTASSIUM CHLORIDE, CALCIUM CHLORIDE 600; 310; 30; 20 MG/100ML; MG/100ML; MG/100ML; MG/100ML
INJECTION, SOLUTION INTRAVENOUS CONTINUOUS
Status: CANCELLED | OUTPATIENT
Start: 2022-08-26

## 2022-08-25 NOTE — PROGRESS NOTES
Subjective:       Patient ID: Gloria Mitchell is a 52 y.o. female.    Chief Complaint: Nephrolithiasis (Patient requesting pain shot after being seen by VINCE Gamino earlier today. )    Ms. Mitchell presented to the clinic today because of left ureteral colic.  She had onset pain on April 2nd and went to the emergency room at [facility].  She was found to have a large stone at the left ureteropelvic junction it measures about 6 x 10 mm.  CT scan was done but she did not have a KUB.  She had a lot pain that day and then did okay for couple of days but today has been having increasing left flank pain again.  She did require Dilaudid and has only a few Dilaudid left.  Presented to the clinic in acute pain as she says she did not hurt any until after she arrived in [place].  She desires to go ahead with definitive treatment because the amount of pain she has had on intermittent basis.  Patient had left ureteroscopy with laser lithotripsy of stone in the distal left ureter in July 2020.  That was the last active stones she has had until this one.  She has a long history of stone disease dating back a good many years.  Previous stone risk profile revealed her to have low citrate and she has been taking her potassium citrate as directed which is two 10 mEq tablets b.i.d..  She admits she does not drink as much water she should.  Has had multiple procedures in the past.  (April 6, 2021  ---------------------------------------------------------------------------------------------------------------------------------------------------------------------     Ms. Mitchell underwent left ureteroscopy with laser lithotripsy of stone and stent insertion on April 8. She was supposed to keep her stent until Monday but accidentally pulled it out on Saturday.  She has had a lot of pain since then and continues to have a lot of pain.  She presented clinic with severe pain on left side.  She took her last Dilaudid at 9:30 a.m. today.  She  is also concerned she may have some infection.  The urine culture that was collected initially at her clinic visit grew 30,000 colonies Citrobacter and E coli and 20,000 colonies of Enterococcus faecalis.  She did get Bactrim DS which is appropriate and she is not febrile.  Presented to clinic with her mother.  (April 12, 2021)     Ms. Mitchell is in for KUB and follow-up since her left ureteroscopy.  She is not having any more stone pain.  She passed a stone on or about April 25th which apparently was a small fragment.  She did not have any pain associated with it.  Comfortable now and back to her baseline.  She is going to pain clinic for her back problems.  Nothing that sounds like any additional stone issues.  She is drinking only water and trying to drink more.  She is taking her potassium citrate to help with stone prevention..  In today for KUB and follow-up.  Her mother was with her today.  (May 17, 2021)     Ms. Mitchell is seen in clinic for the 1st time in over a year but has had at least 3 urinary tract infection during the past year.  We treated her for at least 2 and she was treated in [facility] for infection about 2 weeks ago.  That treatment was with Cipro and clinically seemed to improve although we are not sure what culture showed or if a culture was done.  Now off the Cipro for the last few days with no symptoms.  She has had no recent stone problems.  No other health problems that she is aware of except for pneumonia that was treated as outpatient.  (June 6, 2022)     Ms. Mitchell was seen is work-in patient today because she says she feels she has a stone as she has been hurting in her left flank since it began Saturday night and also feels she has another infection.  She is also having dysuria. (June 20, 2022)     Ms. Mitchell was seen earlier today by Ms. Gamino and is scheduled for ureteroscopy on Friday.  She called back because she could not fill her [facility] because it did not have an address on it.  Ms. Gamino had been unable to do the electronic prescription because of a glitch in the system.  I had also tried to submit a prescription electronically in was unsuccessful.  I did talk with pharmacist at [facility] and she could not help because of the circumstances, ( August 23, 2022)    Review of Systems      Objective:      Physical Exam    Assessment:       Problem List Items Addressed This Visit    None     Visit Diagnoses     Ureteral calculus    -  Primary    Kidney stone        Relevant Medications    promethazine injection 25 mg (Completed)    HYDROmorphone (PF) injection 2 mg (Completed)          Plan:     Patient will return Friday for ureteroscopy with laser lithotripsy of stone**

## 2022-08-26 ENCOUNTER — ANESTHESIA EVENT (OUTPATIENT)
Dept: SURGERY | Facility: HOSPITAL | Age: 52
End: 2022-08-26
Payer: MEDICARE

## 2022-08-26 ENCOUNTER — ANESTHESIA (OUTPATIENT)
Dept: SURGERY | Facility: HOSPITAL | Age: 52
End: 2022-08-26
Payer: MEDICARE

## 2022-08-26 ENCOUNTER — HOSPITAL ENCOUNTER (OUTPATIENT)
Facility: HOSPITAL | Age: 52
Discharge: HOME | End: 2022-08-26
Attending: UROLOGY | Admitting: UROLOGY
Payer: MEDICARE

## 2022-08-26 VITALS
HEART RATE: 67 BPM | RESPIRATION RATE: 16 BRPM | OXYGEN SATURATION: 98 % | DIASTOLIC BLOOD PRESSURE: 91 MMHG | WEIGHT: 191 LBS | BODY MASS INDEX: 35.15 KG/M2 | SYSTOLIC BLOOD PRESSURE: 159 MMHG | TEMPERATURE: 98 F | HEIGHT: 62 IN

## 2022-08-26 DIAGNOSIS — N20.0 KIDNEY STONES: Primary | ICD-10-CM

## 2022-08-26 DIAGNOSIS — N20.1 LEFT URETERAL STONE: Primary | ICD-10-CM

## 2022-08-26 PROCEDURE — 25000003 PHARM REV CODE 250: Performed by: NURSE ANESTHETIST, CERTIFIED REGISTERED

## 2022-08-26 PROCEDURE — 25000003 PHARM REV CODE 250: Performed by: UROLOGY

## 2022-08-26 PROCEDURE — 52356 PR CYSTO/URETERO W/LITHOTRIPSY: ICD-10-PCS | Mod: LT,,, | Performed by: UROLOGY

## 2022-08-26 PROCEDURE — C1769 GUIDE WIRE: HCPCS | Performed by: UROLOGY

## 2022-08-26 PROCEDURE — 52356 CYSTO/URETERO W/LITHOTRIPSY: CPT | Mod: LT,,, | Performed by: UROLOGY

## 2022-08-26 PROCEDURE — 37000009 HC ANESTHESIA EA ADD 15 MINS: Performed by: UROLOGY

## 2022-08-26 PROCEDURE — C2617 STENT, NON-COR, TEM W/O DEL: HCPCS | Performed by: UROLOGY

## 2022-08-26 PROCEDURE — 27000177 HC AIRWAY, LARYNGEAL MASK: Performed by: ANESTHESIOLOGY

## 2022-08-26 PROCEDURE — 37000008 HC ANESTHESIA 1ST 15 MINUTES: Performed by: UROLOGY

## 2022-08-26 PROCEDURE — 27000716 HC OXISENSOR PROBE, ANY SIZE: Performed by: ANESTHESIOLOGY

## 2022-08-26 PROCEDURE — 63600175 PHARM REV CODE 636 W HCPCS: Performed by: UROLOGY

## 2022-08-26 PROCEDURE — C1725 CATH, TRANSLUMIN NON-LASER: HCPCS | Performed by: UROLOGY

## 2022-08-26 PROCEDURE — D9220A PRA ANESTHESIA: Mod: CRNA,,, | Performed by: NURSE ANESTHETIST, CERTIFIED REGISTERED

## 2022-08-26 PROCEDURE — 27201423 OPTIME MED/SURG SUP & DEVICES STERILE SUPPLY: Performed by: UROLOGY

## 2022-08-26 PROCEDURE — 71000015 HC POSTOP RECOV 1ST HR: Performed by: UROLOGY

## 2022-08-26 PROCEDURE — 27000655: Performed by: ANESTHESIOLOGY

## 2022-08-26 PROCEDURE — 63600175 PHARM REV CODE 636 W HCPCS: Performed by: NURSE ANESTHETIST, CERTIFIED REGISTERED

## 2022-08-26 PROCEDURE — D9220A PRA ANESTHESIA: ICD-10-PCS | Mod: CRNA,,, | Performed by: NURSE ANESTHETIST, CERTIFIED REGISTERED

## 2022-08-26 PROCEDURE — 36000706: Performed by: UROLOGY

## 2022-08-26 PROCEDURE — D9220A PRA ANESTHESIA: ICD-10-PCS | Mod: ANES,,, | Performed by: ANESTHESIOLOGY

## 2022-08-26 PROCEDURE — D9220A PRA ANESTHESIA: Mod: ANES,,, | Performed by: ANESTHESIOLOGY

## 2022-08-26 PROCEDURE — 25000003 PHARM REV CODE 250: Performed by: ANESTHESIOLOGY

## 2022-08-26 PROCEDURE — 71000033 HC RECOVERY, INTIAL HOUR: Performed by: UROLOGY

## 2022-08-26 PROCEDURE — 36000707: Performed by: UROLOGY

## 2022-08-26 PROCEDURE — 71000016 HC POSTOP RECOV ADDL HR: Performed by: UROLOGY

## 2022-08-26 DEVICE — STENT URETERAL CONTR VL: Type: IMPLANTABLE DEVICE | Site: URETER | Status: FUNCTIONAL

## 2022-08-26 RX ORDER — MIDAZOLAM HYDROCHLORIDE 1 MG/ML
INJECTION INTRAMUSCULAR; INTRAVENOUS
Status: DISCONTINUED | OUTPATIENT
Start: 2022-08-26 | End: 2022-08-26

## 2022-08-26 RX ORDER — LIDOCAINE HYDROCHLORIDE 20 MG/ML
INJECTION INTRAVENOUS
Status: DISCONTINUED | OUTPATIENT
Start: 2022-08-26 | End: 2022-08-26

## 2022-08-26 RX ORDER — CEFAZOLIN SODIUM 2 G/50ML
2 SOLUTION INTRAVENOUS ONCE
Status: DISCONTINUED | OUTPATIENT
Start: 2022-08-26 | End: 2022-08-26 | Stop reason: HOSPADM

## 2022-08-26 RX ORDER — HYDROMORPHONE HYDROCHLORIDE 2 MG/ML
INJECTION, SOLUTION INTRAMUSCULAR; INTRAVENOUS; SUBCUTANEOUS
Status: DISCONTINUED | OUTPATIENT
Start: 2022-08-26 | End: 2022-08-26

## 2022-08-26 RX ORDER — FENTANYL CITRATE 50 UG/ML
INJECTION, SOLUTION INTRAMUSCULAR; INTRAVENOUS
Status: DISCONTINUED | OUTPATIENT
Start: 2022-08-26 | End: 2022-08-26

## 2022-08-26 RX ORDER — ONDANSETRON 2 MG/ML
4 INJECTION INTRAMUSCULAR; INTRAVENOUS EVERY 12 HOURS PRN
Status: DISCONTINUED | OUTPATIENT
Start: 2022-08-26 | End: 2022-08-26 | Stop reason: HOSPADM

## 2022-08-26 RX ORDER — SODIUM CHLORIDE 0.9 % (FLUSH) 0.9 %
10 SYRINGE (ML) INJECTION
Status: DISCONTINUED | OUTPATIENT
Start: 2022-08-26 | End: 2022-08-26 | Stop reason: HOSPADM

## 2022-08-26 RX ORDER — HYDROMORPHONE HYDROCHLORIDE 2 MG/ML
0.5 INJECTION, SOLUTION INTRAMUSCULAR; INTRAVENOUS; SUBCUTANEOUS EVERY 5 MIN PRN
Status: DISCONTINUED | OUTPATIENT
Start: 2022-08-26 | End: 2022-08-26 | Stop reason: HOSPADM

## 2022-08-26 RX ORDER — HYDROCODONE BITARTRATE AND ACETAMINOPHEN 5; 325 MG/1; MG/1
1 TABLET ORAL EVERY 4 HOURS PRN
Status: DISCONTINUED | OUTPATIENT
Start: 2022-08-26 | End: 2022-08-26 | Stop reason: HOSPADM

## 2022-08-26 RX ORDER — ONDANSETRON 2 MG/ML
INJECTION INTRAMUSCULAR; INTRAVENOUS
Status: DISCONTINUED | OUTPATIENT
Start: 2022-08-26 | End: 2022-08-26

## 2022-08-26 RX ORDER — SCOLOPAMINE TRANSDERMAL SYSTEM 1 MG/1
1 PATCH, EXTENDED RELEASE TRANSDERMAL ONCE
Status: DISCONTINUED | OUTPATIENT
Start: 2022-08-26 | End: 2022-08-26 | Stop reason: HOSPADM

## 2022-08-26 RX ORDER — CEFAZOLIN SODIUM 1 G/3ML
INJECTION, POWDER, FOR SOLUTION INTRAMUSCULAR; INTRAVENOUS
Status: DISCONTINUED | OUTPATIENT
Start: 2022-08-26 | End: 2022-08-26

## 2022-08-26 RX ORDER — GENTAMICIN SULFATE 80 MG/100ML
80 INJECTION, SOLUTION INTRAVENOUS ONCE
Status: DISCONTINUED | OUTPATIENT
Start: 2022-08-26 | End: 2022-08-26 | Stop reason: HOSPADM

## 2022-08-26 RX ORDER — ONDANSETRON 2 MG/ML
4 INJECTION INTRAMUSCULAR; INTRAVENOUS DAILY PRN
Status: DISCONTINUED | OUTPATIENT
Start: 2022-08-26 | End: 2022-08-26 | Stop reason: HOSPADM

## 2022-08-26 RX ORDER — SODIUM CHLORIDE, SODIUM LACTATE, POTASSIUM CHLORIDE, CALCIUM CHLORIDE 600; 310; 30; 20 MG/100ML; MG/100ML; MG/100ML; MG/100ML
INJECTION, SOLUTION INTRAVENOUS CONTINUOUS
Status: DISCONTINUED | OUTPATIENT
Start: 2022-08-26 | End: 2022-08-26 | Stop reason: HOSPADM

## 2022-08-26 RX ORDER — DIPHENHYDRAMINE HYDROCHLORIDE 50 MG/ML
25 INJECTION INTRAMUSCULAR; INTRAVENOUS EVERY 6 HOURS PRN
Status: DISCONTINUED | OUTPATIENT
Start: 2022-08-26 | End: 2022-08-26 | Stop reason: HOSPADM

## 2022-08-26 RX ORDER — HYDROCODONE BITARTRATE AND ACETAMINOPHEN 10; 325 MG/1; MG/1
1 TABLET ORAL EVERY 4 HOURS PRN
Status: DISCONTINUED | OUTPATIENT
Start: 2022-08-26 | End: 2022-08-26 | Stop reason: HOSPADM

## 2022-08-26 RX ORDER — PROPOFOL 10 MG/ML
VIAL (ML) INTRAVENOUS
Status: DISCONTINUED | OUTPATIENT
Start: 2022-08-26 | End: 2022-08-26

## 2022-08-26 RX ORDER — DEXAMETHASONE SODIUM PHOSPHATE 4 MG/ML
INJECTION, SOLUTION INTRA-ARTICULAR; INTRALESIONAL; INTRAMUSCULAR; INTRAVENOUS; SOFT TISSUE
Status: DISCONTINUED | OUTPATIENT
Start: 2022-08-26 | End: 2022-08-26

## 2022-08-26 RX ORDER — IPRATROPIUM BROMIDE AND ALBUTEROL SULFATE 2.5; .5 MG/3ML; MG/3ML
3 SOLUTION RESPIRATORY (INHALATION) ONCE AS NEEDED
Status: DISCONTINUED | OUTPATIENT
Start: 2022-08-26 | End: 2022-08-26 | Stop reason: HOSPADM

## 2022-08-26 RX ORDER — MEPERIDINE HYDROCHLORIDE 25 MG/ML
25 INJECTION INTRAMUSCULAR; INTRAVENOUS; SUBCUTANEOUS ONCE AS NEEDED
Status: DISCONTINUED | OUTPATIENT
Start: 2022-08-26 | End: 2022-08-26 | Stop reason: HOSPADM

## 2022-08-26 RX ADMIN — SODIUM CHLORIDE, POTASSIUM CHLORIDE, SODIUM LACTATE AND CALCIUM CHLORIDE: 600; 310; 30; 20 INJECTION, SOLUTION INTRAVENOUS at 07:08

## 2022-08-26 RX ADMIN — DEXAMETHASONE SODIUM PHOSPHATE 4 MG: 4 INJECTION, SOLUTION INTRA-ARTICULAR; INTRALESIONAL; INTRAMUSCULAR; INTRAVENOUS; SOFT TISSUE at 08:08

## 2022-08-26 RX ADMIN — HYDROMORPHONE HYDROCHLORIDE 0.4 MG: 2 INJECTION, SOLUTION INTRAMUSCULAR; INTRAVENOUS; SUBCUTANEOUS at 08:08

## 2022-08-26 RX ADMIN — HYDROCODONE BITARTRATE AND ACETAMINOPHEN 1 TABLET: 10; 325 TABLET ORAL at 10:08

## 2022-08-26 RX ADMIN — HYDROMORPHONE HYDROCHLORIDE 0.6 MG: 2 INJECTION, SOLUTION INTRAMUSCULAR; INTRAVENOUS; SUBCUTANEOUS at 08:08

## 2022-08-26 RX ADMIN — HYDROMORPHONE HYDROCHLORIDE 0.5 MG: 2 INJECTION, SOLUTION INTRAMUSCULAR; INTRAVENOUS; SUBCUTANEOUS at 08:08

## 2022-08-26 RX ADMIN — CEFAZOLIN 2 G: 1 INJECTION, POWDER, FOR SOLUTION INTRAMUSCULAR; INTRAVENOUS; PARENTERAL at 07:08

## 2022-08-26 RX ADMIN — ONDANSETRON 4 MG: 2 INJECTION INTRAMUSCULAR; INTRAVENOUS at 07:08

## 2022-08-26 RX ADMIN — FENTANYL CITRATE 100 MCG: 50 INJECTION INTRAMUSCULAR; INTRAVENOUS at 07:08

## 2022-08-26 RX ADMIN — LIDOCAINE HYDROCHLORIDE 50 MG: 20 INJECTION, SOLUTION INTRAVENOUS at 07:08

## 2022-08-26 RX ADMIN — MIDAZOLAM HYDROCHLORIDE 2 MG: 1 INJECTION, SOLUTION INTRAMUSCULAR; INTRAVENOUS at 07:08

## 2022-08-26 RX ADMIN — PROPOFOL 175 MG: 10 INJECTION, EMULSION INTRAVENOUS at 07:08

## 2022-08-26 NOTE — ANESTHESIA POSTPROCEDURE EVALUATION
Anesthesia Post Evaluation    Patient: Gloria Mitchell    Procedure(s) Performed: Procedure(s) (LRB):  CYSTOURETEROSCOPY, WITH HOLMIUM LASER LITHOTRIPSY OF URETERAL CALCULUS AND STENT INSERTION (Left)    Final Anesthesia Type: general      Patient location during evaluation: PACU  Patient participation: Yes- Able to Participate  Level of consciousness: awake and alert and oriented  Post-procedure vital signs: reviewed and stable  Pain management: adequate  Airway patency: patent  LARISA mitigation strategies: Multimodal analgesia  PONV status at discharge: No PONV  Anesthetic complications: no      Cardiovascular status: hemodynamically stable  Respiratory status: unassisted and spontaneous ventilation  Hydration status: euvolemic  Follow-up not needed.          Vitals Value Taken Time   /79 08/26/22 0849   Temp 36.8 °C (98.3 °F) 08/26/22 0842   Pulse 64 08/26/22 0850   Resp 9 08/26/22 0850   SpO2 99 % 08/26/22 0850   Vitals shown include unvalidated device data.      No case tracking events are documented in the log.      Pain/Gualberto Score: Gualberto Score: 9 (8/26/2022  8:37 AM)

## 2022-08-26 NOTE — OP NOTE
[facility] - Periop Services  General Surgery  Operative Note    SUMMARY     Date of Procedure: 8/26/2022     Procedure: Procedure(s) (LRB):  CYSTOURETEROSCOPY, WITH HOLMIUM LASER LITHOTRIPSY OF URETERAL CALCULUS AND STENT INSERTION (Left)       Surgeon(s) and Role:     * Carlos Ruvalcaba Jr., MD - Primary    Assistant:  JENNIFER YORK    Pre-Operative Diagnosis: Left ureteral stones [N20.1] with severe intermittent colic    Post-Operative Diagnosis: Post-Op Diagnosis Codes:     * Left ureteral stones[N20.1] with severe intermittent colic    Anesthesia: General LMA    Operative Findings (including complications, if any):     Description of Technical Procedures:  The patient was taken to the hospital cystoscopy suite.  Satisfactory general LMA was introduced.  The patient was placed in the modified dorsal lithotomy position preparing her for left ureteroscopy.  Draping system was applied for cystoscopy and ureteroscopy. The urethra calibrated through 24 Slovak with Edwardo bougies without hang up.  The 21 Slovak Olympus rigid cystoscope was passed in visualization carried out with the Olympus camera system which was also used for ureteroscopy.  The bladder was free of tumors, stones, diverticula on viewing with lateral and Foroblique lens.  A ureteral access catheter was passed into the left ureter.  A 0.038 glidewire was passed and there was hang up at the level of stone which was at the level of the iliac vessels in mid ureter.  We were able to eventually negotiate the stones and get the guidewire up into the kidney.  The access catheter was moved to the other port and replaced in the left ureter.  A 2nd 0.038 glidewire was passed.  The 2 glide wires were left in place, access catheter and cystoscope removed, and the Vizcaino dual chamber short ureteroscope was passed over 1 of the guidewires transurethrally into the bladder and up to the stones.  There was hang up at that level of the stone.  The 365 micron  holmium laser fiber was passed through the left port of the scope and used to fragment the distal stone which was the largest of the stones.  We were able to fragment over 27 seconds of fragmentation time resulting in 0.46 kilojoules of energy being created.  Stone fragments were removed with stone basket and pulled into the bladder.  We made multiple passes to get the fragments out.  Eventually no substantive stone remained in only some sand remained in the area of the original placed.  We were able get the scope all way up to the upper ureter just below the ureteropelvic junction I did not see any substantive stone fragments left.  Ureteral scope was removed.  The safety wire was converted to the working wire the cystoscope passed over the remaining guidewire.  A 6 [place] times 22/30 variable length stent was left indwelling.  Good position was obtained with good curl in the kidney and good curl in the bladder.  The nylon string was left long.  Bladder was irrigated free of stone fragments and stones collected for stone analysis.  Patient left for the PACU in satisfactory condition with indwelling stent.  No Terrell was left indwelling.  She tolerated procedure well.  Blood loss was felt to be no more than 5 mL.  There were no complications.    Significant Surgical Tasks Conducted by the Assistant(s), if Applicable:     Estimated Blood Loss (EBL): * No values recorded between 8/26/2022  7:44 AM and 8/26/2022  8:34 AM *           Implants:   Implant Name Type Inv. Item Serial No.  Lot No. LRB No. Used Action   STENT URETERAL CONTR  - [id number]  STENT URETERAL CONTR   [employer] [id number] Left 1 Implanted       Specimens:   Specimen (24h ago, onward)            None                  Condition: Stable    Disposition: PACU - hemodynamically stable.    Attestation: I was present and scrubbed for the entire procedure.

## 2022-08-26 NOTE — ANESTHESIA PREPROCEDURE EVALUATION
08/26/2022  Gloria Mitchell is a 52 y.o., female.      Pre-op Assessment    I have reviewed the Patient Summary Reports.    I have reviewed the NPO Status.   I have reviewed the Medications.     Review of Systems  Anesthesia Hx:  Hx of Anesthetic complications PONV Denies Family Hx of Anesthesia complications.   Denies Personal Hx of Anesthesia complications.   Social:  Non-Smoker, No Alcohol Use    Hematology/Oncology:  Hematology Normal   Oncology Normal     EENT/Dental:EENT/Dental Normal   Cardiovascular:   Hypertension    Pulmonary:  Pulmonary Normal    Renal/:   Chronic Renal Disease renal calculi    Hepatic/GI:   GERD    Musculoskeletal:  Musculoskeletal Normal    Neurological:   Neuromuscular Disease, Headaches Post laminectomy syndrome  Chronic Pain Syndrome  Peripheral Neuropathy    Endocrine:   Hypothyroidism  Obesity / BMI > 30  Dermatological:  Skin Normal    Psych:   Psychiatric History          Physical Exam  General: Well nourished and Cooperative    Airway:  Mallampati: II   Mouth Opening: Normal  TM Distance: Normal  Tongue: Normal  Neck ROM: Normal ROM    Chest/Lungs:  Clear to auscultation, Normal Respiratory Rate    Heart:  Rate: Normal  Rhythm: Regular Rhythm        Chemistry        Component Value Date/Time     08/03/2022 2038    K 3.0 (L) 08/03/2022 2038     08/03/2022 2038    CO2 29 08/03/2022 2038    BUN 11 08/03/2022 2038    CREATININE 0.95 08/03/2022 2038    GLU 98 08/03/2022 2038        Component Value Date/Time    CALCIUM 7.6 (L) 08/03/2022 2038    ALKPHOS 55 08/03/2022 2038    AST 19 08/03/2022 2038    ALT 27 08/03/2022 2038    BILITOT 0.2 08/03/2022 2038    ESTGFRAFRICA 60 04/11/2021 1230    EGFRNONAA 66 08/03/2022 2038        Lab Results   Component Value Date    WBC 5.68 08/03/2022    RBC 4.22 08/03/2022    HGB 12.6 08/03/2022    MCV 92.2 08/03/2022    MCH  29.9 08/03/2022    MCHC 32.4 08/03/2022    RDW 14.2 08/03/2022     08/03/2022    MPV 9.5 08/03/2022    LYMPH 30.5 08/03/2022    LYMPH 1.73 08/03/2022    MONO 6.5 (H) 08/03/2022    EOS 0.27 08/03/2022    BASO 0.03 08/03/2022     Results for orders placed or performed during the hospital encounter of 04/08/21   EKG 12-lead    Collection Time: 04/08/21 11:37 AM    Narrative    Test Reason : I10,    Vent. Rate : 062 BPM     Atrial Rate : 062 BPM     P-R Int : 156 ms          QRS Dur : 080 ms      QT Int : 472 ms       P-R-T Axes : 040 026 012 degrees     QTc Int : 479 ms    Normal sinus rhythm  Cannot rule out Anterior infarct ,age undetermined  Abnormal ECG  No previous ECGs available  Confirmed by Teressa JUAREZ, Gurwinder CHAVARRIA ([id number]) on 4/21/2021 2:21:33 PM    Referred By: LENIN VALLADARES JR           Confirmed By:Gurwinder Gannon MD         Anesthesia Plan  Type of Anesthesia, risks & benefits discussed:    Anesthesia Type: Gen Supraglottic Airway  Intra-op Monitoring Plan: Standard ASA Monitors  Post Op Pain Control Plan: multimodal analgesia  Induction:  IV  Airway Plan: Direct, Post-Induction  Informed Consent: Informed consent signed with the Patient and all parties understand the risks and agree with anesthesia plan.  All questions answered.   ASA Score: 3  Day of Surgery Review of History & Physical: H&P Update referred to the surgeon/provider.I have interviewed and examined the patient. I have reviewed the patient's H&P dated: There are no significant changes. H&P completed by Anesthesiologist.    Ready For Surgery From Anesthesia Perspective.     .

## 2022-08-26 NOTE — DISCHARGE SUMMARY
"Ms. Mitchell is a 51 yo female who has returned today with her mother after obtaining CT Imaging this morning in [place] for L/R renal colic.  Dr. Ruvalcaba to room to speak with patient and review imaging.  Patient was COVID positive 7/29.  States resolution of symptoms.      -  Chronic pain syndrome: Followed by pain tx for chronic low back pain, treated with Norco 10 BID.     -  Left /Right flank pain: Unable to confirm on KUB last visit. CX positive, tx started today (see below)  CT Stone Protocol today resulted:  "Similar mild-to-moderate left-sided hydronephrosis. There has been some distal migration of previously described left UPJ stone measuring a mm which is now at the S1 level. A few punctate calculi are adjacent to this within the more proximal left ureter.  Similar 2 mm calculus within the distal right ureter at the inferior sacral level without significant hydronephrosis on the right. Multiple subcentimeter nonobstructing bilateral renal calculi are present."     -  UTI (urinary tract infection): Urine culture returned positive for significant E. Coli and Yeast infections.  Will treat with 10 day course of Ceftin 250 mg po BID with daily probiotics.  Instruct patient not to take Omeprazole (Prilosec) in conjunction with this antx.  Urine was also positive for microhematuria.  This may be present b/c of UTI, and/or secondary UTI from active renal stone.     -  Yeast cystitis: Significant growth seen in urine studies.  tx with diflucan x one (ordered this am PTA).    Assessment:  1.  Bilateral renal colic   2.  Bilateral flank pain   3.  Calculus of kidney   4.  Acute cystitis with hematuria   5.  Chronic pain syndrome   6.  Yeast cystitis   7.  Preop testing     Plan:  UTI (urinary tract infection)  · Urine culture returned positive for significant E. Coli and Yeast infections.  Will treat with 10 day course of Ceftin 250 mg po BID with daily probiotics.  · Instruct patient not to take Omeprazole (Prilosec) in " conjunction with this antx.  · Urine was also positive for microhematuria.  This may be present b/c of UTI, and/or secondary UTI from active renal stone.    Bilateral flank pain  · Patient elects to proceed with left Ureteroscopy with laser lithotripsy of multiple stones with stent placement.  Largest of these stones measures 7.4 x 4.5 mm.  · Right 2mm ureteral stone will be left to pass on it's own.  · COVID, EKG tomorrow.  · Urine culture positive, treatment started today;  Last CBC and CMP 8/3 okay per Dr. Ruvalcaba.  · Dr. Ruvalcaba recommends giving short course of Dilaudid for uncontrolled renal colic.  Patient instructed not to take Hydrocodone in combination with or within 4 hours of Dilaudid.  · Phenergan prn N/V  · Push at least 2L of water daily.     Chronic pain syndrome  · Managed by HEMAL Petersen.  Dr. Ruvalcaba contacted to alert him that she was being treated for acute bilateral renal stones with Dilaudid.     Calculus of kidney  · Bilateral subcentimeter stone burden remaining.     Yeast cystitis  · Diflucan x one   (8/23/2022)  ------------------------------------------------------------------------------------------------------  The above note is from our nurse practitioner, Cayla Gamino's clinic visit with Mrs. Mitchell on August 23, 2022.    Ms. Mitchell was admitted to the outpatient surgery department of [facility] this morning. She was taken to the Cystoscopy suite where she underwent Cystoscopy, left ureteroscopy with laser lithotripsy, stone manipulation and basket extraction of stone fragments and insertion of left ureteral stent under general LMA anesthesia by Dr. Ruvalcaba (please see his operative procedure note for complete details). She tolerated the procedure well and was awakened and taken to PACU in stable condition. After PACU, she was taken back to [facility]. She had an uneventful recovery. She has voided adequately and is now requesting to go home. String was left intact on the distal end of indwelling  "left ureteral stent. She was given verbal instructions on how to "pull" her ureteral stent. She will do this first thing Monday morning, August 29, 2022.     was performed on Ms. Mitchell and her last prescription for a narcotic was written on June 20, 2022 but filled on July 27, 2022 for Norco (Hydrocodone) 10/325 mg tabs, # 60 with no refill by JANA[facility] Gardner.     **Our nurse practitioner, Cayla Gamino, wrote Ms. Mitchell a prescription for Dilaudid on August 23 but did not write Ms. Mitchell' home address on the prescription and the Pharmacy would not fill it due to home address not being on the prescription.    Dr. Ruvalcaba contacted [facility] to let him know that he was going to write Ms. Mitchell the following prescriptions:    Prescription # 1:  Dilaudid 2 mg, # 16 with no refill.  Patient may take 1 tablet by mouth every 4 hours as needed for pain.  (NO HYDROCODONE WITHIN 4 HOURS OF TAKING DILAUDID)    Prescription # 2:  Promethazine 25 mg, # 16 with no refill.  Patient may take 1 tablet by mouth every 4 - 6 hours as needed for nausea and vomiting.    She has a post-op return appointment with Dr. Ruvalcaba on Wednesday, September 28, 2022 at 2:10 p.m.  She will have a KUB x-ray first, then see Dr. Ruvalcaba after the x-ray.    She knows to call our office if she has any problems prior to her September 28th appointment.    Jamie Perez, DELORES, CNOR,  [facility]        "

## 2022-08-26 NOTE — TRANSFER OF CARE
"Anesthesia Transfer of Care Note    Patient: Gloria Mitchell    Procedure(s) Performed: Procedure(s) (LRB):  CYSTOURETEROSCOPY, WITH HOLMIUM LASER LITHOTRIPSY OF URETERAL CALCULUS AND STENT INSERTION (Left)    Patient location: PACU    Anesthesia Type: general    Transport from OR: Transported from OR on room air with adequate spontaneous ventilation    Post pain: adequate analgesia    Post assessment: no apparent anesthetic complications    Post vital signs: stable    Level of consciousness: responds to stimulation    Nausea/Vomiting: no nausea/vomiting    Complications: none    Transfer of care protocol was followed      Last vitals:   Visit Vitals  /79 (Patient Position: Lying)   Pulse 71   Temp 36.8 °C (98.3 °F)   Resp (!) 9   Ht 5' 2" (1.575 m)   Wt 86.6 kg (191 lb)   LMP 03/18/2021   SpO2 98%   Breastfeeding No   BMI 34.93 kg/m²     "

## 2022-08-26 NOTE — OR NURSING
0837 Rec'd pt to PACU awake and alert with no distress noted. VSS. Respirations even and unlabored. String x1 noted from urethral opening. No leaking/drainage noted. Denies needs at this time. Will continue to monitor.     0909 Out of PACU. VSS. No signs of bleeding/distress noted.     0915 Pt to ASC 5 awake and alert with no distress noted. Respirations even and unlabored. Family at bedside. Bedside report given to KIARA Cary RN. String x1 noted from urethral opening. Denies needs at this time. /81, P 64, R 12, O2 97% room air.

## 2022-09-21 ENCOUNTER — OFFICE VISIT (OUTPATIENT)
Dept: NEUROLOGY | Facility: CLINIC | Age: 52
End: 2022-09-21
Payer: MEDICARE

## 2022-09-21 VITALS — OXYGEN SATURATION: 98 % | DIASTOLIC BLOOD PRESSURE: 78 MMHG | SYSTOLIC BLOOD PRESSURE: 130 MMHG | HEART RATE: 61 BPM

## 2022-09-21 DIAGNOSIS — N23 BILATERAL RENAL COLIC: ICD-10-CM

## 2022-09-21 DIAGNOSIS — R11.10 VOMITING, INTRACTABILITY OF VOMITING NOT SPECIFIED, PRESENCE OF NAUSEA NOT SPECIFIED, UNSPECIFIED VOMITING TYPE: ICD-10-CM

## 2022-09-21 DIAGNOSIS — G43.719 INTRACTABLE CHRONIC MIGRAINE WITHOUT AURA AND WITHOUT STATUS MIGRAINOSUS: Primary | ICD-10-CM

## 2022-09-21 PROCEDURE — 3075F SYST BP GE 130 - 139MM HG: CPT | Mod: CPTII,,, | Performed by: NURSE PRACTITIONER

## 2022-09-21 PROCEDURE — 1160F PR REVIEW ALL MEDS BY PRESCRIBER/CLIN PHARMACIST DOCUMENTED: ICD-10-PCS | Mod: CPTII,,, | Performed by: NURSE PRACTITIONER

## 2022-09-21 PROCEDURE — 1160F RVW MEDS BY RX/DR IN RCRD: CPT | Mod: CPTII,,, | Performed by: NURSE PRACTITIONER

## 2022-09-21 PROCEDURE — 3075F PR MOST RECENT SYSTOLIC BLOOD PRESS GE 130-139MM HG: ICD-10-PCS | Mod: CPTII,,, | Performed by: NURSE PRACTITIONER

## 2022-09-21 PROCEDURE — 3078F DIAST BP <80 MM HG: CPT | Mod: CPTII,,, | Performed by: NURSE PRACTITIONER

## 2022-09-21 PROCEDURE — 99213 OFFICE O/P EST LOW 20 MIN: CPT | Mod: S$PBB,,, | Performed by: NURSE PRACTITIONER

## 2022-09-21 PROCEDURE — 1159F PR MEDICATION LIST DOCUMENTED IN MEDICAL RECORD: ICD-10-PCS | Mod: CPTII,,, | Performed by: NURSE PRACTITIONER

## 2022-09-21 PROCEDURE — 99213 PR OFFICE/OUTPT VISIT, EST, LEVL III, 20-29 MIN: ICD-10-PCS | Mod: S$PBB,,, | Performed by: NURSE PRACTITIONER

## 2022-09-21 PROCEDURE — 3078F PR MOST RECENT DIASTOLIC BLOOD PRESSURE < 80 MM HG: ICD-10-PCS | Mod: CPTII,,, | Performed by: NURSE PRACTITIONER

## 2022-09-21 PROCEDURE — 1159F MED LIST DOCD IN RCRD: CPT | Mod: CPTII,,, | Performed by: NURSE PRACTITIONER

## 2022-09-21 PROCEDURE — 99214 OFFICE O/P EST MOD 30 MIN: CPT | Mod: PBBFAC | Performed by: NURSE PRACTITIONER

## 2022-09-21 RX ORDER — TOPIRAMATE 25 MG/1
TABLET ORAL
Qty: 21 TABLET | Refills: 0 | Status: SHIPPED | OUTPATIENT
Start: 2022-09-21 | End: 2022-10-21

## 2022-09-21 RX ORDER — PROMETHAZINE HYDROCHLORIDE 25 MG/1
TABLET ORAL
Qty: 15 TABLET | Refills: 1 | Status: SHIPPED | OUTPATIENT
Start: 2022-09-21 | End: 2022-11-01 | Stop reason: SDUPTHER

## 2022-09-21 NOTE — PROGRESS NOTES
Subjective:       Patient ID: Gloria Mitchell is a 52 y.o. female     Chief Complaint:    Chief Complaint   Patient presents with    Follow-up    Migraine          Allergies:  Oxycodone-acetaminophen and Oxycodone hcl    Current Medications:    Outpatient Encounter Medications as of 2022   Medication Sig Dispense Refill    citric acid-potassium citrate (POLYCITRA) 1,100-334 mg/5 mL solution Take by mouth 3 (three) times daily with meals.      erenumab-aooe (AIMOVIG AUTOINJECTOR) 140 mg/mL autoinjector Inject 1 mL (140 mg total) into the skin every 28 days. 1 each 11    ergocalciferol (ERGOCALCIFEROL) 50,000 unit Cap Take 1 capsule (50,000 Units total) by mouth every 7 days. 4 capsule 11    gabapentin (NEURONTIN) 600 MG tablet Take 1 tablet (600 mg total) by mouth every 8 (eight) hours. 90 tablet 1    HYDROcodone-acetaminophen (NORCO)  mg per tablet Take 1 tablet by mouth every 12 (twelve) hours. 60 tablet 0    [START ON 2022] HYDROcodone-acetaminophen (NORCO)  mg per tablet Take 1 tablet by mouth every 12 (twelve) hours. 60 tablet 0    levothyroxine (SYNTHROID) 112 MCG tablet Take 112 mcg by mouth before breakfast.      metoprolol tartrate (LOPRESSOR) 25 MG tablet Take 25 mg by mouth once daily.      omeprazole (PRILOSEC) 20 MG capsule Take 20 mg by mouth once daily.      omeprazole (PRILOSEC) 20 MG capsule 1 capsule 30 minutes before morning meal      phenazopyridine (PYRIDIUM) 200 MG tablet Take 1 tablet (200 mg total) by mouth 3 (three) times daily as needed for Pain. 30 tablet 5    promethazine (PHENERGAN) 25 MG tablet Take 1 tablet every 8 hours as needed for migraine.  Not more than 3 days of the week 15 tablet 1    tiZANidine 4 mg Cap Take 1 capsule by mouth every 8 (eight) hours. 90 capsule 1    [] cefUROXime (CEFTIN) 250 MG tablet Take 1 tablet (250 mg total) by mouth every 12 (twelve) hours. DO NOT USE OMEPRAZOLE (PRILOSEC) WHILE TAKING THIS MEDICINE!  Take with daily  probiotics. for 10 days (Patient not taking: Reported on 2022) 20 tablet 0    [] fluconazole (DIFLUCAN) 150 MG Tab Take 1 tablet (150 mg total) by mouth once. for 1 dose 1 tablet 0    topiramate (TOPAMAX) 25 MG tablet Take 1 tablet twice daily for a week then take 1 tablet daily for a week then stop (Patient not taking: Reported on 2022) 21 tablet 0    [DISCONTINUED] promethazine (PHENERGAN) 25 MG tablet Take 1 tablet (25 mg total) by mouth every 4 (four) hours. 15 tablet 0    [DISCONTINUED] topiramate (TOPAMAX) 50 MG tablet Take 1 tablet (50 mg total) by mouth 2 (two) times daily. 60 tablet 11     No facility-administered encounter medications on file as of 2022.       History of Present Illness  52 yr old white female presents with headaches that started as a teenager.     Prior to topamax she reports was having between 12 and 16 headache days a month, with the topamax 100mg QHS she has maybe 3.  She tolerated very well, no reported side effects.  However, in 2021 was found to have kidney stones.  Prior treated with Effexor in [id number] but was found not effective for migraines.  Initially she declined to change medication, but in January I had discussed option of once monthly injections and she was open to this idea.  She was started on Aimovig once monthly injections and reported significant improvement, reported essentially her migraines had stopped, only one actually in the prior 3 months.    We decreased her topamax to 50mg BID with intent to slow taper off to see if it was still needed.  However, she got covid the week after she saw us, was put on paxlovid and was told by her primary care to suddenly stop her topamax but topamax is not one of the three antiepileptic medications recommended to avoid with paxlovid, that is tegretol, dilantin, and phenobarb.  Topamax should never be suddenly stopped, but when patient did she began having tremors and diaphoresis.  She was told by ED  to restart it and this quickly resolved the symptoms.  At last visit she was doing well but we did discuss need to continue slow tapering off.  We will decrease her dosing to 25mg twice daily for a week then once daily for a week then stop.  We may need to add zonegran in its place, will see how headaches do first.         Review of Systems  Review of Systems   Constitutional:  Negative for diaphoresis and fever.   HENT:  Negative for congestion, hearing loss and tinnitus.    Eyes:  Negative for blurred vision, double vision, photophobia, discharge and redness.   Respiratory:  Negative for cough and shortness of breath.    Cardiovascular:  Negative for chest pain.   Gastrointestinal:  Negative for abdominal pain, nausea and vomiting.   Musculoskeletal:  Negative for back pain, joint pain, myalgias and neck pain.   Skin:  Negative for itching and rash.   Neurological:  Positive for headaches. Negative for dizziness, tremors, sensory change, speech change, focal weakness, seizures, loss of consciousness and weakness.   Psychiatric/Behavioral:  Negative for depression, hallucinations and memory loss. The patient has insomnia.    All other systems reviewed and are negative.   Objective:     NEUROLOGICAL EXAMINATION:     MENTAL STATUS   Oriented to person, place, and time.   Registration: recalls 3 of 3 objects. Recall at 5 minutes: recalls 3 of 3 objects.   Attention: normal. Concentration: normal.   Speech: speech is normal   Level of consciousness: alert  Knowledge: good and consistent with education.   Normal comprehension.     CRANIAL NERVES     CN II   Visual fields full to confrontation.   Visual acuity: normal  Right visual field deficit: none  Left visual field deficit: none     CN III, IV, VI   Pupils are equal, round, and reactive to light.  Extraocular motions are normal.   Right pupil: Size: 3 mm. Shape: regular. Reactivity: brisk. Consensual response: intact. Accommodation: intact.   Left pupil: Size: 3 mm.  Shape: regular. Reactivity: brisk. Consensual response: intact. Accommodation: intact.   CN III: no CN III palsy  CN VI: no CN VI palsy  Nystagmus: none   Diplopia: none  Upgaze: normal  Downgaze: normal  Conjugate gaze: present  Vestibulo-ocular reflex: present    CN V   Facial sensation intact.   Right facial sensation deficit: none  Left facial sensation deficit: none  Right corneal reflex: normal  Left corneal reflex: normal    CN VII   Facial expression full, symmetric.   Right facial weakness: none  Left facial weakness: none  Right taste: normal  Left taste: normal    CN VIII   CN VIII normal.   Hearing: intact    CN IX, X   CN IX normal.   CN X normal.   Palate: symmetric    CN XI   CN XI normal.   Right sternocleidomastoid strength: normal  Left sternocleidomastoid strength: normal  Right trapezius strength: normal  Left trapezius strength: normal    CN XII   CN XII normal.   Tongue: not atrophic  Fasciculations: absent  Tongue deviation: none    MOTOR EXAM   Muscle bulk: normal  Overall muscle tone: normal  Right arm tone: normal  Left arm tone: normal  Right arm pronator drift: absent  Left arm pronator drift: absent  Right leg tone: normal  Left leg tone: normal    Strength   Right neck flexion: 5/5  Left neck flexion: 5/5  Right neck extension: 5/5  Left neck extension: 5/5  Right deltoid: 5/5  Left deltoid: 5/5  Right biceps: 5/5  Left biceps: 5/5  Right triceps: 5/5  Left triceps: 5/5  Right wrist flexion: 5/5  Left wrist flexion: 5/5  Right wrist extension: 5/5  Left wrist extension: 5/5  Right interossei: 5/5  Left interossei: 5/5  Right iliopsoas: 5/5  Left iliopsoas: 5/5  Right quadriceps: 5/5  Left quadriceps: 5/5  Right hamstrin/5  Left hamstrin/5  Right anterior tibial: 5/5  Left anterior tibial: 5/5  Right posterior tibial: 5/5  Left posterior tibial: 5/5  Right gastroc: 5/5  Left gastroc: 5/5    REFLEXES     Reflexes   Right brachioradialis: 2+  Left brachioradialis: 2+  Right biceps:  2+  Left biceps: 2+  Right triceps: 2+  Left triceps: 2+  Right patellar: 2+  Left patellar: 2+  Right achilles: 2+  Left achilles: 2+  Right plantar: normal  Left plantar: normal  Right Hernandez: absent  Left Hernandez: absent  Right ankle clonus: absent  Left ankle clonus: absent  Right pendular knee jerk: absent  Left pendular knee jerk: absent    SENSORY EXAM   Light touch normal.   Right arm light touch: normal  Left arm light touch: normal  Right leg light touch: normal  Left leg light touch: normal  Vibration normal.   Right arm vibration: normal  Left arm vibration: normal  Right leg vibration: normal  Left leg vibration: normal  Proprioception normal.   Right arm proprioception: normal  Left arm proprioception: normal  Right leg proprioception: normal  Left leg proprioception: normal  Pinprick normal.   Right arm pinprick: normal  Left arm pinprick: normal  Right leg pinprick: normal  Left leg pinprick: normal    GAIT AND COORDINATION     Gait  Gait: normal     Coordination   Finger to nose coordination: normal  Heel to shin coordination: normal  Tandem walking coordination: normal    Tremor   Resting tremor: absent  Intention tremor: absent  Action tremor: absent     Physical Exam  Vitals and nursing note reviewed.   Constitutional:       Appearance: Normal appearance.   HENT:      Head: Normocephalic.   Eyes:      Extraocular Movements: Extraocular movements intact and EOM normal.      Pupils: Pupils are equal, round, and reactive to light.   Cardiovascular:      Rate and Rhythm: Normal rate and regular rhythm.   Pulmonary:      Effort: Pulmonary effort is normal.      Breath sounds: Normal breath sounds.   Musculoskeletal:         General: No swelling or tenderness. Normal range of motion.      Cervical back: Normal range of motion and neck supple.      Right lower leg: No edema.      Left lower leg: No edema.   Skin:     General: Skin is warm and dry.      Coloration: Skin is not jaundiced.      Findings:  No rash.   Neurological:      General: No focal deficit present.      Mental Status: She is alert and oriented to person, place, and time.      GCS: GCS eye subscore is 4. GCS verbal subscore is 5. GCS motor subscore is 6.      Cranial Nerves: No cranial nerve deficit.      Sensory: No sensory deficit.      Motor: Motor function is intact. No weakness.      Coordination: Coordination is intact. Coordination normal. Finger-Nose-Finger Test and Heel to Shin Test normal.      Gait: Gait is intact. Gait and tandem walk normal.      Deep Tendon Reflexes: Reflexes normal.      Reflex Scores:       Tricep reflexes are 2+ on the right side and 2+ on the left side.       Bicep reflexes are 2+ on the right side and 2+ on the left side.       Brachioradialis reflexes are 2+ on the right side and 2+ on the left side.       Patellar reflexes are 2+ on the right side and 2+ on the left side.       Achilles reflexes are 2+ on the right side and 2+ on the left side.  Psychiatric:         Mood and Affect: Mood normal.         Speech: Speech normal.         Behavior: Behavior normal.        Assessment:     Problem List Items Addressed This Visit          Neuro    Intractable chronic migraine without aura and without status migrainosus - Primary    Relevant Medications    topiramate (TOPAMAX) 25 MG tablet     Other Visit Diagnoses       Bilateral renal colic        Vomiting, intractability of vomiting not specified, presence of nausea not specified, unspecified vomiting type        Relevant Medications    promethazine (PHENERGAN) 25 MG tablet             Primary Diagnosis and ICD10  Intractable chronic migraine without aura and without status migrainosus [G43.719]    Plan:     Patient Instructions   Decrease topamax to 25mg twice daily for a week then take once daily for a week then stop  Continue Aimovig 140mg once monthly  Continue phenergan as needed    Medications Discontinued During This Encounter   Medication Reason     topiramate (TOPAMAX) 50 MG tablet     promethazine (PHENERGAN) 25 MG tablet          Requested Prescriptions     Signed Prescriptions Disp Refills    topiramate (TOPAMAX) 25 MG tablet 21 tablet 0     Sig: Take 1 tablet twice daily for a week then take 1 tablet daily for a week then stop     Patient not taking: Reported on 9/21/2022    promethazine (PHENERGAN) 25 MG tablet 15 tablet 1     Sig: Take 1 tablet every 8 hours as needed for migraine.  Not more than 3 days of the week       No orders of the defined types were placed in this encounter.

## 2022-09-21 NOTE — PATIENT INSTRUCTIONS
Decrease topamax to 25mg twice daily for a week then take once daily for a week then stop  Continue Aimovig 140mg once monthly  Continue phenergan as needed

## 2022-09-26 ENCOUNTER — OFFICE VISIT (OUTPATIENT)
Dept: UROLOGY | Facility: CLINIC | Age: 52
End: 2022-09-26
Payer: MEDICARE

## 2022-09-26 ENCOUNTER — HOSPITAL ENCOUNTER (OUTPATIENT)
Dept: RADIOLOGY | Facility: HOSPITAL | Age: 52
Discharge: HOME | End: 2022-09-26
Attending: UROLOGY
Payer: MEDICARE

## 2022-09-26 VITALS
HEART RATE: 90 BPM | BODY MASS INDEX: 35.7 KG/M2 | HEIGHT: 62 IN | WEIGHT: 194 LBS | SYSTOLIC BLOOD PRESSURE: 136 MMHG | DIASTOLIC BLOOD PRESSURE: 99 MMHG

## 2022-09-26 DIAGNOSIS — N20.0 KIDNEY STONES: ICD-10-CM

## 2022-09-26 DIAGNOSIS — Z09 POSTOP CHECK: ICD-10-CM

## 2022-09-26 DIAGNOSIS — N20.0 KIDNEY STONES: Primary | ICD-10-CM

## 2022-09-26 PROCEDURE — 3008F PR BODY MASS INDEX (BMI) DOCUMENTED: ICD-10-PCS | Mod: CPTII,,, | Performed by: UROLOGY

## 2022-09-26 PROCEDURE — 1159F MED LIST DOCD IN RCRD: CPT | Mod: CPTII,,, | Performed by: UROLOGY

## 2022-09-26 PROCEDURE — 99215 OFFICE O/P EST HI 40 MIN: CPT | Mod: PBBFAC | Performed by: UROLOGY

## 2022-09-26 PROCEDURE — 3075F PR MOST RECENT SYSTOLIC BLOOD PRESS GE 130-139MM HG: ICD-10-PCS | Mod: CPTII,,, | Performed by: UROLOGY

## 2022-09-26 PROCEDURE — 74018 RADEX ABDOMEN 1 VIEW: CPT | Mod: 26,,, | Performed by: RADIOLOGY

## 2022-09-26 PROCEDURE — 3080F DIAST BP >= 90 MM HG: CPT | Mod: CPTII,,, | Performed by: UROLOGY

## 2022-09-26 PROCEDURE — 74018 XR KUB: ICD-10-PCS | Mod: 26,,, | Performed by: RADIOLOGY

## 2022-09-26 PROCEDURE — 1159F PR MEDICATION LIST DOCUMENTED IN MEDICAL RECORD: ICD-10-PCS | Mod: CPTII,,, | Performed by: UROLOGY

## 2022-09-26 PROCEDURE — 1160F PR REVIEW ALL MEDS BY PRESCRIBER/CLIN PHARMACIST DOCUMENTED: ICD-10-PCS | Mod: CPTII,,, | Performed by: UROLOGY

## 2022-09-26 PROCEDURE — 3075F SYST BP GE 130 - 139MM HG: CPT | Mod: CPTII,,, | Performed by: UROLOGY

## 2022-09-26 PROCEDURE — 1160F RVW MEDS BY RX/DR IN RCRD: CPT | Mod: CPTII,,, | Performed by: UROLOGY

## 2022-09-26 PROCEDURE — 99024 POSTOP FOLLOW-UP VISIT: CPT | Mod: ,,, | Performed by: UROLOGY

## 2022-09-26 PROCEDURE — 3080F PR MOST RECENT DIASTOLIC BLOOD PRESSURE >= 90 MM HG: ICD-10-PCS | Mod: CPTII,,, | Performed by: UROLOGY

## 2022-09-26 PROCEDURE — 99024 PR POST-OP FOLLOW-UP VISIT: ICD-10-PCS | Mod: ,,, | Performed by: UROLOGY

## 2022-09-26 PROCEDURE — 3008F BODY MASS INDEX DOCD: CPT | Mod: CPTII,,, | Performed by: UROLOGY

## 2022-09-26 PROCEDURE — 74018 RADEX ABDOMEN 1 VIEW: CPT | Mod: TC

## 2022-09-26 RX ORDER — AMITRIPTYLINE HYDROCHLORIDE 25 MG/1
25 TABLET, FILM COATED ORAL NIGHTLY PRN
Qty: 30 TABLET | Refills: 0 | Status: SHIPPED | OUTPATIENT
Start: 2022-09-26 | End: 2022-10-21

## 2022-09-26 RX ORDER — METOPROLOL TARTRATE 50 MG/1
100 TABLET ORAL DAILY
COMMUNITY
Start: 2022-06-29

## 2022-09-27 NOTE — PATIENT INSTRUCTIONS
Stone fragments sent for stone analysis.  Call for results in about 2 weeks.  Continue potassium citrate.  Six month appointment with KUB.

## 2022-09-27 NOTE — PROGRESS NOTES
Subjective:       Patient ID: Gloria Mitchell is a 52 y.o. female.    Chief Complaint: Follow-up (One month post op visit; KUB)       PAST UROLOGICAL HISTORY DR. VALLADARES:     Ms. Mitchell presented to the clinic today because of left ureteral colic.  She had onset pain on April 2nd and went to the emergency room at [facility].  She was found to have a large stone at the left ureteropelvic junction it measures about 6 x 10 mm.  CT scan was done but she did not have a KUB.  She had a lot pain that day and then did okay for couple of days but today has been having increasing left flank pain again.  She did require Dilaudid and has only a few Dilaudid left.  Presented to the clinic in acute pain as she says she did not hurt any until after she arrived in [place].  She desires to go ahead with definitive treatment because the amount of pain she has had on intermittent basis.  Patient had left ureteroscopy with laser lithotripsy of stone in the distal left ureter in July 2020.  That was the last active stones she has had until this one.  She has a long history of stone disease dating back a good many years.  Previous stone risk profile revealed her to have low citrate and she has been taking her potassium citrate as directed which is two 10 mEq tablets b.i.d..  She admits she does not drink as much water she should.  Has had multiple procedures in the past.  (April 6, 2021  ---------------------------------------------------------------------------------------------------------------------------------------------------------------------     Ms. Mitchell underwent left ureteroscopy with laser lithotripsy of stone and stent insertion on April 8. She was supposed to keep her stent until Monday but accidentally pulled it out on Saturday.  She has had a lot of pain since then and continues to have a lot of pain.  She presented clinic with severe pain on left side.  She took her last Dilaudid at 9:30 a.m. today.  She is also  concerned she may have some infection.  The urine culture that was collected initially at her clinic visit grew 30,000 colonies Citrobacter and E coli and 20,000 colonies of Enterococcus faecalis.  She did get Bactrim DS which is appropriate and she is not febrile.  Presented to clinic with her mother.  (April 12, 2021)     Ms. Mitchell is in for KUB and follow-up since her left ureteroscopy.  She is not having any more stone pain.  She passed a stone on or about April 25th which apparently was a small fragment.  She did not have any pain associated with it.  Comfortable now and back to her baseline.  She is going to pain clinic for her back problems.  Nothing that sounds like any additional stone issues.  She is drinking only water and trying to drink more.  She is taking her potassium citrate to help with stone prevention..  In today for KUB and follow-up.  Her mother was with her today.  (May 17, 2021)     Ms. Mitchell is seen in clinic for the 1st time in over a year but has had at least 3 urinary tract infection during the past year.  We treated her for at least 2 and she was treated in [facility] for infection about 2 weeks ago.  That treatment was with Cipro and clinically seemed to improve although we are not sure what culture showed or if a culture was done.  Now off the Cipro for the last few days with no symptoms.  She has had no recent stone problems.  No other health problems that she is aware of except for pneumonia that was treated as outpatient.  (June 6, 2022)     Ms. Mitchell was seen is work-in patient today because she says she feels she has a stone as she has been hurting in her left flank since it began Saturday night and also feels she has another infection.  She is also having dysuria. (June 20, 2022)  [id number]     Ms. Mitchell is an established patient of Dr. Ruvalcaba's with history of renal stones.   "She has been worked into clinic today for c/o left flank and pelvic pain x 4 days.  She   Left Ureteroscopy with laser lithotripsy 4/8/21  Seen for left flank pain 6/20, no ureteral stone seen on KUB, and was to f/u if pain persisted for updated CT - last one year prior.  Urine positive for 30,000 E. Coli.  (8/19/2022)----------------------------------------------------------------        Ms. Mitchell is a 51 yo female who has returned today with her mother after obtaining CT Imaging this morning in [place] for L/R renal colic.  Dr. Ruvalcaba to room to speak with patient and review imaging.  Patient was COVID positive 7/29.  States resolution of symptoms.      -  Chronic pain syndrome:  Followed by pain tx for chronic low back pain, treated with Norco 10 BID.     -  Left /Right flank pain:  Unable to confirm on KUB last visit.  CX positive, tx started today (see below)  CT Stone Protocol today resulted:  "Similar mild-to-moderate left-sided hydronephrosis. There has been some distal migration of previously described left UPJ stone measuring a mm which is now at the S1 level. A few punctate calculi are adjacent to this within the more proximal left ureter.  Similar 2 mm calculus within the distal right ureter at the inferior sacral level without significant hydronephrosis on the right. Multiple subcentimeter nonobstructing bilateral renal calculi are present."     -  UTI (urinary tract infection):  Urine culture returned positive for significant E. Coli and Yeast infections.  Will treat with 10 day course of Ceftin 250 mg po BID with daily probiotics.  Instruct patient not to take Omeprazole (Prilosec) in conjunction with this antx.  Urine was also positive for microhematuria.  This may be present b/c of UTI, and/or secondary UTI from active renal stone.     -  Yeast cystitis:    Significant growth seen in urine studies.  tx with diflucan x one (ordered this am " PTA).  (8/23/2022)--------------------------------------------------------------------------      Review of Systems   Constitutional: Negative.    HENT: Negative.    Eyes: Negative.    Respiratory: Negative.    Cardiovascular: Negative.    Gastrointestinal: Negative.    Endocrine: Negative.    Genitourinary: Positive for dysuria, flank pain, frequency, hematuria, pelvic pain, urgency and vaginal discharge. Negative for decreased urine volume, difficulty urinating, genital sores, menstrual problem, vaginal bleeding and vaginal pain.        L/R bilateral flank pain;  Vaginal itching   Musculoskeletal: Positive for back pain.        Chronic low back pain - followed by [facility].   Skin: Negative.    Allergic/Immunologic: Negative.    Neurological: Negative.    Hematological: Negative.    Psychiatric/Behavioral: Negative.    ---------------------------------------------------------------------------------------------------------------------------------------------------------------------------------------  The above is from the history of Ms. Cayla Gamino of August 23rd prior to the left ureteroscopy of August 26.  Ms. Mitchell is doing better since that time.  She had no trouble removing her stent postop.  No sensation of infection today and basically back to her baseline.  She is on potassium citrate 3 times daily like she has been on.  Previous stone risk profile in 2014 revealed very low urine citrate level as well as low urine output.  She is taking her medication as prescribed.  Still having stones but fewer than she formerly had. [September 26, 2022]  Review of Systems      Objective:      Physical Exam  Constitutional:       Appearance: Normal appearance. She is normal weight.   Neurological:      Mental Status: She is alert.   Psychiatric:         Mood and Affect: Mood normal.         Behavior: Behavior normal.     Urinalysis reveals several red blood cells with occasional pus.  Dipstick had 2+ blood  with pH 5.0 and specific gravity 1.025  Assessment:       Problem List Items Addressed This Visit    None  Visit Diagnoses       Kidney stones    -  Primary    Relevant Orders    Surgical Pathology    X-Ray KUB    Postop check                  Plan:         Stone fragments sent for stone analysis.  Call for results in about 2 weeks.  Continue potassium citrate.  Six month appointment with KUB.

## 2022-09-29 LAB
ESTROGEN SERPL-MCNC: NORMAL PG/ML
INSULIN SERPL-ACNC: NORMAL U[IU]/ML
LAB AP CLINICAL INFORMATION: NORMAL
LAB AP GROSS DESCRIPTION: NORMAL
LAB AP LABORATORY NOTES: NORMAL
T3RU NFR SERPL: NORMAL %

## 2022-10-03 LAB
CELL MATERIAL STONE EST-MCNT: NORMAL %
SPECIMEN SOURCE: NORMAL

## 2022-10-17 ENCOUNTER — CLINICAL SUPPORT (OUTPATIENT)
Dept: REHABILITATION | Facility: HOSPITAL | Age: 52
End: 2022-10-17
Payer: MEDICARE

## 2022-10-17 DIAGNOSIS — R52 PAIN: ICD-10-CM

## 2022-10-17 PROCEDURE — 97110 THERAPEUTIC EXERCISES: CPT

## 2022-10-17 PROCEDURE — 97161 PT EVAL LOW COMPLEX 20 MIN: CPT

## 2022-10-17 PROCEDURE — 97140 MANUAL THERAPY 1/> REGIONS: CPT

## 2022-10-17 NOTE — PLAN OF CARE
[facility]   Physical Therapy Initial Evaluation    Name: Gloria Mitchell  Clinic Number: [id number]    Therapy Diagnosis: No diagnosis found.  Physician: Lasha Kerr PA    Physician Orders: PT Eval and Treat   Medical Diagnosis from Referral: lumbosacral radiculopathy   Evaluation Date: 10/17/2022  Authorization Period Expiration: 8/23/2023  Plan of Care Expiration: 11/14/2022  Visit # / Visits authorized: 1/ 8    Time In: 9:04  Time Out: 10:00  Total Appointment Time (timed & untimed codes): 56 minutes    Precautions:  pain stimulator- no electrical modalities until cleared    Subjective   Date of onset: 30 years from work injury  History of current condition - Gloria reports: Pt with complaints of chronic back pain that pt reports started about 30 years ago with a ruptured disc at work while lifting. Pt had four ruptured discs in L4-L5 since initial rupture. Pt had stimulator put in last August that helps but doesn't control the pain like intended. Pt does not remember PT for back before. Pt reports pain with all prolonged activity. Pt had radicular symptoms down B LE with complete numbness down L LE and pain down B LE. Pt reports sensation in feet for hot and cold or sharp objects. Pt referred for conservative care.      Medical History:   Past Medical History:   Diagnosis Date    Acid reflux     Calculus of kidney     Depression     Hypertension     Hypothyroidism     Low back pain     Lumbosacral radiculopathy 5/17/2021    Migraines     Pain     Postlaminectomy syndrome, lumbar region 5/17/2021    UTI (urinary tract infection) 8/23/2022       Surgical History:   Gloria Mitchell  has a past surgical history that includes Lithotripsy; Back surgery; and Insertion of dorsal column nerve stimulator for trial (N/A, 7/15/2021).    Medications:   Gloria has a current medication list which includes the following prescription(s): amitriptyline, citric acid-potassium citrate, aimovig autoinjector,  "ergocalciferol, gabapentin, hydrocodone-acetaminophen, levothyroxine, metoprolol tartrate, metoprolol tartrate, omeprazole, omeprazole, phenazopyridine, promethazine, tizanidine, and topiramate.    Allergies:   Review of patient's allergies indicates:   Allergen Reactions    Oxycodone-acetaminophen Swelling and Edema    Oxycodone hcl         Imaging, CT scan films: kidney stone, MRI- a year ago, OA and spinal stenosis    Prior Therapy: pt can't remember PT but states it doesn't help  Social History:  lives alone  Occupation: disability for back  Prior Level of Function: semi-active   Current Level of Function: sedentary    Pain:  Current 8/10, worst 10/10, best 5/10   Location: bilateral back  and LEs   Description: Aching, Dull, Burning, Throbbing, Numb, Sharp, and Shooting  Aggravating Factors: Sitting, Standing, Bending, Walking, and Lifting  Easing Factors: lying down and heating pad    Pts goals: "to relieve some pain"     Objective     Posture:  Standing lordosis: WNL  Sitting lordosis: Decreased  Iliac crest height: left increased  PSIS height: left increased  Pelvic rotation/torsion: no  Scoliosis: no      Forward bend: limited 50% with no increased pain reported  Back bend: limited 75% with severe pain reported  Lateral trunk lean: right WFL's, left pain decreased 25%   Trunk rotation: right WFL's left pain decreased 25%    MANUAL MUSCLE TEST  Right left   Hip flexion MMT number: 4-/5 MMT strength: 4-/5   Hip abduction MMT strength: 4-/5 MMT strength: 4-/5   Knee extension MMT strength: 4-/5 MMT strength: 4-/5   Knee flexion  MMT strength: 4-/5 MMT strength: 4-/5   Ankle dorsiflexion MMT strength: 4/5 MMT strength: 4/5   Ankle plantar flexion  MMT strength: 4/5 MMT strength: 4/5   Extensor hallucis longus MMT strength: 4/5 MMT strength: 4/5     ROM/flexibility right left   Hamstring 90/90 (-10) 20 degrees 45 degrees      Special test Right  Left    SLR test < 60 degrees Negative Negative   SLR test > 60 " "degrees Negative Positive   Sitting slump test Negative Positive   Piriformis test Negative Positive   ELTON test Negative Negative   SI forward bend Negative Positive   SI distraction Negative Negative   SI compression Negative Negative     Gait assessment:   Gait is guarded and slow but no major abnormalities noted    Palpation: L pelvic upslip with surrounding moderate muscle tightness      Dermatome: intact but decreased throughout L LE      TREATMENT   Treatment Time In: 9:20  Treatment Time Out: 10:00  Total Treatment time (time-based codes) separate from Evaluation: 40 minutes    Gloria received the treatments listed below:  THERAPEUTIC EXERCISES to develop strength, endurance, ROM, flexibility, posture, and core stabilization for 12 minutes including see flowsheet below  MANUAL THERAPY TECHNIQUES including Joint mobilizations and Soft tissue Mobilization were applied to see details below for 20 minutes.  hot pack for 8 minutes to low back.      Ther-Ex    Nustep    Wedge    Hamstring Stretch    Posterior Pelvic Tilts 20 x 3"   Lower Trunk Rotations 5 x 10" each   Single Knee to Chest Stretch 5 x 10" each   Piriformis Stretch 2 x 30" each   Figure 4 Stretch 2 x 30" each    Hip Adduction    Hip Abduction                           PT completed palpation assessment and noted Left upslip innominate. Manual correction of pelvic malalignment completed with mod effort by PT. Patient reported decreased in previous symptoms following correction.     Active neuromuscular releases of left psoas, glute min/med, quadratus lumborum, and piriformis completed by PT with mod effort to decrease tissue irritation, tightness, and pain. Patient reported decreased in reported symptoms. PT noted improved tissue irritability and tightness post manual tx.     Home Exercises and Patient Education Provided    Education provided:   - eval findings, POC, HEP    Written Home Exercises Provided: yes.  Exercises were reviewed and Gloria was " able to demonstrate them prior to the end of the session.  Gloria demonstrated good  understanding of the education provided.     See EMR under Patient Instructions for exercises provided 10/17/2022.    Assessment   Gloria is a 52 y.o. female referred to outpatient Physical Therapy with a medical diagnosis of lumbosacral radiculopathy. Pt presents with increased pain, decreased ROM, and decreased strength. Pt limited with functional mobility and gait due to deficits.     Pt prognosis is Fair.   Pt will benefit from skilled outpatient Physical Therapy to address the deficits stated above and in the chart below, provide pt/family education, and to maximize pt's level of independence.     Plan of care discussed with patient: Yes  Pt's spiritual, cultural and educational needs considered and patient is agreeable to the plan of care and goals as stated below:     Anticipated Barriers for therapy: chronic pain, compliance with HEP    Goals: to be met by the end of POC  Patient will report decrease in pain to 4/10 to improve quality of life  Patient will report decrease in radiating occurrences from 90% to 50% to allow patient the ability to perform activities of daily living   Patient will increase B lower extremity strength to 4+/5 to improve ambulation ability  Patient will increase L hamstring 90/90 by 10 degrees     Plan   Plan of care Certification: 10/17/2022 to 11/14/2022.    Outpatient Physical Therapy 2 times weekly for 4 weeks to include the following interventions: Electrical Stimulation IFC/Biphasic, Gait Training, Manual Therapy, Moist Heat/ Ice, Neuromuscular Re-ed, Patient Education, Self Care, Therapeutic Activities, Therapeutic Exercise, and Ultrasound.     Ursula Siegel, PT, DPT 10/17/2022

## 2022-10-19 ENCOUNTER — OFFICE VISIT (OUTPATIENT)
Dept: PAIN MEDICINE | Facility: CLINIC | Age: 52
End: 2022-10-19
Payer: MEDICARE

## 2022-10-19 VITALS
BODY MASS INDEX: 36.07 KG/M2 | HEIGHT: 62 IN | SYSTOLIC BLOOD PRESSURE: 161 MMHG | WEIGHT: 196 LBS | DIASTOLIC BLOOD PRESSURE: 90 MMHG | HEART RATE: 58 BPM

## 2022-10-19 DIAGNOSIS — G89.4 CHRONIC PAIN SYNDROME: ICD-10-CM

## 2022-10-19 DIAGNOSIS — M96.1 POSTLAMINECTOMY SYNDROME, LUMBAR REGION: Primary | ICD-10-CM

## 2022-10-19 DIAGNOSIS — M53.3 DISORDER OF SACRUM: ICD-10-CM

## 2022-10-19 DIAGNOSIS — M54.17 LUMBOSACRAL RADICULOPATHY: ICD-10-CM

## 2022-10-19 PROCEDURE — 1159F PR MEDICATION LIST DOCUMENTED IN MEDICAL RECORD: ICD-10-PCS | Mod: CPTII,,, | Performed by: PAIN MEDICINE

## 2022-10-19 PROCEDURE — 99215 OFFICE O/P EST HI 40 MIN: CPT | Mod: PBBFAC | Performed by: PAIN MEDICINE

## 2022-10-19 PROCEDURE — 3077F PR MOST RECENT SYSTOLIC BLOOD PRESSURE >= 140 MM HG: ICD-10-PCS | Mod: CPTII,,, | Performed by: PAIN MEDICINE

## 2022-10-19 PROCEDURE — 1159F MED LIST DOCD IN RCRD: CPT | Mod: CPTII,,, | Performed by: PAIN MEDICINE

## 2022-10-19 PROCEDURE — 99213 PR OFFICE/OUTPT VISIT, EST, LEVL III, 20-29 MIN: ICD-10-PCS | Mod: S$PBB,25,, | Performed by: PAIN MEDICINE

## 2022-10-19 PROCEDURE — 99213 OFFICE O/P EST LOW 20 MIN: CPT | Mod: S$PBB,25,, | Performed by: PAIN MEDICINE

## 2022-10-19 PROCEDURE — 96372 THER/PROPH/DIAG INJ SC/IM: CPT | Mod: PBBFAC | Performed by: PAIN MEDICINE

## 2022-10-19 PROCEDURE — 3077F SYST BP >= 140 MM HG: CPT | Mod: CPTII,,, | Performed by: PAIN MEDICINE

## 2022-10-19 PROCEDURE — 3080F DIAST BP >= 90 MM HG: CPT | Mod: CPTII,,, | Performed by: PAIN MEDICINE

## 2022-10-19 PROCEDURE — 3080F PR MOST RECENT DIASTOLIC BLOOD PRESSURE >= 90 MM HG: ICD-10-PCS | Mod: CPTII,,, | Performed by: PAIN MEDICINE

## 2022-10-19 RX ORDER — GABAPENTIN 800 MG/1
800 TABLET ORAL 3 TIMES DAILY
Qty: 90 TABLET | Refills: 1 | Status: SHIPPED | OUTPATIENT
Start: 2022-10-19 | End: 2024-01-25

## 2022-10-19 RX ORDER — CELECOXIB 200 MG/1
200 CAPSULE ORAL DAILY
Qty: 30 CAPSULE | Refills: 1 | Status: SHIPPED | OUTPATIENT
Start: 2022-10-19 | End: 2022-11-23 | Stop reason: SDUPTHER

## 2022-10-19 RX ORDER — TIZANIDINE 4 MG/1
4 TABLET ORAL 3 TIMES DAILY
Qty: 90 TABLET | Refills: 1 | Status: SHIPPED | OUTPATIENT
Start: 2022-10-19 | End: 2022-11-18

## 2022-10-19 RX ORDER — KETOROLAC TROMETHAMINE 30 MG/ML
60 INJECTION, SOLUTION INTRAMUSCULAR; INTRAVENOUS
Status: COMPLETED | OUTPATIENT
Start: 2022-10-19 | End: 2022-10-19

## 2022-10-19 RX ORDER — HYDROCODONE BITARTRATE AND ACETAMINOPHEN 10; 325 MG/1; MG/1
1 TABLET ORAL EVERY 8 HOURS PRN
Qty: 90 TABLET | Refills: 0 | Status: SHIPPED | OUTPATIENT
Start: 2022-10-19 | End: 2022-11-16 | Stop reason: SDUPTHER

## 2022-10-19 RX ADMIN — KETOROLAC TROMETHAMINE 60 MG: 30 INJECTION, SOLUTION INTRAMUSCULAR at 11:10

## 2022-10-19 NOTE — H&P (VIEW-ONLY)
She Disclaimer: This note has been generated using voice-recognition software. There may be typographical errors that have been missed during proof-reading        Patient ID: Gloria Mitchell is a 52 y.o. female.      Chief Complaint: Leg Pain      52-year-old female returns for re-evaluation of worsening lower back and bilateral lumbar radicular pain.  She has a long history of chronic pain and post-laminectomy pain syndrome.  She received a spinal cord stimulator by Dr. Ireland 7/15/ 2, and was experiencing good relief until recently.  Her pain is poorly controlled with medications and  the spinal cord stimulator.  She describes a burning, aching pain of the lower extremities and feet.  She was unable to complete physical therapy due to pain exacerbation.  MRI of the lumbar spine from December 2020 revealed degenerative changes, L5-S1 bulging disc, bilateral stenosis and scar tissue formation.  She has  not received recent nerve block injections for lumbosacral radiculopathy.  Caudal epidural steroid injection was discussed prior to consideration of repeating  lumbar MRI and surgical re-evaluation.          Pain Assessment  Pain Assessment: 0-10  Pain Score:   8  Pain Location: Leg  Pain Orientation: Right, Left  Pain Descriptors: Aching, Burning, Sharp, Constant  Pain Frequency: Continuous  Pain Onset: Awakened from sleep  Clinical Progression: Gradually worsening  Aggravating Factors: Standing, Walking, Other (Comment) (sitting and lying)  Pain Intervention(s): Medication (See eMAR), Rest, Heat applied      A's of Opioid Risk Assessment  Activity:Patient can not perform ADL.   Analgesia:Patients pain is not controlled by current medication.   Adverse Effects: Patient denies constipation or sedation.  Aberrant Behavior:  reviewed with no aberrant drug seeking/taking behavior.      Patient denies any suicidal or homicidal ideations    Physical Therapy/Home Exercise: yes      X-Ray KUB  Narrative:  EXAMINATION:  XR KUB    CLINICAL HISTORY:  Calculus of kidneypost op follow up kidney stones;    COMPARISON:  KUB August 19, 2022    TECHNIQUE:  Frontal views of the abdomen.    FINDINGS:  Nonspecific bowel gas pattern.  Much of the abdomen is essentially gasless.  No convincing urinary calculus.  Surgical clips project over the right upper quadrant of the abdomen.  Neurostimulator device again projects over the left iliac wing with electrodes terminating within the lower thoracic region.  Osseous structures appear unchanged.  Lung bases clear.  Impression: No convincing acute radiographic findings.    Point of Service: [facility]    Electronically signed by: Carlos Still  Date:    09/26/2022  Time:    13:16      Review of Systems   Constitutional: Negative.    HENT: Negative.     Eyes: Negative.    Respiratory: Negative.     Cardiovascular: Negative.    Gastrointestinal: Negative.    Endocrine: Negative.    Genitourinary: Negative.    Musculoskeletal:  Positive for arthralgias, back pain, gait problem and leg pain (BLE).   Integumentary:  Negative.   Neurological:  Positive for numbness (BLE).   Hematological: Negative.    Psychiatric/Behavioral: Negative.             Past Medical History:   Diagnosis Date    Acid reflux     Calculus of kidney     Depression     Hypertension     Hypothyroidism     Low back pain     Lumbosacral radiculopathy 5/17/2021    Migraines     Pain     Postlaminectomy syndrome, lumbar region 5/17/2021    UTI (urinary tract infection) 8/23/2022     Past Surgical History:   Procedure Laterality Date    BACK SURGERY      INSERTION OF DORSAL COLUMN NERVE STIMULATOR FOR TRIAL N/A 7/15/2021    Procedure: INSERTION, NEUROSTIMULATOR, SPINAL CORD, DORSAL COLUMN, FOR TRIAL;  Surgeon: Elba Moon MD;  Location: [facility] [place], [place];  Service: Pain Management;  Laterality: N/A;  spoke to Dr Calin Burgess nurse, urine culture negative (results in Epic), pt cleared.    LITHOTRIPSY        Social History     Socioeconomic History    Marital status:     Number of children: 2    Years of education: 12    Highest education level: 12th grade   Occupational History    Occupation: disabled   Tobacco Use    Smoking status: Never    Smokeless tobacco: Never   Substance and Sexual Activity    Alcohol use: Never    Drug use: Never    Sexual activity: Not Currently     Family History   Problem Relation Age of Onset    Stroke Father     Heart attack Father     Heart attack Brother     Cancer Brother      Review of patient's allergies indicates:   Allergen Reactions    Oxycodone-acetaminophen Swelling and Edema    Acetaminophen     Oxycodone hcl      has a current medication list which includes the following prescription(s): citric acid-potassium citrate, aimovig autoinjector, gabapentin, hydrocodone-acetaminophen, levothyroxine, metoprolol tartrate, omeprazole, promethazine, tizanidine, amitriptyline, celecoxib, ergocalciferol, gabapentin, hydrocodone-acetaminophen, metoprolol tartrate, omeprazole, phenazopyridine, tizanidine, and topiramate, and the following Facility-Administered Medications: ketorolac.      Objective:  Vitals:    10/19/22 1007   BP: (!) 161/90   Pulse: (!) 58        Physical Exam  Vitals and nursing note reviewed.   Constitutional:       General: She is not in acute distress.     Appearance: Normal appearance. She is not ill-appearing, toxic-appearing or diaphoretic.   HENT:      Head: Normocephalic and atraumatic.      Nose: Nose normal.      Mouth/Throat:      Mouth: Mucous membranes are moist.   Eyes:      Extraocular Movements: Extraocular movements intact.      Pupils: Pupils are equal, round, and reactive to light.   Cardiovascular:      Rate and Rhythm: Normal rate and regular rhythm.      Heart sounds: Normal heart sounds.   Pulmonary:      Effort: Pulmonary effort is normal. No respiratory distress.      Breath sounds: Normal breath sounds. No stridor. No wheezing or  rhonchi.   Abdominal:      General: Bowel sounds are normal.      Palpations: Abdomen is soft.   Musculoskeletal:         General: No swelling or deformity.      Cervical back: Normal and normal range of motion. No spasms or tenderness. No pain with movement. Normal range of motion.      Thoracic back: Normal.      Lumbar back: Tenderness and bony tenderness present. No spasms. Decreased range of motion. Negative right straight leg raise test and negative left straight leg raise test. No scoliosis.      Right lower leg: No edema.      Left lower leg: No edema.      Comments: Lumbar pain with flexion, extension and lateral rotation.  Bilateral facet tenderness to palpation from L3-S1.   Skin:     General: Skin is warm.   Neurological:      General: No focal deficit present.      Mental Status: She is alert and oriented to person, place, and time. Mental status is at baseline.      Cranial Nerves: No cranial nerve deficit.      Sensory: Sensation is intact. No sensory deficit.      Motor: No weakness.      Coordination: Coordination normal.      Gait: Gait normal.      Deep Tendon Reflexes: Reflexes are normal and symmetric.   Psychiatric:         Mood and Affect: Mood normal.         Behavior: Behavior normal.         Assessment:      1. Postlaminectomy syndrome, lumbar region    2. Lumbosacral radiculopathy    3. Chronic pain syndrome    4. Disorder of sacrum            Plan:  1. reviewed  2.Addiction, Dependency, Tolerance, Opioid abuse-misuse, Death, Diversion Discussed. Overdose reversal drug Naloxone discussed.  3.Refill/Continue medications for pain control and function       Requested Prescriptions     Signed Prescriptions Disp Refills    gabapentin (NEURONTIN) 800 MG tablet 90 tablet 1     Sig: Take 1 tablet (800 mg total) by mouth 3 (three) times daily.    celecoxib (CELEBREX) 200 MG capsule 30 capsule 1     Sig: Take 1 capsule (200 mg total) by mouth once daily.    tiZANidine (ZANAFLEX) 4 MG tablet 90  tablet 1     Sig: Take 1 tablet (4 mg total) by mouth 3 (three) times daily.    HYDROcodone-acetaminophen (NORCO)  mg per tablet 90 tablet 0     Sig: Take 1 tablet by mouth every 8 (eight) hours as needed for Pain.     4. Schedule caudal epidural steroid injection for lumbosacral radiculopathy  Orders Placed This Encounter   Procedures    Case Request Operating Room: Injection-steroid-epidural-caudal     Order Specific Question:   Medical Necessity:     Answer:   Medically Non-Urgent [100]     Order Specific Question:   CPT Code:     Answer:   FL INJ LUMBAR/SACRAL, W/IMAGING GUIDANCE [05973]     Order Specific Question:   Positioning:     Answer:   Prone [1003]     Order Specific Question:   Post-Procedure Disposition:     Answer:   PACU [1]     Order Specific Question:   Estimated Length of Stay:     Answer:   0 midnight     Order Specific Question:   Implant Required:     Answer:   No [1001]     Order Specific Question:   Is an on-site pathologist required for this procedure?     Answer:   N/A      5.Indications for this procedure for this specific patient include the following     - Pt has been in pain for at least 6 weeks and has failed conservative care (e.g. Exercise, physical methods, NSAID/ and or muscle relaxants)  - Pt has no major risk factors for spinal cancer or contraindicated condition   - Radicular pain is interfering with functional activity  - Pain is associated with symptoms of nerve root irritation   - Any numbness documented is accompanied with paresthesia   - No evidence of systemic or local infection, bleeding tendency or unstable medical condition   - Epidural provided as part of a comprehensive pain management program  - All repeat injections have at least 80% pain relief and increase functional gain and physical activity, and reduction in reliance on the use of medication and or physical therapy  - Pt has significant functional limitation resulting in diminished quality of life and  impaired age appropriate ADL's.   - Diagnostic evaluation has ruled out other causes of pain  - Pt participating in an active rehabilitation program or home exercise program which has been discussed with the patient including heat ice and rest  - No more than 3 epidurals will be done in a 6 month period at the same level with at least 7 days between injections  - MAC is only offered in cases of needle phobia   - Injection done at the caudal which is consistent with patient's dermatomal pain complaint    6.Monitored Anesthesia Care medical necessity authorization request:    Monitor anesthesia request is medically indicated for the scheduled nerve block procedure due to:  - needle phobia and anxiety, placing  the patient at risk during the provided service.  -patient has an ASA class greater than 3 and requires constant presence of an anesthesiologist during the procedure:  -patient has severe problems with muscles and muscle spasticity that makes it hard to lie still  -patient suffers from chronic pain and is unable to function due to  diminished ADLs    7.The planned medically necessary  surgical procedure is performed in a hospital outpatient department and not in an ambulatory surgical center due to:     -there is no geographically assessable ambulatory surgery center that has the  necessary equipment and fluoroscopy needed for the procedure     -there is no geographically assessable ambulatory surgical center available at which the physician has privileges     -an ASC's  specific  guideline regarding the individuals weight or health conditions that prevent the use of an ASC    8. If patient fails to receive benefit from the call epidural steroid injections then I will repeat an MRI to rule out new disc pathology  9. Will contact [employer] representative for reprogramming of spinal cord stimulator  10. Toradol 60 mg IM today for acute pain         report:  Reviewed and consistent with medication use as  prescribed.      The total time spent for evaluation and management on 10/19/2022 including reviewing separately obtained history, performing a medically appropriate exam and evaluation, documenting clinical information in the health record, independently interpreting results and communicating them to the patient/family/caregiver, and ordering medications/tests/procedures was between 15-29 minutes.    The above plan and management options were discussed at length with patient. Patient is in agreement with the above and verbalized understanding. It will be communicated with the referring physician via electronic record, fax, or mail.

## 2022-10-19 NOTE — PROGRESS NOTES
She Disclaimer: This note has been generated using voice-recognition software. There may be typographical errors that have been missed during proof-reading        Patient ID: Gloria Mitchell is a 52 y.o. female.      Chief Complaint: Leg Pain      52-year-old female returns for re-evaluation of worsening lower back and bilateral lumbar radicular pain.  She has a long history of chronic pain and post-laminectomy pain syndrome.  She received a spinal cord stimulator by Dr. Ireland 7/15/ 2, and was experiencing good relief until recently.  Her pain is poorly controlled with medications and  the spinal cord stimulator.  She describes a burning, aching pain of the lower extremities and feet.  She was unable to complete physical therapy due to pain exacerbation.  MRI of the lumbar spine from December 2020 revealed degenerative changes, L5-S1 bulging disc, bilateral stenosis and scar tissue formation.  She has  not received recent nerve block injections for lumbosacral radiculopathy.  Caudal epidural steroid injection was discussed prior to consideration of repeating  lumbar MRI and surgical re-evaluation.          Pain Assessment  Pain Assessment: 0-10  Pain Score:   8  Pain Location: Leg  Pain Orientation: Right, Left  Pain Descriptors: Aching, Burning, Sharp, Constant  Pain Frequency: Continuous  Pain Onset: Awakened from sleep  Clinical Progression: Gradually worsening  Aggravating Factors: Standing, Walking, Other (Comment) (sitting and lying)  Pain Intervention(s): Medication (See eMAR), Rest, Heat applied      A's of Opioid Risk Assessment  Activity:Patient can not perform ADL.   Analgesia:Patients pain is not controlled by current medication.   Adverse Effects: Patient denies constipation or sedation.  Aberrant Behavior:  reviewed with no aberrant drug seeking/taking behavior.      Patient denies any suicidal or homicidal ideations    Physical Therapy/Home Exercise: yes      X-Ray KUB  Narrative:  EXAMINATION:  XR KUB    CLINICAL HISTORY:  Calculus of kidneypost op follow up kidney stones;    COMPARISON:  KUB August 19, 2022    TECHNIQUE:  Frontal views of the abdomen.    FINDINGS:  Nonspecific bowel gas pattern.  Much of the abdomen is essentially gasless.  No convincing urinary calculus.  Surgical clips project over the right upper quadrant of the abdomen.  Neurostimulator device again projects over the left iliac wing with electrodes terminating within the lower thoracic region.  Osseous structures appear unchanged.  Lung bases clear.  Impression: No convincing acute radiographic findings.    Point of Service: [facility]    Electronically signed by: Carlos Still  Date:    09/26/2022  Time:    13:16      Review of Systems   Constitutional: Negative.    HENT: Negative.     Eyes: Negative.    Respiratory: Negative.     Cardiovascular: Negative.    Gastrointestinal: Negative.    Endocrine: Negative.    Genitourinary: Negative.    Musculoskeletal:  Positive for arthralgias, back pain, gait problem and leg pain (BLE).   Integumentary:  Negative.   Neurological:  Positive for numbness (BLE).   Hematological: Negative.    Psychiatric/Behavioral: Negative.             Past Medical History:   Diagnosis Date    Acid reflux     Calculus of kidney     Depression     Hypertension     Hypothyroidism     Low back pain     Lumbosacral radiculopathy 5/17/2021    Migraines     Pain     Postlaminectomy syndrome, lumbar region 5/17/2021    UTI (urinary tract infection) 8/23/2022     Past Surgical History:   Procedure Laterality Date    BACK SURGERY      INSERTION OF DORSAL COLUMN NERVE STIMULATOR FOR TRIAL N/A 7/15/2021    Procedure: INSERTION, NEUROSTIMULATOR, SPINAL CORD, DORSAL COLUMN, FOR TRIAL;  Surgeon: Elba Moon MD;  Location: [facility];  Service: Pain Management;  Laterality: N/A;  spoke to Dr Calin Burgess nurse, urine culture negative (results in Epic), pt cleared.    LITHOTRIPSY        Social History     Socioeconomic History    Marital status:     Number of children: 2    Years of education: 12    Highest education level: 12th grade   Occupational History    Occupation: disabled   Tobacco Use    Smoking status: Never    Smokeless tobacco: Never   Substance and Sexual Activity    Alcohol use: Never    Drug use: Never    Sexual activity: Not Currently     Family History   Problem Relation Age of Onset    Stroke Father     Heart attack Father     Heart attack Brother     Cancer Brother      Review of patient's allergies indicates:   Allergen Reactions    Oxycodone-acetaminophen Swelling and Edema    Acetaminophen     Oxycodone hcl      has a current medication list which includes the following prescription(s): citric acid-potassium citrate, aimovig autoinjector, gabapentin, hydrocodone-acetaminophen, levothyroxine, metoprolol tartrate, omeprazole, promethazine, tizanidine, amitriptyline, celecoxib, ergocalciferol, gabapentin, hydrocodone-acetaminophen, metoprolol tartrate, omeprazole, phenazopyridine, tizanidine, and topiramate, and the following Facility-Administered Medications: ketorolac.      Objective:  Vitals:    10/19/22 1007   BP: (!) 161/90   Pulse: (!) 58        Physical Exam  Vitals and nursing note reviewed.   Constitutional:       General: She is not in acute distress.     Appearance: Normal appearance. She is not ill-appearing, toxic-appearing or diaphoretic.   HENT:      Head: Normocephalic and atraumatic.      Nose: Nose normal.      Mouth/Throat:      Mouth: Mucous membranes are moist.   Eyes:      Extraocular Movements: Extraocular movements intact.      Pupils: Pupils are equal, round, and reactive to light.   Cardiovascular:      Rate and Rhythm: Normal rate and regular rhythm.      Heart sounds: Normal heart sounds.   Pulmonary:      Effort: Pulmonary effort is normal. No respiratory distress.      Breath sounds: Normal breath sounds. No stridor. No wheezing or  rhonchi.   Abdominal:      General: Bowel sounds are normal.      Palpations: Abdomen is soft.   Musculoskeletal:         General: No swelling or deformity.      Cervical back: Normal and normal range of motion. No spasms or tenderness. No pain with movement. Normal range of motion.      Thoracic back: Normal.      Lumbar back: Tenderness and bony tenderness present. No spasms. Decreased range of motion. Negative right straight leg raise test and negative left straight leg raise test. No scoliosis.      Right lower leg: No edema.      Left lower leg: No edema.      Comments: Lumbar pain with flexion, extension and lateral rotation.  Bilateral facet tenderness to palpation from L3-S1.   Skin:     General: Skin is warm.   Neurological:      General: No focal deficit present.      Mental Status: She is alert and oriented to person, place, and time. Mental status is at baseline.      Cranial Nerves: No cranial nerve deficit.      Sensory: Sensation is intact. No sensory deficit.      Motor: No weakness.      Coordination: Coordination normal.      Gait: Gait normal.      Deep Tendon Reflexes: Reflexes are normal and symmetric.   Psychiatric:         Mood and Affect: Mood normal.         Behavior: Behavior normal.         Assessment:      1. Postlaminectomy syndrome, lumbar region    2. Lumbosacral radiculopathy    3. Chronic pain syndrome    4. Disorder of sacrum            Plan:  1. reviewed  2.Addiction, Dependency, Tolerance, Opioid abuse-misuse, Death, Diversion Discussed. Overdose reversal drug Naloxone discussed.  3.Refill/Continue medications for pain control and function       Requested Prescriptions     Signed Prescriptions Disp Refills    gabapentin (NEURONTIN) 800 MG tablet 90 tablet 1     Sig: Take 1 tablet (800 mg total) by mouth 3 (three) times daily.    celecoxib (CELEBREX) 200 MG capsule 30 capsule 1     Sig: Take 1 capsule (200 mg total) by mouth once daily.    tiZANidine (ZANAFLEX) 4 MG tablet 90  tablet 1     Sig: Take 1 tablet (4 mg total) by mouth 3 (three) times daily.    HYDROcodone-acetaminophen (NORCO)  mg per tablet 90 tablet 0     Sig: Take 1 tablet by mouth every 8 (eight) hours as needed for Pain.     4. Schedule caudal epidural steroid injection for lumbosacral radiculopathy  Orders Placed This Encounter   Procedures    Case Request Operating Room: Injection-steroid-epidural-caudal     Order Specific Question:   Medical Necessity:     Answer:   Medically Non-Urgent [100]     Order Specific Question:   CPT Code:     Answer:   IA INJ LUMBAR/SACRAL, W/IMAGING GUIDANCE [49037]     Order Specific Question:   Positioning:     Answer:   Prone [1003]     Order Specific Question:   Post-Procedure Disposition:     Answer:   PACU [1]     Order Specific Question:   Estimated Length of Stay:     Answer:   0 midnight     Order Specific Question:   Implant Required:     Answer:   No [1001]     Order Specific Question:   Is an on-site pathologist required for this procedure?     Answer:   N/A      5.Indications for this procedure for this specific patient include the following     - Pt has been in pain for at least 6 weeks and has failed conservative care (e.g. Exercise, physical methods, NSAID/ and or muscle relaxants)  - Pt has no major risk factors for spinal cancer or contraindicated condition   - Radicular pain is interfering with functional activity  - Pain is associated with symptoms of nerve root irritation   - Any numbness documented is accompanied with paresthesia   - No evidence of systemic or local infection, bleeding tendency or unstable medical condition   - Epidural provided as part of a comprehensive pain management program  - All repeat injections have at least 80% pain relief and increase functional gain and physical activity, and reduction in reliance on the use of medication and or physical therapy  - Pt has significant functional limitation resulting in diminished quality of life and  impaired age appropriate ADL's.   - Diagnostic evaluation has ruled out other causes of pain  - Pt participating in an active rehabilitation program or home exercise program which has been discussed with the patient including heat ice and rest  - No more than 3 epidurals will be done in a 6 month period at the same level with at least 7 days between injections  - MAC is only offered in cases of needle phobia   - Injection done at the caudal which is consistent with patient's dermatomal pain complaint    6.Monitored Anesthesia Care medical necessity authorization request:    Monitor anesthesia request is medically indicated for the scheduled nerve block procedure due to:  - needle phobia and anxiety, placing  the patient at risk during the provided service.  -patient has an ASA class greater than 3 and requires constant presence of an anesthesiologist during the procedure:  -patient has severe problems with muscles and muscle spasticity that makes it hard to lie still  -patient suffers from chronic pain and is unable to function due to  diminished ADLs    7.The planned medically necessary  surgical procedure is performed in a hospital outpatient department and not in an ambulatory surgical center due to:     -there is no geographically assessable ambulatory surgery center that has the  necessary equipment and fluoroscopy needed for the procedure     -there is no geographically assessable ambulatory surgical center available at which the physician has privileges     -an ASC's  specific  guideline regarding the individuals weight or health conditions that prevent the use of an ASC    8. If patient fails to receive benefit from the call epidural steroid injections then I will repeat an MRI to rule out new disc pathology  9. Will contact [facility] representative for reprogramming of spinal cord stimulator  10. Toradol 60 mg IM today for acute pain         report:  Reviewed and consistent with medication use as  prescribed.      The total time spent for evaluation and management on 10/19/2022 including reviewing separately obtained history, performing a medically appropriate exam and evaluation, documenting clinical information in the health record, independently interpreting results and communicating them to the patient/family/caregiver, and ordering medications/tests/procedures was between 15-29 minutes.    The above plan and management options were discussed at length with patient. Patient is in agreement with the above and verbalized understanding. It will be communicated with the referring physician via electronic record, fax, or mail.

## 2022-10-28 ENCOUNTER — DOCUMENTATION ONLY (OUTPATIENT)
Dept: REHABILITATION | Facility: HOSPITAL | Age: 52
End: 2022-10-28
Payer: MEDICARE

## 2022-10-28 NOTE — PROGRESS NOTES
Outpatient Therapy Discharge Summary     Name: Gloria Mitchell  [facility] Number: [id number]     Therapy Diagnosis:Low back pain   Physician: Lasha Kerr PA     Physician Orders: PT Eval and Treat   Medical Diagnosis from Referral: lumbosacral radiculopathy   Evaluation Date: 10/17/2022  Date of Last visit: 10/17/2022  Total Visits Received: 1  Cancelled Visits: 0  No Show Visits: 3    Assessment    Goals:  Patient will report decrease in pain to 4/10 to improve quality of life-NOT MET   Patient will report decrease in radiating occurrences from 90% to 50% to allow patient the ability to perform activities of daily living -NOT MET   Patient will increase B lower extremity strength to 4+/5 to improve ambulation ability-NOT MET   Patient will increase L hamstring 90/90 by 10 degrees -NOT MET     Discharge reason: Patient has not attended therapy since 10/17/2022    Plan   This patient is discharged from Physical Therapy.   Aubrey Salas, PT, DPT

## 2022-11-01 ENCOUNTER — OFFICE VISIT (OUTPATIENT)
Dept: NEUROLOGY | Facility: CLINIC | Age: 52
End: 2022-11-01
Payer: MEDICARE

## 2022-11-01 VITALS
HEART RATE: 72 BPM | DIASTOLIC BLOOD PRESSURE: 88 MMHG | SYSTOLIC BLOOD PRESSURE: 138 MMHG | RESPIRATION RATE: 18 BRPM | BODY MASS INDEX: 35.88 KG/M2 | OXYGEN SATURATION: 98 % | WEIGHT: 195 LBS | HEIGHT: 62 IN

## 2022-11-01 DIAGNOSIS — R11.10 VOMITING, UNSPECIFIED VOMITING TYPE, UNSPECIFIED WHETHER NAUSEA PRESENT: ICD-10-CM

## 2022-11-01 DIAGNOSIS — G43.719 INTRACTABLE CHRONIC MIGRAINE WITHOUT AURA AND WITHOUT STATUS MIGRAINOSUS: Primary | ICD-10-CM

## 2022-11-01 PROCEDURE — 1160F RVW MEDS BY RX/DR IN RCRD: CPT | Mod: CPTII,,, | Performed by: NURSE PRACTITIONER

## 2022-11-01 PROCEDURE — 99212 PR OFFICE/OUTPT VISIT, EST, LEVL II, 10-19 MIN: ICD-10-PCS | Mod: S$PBB,,, | Performed by: NURSE PRACTITIONER

## 2022-11-01 PROCEDURE — 3079F PR MOST RECENT DIASTOLIC BLOOD PRESSURE 80-89 MM HG: ICD-10-PCS | Mod: CPTII,,, | Performed by: NURSE PRACTITIONER

## 2022-11-01 PROCEDURE — 1159F MED LIST DOCD IN RCRD: CPT | Mod: CPTII,,, | Performed by: NURSE PRACTITIONER

## 2022-11-01 PROCEDURE — 99212 OFFICE O/P EST SF 10 MIN: CPT | Mod: S$PBB,,, | Performed by: NURSE PRACTITIONER

## 2022-11-01 PROCEDURE — 1159F PR MEDICATION LIST DOCUMENTED IN MEDICAL RECORD: ICD-10-PCS | Mod: CPTII,,, | Performed by: NURSE PRACTITIONER

## 2022-11-01 PROCEDURE — 1160F PR REVIEW ALL MEDS BY PRESCRIBER/CLIN PHARMACIST DOCUMENTED: ICD-10-PCS | Mod: CPTII,,, | Performed by: NURSE PRACTITIONER

## 2022-11-01 PROCEDURE — 3079F DIAST BP 80-89 MM HG: CPT | Mod: CPTII,,, | Performed by: NURSE PRACTITIONER

## 2022-11-01 PROCEDURE — 3075F SYST BP GE 130 - 139MM HG: CPT | Mod: CPTII,,, | Performed by: NURSE PRACTITIONER

## 2022-11-01 PROCEDURE — 3075F PR MOST RECENT SYSTOLIC BLOOD PRESS GE 130-139MM HG: ICD-10-PCS | Mod: CPTII,,, | Performed by: NURSE PRACTITIONER

## 2022-11-01 PROCEDURE — 99214 OFFICE O/P EST MOD 30 MIN: CPT | Mod: PBBFAC | Performed by: NURSE PRACTITIONER

## 2022-11-01 RX ORDER — PROMETHAZINE HYDROCHLORIDE 25 MG/1
TABLET ORAL
Qty: 15 TABLET | Refills: 1 | Status: SHIPPED | OUTPATIENT
Start: 2022-11-01 | End: 2022-11-30 | Stop reason: SDUPTHER

## 2022-11-01 NOTE — PROGRESS NOTES
Subjective:       Patient ID: Gloria Mitchell is a 52 y.o. female     Chief Complaint:    Chief Complaint   Patient presents with    Follow-up    Migraine            Allergies:  Oxycodone-acetaminophen, Acetaminophen, and Oxycodone hcl    Current Medications:    Outpatient Encounter Medications as of 11/1/2022   Medication Sig Dispense Refill    celecoxib (CELEBREX) 200 MG capsule Take 1 capsule (200 mg total) by mouth once daily. 30 capsule 1    citric acid-potassium citrate (POLYCITRA) 1,100-334 mg/5 mL solution Take by mouth 3 (three) times daily with meals.      erenumab-aooe (AIMOVIG AUTOINJECTOR) 140 mg/mL autoinjector Inject 1 mL (140 mg total) into the skin every 28 days. 1 each 11    gabapentin (NEURONTIN) 800 MG tablet Take 1 tablet (800 mg total) by mouth 3 (three) times daily. 90 tablet 1    HYDROcodone-acetaminophen (NORCO)  mg per tablet Take 1 tablet by mouth every 8 (eight) hours as needed for Pain. 90 tablet 0    levothyroxine (SYNTHROID) 112 MCG tablet Take 112 mcg by mouth before breakfast.      metoprolol tartrate (LOPRESSOR) 50 MG tablet Take 50 mg by mouth Daily.      omeprazole (PRILOSEC) 20 MG capsule 1 capsule 30 minutes before morning meal      promethazine (PHENERGAN) 25 MG tablet Take 1 tablet every 8 hours as needed for migraine.  Not more than 3 days of the week 15 tablet 1    tiZANidine (ZANAFLEX) 4 MG tablet Take 1 tablet (4 mg total) by mouth 3 (three) times daily. 90 tablet 1    tiZANidine 4 mg Cap Take 1 capsule by mouth every 8 (eight) hours. (Patient not taking: Reported on 11/1/2022) 90 capsule 1    [DISCONTINUED] gabapentin (NEURONTIN) 600 MG tablet Take 1 tablet (600 mg total) by mouth every 8 (eight) hours. (Patient not taking: Reported on 11/1/2022) 90 tablet 1     No facility-administered encounter medications on file as of 11/1/2022.       History of Present Illness  52 yr old white female presents with headaches that started as a teenager.     Prior to  topamax she reports was having between 12 and 16 headache days a month, with the topamax 100mg QHS she has maybe 3.  She tolerated very well, no reported side effects.  However, in October 2021 was found to have kidney stones.  Prior treated with Effexor in [id number] but was found not effective for migraines.  Initially she declined to change medication, but in January I had discussed option of once monthly injections and she was open to this idea.  She was started on Aimovig once monthly injections and reported significant improvement, reported essentially her migraines had stopped, only one actually in the prior 3 months.    We decreased her topamax to 50mg BID with intent to slow taper off to see if it was still needed.  However, she got covid the week after she saw us, was put on paxlovid and was told by her primary care to suddenly stop her topamax but topamax is not one of the three antiepileptic medications recommended to avoid with paxlovid, that is tegretol, dilantin, and phenobarb.  Topamax should never be suddenly stopped, but when patient did she began having tremors and diaphoresis.  She was told by ED to restart it and this quickly resolved the symptoms.  At last visit she was doing well but we did discuss need to continue slow tapering off.  We will decrease her dosing to 25mg twice daily for a week then once daily for a week then stop.  We may need to add zonegran in its place, will see how headaches do first.         Review of Systems  Review of Systems   Constitutional:  Negative for diaphoresis and fever.   HENT:  Negative for congestion, hearing loss and tinnitus.    Eyes:  Negative for blurred vision, double vision, photophobia, discharge and redness.   Respiratory:  Negative for cough and shortness of breath.    Cardiovascular:  Negative for chest pain.   Gastrointestinal:  Negative for abdominal pain, nausea and vomiting.   Musculoskeletal:  Negative for back pain, joint pain, myalgias and  neck pain.   Skin:  Negative for itching and rash.   Neurological:  Positive for headaches. Negative for dizziness, tremors, sensory change, speech change, focal weakness, seizures, loss of consciousness and weakness.   Psychiatric/Behavioral:  Negative for depression, hallucinations and memory loss. The patient has insomnia.    All other systems reviewed and are negative.   Objective:     NEUROLOGICAL EXAMINATION:     MENTAL STATUS   Oriented to person, place, and time.   Registration: recalls 3 of 3 objects. Recall at 5 minutes: recalls 3 of 3 objects.   Attention: normal. Concentration: normal.   Speech: speech is normal   Level of consciousness: alert  Knowledge: good and consistent with education.   Normal comprehension.     CRANIAL NERVES     CN II   Visual fields full to confrontation.   Visual acuity: normal  Right visual field deficit: none  Left visual field deficit: none     CN III, IV, VI   Pupils are equal, round, and reactive to light.  Extraocular motions are normal.   Right pupil: Size: 3 mm. Shape: regular. Reactivity: brisk. Consensual response: intact. Accommodation: intact.   Left pupil: Size: 3 mm. Shape: regular. Reactivity: brisk. Consensual response: intact. Accommodation: intact.   CN III: no CN III palsy  CN VI: no CN VI palsy  Nystagmus: none   Diplopia: none  Upgaze: normal  Downgaze: normal  Conjugate gaze: present  Vestibulo-ocular reflex: present    CN V   Facial sensation intact.   Right facial sensation deficit: none  Left facial sensation deficit: none  Right corneal reflex: normal  Left corneal reflex: normal    CN VII   Facial expression full, symmetric.   Right facial weakness: none  Left facial weakness: none  Right taste: normal  Left taste: normal    CN VIII   CN VIII normal.   Hearing: intact    CN IX, X   CN IX normal.   CN X normal.   Palate: symmetric    CN XI   CN XI normal.   Right sternocleidomastoid strength: normal  Left sternocleidomastoid strength: normal  Right  trapezius strength: normal  Left trapezius strength: normal    CN XII   CN XII normal.   Tongue: not atrophic  Fasciculations: absent  Tongue deviation: none    MOTOR EXAM   Muscle bulk: normal  Overall muscle tone: normal  Right arm tone: normal  Left arm tone: normal  Right arm pronator drift: absent  Left arm pronator drift: absent  Right leg tone: normal  Left leg tone: normal    Strength   Right neck flexion: 5/5  Left neck flexion: 5/5  Right neck extension: 5/5  Left neck extension: 5/5  Right deltoid: 5/5  Left deltoid: 5/5  Right biceps: 5/5  Left biceps: 5/5  Right triceps: 5/5  Left triceps: 5/5  Right wrist flexion: 5/5  Left wrist flexion: 5/5  Right wrist extension: 5/5  Left wrist extension: 5/5  Right interossei: 5/5  Left interossei: 5/5  Right iliopsoas: 5/5  Left iliopsoas: 5/  Right quadriceps: 5/5  Left quadriceps: 5/5  Right hamstrin/5  Left hamstrin/5  Right anterior tibial: /5  Left anterior tibial: 5/5  Right posterior tibial: 5/5  Left posterior tibial: 5/5  Right gastroc: 5/5  Left gastroc: 5/5    REFLEXES     Reflexes   Right brachioradialis: 2+  Left brachioradialis: 2+  Right biceps: 2+  Left biceps: 2+  Right triceps: 2+  Left triceps: 2+  Right patellar: 2+  Left patellar: 2+  Right achilles: 2+  Left achilles: 2+  Right plantar: normal  Left plantar: normal  Right Hernandez: absent  Left Hernandez: absent  Right ankle clonus: absent  Left ankle clonus: absent  Right pendular knee jerk: absent  Left pendular knee jerk: absent    SENSORY EXAM   Light touch normal.   Right arm light touch: normal  Left arm light touch: normal  Right leg light touch: normal  Left leg light touch: normal  Vibration normal.   Right arm vibration: normal  Left arm vibration: normal  Right leg vibration: normal  Left leg vibration: normal  Proprioception normal.   Right arm proprioception: normal  Left arm proprioception: normal  Right leg proprioception: normal  Left leg proprioception:  normal  Pinprick normal.   Right arm pinprick: normal  Left arm pinprick: normal  Right leg pinprick: normal  Left leg pinprick: normal    GAIT AND COORDINATION     Gait  Gait: normal     Coordination   Finger to nose coordination: normal  Heel to shin coordination: normal  Tandem walking coordination: normal    Tremor   Resting tremor: absent  Intention tremor: absent  Action tremor: absent     Physical Exam  Vitals and nursing note reviewed.   Constitutional:       Appearance: Normal appearance.   HENT:      Head: Normocephalic.   Eyes:      Extraocular Movements: Extraocular movements intact and EOM normal.      Pupils: Pupils are equal, round, and reactive to light.   Cardiovascular:      Rate and Rhythm: Normal rate and regular rhythm.   Pulmonary:      Effort: Pulmonary effort is normal.      Breath sounds: Normal breath sounds.   Musculoskeletal:         General: No swelling or tenderness. Normal range of motion.      Cervical back: Normal range of motion and neck supple.      Right lower leg: No edema.      Left lower leg: No edema.   Skin:     General: Skin is warm and dry.      Coloration: Skin is not jaundiced.      Findings: No rash.   Neurological:      General: No focal deficit present.      Mental Status: She is alert and oriented to person, place, and time.      GCS: GCS eye subscore is 4. GCS verbal subscore is 5. GCS motor subscore is 6.      Cranial Nerves: No cranial nerve deficit.      Sensory: No sensory deficit.      Motor: Motor function is intact. No weakness.      Coordination: Coordination is intact. Coordination normal. Finger-Nose-Finger Test and Heel to Shin Test normal.      Gait: Gait is intact. Gait and tandem walk normal.      Deep Tendon Reflexes: Reflexes normal.      Reflex Scores:       Tricep reflexes are 2+ on the right side and 2+ on the left side.       Bicep reflexes are 2+ on the right side and 2+ on the left side.       Brachioradialis reflexes are 2+ on the right side  and 2+ on the left side.       Patellar reflexes are 2+ on the right side and 2+ on the left side.       Achilles reflexes are 2+ on the right side and 2+ on the left side.  Psychiatric:         Mood and Affect: Mood normal.         Speech: Speech normal.         Behavior: Behavior normal.        Assessment:     Problem List Items Addressed This Visit          Neuro    Intractable chronic migraine without aura and without status migrainosus - Primary        Primary Diagnosis and ICD10  Intractable chronic migraine without aura and without status migrainosus [G43.719]    Plan:     Patient Instructions   Patient now off the topamax  Continue Aimovig 140mg once monthly  Continue phenergan as needed    Medications Discontinued During This Encounter   Medication Reason    gabapentin (NEURONTIN) 600 MG tablet            Requested Prescriptions      No prescriptions requested or ordered in this encounter       No orders of the defined types were placed in this encounter.

## 2022-11-08 ENCOUNTER — ANESTHESIA (OUTPATIENT)
Dept: PAIN MEDICINE | Facility: HOSPITAL | Age: 52
End: 2022-11-08
Payer: MEDICARE

## 2022-11-08 ENCOUNTER — ANESTHESIA EVENT (OUTPATIENT)
Dept: PAIN MEDICINE | Facility: HOSPITAL | Age: 52
End: 2022-11-08
Payer: MEDICARE

## 2022-11-08 ENCOUNTER — HOSPITAL ENCOUNTER (OUTPATIENT)
Facility: HOSPITAL | Age: 52
Discharge: HOME | End: 2022-11-08
Attending: PAIN MEDICINE | Admitting: PAIN MEDICINE
Payer: MEDICARE

## 2022-11-08 VITALS
TEMPERATURE: 98 F | RESPIRATION RATE: 10 BRPM | HEIGHT: 62 IN | DIASTOLIC BLOOD PRESSURE: 84 MMHG | SYSTOLIC BLOOD PRESSURE: 156 MMHG | WEIGHT: 193 LBS | OXYGEN SATURATION: 100 % | BODY MASS INDEX: 35.51 KG/M2 | HEART RATE: 58 BPM

## 2022-11-08 DIAGNOSIS — M54.17 RADICULOPATHY OF LUMBOSACRAL REGION: ICD-10-CM

## 2022-11-08 DIAGNOSIS — M54.17 LUMBOSACRAL RADICULOPATHY: Primary | Chronic | ICD-10-CM

## 2022-11-08 PROCEDURE — 25500020 PHARM REV CODE 255: Performed by: PAIN MEDICINE

## 2022-11-08 PROCEDURE — 62323 NJX INTERLAMINAR LMBR/SAC: CPT | Performed by: PAIN MEDICINE

## 2022-11-08 PROCEDURE — 63600175 PHARM REV CODE 636 W HCPCS: Performed by: PAIN MEDICINE

## 2022-11-08 PROCEDURE — D9220A PRA ANESTHESIA: Mod: ,,, | Performed by: NURSE ANESTHETIST, CERTIFIED REGISTERED

## 2022-11-08 PROCEDURE — 63600175 PHARM REV CODE 636 W HCPCS: Performed by: NURSE ANESTHETIST, CERTIFIED REGISTERED

## 2022-11-08 PROCEDURE — D9220A PRA ANESTHESIA: ICD-10-PCS | Mod: ,,, | Performed by: NURSE ANESTHETIST, CERTIFIED REGISTERED

## 2022-11-08 PROCEDURE — 62323 NJX INTERLAMINAR LMBR/SAC: CPT | Mod: ,,, | Performed by: PAIN MEDICINE

## 2022-11-08 PROCEDURE — 62323 PR INJ LUMBAR/SACRAL, W/IMAGING GUIDANCE: ICD-10-PCS | Mod: ,,, | Performed by: PAIN MEDICINE

## 2022-11-08 PROCEDURE — 25000003 PHARM REV CODE 250: Performed by: NURSE ANESTHETIST, CERTIFIED REGISTERED

## 2022-11-08 PROCEDURE — 37000008 HC ANESTHESIA 1ST 15 MINUTES: Performed by: PAIN MEDICINE

## 2022-11-08 RX ORDER — TRIAMCINOLONE ACETONIDE 40 MG/ML
INJECTION, SUSPENSION INTRA-ARTICULAR; INTRAMUSCULAR CODE/TRAUMA/SEDATION MEDICATION
Status: DISCONTINUED | OUTPATIENT
Start: 2022-11-08 | End: 2022-11-08 | Stop reason: HOSPADM

## 2022-11-08 RX ORDER — PROPOFOL 10 MG/ML
VIAL (ML) INTRAVENOUS
Status: DISCONTINUED | OUTPATIENT
Start: 2022-11-08 | End: 2022-11-08

## 2022-11-08 RX ORDER — LIDOCAINE HYDROCHLORIDE 20 MG/ML
INJECTION, SOLUTION EPIDURAL; INFILTRATION; INTRACAUDAL; PERINEURAL
Status: DISCONTINUED | OUTPATIENT
Start: 2022-11-08 | End: 2022-11-08

## 2022-11-08 RX ORDER — IOPAMIDOL 612 MG/ML
INJECTION, SOLUTION INTRATHECAL CODE/TRAUMA/SEDATION MEDICATION
Status: DISCONTINUED | OUTPATIENT
Start: 2022-11-08 | End: 2022-11-08 | Stop reason: HOSPADM

## 2022-11-08 RX ORDER — SODIUM CHLORIDE 9 MG/ML
INJECTION, SOLUTION INTRAVENOUS CONTINUOUS
Status: DISCONTINUED | OUTPATIENT
Start: 2022-11-08 | End: 2022-11-08 | Stop reason: HOSPADM

## 2022-11-08 RX ADMIN — PROPOFOL 100 MG: 10 INJECTION, EMULSION INTRAVENOUS at 01:11

## 2022-11-08 RX ADMIN — LIDOCAINE HYDROCHLORIDE 80 MG: 20 INJECTION, SOLUTION INTRAVENOUS at 01:11

## 2022-11-08 NOTE — ANESTHESIA PREPROCEDURE EVALUATION
11/08/2022  Gloria Mitchell is a 52 y.o., female.      Pre-op Assessment    I have reviewed the Patient Summary Reports.    I have reviewed the NPO Status.   I have reviewed the Medications.     Review of Systems  Anesthesia Hx:  No problems with previous Anesthesia PONV Denies Family Hx of Anesthesia complications.   Denies Personal Hx of Anesthesia complications.   Social:  Non-Smoker, No Alcohol Use    Hematology/Oncology:  Hematology Normal   Oncology Normal     EENT/Dental:EENT/Dental Normal   Cardiovascular:   Hypertension    Pulmonary:  Pulmonary Normal    Renal/:   Chronic Renal Disease renal calculi    Hepatic/GI:   GERD    Musculoskeletal:   Arthritis   Spine Disorders: lumbar and thoracic Disc disease, Degenerative disease and Chronic Pain    Neurological:   Neuromuscular Disease, Headaches Post laminectomy syndrome  Chronic Pain Syndrome  Peripheral Neuropathy    Endocrine:   Hypothyroidism  Obesity / BMI > 30  Dermatological:  Skin Normal    Psych:   Psychiatric History          Physical Exam  General: Well nourished, Cooperative and Alert    Airway:  Mallampati: III / II  Mouth Opening: Normal  TM Distance: Normal  Tongue: Normal  Neck ROM: Normal ROM    Dental:  Intact    Chest/Lungs:  Clear to auscultation, Normal Respiratory Rate    Heart:  Rate: Normal  Rhythm: Regular Rhythm        Chemistry        Component Value Date/Time     08/03/2022 2038    K 3.0 (L) 08/03/2022 2038     08/03/2022 2038    CO2 29 08/03/2022 2038    BUN 11 08/03/2022 2038    CREATININE 0.95 08/03/2022 2038    GLU 98 08/03/2022 2038        Component Value Date/Time    CALCIUM 7.6 (L) 08/03/2022 2038    ALKPHOS 55 08/03/2022 2038    AST 19 08/03/2022 2038    ALT 27 08/03/2022 2038    BILITOT 0.2 08/03/2022 2038    ESTGFRAFRICA 60 04/11/2021 1230    EGFRNONAA 66 08/03/2022 2038        Lab Results    Component Value Date    WBC 5.68 08/03/2022    RBC 4.22 08/03/2022    HGB 12.6 08/03/2022    MCV 92.2 08/03/2022    MCH 29.9 08/03/2022    MCHC 32.4 08/03/2022    RDW 14.2 08/03/2022     08/03/2022    MPV 9.5 08/03/2022    LYMPH 30.5 08/03/2022    LYMPH 1.73 08/03/2022    MONO 6.5 (H) 08/03/2022    EOS 0.27 08/03/2022    BASO 0.03 08/03/2022     Results for orders placed or performed during the hospital encounter of 04/08/21   EKG 12-lead    Collection Time: 04/08/21 11:37 AM    Narrative    Test Reason : I10,    Vent. Rate : 062 BPM     Atrial Rate : 062 BPM     P-R Int : 156 ms          QRS Dur : 080 ms      QT Int : 472 ms       P-R-T Axes : 040 026 012 degrees     QTc Int : 479 ms    Normal sinus rhythm  Cannot rule out Anterior infarct ,age undetermined  Abnormal ECG  No previous ECGs available  Confirmed by Teressa JUAREZ, Gurwinder CHAVARRIA ([id number] on 4/21/2021 2:21:33 PM    Referred By: LENIN VALLADARES            Confirmed By:Gurwinder Gannon MD         Anesthesia Plan  Type of Anesthesia, risks & benefits discussed:    Anesthesia Type: Gen Natural Airway  Intra-op Monitoring Plan: Standard ASA Monitors  Post Op Pain Control Plan: multimodal analgesia  Induction:  IV  Airway Plan: Direct, Post-Induction  Informed Consent: Informed consent signed with the Patient and all parties understand the risks and agree with anesthesia plan.  All questions answered.   ASA Score: 3  Day of Surgery Review of History & Physical: H&P Update referred to the surgeon/provider.I have interviewed and examined the patient. I have reviewed the patient's H&P dated: There are no significant changes.     Ready For Surgery From Anesthesia Perspective.     .    Active Ambulatory Problems     Diagnosis Date Noted    Chronic pain syndrome 05/17/2021    Postlaminectomy syndrome, lumbar region 05/17/2021    Lumbosacral radiculopathy 05/17/2021    Disorder of sacrum 09/20/2021    Chest wall pain, chronic after insertion spinal cord stimulator  leads 12/22/2021    Intractable chronic migraine without aura and without status migrainosus 01/10/2022    Thoracic radiculopathy 02/16/2022    Calculus of kidney     Bilateral flank pain 08/19/2022    UTI (urinary tract infection) 08/23/2022    Yeast cystitis 08/23/2022    Pain 10/17/2022     Resolved Ambulatory Problems     Diagnosis Date Noted    Calculus of kidney and ureter 04/08/2021     Past Medical History:   Diagnosis Date    Acid reflux     Depression     Hypertension     Hypothyroidism     Low back pain     Migraines      Review of patient's allergies indicates:   Allergen Reactions    Oxycodone-acetaminophen Swelling and Edema    Acetaminophen     Oxycodone hcl      No current facility-administered medications on file prior to encounter.     Current Outpatient Medications on File Prior to Encounter   Medication Sig Dispense Refill    celecoxib (CELEBREX) 200 MG capsule Take 1 capsule (200 mg total) by mouth once daily. 30 capsule 1    citric acid-potassium citrate (POLYCITRA) 1,100-334 mg/5 mL solution Take by mouth 3 (three) times daily with meals.      erenumab-aooe (AIMOVIG AUTOINJECTOR) 140 mg/mL autoinjector Inject 1 mL (140 mg total) into the skin every 28 days. 1 each 11    gabapentin (NEURONTIN) 800 MG tablet Take 1 tablet (800 mg total) by mouth 3 (three) times daily. 90 tablet 1    HYDROcodone-acetaminophen (NORCO)  mg per tablet Take 1 tablet by mouth every 8 (eight) hours as needed for Pain. 90 tablet 0    levothyroxine (SYNTHROID) 112 MCG tablet Take 112 mcg by mouth before breakfast.      metoprolol tartrate (LOPRESSOR) 50 MG tablet Take 50 mg by mouth Daily.      omeprazole (PRILOSEC) 20 MG capsule 1 capsule 30 minutes before morning meal      tiZANidine (ZANAFLEX) 4 MG tablet Take 1 tablet (4 mg total) by mouth 3 (three) times daily. 90 tablet 1    tiZANidine 4 mg Cap Take 1 capsule by mouth every 8 (eight) hours. (Patient not taking: Reported on  11/1/2022) 90 capsule 1

## 2022-11-08 NOTE — TRANSFER OF CARE
"Anesthesia Transfer of Care Note    Patient: Gloria Mitchell    Procedure(s) Performed: Procedure(s) (LRB):  CAUDAL BRENDA (Bilateral)    Patient location: Other: [facility]    Anesthesia Type: general    Transport from OR: Transported from OR on room air with adequate spontaneous ventilation    Post pain: adequate analgesia    Post assessment: no apparent anesthetic complications    Post vital signs: stable    Level of consciousness: sedated and responds to stimulation    Nausea/Vomiting: no nausea/vomiting    Complications: none    Transfer of care protocol was followedComments: Good SV continue, NAD noted, VSS, RTRN      Last vitals:   Visit Vitals  BP (!) 160/92 (BP Location: Right arm, Patient Position: Lying)   Pulse 98   Temp 36.6 °C (97.8 °F) (Oral)   Resp 14   Ht 5' 2" (1.575 m)   Wt 87.5 kg (193 lb)   LMP 03/18/2021   SpO2 98%   BMI 35.30 kg/m²     "

## 2022-11-08 NOTE — ANESTHESIA POSTPROCEDURE EVALUATION
Anesthesia Post Evaluation    Patient: Gloria Mitchell    Procedure(s) Performed: Procedure(s) (LRB):  CAUDAL BRENDA (Bilateral)    Final Anesthesia Type: general      Patient location during evaluation: PACU  Patient participation: Yes- Able to Participate  Level of consciousness: awake and alert  Post-procedure vital signs: reviewed and stable  Pain management: adequate  Airway patency: patent    PONV status at discharge: No PONV  Anesthetic complications: no      Cardiovascular status: blood pressure returned to baseline  Respiratory status: unassisted  Hydration status: euvolemic  Follow-up not needed.          Vitals Value Taken Time   /82 11/08/22 1357   Temp 36.6 °C (97.8 °F) 11/08/22 1322   Pulse 59 11/08/22 1358   Resp 12 11/08/22 1358   SpO2 100 % 11/08/22 1358   Vitals shown include unvalidated device data.      No case tracking events are documented in the log.      Pain/Gualberto Score: Gualberto Score: 10 (11/8/2022  1:40 PM)

## 2022-11-08 NOTE — OP NOTE
Procedure Note    Procedure Date: 11/8/2022    Procedure Performed:  Caudal epidural steroid injection under fluoroscopy    Indications: Patient has failed conservative therapy.      Pre-op diagnosis: Lumbosacral radiculopathy    Post-op diagnosis: same    Physician: Elba Moon MD    Anesthesia: MAC    Medications injected: Kenalog 40mg,  preservative-free normal saline, 1% lidocaine.    Local anesthetic used: 1% Lidocaine    IVF: Per anesthesia    Estimated Blood Loss: Less than 1cc    Complications:  None    TECHNIQUE:   The patient was interviewed in the holding area and Risks/Benefits were discussed, including, but not limited to, the possibility of new or different pain, bleeding or infection.   All questions were answered.  The patient understood and accepted risks.  Consent was reviewed and signed.  A time-out was taken to identify patient and procedure side prior to starting the procedure.  After the patient was placed in a prone position, the patient was prepped and draped in the usual sterile fashion using ChloraPrep and sterile towels.  Appropriate anatomic landmarks were determined by identifying the sacral hiatus in the lateral fluoroscopic view.  Local anesthetic was given via a 25g 1.5 inch needle by raising a skin wheal and infiltrating subcutaneous tissues.  A 22 gauge 3.5 inch needle was introduced thru the sacral hiatus.  3cc of Isovue was injected to confirm placement in the sacral canal and without vascular uptake, filling defects or obstruction to dye flow due to scar adhesions. After negative aspirations for CSF and heme  5 cc of a mixture  (0.9 NS 2cc, 1% lidocaine 2cc, kenalog 40mg) was then injected.  The needle was removed and a sterile Band-Aid dressing was placed over the puncture site The patient tolerated the procedure well.  The patient was monitored after the procedure and given post procedure and discharge instructions to follow at home.  The patient was discharged in a stable  condition and accompanied by an adult .

## 2022-11-08 NOTE — DISCHARGE SUMMARY
[facility] - Pain Management  Discharge Note  Short Stay    Procedure(s) (LRB):  CAUDAL BRENDA (Bilateral)      OUTCOME: Patient tolerated treatment/procedure well without complication and is now ready for discharge.    DISPOSITION: Home or Self Care    FINAL DIAGNOSIS:  Lumbosacral radiculopathy    FOLLOWUP: In clinic    DISCHARGE INSTRUCTIONS:  See nurse's notes     TIME SPENT ON DISCHARGE: 5 minutes

## 2022-11-08 NOTE — PLAN OF CARE
Plan:  D/c pt via wheelchair at 1405    Informed pt if does not void in 8 hours to go to ER. Notify if redness, drainage, from injection site or fever over next 3-4 days. Rest and drink plenty of fluids for the remainder of the day. No lifting over 5 lbs. For the remainder of the day. Continue regular medications as prescribed. May take pain medications as prescribed.     Pain improved 100%

## 2022-11-08 NOTE — BRIEF OP NOTE
Discharge Note  Short Stay    Admit Date: 11/8/2022    Discharge Date: 11/8/2022    Attending Physician: Elba oMon     Discharge Provider: Elba Moon    Diagnoses:  Lumbosacral radiculopathy    Discharged Condition: Good    Final Diagnoses: Lumbosacral radiculopathy [M54.17]    Disposition: Home or Self Care    Hospital Course: No complications, uneventful    Outcome of Hospitalization, Treatment, Procedure, or Surgery:  Patient was admitted for outpatient interventional pain management procedure. The patient tolerated the procedure well with no complications.    Follow up/Patient Instructions:  Follow up as scheduled in Pain Management office in 3-4 weeks.  Patient has received instructions and follow up date and time.    Medications:  Continue previous medications

## 2022-11-09 ENCOUNTER — TELEPHONE (OUTPATIENT)
Dept: PAIN MEDICINE | Facility: CLINIC | Age: 52
End: 2022-11-09
Payer: MEDICARE

## 2022-11-09 NOTE — TELEPHONE ENCOUNTER
Pt had caudal BRENDA on yesterday.  Pt states she has a headache since 3 am.  Pt denies any other complaints.  Pt states she has been afebrile.  Pt informed she may have a flush fleeling d/t the steroids but she should not have a headache in regard to the procedure being it was a caudal BRENDA.  Pt informed to call back if she gets to feeling worse and/or has any other symptoms, voiced understanding. Dr Moon made aware of pt call and agrees that procedure does not cause headaches.

## 2022-11-16 NOTE — TELEPHONE ENCOUNTER
Pt states she forgot to ask Dr Moon for a new rx for her norco on her procedure day.  Per , [place] 10 #90 filled on 10-. Dr Moon will send medication.

## 2022-11-16 NOTE — TELEPHONE ENCOUNTER
----- Message from Shannon Luong sent at 11/16/2022 12:20 PM CST -----  Regarding: pt wants to speak to nurse  Pt needs to speak to nurse regarding meds please call pt back at [phone]

## 2022-11-17 RX ORDER — HYDROCODONE BITARTRATE AND ACETAMINOPHEN 10; 325 MG/1; MG/1
1 TABLET ORAL EVERY 8 HOURS PRN
Qty: 90 TABLET | Refills: 0 | Status: SHIPPED | OUTPATIENT
Start: 2022-11-18 | End: 2022-11-23 | Stop reason: SDUPTHER

## 2022-11-23 ENCOUNTER — OFFICE VISIT (OUTPATIENT)
Dept: PAIN MEDICINE | Facility: CLINIC | Age: 52
End: 2022-11-23
Payer: MEDICARE

## 2022-11-23 VITALS
WEIGHT: 193 LBS | SYSTOLIC BLOOD PRESSURE: 158 MMHG | HEIGHT: 62 IN | BODY MASS INDEX: 35.51 KG/M2 | HEART RATE: 59 BPM | DIASTOLIC BLOOD PRESSURE: 88 MMHG

## 2022-11-23 DIAGNOSIS — M96.1 POSTLAMINECTOMY SYNDROME, LUMBAR REGION: ICD-10-CM

## 2022-11-23 DIAGNOSIS — M54.17 LUMBOSACRAL RADICULOPATHY: Primary | ICD-10-CM

## 2022-11-23 DIAGNOSIS — G89.4 CHRONIC PAIN SYNDROME: ICD-10-CM

## 2022-11-23 DIAGNOSIS — R07.9 CHEST PAIN: ICD-10-CM

## 2022-11-23 DIAGNOSIS — M53.3 DISORDER OF SACRUM: ICD-10-CM

## 2022-11-23 DIAGNOSIS — Z79.899 ENCOUNTER FOR LONG-TERM (CURRENT) USE OF OTHER MEDICATIONS: ICD-10-CM

## 2022-11-23 LAB
CTP QC/QA: YES
POC (AMP) AMPHETAMINE: NEGATIVE
POC (BAR) BARBITURATES: NEGATIVE
POC (BUP) BUPRENORPHINE: NEGATIVE
POC (BZO) BENZODIAZEPINES: NEGATIVE
POC (COC) COCAINE: NEGATIVE
POC (MDMA) METHYLENEDIOXYMETHAMPHETAMINE 3,4: NEGATIVE
POC (MET) METHAMPHETAMINE: NEGATIVE
POC (MOP) OPIATES: ABNORMAL
POC (MTD) METHADONE: NEGATIVE
POC (OXY) OXYCODONE: NEGATIVE
POC (PCP) PHENCYCLIDINE: NEGATIVE
POC (TCA) TRICYCLIC ANTIDEPRESSANTS: NEGATIVE
POC TEMPERATURE (URINE): 92
POC THC: ABNORMAL

## 2022-11-23 PROCEDURE — 99213 OFFICE O/P EST LOW 20 MIN: CPT | Mod: S$PBB,,, | Performed by: PAIN MEDICINE

## 2022-11-23 PROCEDURE — 3077F PR MOST RECENT SYSTOLIC BLOOD PRESSURE >= 140 MM HG: ICD-10-PCS | Mod: CPTII,,, | Performed by: PAIN MEDICINE

## 2022-11-23 PROCEDURE — 3079F DIAST BP 80-89 MM HG: CPT | Mod: CPTII,,, | Performed by: PAIN MEDICINE

## 2022-11-23 PROCEDURE — 1159F PR MEDICATION LIST DOCUMENTED IN MEDICAL RECORD: ICD-10-PCS | Mod: CPTII,,, | Performed by: PAIN MEDICINE

## 2022-11-23 PROCEDURE — 93010 ELECTROCARDIOGRAM REPORT: CPT | Mod: ,,, | Performed by: HOSPITALIST

## 2022-11-23 PROCEDURE — 93005 ELECTROCARDIOGRAM TRACING: CPT

## 2022-11-23 PROCEDURE — 3008F BODY MASS INDEX DOCD: CPT | Mod: CPTII,,, | Performed by: PAIN MEDICINE

## 2022-11-23 PROCEDURE — G0481 DRUG TEST DEF 8-14 CLASSES: HCPCS | Mod: ,,, | Performed by: CLINICAL MEDICAL LABORATORY

## 2022-11-23 PROCEDURE — 99215 OFFICE O/P EST HI 40 MIN: CPT | Mod: PBBFAC | Performed by: PAIN MEDICINE

## 2022-11-23 PROCEDURE — 80305 DRUG TEST PRSMV DIR OPT OBS: CPT | Mod: PBBFAC | Performed by: PAIN MEDICINE

## 2022-11-23 PROCEDURE — 99213 PR OFFICE/OUTPT VISIT, EST, LEVL III, 20-29 MIN: ICD-10-PCS | Mod: S$PBB,,, | Performed by: PAIN MEDICINE

## 2022-11-23 PROCEDURE — G0481 PR DRUG TEST DEF 8-14 CLASSES: ICD-10-PCS | Mod: ,,, | Performed by: CLINICAL MEDICAL LABORATORY

## 2022-11-23 PROCEDURE — 93010 EKG 12-LEAD: ICD-10-PCS | Mod: ,,, | Performed by: HOSPITALIST

## 2022-11-23 PROCEDURE — 3008F PR BODY MASS INDEX (BMI) DOCUMENTED: ICD-10-PCS | Mod: CPTII,,, | Performed by: PAIN MEDICINE

## 2022-11-23 PROCEDURE — 1159F MED LIST DOCD IN RCRD: CPT | Mod: CPTII,,, | Performed by: PAIN MEDICINE

## 2022-11-23 PROCEDURE — 3079F PR MOST RECENT DIASTOLIC BLOOD PRESSURE 80-89 MM HG: ICD-10-PCS | Mod: CPTII,,, | Performed by: PAIN MEDICINE

## 2022-11-23 PROCEDURE — 3077F SYST BP >= 140 MM HG: CPT | Mod: CPTII,,, | Performed by: PAIN MEDICINE

## 2022-11-23 RX ORDER — TIZANIDINE HYDROCHLORIDE 4 MG/1
1 CAPSULE, GELATIN COATED ORAL EVERY 8 HOURS
Qty: 90 CAPSULE | Refills: 1 | Status: SHIPPED | OUTPATIENT
Start: 2022-11-23 | End: 2024-01-25

## 2022-11-23 RX ORDER — CELECOXIB 200 MG/1
200 CAPSULE ORAL DAILY
Qty: 30 CAPSULE | Refills: 1 | Status: SHIPPED | OUTPATIENT
Start: 2022-11-23 | End: 2023-01-17

## 2022-11-23 RX ORDER — HYDROCODONE BITARTRATE AND ACETAMINOPHEN 10; 325 MG/1; MG/1
1 TABLET ORAL EVERY 8 HOURS PRN
Qty: 90 TABLET | Refills: 0 | Status: SHIPPED | OUTPATIENT
Start: 2022-12-16 | End: 2022-11-23 | Stop reason: CLARIF

## 2022-11-23 NOTE — PATIENT INSTRUCTIONS
CAUDAL [facility]   12- AT 2PM          Procedure Instructions:    Nothing to eat or drink for 8 hours or after midnight including gum, candy, mints, or tobacco products.  If you are scheduled for 1:30 or later nothing to eat or drink after 5 a.m. the morning of the procedure, including gum, candy, mints, or tobacco products.  Must have a  at least 18 yrs of age to stay present at all times  No Diabetic medications the morning of procedure, check blood sugar the morning of procedure, if it is greater than 200 call the office at [phone]  If you are started on antibiotics or have been prescribed antibiotics, have a fever, or have any other type of infection call to reschedule procedure.  If you take blood pressure medications you can take it at your regular scheduled time with a small sip of WATER!    HOLD ASPIRIN AND ASPIRIN PRODUCTS  (ASPIRIN, BC POWDER ETC. ) FOR 7 DAYS  PRIOR TO PROCEDURE  HOLD NSAIDS( ibuprofen, mobic, meloxicam, advil, diclofenac, naproxen, relafen, celebrex,  methotrexate, aleve etc....)  FOR 3 DAYS   PRIOR TO PROCEDURE

## 2022-11-23 NOTE — PROGRESS NOTES
She Disclaimer: This note has been generated using voice-recognition software. There may be typographical errors that have been missed during proof-reading        Patient ID: Gloria Mitchell is a 52 y.o. female.      Chief Complaint: Low-back Pain      52-year-old female returns for re-evaluation of intractable lower back,  bilateral lumbar radicular and post-laminectomy pain syndrome.  She is status post lumbar laminectomy x3 and spinal cord stimulator implantation by Dr. Ireland, July 2022.  She  received a caudal epidural steroid injection November 8, 2022 and reports greater than 50% pain relief, but unfortunately she has been unable to decrease opioid intake.  The pain radiates from lower back to the left lower extremity and foot.  She notes associated paresthesia and subjective weakness.  At today's office visit she complained of some nausea, left upper extremity and chest discomfort.  Vitals were stable.  Stat EKG failed to reveal any abnormalities.  Her symptoms eventually resolved without sequelae.  She was discharged home in good condition.  She was instructed to follow-up in the emergency department if she experienced any additional chest discomfort.  Urine drug screen point care was inconsistent therefore we will obtain a definitive urine drug screen for confirmation prior to refilling opiates.            Pain Assessment  Pain Assessment: 0-10  Pain Score:   7  Pain Location: Other (Comment) (lower back)  Pain Orientation: Left  Pain Radiating Towards: radiates down leg to foot  Pain Descriptors: Aching, Burning, Radiating, Stabbing, Constant  Pain Frequency: Continuous  Pain Onset: Awakened from sleep  Clinical Progression: Not changed  Aggravating Factors: Standing, Walking, Other (Comment) (sitting and lying)  Pain Intervention(s): Medication (See eMAR), Rest      A's of Opioid Risk Assessment  Activity:Patient is unable to perform ADL.   Analgesia:Patients pain is partially controlled by current  medication.   Adverse Effects: Patient denies constipation or sedation.  Aberrant Behavior:  reviewed with no aberrant drug seeking/taking behavior.      Patient denies any suicidal or homicidal ideations    Physical Therapy/Home Exercise:  Home exercise program without significant improvement      FL Fluoro for Pain Management  See OP Notes for results.     IMPRESSION: See OP Notes for results.     This procedure was auto-finalized by: Virtual Radiologist      Review of Systems   Constitutional: Negative.    HENT: Negative.     Eyes: Negative.    Respiratory: Negative.     Cardiovascular: Negative.    Gastrointestinal: Negative.    Endocrine: Negative.    Genitourinary: Negative.    Musculoskeletal:  Positive for arthralgias, back pain and leg pain.   Integumentary:  Negative.   Neurological:  Positive for numbness.   Hematological: Negative.    Psychiatric/Behavioral: Negative.             Past Medical History:   Diagnosis Date    Acid reflux     Calculus of kidney     Depression     Hypertension     Hypothyroidism     Low back pain     Lumbosacral radiculopathy 5/17/2021    Migraines     Pain     Postlaminectomy syndrome, lumbar region 5/17/2021    UTI (urinary tract infection) 8/23/2022     Past Surgical History:   Procedure Laterality Date    BACK SURGERY      CAUDAL EPIDURAL STEROID INJECTION Bilateral 11/8/2022    Procedure: CAUDAL BRENDA;  Surgeon: Elba Moon MD;  Location: [facility];  Service: Pain Management;  Laterality: Bilateral;  PT STATES HAD VAC  WILL BRING CARD    INSERTION OF DORSAL COLUMN NERVE STIMULATOR FOR TRIAL N/A 7/15/2021    Procedure: INSERTION, NEUROSTIMULATOR, SPINAL CORD, DORSAL COLUMN, FOR TRIAL;  Surgeon: Elba Moon MD;  Location: [facility];  Service: Pain Management;  Laterality: N/A;  spoke to Dr Calin Burgess nurse, urine culture negative (results in Epic), pt cleared.    LITHOTRIPSY       Social History     Socioeconomic History    Marital status:      Number of children: 2    Years of education: 12    Highest education level: 12th grade   Occupational History    Occupation: disabled   Tobacco Use    Smoking status: Never     Passive exposure: Never    Smokeless tobacco: Never   Substance and Sexual Activity    Alcohol use: Never    Drug use: Never    Sexual activity: Not Currently     Family History   Problem Relation Age of Onset    Stroke Father     Heart attack Father     Heart attack Brother     Cancer Brother      Review of patient's allergies indicates:   Allergen Reactions    Oxycodone-acetaminophen Swelling and Edema    Acetaminophen     Oxycodone hcl      has a current medication list which includes the following prescription(s): citric acid-potassium citrate, aimovig autoinjector, gabapentin, levothyroxine, metoprolol tartrate, omeprazole, promethazine, celecoxib, and tizanidine.      Objective:  Vitals:    11/23/22 1046   BP: (!) 158/88   Pulse: (!) 59        Physical Exam  Vitals and nursing note reviewed.   Constitutional:       General: She is not in acute distress.     Appearance: Normal appearance. She is not ill-appearing, toxic-appearing or diaphoretic.   HENT:      Head: Normocephalic and atraumatic.      Nose: Nose normal.      Mouth/Throat:      Mouth: Mucous membranes are moist.   Eyes:      Extraocular Movements: Extraocular movements intact.      Pupils: Pupils are equal, round, and reactive to light.   Cardiovascular:      Rate and Rhythm: Normal rate and regular rhythm.      Heart sounds: Normal heart sounds.   Pulmonary:      Effort: Pulmonary effort is normal. No respiratory distress.      Breath sounds: Normal breath sounds. No stridor. No wheezing or rhonchi.   Abdominal:      General: Bowel sounds are normal.      Palpations: Abdomen is soft.   Musculoskeletal:         General: No swelling or deformity.      Cervical back: Normal and normal range of motion. No spasms or tenderness. No pain with movement. Normal range of  motion.      Thoracic back: Normal.      Lumbar back: Tenderness and bony tenderness present. No spasms. Decreased range of motion. Negative right straight leg raise test and negative left straight leg raise test. No scoliosis.      Right lower leg: No edema.      Left lower leg: No edema.      Comments: Lumbar pain with flexion, extension lateral rotation.  Bilateral facet tenderness to palpation from L3-S1.  Bilateral sacroiliac joint tenderness palpation   Skin:     General: Skin is warm.   Neurological:      General: No focal deficit present.      Mental Status: She is alert and oriented to person, place, and time. Mental status is at baseline.      Cranial Nerves: No cranial nerve deficit.      Sensory: Sensation is intact. No sensory deficit.      Motor: No weakness.      Coordination: Coordination normal.      Gait: Gait normal.      Deep Tendon Reflexes: Reflexes are normal and symmetric.   Psychiatric:         Mood and Affect: Mood normal.         Behavior: Behavior normal.         Assessment:      1. Chest pain    2. Lumbosacral radiculopathy    3. Postlaminectomy syndrome, lumbar region    4. Chronic pain syndrome    5. Disorder of sacrum    6. Encounter for long-term (current) use of other medications            Plan:  1. reviewed  2.Addiction, Dependency, Tolerance, Opioid abuse-misuse, Death, Diversion Discussed. Overdose reversal drug Naloxone discussed.  3.Refill/Continue medications for pain control and function       Requested Prescriptions     Signed Prescriptions Disp Refills    celecoxib (CELEBREX) 200 MG capsule 30 capsule 1     Sig: Take 1 capsule (200 mg total) by mouth once daily.    tiZANidine 4 mg Cap 90 capsule 1     Sig: Take 1 capsule by mouth every 8 (eight) hours.     4. Stat EKG order for chest pain and nausea  5.Urine drug screen and confirmation testing was ordered as documented on the requisition form in order to verify medication compliance, test for illicit substances.  6.  Caudal epidural steroid injection scheduled for lumbosacral radiculopathy    Orders Placed This Encounter   Procedures    Drug Screen Definitive 14, Urine     Standing Status:   Future     Number of Occurrences:   1     Standing Expiration Date:   1/22/2024     Order Specific Question:   Specimen Source     Answer:   Urine    POCT Urine Drug Screen Presump     Interpretive Information:     Negative:  No drug detected at the cut off level.   Positive:  This result represents presumptive positive for the   tested drug, other substances may yield a positive response other   than the analyte of interest. This result should be utilized for   diagnostic purpose only. Confirmation testing will be performed upon physician request only.       IN OFFICE EKG 12-LEAD (to [place])    Case Request Operating Room: Injection-steroid-epidural-caudal     Order Specific Question:   Medical Necessity:     Answer:   Medically Non-Urgent [100]     Order Specific Question:   CPT Code:     Answer:   IN INJ LUMBAR/SACRAL, W/IMAGING GUIDANCE [09225]     Order Specific Question:   Positioning:     Answer:   Prone [1003]     Order Specific Question:   Post-Procedure Disposition:     Answer:   PACU [1]     Order Specific Question:   Estimated Length of Stay:     Answer:   0 midnight     Order Specific Question:   Implant Required:     Answer:   No [1001]     Order Specific Question:   Is an on-site pathologist required for this procedure?     Answer:   N/A      7.Indications for this procedure for this specific patient include the following     - Pt has been in pain for at least 6 weeks and has failed conservative care (e.g. Exercise, physical methods, NSAID/ and or muscle relaxants)  - Pt has no major risk factors for spinal cancer or contraindicated condition   - Radicular pain is interfering with functional activity  - Pain is associated with symptoms of nerve root irritation   - Any numbness documented is accompanied with paresthesia   - No  evidence of systemic or local infection, bleeding tendency or unstable medical condition   - Epidural provided as part of a comprehensive pain management program  - All repeat injections have at least 80% pain relief and increase functional gain and physical activity, and reduction in reliance on the use of medication and or physical therapy  - Pt has significant functional limitation resulting in diminished quality of life and impaired age appropriate ADL's.   - Diagnostic evaluation has ruled out other causes of pain  - Pt participating in an active rehabilitation program or home exercise program which has been discussed with the patient including heat ice and rest  - No more than 3 epidurals will be done in a 6 month period at the same level with at least 7 days between injections  - MAC is only offered in cases of needle phobia   - Injection done at call level  which is consistent with patient's dermatomal pain complaint    8.Monitored Anesthesia Care medical necessity authorization request:    Monitor anesthesia request is medically indicated for the scheduled nerve block procedure due to:  - needle phobia and anxiety, placing  the patient at risk during the provided service.  -patient has severe problems with muscles and muscle spasticity that makes it hard to lie still  -patient suffers from chronic pain and is unable to function due to  diminished ADLs    9.The planned medically necessary  surgical procedure is performed in a hospital outpatient department and not in an ambulatory surgical center due to:     -there is no geographically assessable ambulatory surgery center that has the  necessary equipment and fluoroscopy needed for the procedure     -there is no geographically assessable ambulatory surgical center available at which the physician has privileges     -an ASC's  specific  guideline regarding the individuals weight or health conditions that prevent the use of an ASC       report:  Reviewed  and consistent with medication use as prescribed.      The total time spent for evaluation and management on 11/23/2022 including reviewing separately obtained history, performing a medically appropriate exam and evaluation, documenting clinical information in the health record, independently interpreting results and communicating them to the patient/family/caregiver, and ordering medications/tests/procedures was between 15-29 minutes.    The above plan and management options were discussed at length with patient. Patient is in agreement with the above and verbalized understanding. It will be communicated with the referring physician via electronic record, fax, or mail.

## 2022-11-28 PROBLEM — B37.41 YEAST CYSTITIS: Status: RESOLVED | Noted: 2022-08-23 | Resolved: 2022-11-28

## 2022-11-28 PROBLEM — N39.0 UTI (URINARY TRACT INFECTION): Status: RESOLVED | Noted: 2022-08-23 | Resolved: 2022-11-28

## 2022-11-28 LAB
6-ACETYLMORPHINE, URINE (RUSH): NEGATIVE 10 NG/ML
7-AMINOCLONAZEPAM, URINE (RUSH): NEGATIVE 25 NG/ML
A-HYDROXYALPRAZOLAM, URINE (RUSH): NEGATIVE 25 NG/ML
ACETYL FENTANYL, URINE (RUSH): NEGATIVE 2.5 NG/ML
ACETYL NORFENTANYL OXALATE, URINE (RUSH): NEGATIVE 5 NG/ML
AMPHET UR QL SCN: NEGATIVE
BENZOYLECGONINE, URINE (RUSH): NEGATIVE 100 NG/ML
BUPRENORPHINE UR QL SCN: NEGATIVE 25 NG/ML
CODEINE, URINE (RUSH): NEGATIVE 25 NG/ML
CREAT UR-MCNC: 112 MG/DL (ref 28–219)
EDDP, URINE (RUSH): NEGATIVE 25 NG/ML
FENTANYL, URINE (RUSH): NEGATIVE 2.5 NG/ML
HYDROCODONE, URINE (RUSH): >250 25 NG/ML
HYDROMORPHONE, URINE (RUSH): 186.7 25 NG/ML
LORAZEPAM, URINE (RUSH): NEGATIVE 25 NG/ML
METHADONE UR QL SCN: NEGATIVE 25 NG/ML
METHAMPHET UR QL SCN: NEGATIVE
MORPHINE, URINE (RUSH): NEGATIVE 25 NG/ML
NORBUPRENORPHINE, URINE (RUSH): NEGATIVE 25 NG/ML
NORDIAZEPAM, URINE (RUSH): NEGATIVE 25 NG/ML
NORFENTANYL OXALATE, URINE (RUSH): NEGATIVE 5 NG/ML
NORHYDROCODONE, URINE (RUSH): >500 50 NG/ML
NOROXYCODONE HCL, URINE (RUSH): NEGATIVE 50 NG/ML
OXAZEPAM, URINE (RUSH): NEGATIVE 25 NG/ML
OXYCODONE UR QL SCN: NEGATIVE 25 NG/ML
OXYMORPHONE, URINE (RUSH): NEGATIVE 25 NG/ML
PH UR STRIP: 7 PH UNITS
SP GR UR STRIP: 1.01
TAPENTADOL, URINE (RUSH): NEGATIVE 25 NG/ML
TEMAZEPAM, URINE (RUSH): NEGATIVE 25 NG/ML
THC-COOH, URINE (RUSH): >250 25 NG/ML
TRAMADOL, URINE (RUSH): NEGATIVE 100 NG/ML

## 2022-11-30 DIAGNOSIS — R11.10 VOMITING, UNSPECIFIED VOMITING TYPE, UNSPECIFIED WHETHER NAUSEA PRESENT: ICD-10-CM

## 2022-12-02 RX ORDER — PROMETHAZINE HYDROCHLORIDE 25 MG/1
TABLET ORAL
Qty: 15 TABLET | Refills: 1 | Status: SHIPPED | OUTPATIENT
Start: 2022-12-02 | End: 2023-01-17

## 2022-12-08 DIAGNOSIS — R11.2 NAUSEA AND VOMITING, UNSPECIFIED VOMITING TYPE: Primary | ICD-10-CM

## 2022-12-08 DIAGNOSIS — I10 ESSENTIAL HYPERTENSION, MALIGNANT: Primary | ICD-10-CM

## 2022-12-09 ENCOUNTER — HOSPITAL ENCOUNTER (OUTPATIENT)
Dept: RADIOLOGY | Facility: HOSPITAL | Age: 52
Discharge: HOME | End: 2022-12-09
Attending: FAMILY MEDICINE
Payer: MEDICARE

## 2022-12-09 DIAGNOSIS — R11.2 NAUSEA AND VOMITING, UNSPECIFIED VOMITING TYPE: ICD-10-CM

## 2022-12-09 PROCEDURE — 74177 CT ABD & PELVIS W/CONTRAST: CPT | Mod: TC

## 2022-12-09 PROCEDURE — 25500020 PHARM REV CODE 255: Performed by: FAMILY MEDICINE

## 2022-12-09 RX ADMIN — IOPAMIDOL 80 ML: 755 INJECTION, SOLUTION INTRAVENOUS at 11:12

## 2022-12-13 ENCOUNTER — TELEPHONE (OUTPATIENT)
Dept: PAIN MEDICINE | Facility: CLINIC | Age: 52
End: 2022-12-13
Payer: MEDICARE

## 2022-12-13 NOTE — TELEPHONE ENCOUNTER
----- Message from Zulma Bedoya sent at 12/13/2022  8:04 AM CST -----  Regarding: cancel procedure  Patient states she wants to cancel procedure for today & reschedule after [place]

## 2022-12-13 NOTE — TELEPHONE ENCOUNTER
Pt was scheduled for Caudal BRENDA today. Pt was last seen on 11-23. I attempted to contact pt, no voicemail  is set up`

## 2023-01-09 DIAGNOSIS — G43.719 INTRACTABLE CHRONIC MIGRAINE WITHOUT AURA AND WITHOUT STATUS MIGRAINOSUS: Primary | ICD-10-CM

## 2023-01-09 RX ORDER — TOPIRAMATE 50 MG/1
50 TABLET, FILM COATED ORAL 2 TIMES DAILY
Qty: 60 TABLET | Refills: 11 | Status: SHIPPED | OUTPATIENT
Start: 2023-01-09 | End: 2024-01-03 | Stop reason: CLARIF

## 2023-01-17 ENCOUNTER — HOSPITAL ENCOUNTER (EMERGENCY)
Facility: HOSPITAL | Age: 53
Discharge: HOME | End: 2023-01-17
Attending: EMERGENCY MEDICINE
Payer: MEDICARE

## 2023-01-17 VITALS
SYSTOLIC BLOOD PRESSURE: 145 MMHG | TEMPERATURE: 98 F | BODY MASS INDEX: 34.96 KG/M2 | HEART RATE: 65 BPM | HEIGHT: 62 IN | WEIGHT: 190 LBS | OXYGEN SATURATION: 99 % | DIASTOLIC BLOOD PRESSURE: 98 MMHG | RESPIRATION RATE: 12 BRPM

## 2023-01-17 DIAGNOSIS — A49.9 BACTERIAL URINARY INFECTION: Primary | ICD-10-CM

## 2023-01-17 DIAGNOSIS — R06.02 SOBOE (SHORTNESS OF BREATH ON EXERTION): ICD-10-CM

## 2023-01-17 DIAGNOSIS — N39.0 BACTERIAL URINARY INFECTION: Primary | ICD-10-CM

## 2023-01-17 DIAGNOSIS — F41.9 ANXIETY: ICD-10-CM

## 2023-01-17 LAB
ALBUMIN SERPL BCP-MCNC: 3.4 G/DL (ref 3.5–5)
ALBUMIN/GLOB SERPL: 0.9 {RATIO}
ALP SERPL-CCNC: 81 U/L (ref 41–108)
ALT SERPL W P-5'-P-CCNC: 24 U/L (ref 13–56)
AMORPH PHOS CRY #/AREA URNS LPF: ABNORMAL /LPF
AMYLASE SERPL-CCNC: 41 U/L (ref 25–115)
ANION GAP SERPL CALCULATED.3IONS-SCNC: 15 MMOL/L (ref 7–16)
AST SERPL W P-5'-P-CCNC: 24 U/L (ref 15–37)
BACTERIA #/AREA URNS HPF: ABNORMAL /HPF
BASOPHILS # BLD AUTO: 0.03 K/UL (ref 0–0.2)
BASOPHILS NFR BLD AUTO: 0.6 % (ref 0–1)
BILIRUB SERPL-MCNC: 0.4 MG/DL (ref ?–1.2)
BILIRUB UR QL STRIP: NEGATIVE
BUN SERPL-MCNC: 8 MG/DL (ref 7–18)
BUN/CREAT SERPL: 9 (ref 6–20)
CALCIUM SERPL-MCNC: 8.7 MG/DL (ref 8.5–10.1)
CHLORIDE SERPL-SCNC: 106 MMOL/L (ref 98–107)
CLARITY UR: CLEAR
CO2 SERPL-SCNC: 22 MMOL/L (ref 21–32)
COLOR UR: YELLOW
CREAT SERPL-MCNC: 0.86 MG/DL (ref 0.55–1.02)
DIFFERENTIAL METHOD BLD: ABNORMAL
EGFR (NO RACE VARIABLE) (RUSH/TITUS): 81 ML/MIN/1.73M²
EOSINOPHIL # BLD AUTO: 0.22 K/UL (ref 0–0.5)
EOSINOPHIL NFR BLD AUTO: 4.3 % (ref 1–4)
ERYTHROCYTE [DISTWIDTH] IN BLOOD BY AUTOMATED COUNT: 14.3 % (ref 11.5–14.5)
FLUAV AG UPPER RESP QL IA.RAPID: NEGATIVE
FLUBV AG UPPER RESP QL IA.RAPID: NEGATIVE
GLOBULIN SER-MCNC: 3.6 G/DL (ref 2–4)
GLUCOSE SERPL-MCNC: 117 MG/DL (ref 74–106)
GLUCOSE UR STRIP-MCNC: NEGATIVE MG/DL
HCT VFR BLD AUTO: 40.7 % (ref 38–47)
HGB BLD-MCNC: 13.1 G/DL (ref 12–16)
IMM GRANULOCYTES # BLD AUTO: 0.01 K/UL (ref 0–0.04)
IMM GRANULOCYTES NFR BLD: 0.2 % (ref 0–0.4)
KETONES UR STRIP-SCNC: NEGATIVE MG/DL
LEUKOCYTE ESTERASE UR QL STRIP: NEGATIVE
LIPASE SERPL-CCNC: 123 U/L (ref 73–393)
LYMPHOCYTES # BLD AUTO: 1.94 K/UL (ref 1–4.8)
LYMPHOCYTES NFR BLD AUTO: 38.3 % (ref 27–41)
MAGNESIUM SERPL-MCNC: 2.4 MG/DL (ref 1.7–2.3)
MCH RBC QN AUTO: 30.9 PG (ref 27–31)
MCHC RBC AUTO-ENTMCNC: 32.2 G/DL (ref 32–36)
MCV RBC AUTO: 96 FL (ref 80–96)
MONOCYTES # BLD AUTO: 0.37 K/UL (ref 0–0.8)
MONOCYTES NFR BLD AUTO: 7.3 % (ref 2–6)
MPC BLD CALC-MCNC: 10 FL (ref 9.4–12.4)
NEUTROPHILS # BLD AUTO: 2.49 K/UL (ref 1.8–7.7)
NEUTROPHILS NFR BLD AUTO: 49.3 % (ref 53–65)
NITRITE UR QL STRIP: NEGATIVE
PH UR STRIP: 8.5 PH UNITS
PLATELET # BLD AUTO: 338 K/UL (ref 150–400)
POTASSIUM SERPL-SCNC: 3.7 MMOL/L (ref 3.5–5.1)
PROT SERPL-MCNC: 7 G/DL (ref 6.4–8.2)
PROT UR QL STRIP: NEGATIVE
RBC # BLD AUTO: 4.24 M/UL (ref 4.2–5.4)
RBC # UR STRIP: ABNORMAL /UL
RBC #/AREA URNS HPF: ABNORMAL /HPF
SARS-COV+SARS-COV-2 AG RESP QL IA.RAPID: NEGATIVE
SODIUM SERPL-SCNC: 139 MMOL/L (ref 136–145)
SP GR UR STRIP: 1.02
SQUAMOUS #/AREA URNS LPF: ABNORMAL /LPF
UROBILINOGEN UR STRIP-ACNC: 0.2 MG/DL
WBC # BLD AUTO: 5.06 K/UL (ref 4.5–11)
WBC #/AREA URNS HPF: ABNORMAL /HPF

## 2023-01-17 PROCEDURE — 87426 SARSCOV CORONAVIRUS AG IA: CPT | Performed by: EMERGENCY MEDICINE

## 2023-01-17 PROCEDURE — 81003 URINALYSIS AUTO W/O SCOPE: CPT | Performed by: EMERGENCY MEDICINE

## 2023-01-17 PROCEDURE — 96375 TX/PRO/DX INJ NEW DRUG ADDON: CPT

## 2023-01-17 PROCEDURE — 99285 EMERGENCY DEPT VISIT HI MDM: CPT

## 2023-01-17 PROCEDURE — 80053 COMPREHEN METABOLIC PANEL: CPT | Performed by: EMERGENCY MEDICINE

## 2023-01-17 PROCEDURE — 82150 ASSAY OF AMYLASE: CPT | Performed by: EMERGENCY MEDICINE

## 2023-01-17 PROCEDURE — 83735 ASSAY OF MAGNESIUM: CPT | Performed by: EMERGENCY MEDICINE

## 2023-01-17 PROCEDURE — 93005 ELECTROCARDIOGRAM TRACING: CPT

## 2023-01-17 PROCEDURE — 63600175 PHARM REV CODE 636 W HCPCS: Performed by: EMERGENCY MEDICINE

## 2023-01-17 PROCEDURE — 96376 TX/PRO/DX INJ SAME DRUG ADON: CPT

## 2023-01-17 PROCEDURE — 99284 EMERGENCY DEPT VISIT MOD MDM: CPT | Performed by: EMERGENCY MEDICINE

## 2023-01-17 PROCEDURE — 63600175 PHARM REV CODE 636 W HCPCS

## 2023-01-17 PROCEDURE — 93010 EKG 12-LEAD: ICD-10-PCS | Mod: ,,, | Performed by: INTERNAL MEDICINE

## 2023-01-17 PROCEDURE — 25000003 PHARM REV CODE 250: Performed by: EMERGENCY MEDICINE

## 2023-01-17 PROCEDURE — 83690 ASSAY OF LIPASE: CPT | Performed by: EMERGENCY MEDICINE

## 2023-01-17 PROCEDURE — 87804 INFLUENZA ASSAY W/OPTIC: CPT | Mod: 59 | Performed by: EMERGENCY MEDICINE

## 2023-01-17 PROCEDURE — 87899 AGENT NOS ASSAY W/OPTIC: CPT | Mod: 59 | Performed by: EMERGENCY MEDICINE

## 2023-01-17 PROCEDURE — 96365 THER/PROPH/DIAG IV INF INIT: CPT

## 2023-01-17 PROCEDURE — 85025 COMPLETE CBC W/AUTO DIFF WBC: CPT | Performed by: EMERGENCY MEDICINE

## 2023-01-17 PROCEDURE — 87045 FECES CULTURE AEROBIC BACT: CPT | Performed by: EMERGENCY MEDICINE

## 2023-01-17 PROCEDURE — 93010 ELECTROCARDIOGRAM REPORT: CPT | Mod: ,,, | Performed by: INTERNAL MEDICINE

## 2023-01-17 PROCEDURE — 96361 HYDRATE IV INFUSION ADD-ON: CPT

## 2023-01-17 RX ORDER — ONDANSETRON 2 MG/ML
INJECTION INTRAMUSCULAR; INTRAVENOUS
Status: COMPLETED
Start: 2023-01-17 | End: 2023-01-17

## 2023-01-17 RX ORDER — ONDANSETRON 2 MG/ML
8 INJECTION INTRAMUSCULAR; INTRAVENOUS
Status: COMPLETED | OUTPATIENT
Start: 2023-01-17 | End: 2023-01-17

## 2023-01-17 RX ORDER — LORAZEPAM 2 MG/ML
0.5 INJECTION INTRAMUSCULAR
Status: COMPLETED | OUTPATIENT
Start: 2023-01-17 | End: 2023-01-17

## 2023-01-17 RX ORDER — NITROFURANTOIN (MACROCRYSTALS) 100 MG/1
100 CAPSULE ORAL 2 TIMES DAILY
COMMUNITY
Start: 2023-01-05 | End: 2023-01-17

## 2023-01-17 RX ORDER — BUPRENORPHINE HYDROCHLORIDE AND NALOXONE HYDROCHLORIDE DIHYDRATE 2; .5 MG/1; MG/1
1 TABLET SUBLINGUAL
COMMUNITY
Start: 2023-01-04

## 2023-01-17 RX ORDER — LOSARTAN POTASSIUM 50 MG/1
50 TABLET ORAL DAILY
COMMUNITY
Start: 2023-01-06

## 2023-01-17 RX ORDER — SUCRALFATE 1 G/1
1 TABLET ORAL 4 TIMES DAILY
COMMUNITY
Start: 2023-01-04 | End: 2024-01-03 | Stop reason: CLARIF

## 2023-01-17 RX ORDER — DIPHENHYDRAMINE HYDROCHLORIDE 50 MG/ML
25 INJECTION INTRAMUSCULAR; INTRAVENOUS
Status: COMPLETED | OUTPATIENT
Start: 2023-01-17 | End: 2023-01-17

## 2023-01-17 RX ORDER — TRAZODONE HYDROCHLORIDE 50 MG/1
50 TABLET ORAL NIGHTLY
COMMUNITY
Start: 2023-01-09

## 2023-01-17 RX ORDER — PROCHLORPERAZINE EDISYLATE 5 MG/ML
10 INJECTION INTRAMUSCULAR; INTRAVENOUS
Status: COMPLETED | OUTPATIENT
Start: 2023-01-17 | End: 2023-01-17

## 2023-01-17 RX ORDER — SODIUM CHLORIDE 9 MG/ML
1000 INJECTION, SOLUTION INTRAVENOUS
Status: COMPLETED | OUTPATIENT
Start: 2023-01-17 | End: 2023-01-17

## 2023-01-17 RX ORDER — CEFDINIR 300 MG/1
300 CAPSULE ORAL 2 TIMES DAILY
Qty: 20 CAPSULE | Refills: 0 | Status: SHIPPED | OUTPATIENT
Start: 2023-01-17 | End: 2023-01-27

## 2023-01-17 RX ADMIN — PROCHLORPERAZINE EDISYLATE 10 MG: 5 INJECTION INTRAMUSCULAR; INTRAVENOUS at 08:01

## 2023-01-17 RX ADMIN — ONDANSETRON 8 MG: 2 INJECTION INTRAMUSCULAR; INTRAVENOUS at 08:01

## 2023-01-17 RX ADMIN — LORAZEPAM 0.5 MG: 2 INJECTION INTRAMUSCULAR; INTRAVENOUS at 09:01

## 2023-01-17 RX ADMIN — ONDANSETRON 8 MG: 2 INJECTION INTRAMUSCULAR; INTRAVENOUS at 07:01

## 2023-01-17 RX ADMIN — SODIUM CHLORIDE 1000 ML: 9 INJECTION, SOLUTION INTRAVENOUS at 07:01

## 2023-01-17 RX ADMIN — DIPHENHYDRAMINE HYDROCHLORIDE 25 MG: 50 INJECTION INTRAMUSCULAR; INTRAVENOUS at 08:01

## 2023-01-17 RX ADMIN — CEFTRIAXONE SODIUM 1 G: 1 INJECTION, POWDER, FOR SOLUTION INTRAMUSCULAR; INTRAVENOUS at 09:01

## 2023-01-17 NOTE — ED NOTES
Pt noted with redness to iv site after zofran given-pt c/o burning at site- dr jett notified new orders received

## 2023-01-17 NOTE — ED NOTES
Iv restarted to r wrist area- pt tolerated on 3rd attempt- iv fluids restarted with out difficulty compazine given as ordered

## 2023-01-17 NOTE — ED TRIAGE NOTES
Pt c/o sob that started this am at 0400. Pt having n/v/d also after taking laxative yesterday due to constipation. Pt with recent hx of hospitalization for gastric/esophageal ulcers. Also on antibiotics currently for tx of uti.

## 2023-01-17 NOTE — ED NOTES
Mother calls nurse to room - pt states she feel sob again- 02 sat  room air- no cough or distress noted- pt assisted with repositioning then states has to go to bathroom- pt assisted to bathroom - urine sample collected and to lab for result- pt came out of bathroom stating iv out came out- pt bleeding- assisted by  candace cruz rn back to room - bleeding controlled- drsg applied

## 2023-01-17 NOTE — ED NOTES
Benadryl given as ordered- iv tubing changed- - pt with continued pain at site- redness decreasing

## 2023-01-17 NOTE — DISCHARGE INSTRUCTIONS
Talk to your primary care physician about your symptoms of anxiety, you may need a referral to a mental health specialist which can be provided by your primary physician.  If shortness of breath persists, also talk to your doctor about a referral to a cardiologist for a treadmill stress test.

## 2023-01-17 NOTE — ED PROVIDER NOTES
Encounter Date: 1/17/2023       History     Chief Complaint   Patient presents with    Shortness of Breath    Vomiting    Diarrhea     Patient presents with shortness of breath started this morning, now she reports epigastric discomfort and nausea.  Patient reports that she had been constipated, took a laxative and now has some epigastric discomfort and cramping of her abdomen.  Presented to the emergency department by EMS.  History is from patient as well as EMS providers.  Patient is currently on Macrobid for urinary infection.    Review of patient's allergies indicates:   Allergen Reactions    Oxycodone-acetaminophen Swelling and Edema    Oxycodone hcl      Past Medical History:   Diagnosis Date    Acid reflux     Calculus of kidney     Depression     Hypertension     Hypothyroidism     Low back pain     Lumbosacral radiculopathy 05/17/2021    Migraines     Multiple gastric ulcers     Pain     Postlaminectomy syndrome, lumbar region 05/17/2021    UTI (urinary tract infection) 08/23/2022     Past Surgical History:   Procedure Laterality Date    BACK SURGERY      CAUDAL EPIDURAL STEROID INJECTION Bilateral 11/8/2022    Procedure: CAUDAL BRENDA;  Surgeon: Elba Moon MD;  Location: [facility];  Service: Pain Management;  Laterality: Bilateral;  PT STATES HAD VAC  WILL BRING CARD    INSERTION OF DORSAL COLUMN NERVE STIMULATOR FOR TRIAL N/A 7/15/2021    Procedure: INSERTION, NEUROSTIMULATOR, SPINAL CORD, DORSAL COLUMN, FOR TRIAL;  Surgeon: Elba Moon MD;  Location: [facility];  Service: Pain Management;  Laterality: N/A;  spoke to Dr Calin Burgess nurse, urine culture negative (results in Epic), pt cleared.    LITHOTRIPSY       Family History   Problem Relation Age of Onset    Stroke Father     Heart attack Father     Heart attack Brother     Cancer Brother      Social History     Tobacco Use    Smoking status: Never     Passive exposure: Never    Smokeless tobacco: Never   Substance Use Topics     Alcohol use: Never    Drug use: Never     Review of Systems   Constitutional:  Positive for appetite change (Poor appetite this morning). Negative for activity change, fatigue and fever.   HENT: Negative.     Eyes: Negative.    Respiratory:  Positive for shortness of breath. Negative for apnea, cough, choking, chest tightness, wheezing and stridor.    Cardiovascular:  Negative for chest pain, palpitations and leg swelling.   Gastrointestinal:  Positive for abdominal pain, constipation and nausea. Negative for abdominal distention, anal bleeding, blood in stool, diarrhea and vomiting.   Genitourinary: Negative.    Musculoskeletal: Negative.    Skin: Negative.    Neurological: Negative.    Hematological: Negative.    Psychiatric/Behavioral: Negative.     All other systems reviewed and are negative.    Physical Exam     Initial Vitals [01/17/23 0722]   BP Pulse Resp Temp SpO2   133/69 74 18 98 °F (36.7 °C) 98 %      MAP       --         Physical Exam    Nursing note and vitals reviewed.  Constitutional: She appears well-developed and well-nourished.   HENT:   Head: Normocephalic.   Right Ear: External ear normal.   Left Ear: External ear normal.   Nose: Nose normal.   Mouth/Throat: Oropharynx is clear and moist. No oropharyngeal exudate.   Eyes: Conjunctivae and EOM are normal. Pupils are equal, round, and reactive to light. Right eye exhibits no discharge. Left eye exhibits no discharge. No scleral icterus.   Neck: Neck supple. No JVD present.   Normal range of motion.  Cardiovascular:  Normal rate, regular rhythm, normal heart sounds and intact distal pulses.           No murmur heard.  Pulmonary/Chest: Breath sounds normal. No stridor. No respiratory distress. She has no wheezes. She has no rhonchi. She has no rales. She exhibits no tenderness.   Abdominal: Abdomen is soft. Bowel sounds are normal. She exhibits no distension and no mass. There is abdominal tenderness (mild epigastric tenderness on palpation.).  There is no rebound and no guarding.   Musculoskeletal:         General: No tenderness or edema. Normal range of motion.      Cervical back: Normal range of motion and neck supple.     Lymphadenopathy:     She has no cervical adenopathy.   Neurological: She is alert and oriented to person, place, and time. She has normal strength. No cranial nerve deficit. GCS score is 15. GCS eye subscore is 4. GCS verbal subscore is 5. GCS motor subscore is 6.   Skin: Skin is warm and dry. Capillary refill takes less than 2 seconds. No rash noted. No erythema. No pallor.   Psychiatric: She has a normal mood and affect.       Medical Screening Exam   See Full Note    ED Course   Procedures  Labs Reviewed   COMPREHENSIVE METABOLIC PANEL - Abnormal; Notable for the following components:       Result Value    Glucose 117 (*)     Albumin 3.4 (*)     All other components within normal limits   MAGNESIUM - Abnormal; Notable for the following components:    Magnesium 2.4 (*)     All other components within normal limits   URINALYSIS, REFLEX TO URINE CULTURE - Abnormal; Notable for the following components:    pH, UA 8.5 (*)     Blood, UA Trace-Intact (*)     All other components within normal limits   CBC WITH DIFFERENTIAL - Abnormal; Notable for the following components:    Neutrophils % 49.3 (*)     Monocytes % 7.3 (*)     Eosinophils % 4.3 (*)     All other components within normal limits   URINALYSIS, MICROSCOPIC - Abnormal; Notable for the following components:    RBC, UA 3-5 (*)     Bacteria, UA Few (*)     Squamous Epithelial Cells, UA Few (*)     Amorphous Crystals, UA Few (*)     All other components within normal limits   RAPID INFLUENZA A/B - Normal   AMYLASE - Normal   LIPASE - Normal   SARS ANTIGEN(DESTINEY) - Normal    Narrative:     Negative SARS-CoV results should not be used as the sole basis for treatment or patient management decisions; negative results should be considered in the context of a patient's recent exposures,  history and the presene of clinical signs and symptoms consistent with COVID-19.  Negative results should be treated as presumptive and confirmed by molecular assay, if necessary for patient management.   CULTURE, STOOL   CBC W/ AUTO DIFFERENTIAL    Narrative:     The following orders were created for panel order CBC auto differential.  Procedure                               Abnormality         Status                     ---------                               -----------         ------                     CBC with Differential[[id number]]        Abnormal            Final result                 Please view results for these tests on the individual orders.        ECG Results              EKG 12-lead (In process)  Result time 01/17/23 08:51:39      In process by Interface, Lab In [facility] (01/17/23 08:51:39)                   Narrative:    Test Reason : R06.02,    Vent. Rate : 065 BPM     Atrial Rate : 065 BPM     P-R Int : 142 ms          QRS Dur : 078 ms      QT Int : 562 ms       P-R-T Axes : 048 005 034 degrees     QTc Int : 584 ms    Normal sinus rhythm  Nonspecific T wave abnormality  Prolonged QT  Abnormal ECG  When compared with ECG of 23-NOV-2022 11:44,  Nonspecific T wave abnormality now evident in Inferior leads  Nonspecific T wave abnormality now evident in Anterior-lateral leads  QT has lengthened    Referred By: [email or web address]   SELF           Confirmed By:                       In process by Interface, Lab In [facility] (01/17/23 07:52:16)                   Narrative:    Test Reason : R06.02,    Vent. Rate : 065 BPM     Atrial Rate : 065 BPM     P-R Int : 142 ms          QRS Dur : 078 ms      QT Int : 562 ms       P-R-T Axes : 048 005 034 degrees     QTc Int : 584 ms    Normal sinus rhythm  Nonspecific T wave abnormality  Prolonged QT  Abnormal ECG  When compared with ECG of 23-NOV-2022 11:44,  Nonspecific T wave abnormality now evident in Inferior leads  Nonspecific T wave abnormality now evident in  Anterior-lateral leads  QT has lengthened    Referred By: [email or web address]   SELF           Confirmed By:                                   Imaging Results              XR ABDOMEN, ACUTE 2 OR MORE VIEWS WITH CHEST (Final result)  Result time 01/17/23 08:15:42      Final result by Carlos Still DO (01/17/23 08:15:42)                   Impression:      No acute findings.    Point of Service: [facility]      Electronically signed by: Carlos Still  Date:    01/17/2023  Time:    08:15               Narrative:    EXAMINATION:  XR ABDOMEN ACUTE 2 OR MORE VIEWS WITH CHEST    CLINICAL HISTORY:  Shortness of breath;    COMPARISON:  [facility] September 26, 2022    TECHNIQUE:  Single frontal view of the chest as well as frontal views of the abdomen in the supine and upright  position.    FINDINGS:  Heart size appears within normal limits.  Chronic change of the lungs without focal consolidation, pleural effusion, or pneumothorax.  Nonspecific nonobstructive bowel gas pattern.  No free intraperitoneal air demonstrated.  Neurostimulator device projects over the left iliac wing with electrodes terminating over the lower thoracic spine.  Surgical clips project over the right upper quadrant of the abdomen.                                       Medications   cefTRIAXone (ROCEPHIN) 1 g in dextrose 5 % in water (D5W) 5 % 50 mL IVPB (MB+) (1 g Intravenous New Bag 1/17/23 0943)   LORazepam injection 0.5 mg (has no administration in time range)   ondansetron injection 8 mg (8 mg Intravenous Given 1/17/23 0815)   0.9%  NaCl infusion (0 mLs Intravenous Stopped 1/17/23 0929)   prochlorperazine injection Soln 10 mg (10 mg Intravenous Given 1/17/23 0833)   diphenhydrAMINE injection 25 mg (25 mg Intravenous Given 1/17/23 0818)     Medical Decision Making:   Clinical Tests:   Radiological Study: Reviewed  ED Management:  Patient had local skin reaction at site of IV when given Zofran, patient was given Benadryl 25 mg IV and Compazine IV  instead of finishing the Zofran.    Reviewed radiologist report for flat and upright abdomen x-ray with single-view chest, indicates no acute process.              Clinical Impression:   Final diagnoses:  [R06.02] SOBOE (shortness of breath on exertion)  [F41.9] Anxiety  [N39.0, A49.9] Bacterial urinary infection (Primary)        ED Disposition Condition    Discharge Stable          ED Prescriptions       Medication Sig Dispense Start Date End Date Auth. Provider    cefdinir (OMNICEF) 300 MG capsule Take 1 capsule (300 mg total) by mouth 2 (two) times daily. for 10 days 20 capsule 1/17/2023 1/27/2023 Srini Angulo DO          Follow-up Information       Follow up With Specialties Details Why Contact Info    Shannon Lange MD Family Medicine Schedule an appointment as soon as possible for a visit in 2 days To recheck; sooner if worse, not improving, or if any new symptoms. [id number]  [facility]   [place] [id number]  [phone]               Srini Angulo DO  01/17/23 0956

## 2023-01-17 NOTE — ED NOTES
Called to room for pt c/o feeling short of breath. Resp even and unlabored. O2 sat 100% on room air. Pt states that she has had some trouble with anxiety before but takes no meds for it. Dr jett notified.

## 2023-01-18 LAB
CAMPYLOBACTER ANTIGEN: NEGATIVE
EHEC (SHIGA TOXIN 1): NEGATIVE
EHEC (SHIGA TOXIN 2): NEGATIVE

## 2023-01-19 LAB — SALM+SHIG+E COLI ETEC STL CULT: NORMAL

## 2023-01-23 ENCOUNTER — PATIENT MESSAGE (OUTPATIENT)
Dept: NEUROLOGY | Facility: CLINIC | Age: 53
End: 2023-01-23
Payer: MEDICAID

## 2023-01-23 NOTE — ADDENDUM NOTE
Encounter addended by: Lissette Lopez on: 1/23/2023 2:12 PM   Actions taken: SmartForm saved, Flowsheet accepted

## 2023-03-08 ENCOUNTER — HOSPITAL ENCOUNTER (INPATIENT)
Facility: HOSPITAL | Age: 53
LOS: 6 days | Discharge: HOME | DRG: 690 | End: 2023-03-14
Attending: EMERGENCY MEDICINE | Admitting: EMERGENCY MEDICINE
Payer: MEDICARE

## 2023-03-08 DIAGNOSIS — N10 ACUTE PYELONEPHRITIS: Primary | ICD-10-CM

## 2023-03-08 DIAGNOSIS — R78.81 BACTEREMIA: ICD-10-CM

## 2023-03-08 DIAGNOSIS — R11.2 NAUSEA AND VOMITING, UNSPECIFIED VOMITING TYPE: ICD-10-CM

## 2023-03-08 DIAGNOSIS — R79.89 ELEVATED LACTIC ACID LEVEL: ICD-10-CM

## 2023-03-08 DIAGNOSIS — E87.6 HYPOKALEMIA: ICD-10-CM

## 2023-03-08 DIAGNOSIS — E83.42 HYPOMAGNESEMIA: ICD-10-CM

## 2023-03-08 DIAGNOSIS — E86.9 VOLUME DEPLETION: ICD-10-CM

## 2023-03-08 LAB
ALBUMIN SERPL BCP-MCNC: 2.7 G/DL (ref 3.5–5)
ALBUMIN/GLOB SERPL: 0.9 {RATIO}
ALP SERPL-CCNC: 70 U/L (ref 41–108)
ALT SERPL W P-5'-P-CCNC: 40 U/L (ref 13–56)
AMPHET UR QL SCN: NEGATIVE
AMYLASE SERPL-CCNC: 30 U/L (ref 25–115)
ANION GAP SERPL CALCULATED.3IONS-SCNC: 16 MMOL/L (ref 7–16)
AST SERPL W P-5'-P-CCNC: 49 U/L (ref 15–37)
BACTERIA #/AREA URNS HPF: ABNORMAL /HPF
BARBITURATES UR QL SCN: NEGATIVE
BASOPHILS # BLD AUTO: 0.01 K/UL (ref 0–0.2)
BASOPHILS NFR BLD AUTO: 0.1 % (ref 0–1)
BENZODIAZ METAB UR QL SCN: NEGATIVE
BILIRUB SERPL-MCNC: 0.8 MG/DL (ref ?–1.2)
BILIRUB UR QL STRIP: NEGATIVE
BUN SERPL-MCNC: 12 MG/DL (ref 7–18)
BUN/CREAT SERPL: 10 (ref 6–20)
CALCIUM SERPL-MCNC: 8.6 MG/DL (ref 8.5–10.1)
CANNABINOIDS UR QL SCN: NEGATIVE
CHLORIDE SERPL-SCNC: 108 MMOL/L (ref 98–107)
CLARITY UR: ABNORMAL
CO2 SERPL-SCNC: 22 MMOL/L (ref 21–32)
COCAINE UR QL SCN: NEGATIVE
COLOR UR: YELLOW
CREAT SERPL-MCNC: 1.19 MG/DL (ref 0.55–1.02)
DIFFERENTIAL METHOD BLD: ABNORMAL
EGFR (NO RACE VARIABLE) (RUSH/TITUS): 55 ML/MIN/1.73M²
EOSINOPHIL # BLD AUTO: 0 K/UL (ref 0–0.5)
EOSINOPHIL NFR BLD AUTO: 0 % (ref 1–4)
ERYTHROCYTE [DISTWIDTH] IN BLOOD BY AUTOMATED COUNT: 14.5 % (ref 11.5–14.5)
FLUAV AG UPPER RESP QL IA.RAPID: NEGATIVE
FLUBV AG UPPER RESP QL IA.RAPID: NEGATIVE
GLOBULIN SER-MCNC: 3 G/DL (ref 2–4)
GLUCOSE SERPL-MCNC: 140 MG/DL (ref 74–106)
GLUCOSE UR STRIP-MCNC: NEGATIVE MG/DL
HCT VFR BLD AUTO: 37.8 % (ref 38–47)
HGB BLD-MCNC: 12.1 G/DL (ref 12–16)
IMM GRANULOCYTES # BLD AUTO: 0.05 K/UL (ref 0–0.04)
IMM GRANULOCYTES NFR BLD: 0.6 % (ref 0–0.4)
KETONES UR STRIP-SCNC: NEGATIVE MG/DL
LACTATE SERPL-SCNC: 1.9 MMOL/L (ref 0.4–2)
LACTATE SERPL-SCNC: 2.7 MMOL/L (ref 0.4–2)
LACTATE SERPL-SCNC: 4.1 MMOL/L (ref 0.4–2)
LEUKOCYTE ESTERASE UR QL STRIP: ABNORMAL
LIPASE SERPL-CCNC: 75 U/L (ref 73–393)
LYMPHOCYTES # BLD AUTO: 0.65 K/UL (ref 1–4.8)
LYMPHOCYTES NFR BLD AUTO: 7.3 % (ref 27–41)
MAGNESIUM SERPL-MCNC: 1.4 MG/DL (ref 1.7–2.3)
MCH RBC QN AUTO: 30.4 PG (ref 27–31)
MCHC RBC AUTO-ENTMCNC: 32 G/DL (ref 32–36)
MCV RBC AUTO: 95 FL (ref 80–96)
MONOCYTES # BLD AUTO: 0.39 K/UL (ref 0–0.8)
MONOCYTES NFR BLD AUTO: 4.4 % (ref 2–6)
MPC BLD CALC-MCNC: 10.3 FL (ref 9.4–12.4)
NEUTROPHILS # BLD AUTO: 7.8 K/UL (ref 1.8–7.7)
NEUTROPHILS NFR BLD AUTO: 87.6 % (ref 53–65)
NITRITE UR QL STRIP: POSITIVE
OPIATES UR QL SCN: POSITIVE
PCP UR QL SCN: NEGATIVE
PH UR STRIP: 7.5 PH UNITS
PLATELET # BLD AUTO: 139 K/UL (ref 150–400)
POTASSIUM SERPL-SCNC: 2.8 MMOL/L (ref 3.5–5.1)
PROT SERPL-MCNC: 5.7 G/DL (ref 6.4–8.2)
PROT UR QL STRIP: 100
RAPID GROUP A STREP: NEGATIVE
RBC # BLD AUTO: 3.98 M/UL (ref 4.2–5.4)
RBC # UR STRIP: ABNORMAL /UL
RBC #/AREA URNS HPF: ABNORMAL /HPF
SARS-COV+SARS-COV-2 AG RESP QL IA.RAPID: NEGATIVE
SODIUM SERPL-SCNC: 143 MMOL/L (ref 136–145)
SP GR UR STRIP: 1.01
SQUAMOUS #/AREA URNS LPF: ABNORMAL /LPF
UROBILINOGEN UR STRIP-ACNC: 1 MG/DL
WBC # BLD AUTO: 8.9 K/UL (ref 4.5–11)
WBC #/AREA URNS HPF: ABNORMAL /HPF

## 2023-03-08 PROCEDURE — 99285 EMERGENCY DEPT VISIT HI MDM: CPT | Mod: EDII,,, | Performed by: EMERGENCY MEDICINE

## 2023-03-08 PROCEDURE — 85025 COMPLETE CBC W/AUTO DIFF WBC: CPT | Performed by: EMERGENCY MEDICINE

## 2023-03-08 PROCEDURE — 87880 STREP A ASSAY W/OPTIC: CPT | Performed by: EMERGENCY MEDICINE

## 2023-03-08 PROCEDURE — G0378 HOSPITAL OBSERVATION PER HR: HCPCS

## 2023-03-08 PROCEDURE — 99285 PR EMERGENCY DEPT VISIT,LEVEL V: ICD-10-PCS | Mod: EDII,,, | Performed by: EMERGENCY MEDICINE

## 2023-03-08 PROCEDURE — 87186 SC STD MICRODIL/AGAR DIL: CPT | Performed by: EMERGENCY MEDICINE

## 2023-03-08 PROCEDURE — 25000003 PHARM REV CODE 250: Performed by: EMERGENCY MEDICINE

## 2023-03-08 PROCEDURE — 11000001 HC ACUTE MED/SURG PRIVATE ROOM

## 2023-03-08 PROCEDURE — 63600175 PHARM REV CODE 636 W HCPCS: Performed by: EMERGENCY MEDICINE

## 2023-03-08 PROCEDURE — 96365 THER/PROPH/DIAG IV INF INIT: CPT

## 2023-03-08 PROCEDURE — 83605 ASSAY OF LACTIC ACID: CPT | Performed by: EMERGENCY MEDICINE

## 2023-03-08 PROCEDURE — 87428 SARSCOV & INF VIR A&B AG IA: CPT | Performed by: EMERGENCY MEDICINE

## 2023-03-08 PROCEDURE — 82150 ASSAY OF AMYLASE: CPT | Performed by: EMERGENCY MEDICINE

## 2023-03-08 PROCEDURE — 87081 CULTURE SCREEN ONLY: CPT | Performed by: EMERGENCY MEDICINE

## 2023-03-08 PROCEDURE — 81001 URINALYSIS AUTO W/SCOPE: CPT | Performed by: EMERGENCY MEDICINE

## 2023-03-08 PROCEDURE — 83690 ASSAY OF LIPASE: CPT | Performed by: EMERGENCY MEDICINE

## 2023-03-08 PROCEDURE — 80307 DRUG TEST PRSMV CHEM ANLYZR: CPT | Performed by: EMERGENCY MEDICINE

## 2023-03-08 PROCEDURE — 96366 THER/PROPH/DIAG IV INF ADDON: CPT

## 2023-03-08 PROCEDURE — 99285 EMERGENCY DEPT VISIT HI MDM: CPT | Mod: 25

## 2023-03-08 PROCEDURE — 87077 CULTURE AEROBIC IDENTIFY: CPT | Performed by: EMERGENCY MEDICINE

## 2023-03-08 PROCEDURE — 96372 THER/PROPH/DIAG INJ SC/IM: CPT | Performed by: EMERGENCY MEDICINE

## 2023-03-08 PROCEDURE — 96375 TX/PRO/DX INJ NEW DRUG ADDON: CPT

## 2023-03-08 PROCEDURE — 99223 PR INITIAL HOSPITAL CARE,LEVL III: ICD-10-PCS | Mod: ,,, | Performed by: EMERGENCY MEDICINE

## 2023-03-08 PROCEDURE — 96361 HYDRATE IV INFUSION ADD-ON: CPT

## 2023-03-08 PROCEDURE — 83735 ASSAY OF MAGNESIUM: CPT | Performed by: EMERGENCY MEDICINE

## 2023-03-08 PROCEDURE — 96368 THER/DIAG CONCURRENT INF: CPT

## 2023-03-08 PROCEDURE — 99223 1ST HOSP IP/OBS HIGH 75: CPT | Mod: ,,, | Performed by: EMERGENCY MEDICINE

## 2023-03-08 PROCEDURE — 80053 COMPREHEN METABOLIC PANEL: CPT | Performed by: EMERGENCY MEDICINE

## 2023-03-08 PROCEDURE — 87149 DNA/RNA DIRECT PROBE: CPT | Performed by: EMERGENCY MEDICINE

## 2023-03-08 RX ORDER — TIZANIDINE 2 MG/1
4 TABLET ORAL EVERY 8 HOURS PRN
Status: DISCONTINUED | OUTPATIENT
Start: 2023-03-08 | End: 2023-03-14 | Stop reason: HOSPADM

## 2023-03-08 RX ORDER — POLYETHYLENE GLYCOL 3350 17 G/17G
17 POWDER, FOR SOLUTION ORAL DAILY
Status: DISCONTINUED | OUTPATIENT
Start: 2023-03-09 | End: 2023-03-14 | Stop reason: HOSPADM

## 2023-03-08 RX ORDER — MAGNESIUM SULFATE HEPTAHYDRATE 40 MG/ML
2 INJECTION, SOLUTION INTRAVENOUS ONCE
Status: COMPLETED | OUTPATIENT
Start: 2023-03-08 | End: 2023-03-08

## 2023-03-08 RX ORDER — ACETAMINOPHEN 325 MG/1
650 TABLET ORAL EVERY 8 HOURS PRN
Status: DISCONTINUED | OUTPATIENT
Start: 2023-03-08 | End: 2023-03-14 | Stop reason: HOSPADM

## 2023-03-08 RX ORDER — SODIUM CHLORIDE AND POTASSIUM CHLORIDE 150; 900 MG/100ML; MG/100ML
INJECTION, SOLUTION INTRAVENOUS CONTINUOUS
Status: DISCONTINUED | OUTPATIENT
Start: 2023-03-08 | End: 2023-03-13

## 2023-03-08 RX ORDER — TRAZODONE HYDROCHLORIDE 50 MG/1
50 TABLET ORAL NIGHTLY
Status: DISCONTINUED | OUTPATIENT
Start: 2023-03-08 | End: 2023-03-14 | Stop reason: HOSPADM

## 2023-03-08 RX ORDER — ONDANSETRON 4 MG/1
8 TABLET, ORALLY DISINTEGRATING ORAL EVERY 8 HOURS PRN
Status: DISCONTINUED | OUTPATIENT
Start: 2023-03-08 | End: 2023-03-08

## 2023-03-08 RX ORDER — SUCRALFATE 1 G/1
1 TABLET ORAL 4 TIMES DAILY
Status: DISCONTINUED | OUTPATIENT
Start: 2023-03-08 | End: 2023-03-14 | Stop reason: HOSPADM

## 2023-03-08 RX ORDER — PANTOPRAZOLE SODIUM 40 MG/1
40 TABLET, DELAYED RELEASE ORAL DAILY
Status: DISCONTINUED | OUTPATIENT
Start: 2023-03-09 | End: 2023-03-14 | Stop reason: HOSPADM

## 2023-03-08 RX ORDER — SODIUM CHLORIDE AND POTASSIUM CHLORIDE 150; 900 MG/100ML; MG/100ML
INJECTION, SOLUTION INTRAVENOUS
Status: COMPLETED | OUTPATIENT
Start: 2023-03-08 | End: 2023-03-08

## 2023-03-08 RX ORDER — PROCHLORPERAZINE EDISYLATE 5 MG/ML
5 INJECTION INTRAMUSCULAR; INTRAVENOUS EVERY 6 HOURS PRN
Status: DISCONTINUED | OUTPATIENT
Start: 2023-03-08 | End: 2023-03-08

## 2023-03-08 RX ORDER — AMOXICILLIN 250 MG
1 CAPSULE ORAL 2 TIMES DAILY
Status: DISCONTINUED | OUTPATIENT
Start: 2023-03-08 | End: 2023-03-14 | Stop reason: HOSPADM

## 2023-03-08 RX ORDER — ENOXAPARIN SODIUM 100 MG/ML
40 INJECTION SUBCUTANEOUS EVERY 24 HOURS
Status: DISCONTINUED | OUTPATIENT
Start: 2023-03-08 | End: 2023-03-14 | Stop reason: HOSPADM

## 2023-03-08 RX ORDER — GABAPENTIN 800 MG/1
800 TABLET ORAL 2 TIMES DAILY
Status: DISCONTINUED | OUTPATIENT
Start: 2023-03-08 | End: 2023-03-08

## 2023-03-08 RX ORDER — LOSARTAN POTASSIUM 25 MG/1
50 TABLET ORAL DAILY
Status: DISCONTINUED | OUTPATIENT
Start: 2023-03-09 | End: 2023-03-14 | Stop reason: HOSPADM

## 2023-03-08 RX ORDER — BUPRENORPHINE AND NALOXONE 2; .5 MG/1; MG/1
1 FILM, SOLUBLE BUCCAL; SUBLINGUAL 2 TIMES DAILY
Status: DISCONTINUED | OUTPATIENT
Start: 2023-03-08 | End: 2023-03-14 | Stop reason: HOSPADM

## 2023-03-08 RX ORDER — PROCHLORPERAZINE EDISYLATE 5 MG/ML
5 INJECTION INTRAMUSCULAR; INTRAVENOUS EVERY 6 HOURS PRN
Status: DISCONTINUED | OUTPATIENT
Start: 2023-03-08 | End: 2023-03-14 | Stop reason: HOSPADM

## 2023-03-08 RX ORDER — TALC
6 POWDER (GRAM) TOPICAL NIGHTLY PRN
Status: DISCONTINUED | OUTPATIENT
Start: 2023-03-08 | End: 2023-03-14 | Stop reason: HOSPADM

## 2023-03-08 RX ORDER — FAMOTIDINE 10 MG/ML
20 INJECTION INTRAVENOUS EVERY 12 HOURS
Status: DISCONTINUED | OUTPATIENT
Start: 2023-03-08 | End: 2023-03-13

## 2023-03-08 RX ORDER — PROCHLORPERAZINE EDISYLATE 5 MG/ML
10 INJECTION INTRAMUSCULAR; INTRAVENOUS
Status: COMPLETED | OUTPATIENT
Start: 2023-03-08 | End: 2023-03-08

## 2023-03-08 RX ORDER — KETOROLAC TROMETHAMINE 30 MG/ML
15 INJECTION, SOLUTION INTRAMUSCULAR; INTRAVENOUS EVERY 6 HOURS PRN
Status: DISPENSED | OUTPATIENT
Start: 2023-03-08 | End: 2023-03-11

## 2023-03-08 RX ORDER — LEVOTHYROXINE SODIUM 112 UG/1
112 TABLET ORAL
Status: DISCONTINUED | OUTPATIENT
Start: 2023-03-09 | End: 2023-03-14 | Stop reason: HOSPADM

## 2023-03-08 RX ORDER — METOPROLOL TARTRATE 50 MG/1
100 TABLET ORAL DAILY
Status: DISCONTINUED | OUTPATIENT
Start: 2023-03-08 | End: 2023-03-14 | Stop reason: HOSPADM

## 2023-03-08 RX ORDER — PROMETHAZINE HYDROCHLORIDE 25 MG/ML
50 INJECTION, SOLUTION INTRAMUSCULAR; INTRAVENOUS
Status: COMPLETED | OUTPATIENT
Start: 2023-03-08 | End: 2023-03-08

## 2023-03-08 RX ORDER — TOPIRAMATE 25 MG/1
50 TABLET ORAL 2 TIMES DAILY
Status: DISCONTINUED | OUTPATIENT
Start: 2023-03-08 | End: 2023-03-14 | Stop reason: HOSPADM

## 2023-03-08 RX ORDER — SODIUM CHLORIDE 0.9 % (FLUSH) 0.9 %
10 SYRINGE (ML) INJECTION
Status: DISCONTINUED | OUTPATIENT
Start: 2023-03-08 | End: 2023-03-14 | Stop reason: HOSPADM

## 2023-03-08 RX ADMIN — PROCHLORPERAZINE EDISYLATE 5 MG: 5 INJECTION INTRAMUSCULAR; INTRAVENOUS at 11:03

## 2023-03-08 RX ADMIN — TRAZODONE HYDROCHLORIDE 50 MG: 50 TABLET ORAL at 08:03

## 2023-03-08 RX ADMIN — METOPROLOL TARTRATE 100 MG: 50 TABLET, FILM COATED ORAL at 05:03

## 2023-03-08 RX ADMIN — ACETAMINOPHEN 650 MG: 325 TABLET ORAL at 08:03

## 2023-03-08 RX ADMIN — SODIUM CHLORIDE 1000 ML: 9 INJECTION, SOLUTION INTRAVENOUS at 02:03

## 2023-03-08 RX ADMIN — MAGNESIUM SULFATE HEPTAHYDRATE 2 G: 40 INJECTION, SOLUTION INTRAVENOUS at 02:03

## 2023-03-08 RX ADMIN — SODIUM CHLORIDE AND POTASSIUM CHLORIDE: .9; .15 SOLUTION INTRAVENOUS at 11:03

## 2023-03-08 RX ADMIN — FAMOTIDINE 20 MG: 10 INJECTION, SOLUTION INTRAVENOUS at 08:03

## 2023-03-08 RX ADMIN — PROMETHAZINE HYDROCHLORIDE 50 MG: 25 INJECTION INTRAMUSCULAR; INTRAVENOUS at 08:03

## 2023-03-08 RX ADMIN — SODIUM CHLORIDE AND POTASSIUM CHLORIDE: .9; .15 SOLUTION INTRAVENOUS at 02:03

## 2023-03-08 RX ADMIN — SODIUM CHLORIDE AND POTASSIUM CHLORIDE: .9; .15 SOLUTION INTRAVENOUS at 05:03

## 2023-03-08 RX ADMIN — SUCRALFATE 1 G: 1 TABLET ORAL at 05:03

## 2023-03-08 RX ADMIN — BUPRENORPHINE AND NALOXONE 1 FILM: 2; .5 FILM, SOLUBLE BUCCAL; SUBLINGUAL at 09:03

## 2023-03-08 RX ADMIN — TOPIRAMATE 50 MG: 25 TABLET, FILM COATED ORAL at 08:03

## 2023-03-08 RX ADMIN — PROCHLORPERAZINE EDISYLATE 10 MG: 5 INJECTION INTRAMUSCULAR; INTRAVENOUS at 03:03

## 2023-03-08 RX ADMIN — SUCRALFATE 1 G: 1 TABLET ORAL at 08:03

## 2023-03-08 RX ADMIN — ENOXAPARIN SODIUM 40 MG: 40 INJECTION SUBCUTANEOUS at 05:03

## 2023-03-08 RX ADMIN — CEFTRIAXONE SODIUM 1 G: 1 INJECTION, POWDER, FOR SOLUTION INTRAMUSCULAR; INTRAVENOUS at 03:03

## 2023-03-08 RX ADMIN — SENNOSIDES AND DOCUSATE SODIUM 1 TABLET: 50; 8.6 TABLET ORAL at 08:03

## 2023-03-08 NOTE — ASSESSMENT & PLAN NOTE
Treat with IV fluid rehydration.  Treat underlying cause of volume depletion which is nausea vomiting secondary to pyelonephritis infection.

## 2023-03-08 NOTE — ASSESSMENT & PLAN NOTE
Treated with IV fluids and antiemetics, treat underlying cause presumably pyelonephritis with IV Rocephin.

## 2023-03-08 NOTE — H&P
[facility] - Emergency Department    History & Physical      Patient Name: Gloria Mitchell  MRN: [id number]  Admission Date: 3/8/2023  Attending Physician: Srini Angulo DO   Primary Care Provider: Shannon Lange MD         Patient information was obtained from patient and ER records.     Subjective:     Principal Problem:Acute pyelonephritis    Chief Complaint:   Chief Complaint   Patient presents with    Vomiting     C/o left flank pain starting about 2 days with n/v        HPI:  Patient admitted from the emergency department where she presented with flank pain, fever, nausea and vomiting.  Diagnosed with pyelonephritis and recommended for admission.    Past Medical History:   Diagnosis Date    Acid reflux     Calculus of kidney     Depression     Hypertension     Hypothyroidism     Low back pain     Lumbosacral radiculopathy 05/17/2021    Migraines     Multiple gastric ulcers     Pain     Postlaminectomy syndrome, lumbar region 05/17/2021    UTI (urinary tract infection) 08/23/2022       Past Surgical History:   Procedure Laterality Date    BACK SURGERY      CAUDAL EPIDURAL STEROID INJECTION Bilateral 11/8/2022    Procedure: CAUDAL BRENDA;  Surgeon: Elba Moon MD;  Location: [facility];  Service: Pain Management;  Laterality: Bilateral;  PT STATES HAD VAC  WILL BRING CARD    INSERTION OF DORSAL COLUMN NERVE STIMULATOR FOR TRIAL N/A 7/15/2021    Procedure: INSERTION, NEUROSTIMULATOR, SPINAL CORD, DORSAL COLUMN, FOR TRIAL;  Surgeon: Elba Moon MD;  Location: [facility];  Service: Pain Management;  Laterality: N/A;  spoke to Dr Calin Burgess nurse, urine culture negative (results in Epic), pt cleared.    LITHOTRIPSY         Review of patient's allergies indicates:   Allergen Reactions    Oxycodone-acetaminophen Swelling and Edema    Oxycodone hcl        No current facility-administered medications on file prior to encounter.     Current Outpatient Medications on File  Prior to Encounter   Medication Sig    buprenorphine-naloxone 2-0.5 mg (SUBOXONE) 2-0.5 mg Subl     erenumab-aooe (AIMOVIG AUTOINJECTOR) 140 mg/mL autoinjector Inject 1 mL (140 mg total) into the skin every 28 days.    gabapentin (NEURONTIN) 800 MG tablet Take 1 tablet (800 mg total) by mouth 3 (three) times daily. (Patient taking differently: Take 800 mg by mouth 2 (two) times daily.)    levothyroxine (SYNTHROID) 112 MCG tablet Take 112 mcg by mouth before breakfast.    losartan (COZAAR) 50 MG tablet     metoprolol tartrate (LOPRESSOR) 50 MG tablet Take 100 mg by mouth Daily.    omeprazole (PRILOSEC) 20 MG capsule 1 capsule 30 minutes before morning meal    sucralfate (CARAFATE) 1 gram tablet Take 1 tablet by mouth 4 (four) times daily.    tiZANidine 4 mg Cap Take 1 capsule by mouth every 8 (eight) hours. (Patient taking differently: Take 1 capsule by mouth every 12 (twelve) hours.)    topiramate (TOPAMAX) 50 MG tablet Take 1 tablet (50 mg total) by mouth 2 (two) times daily.    traZODone (DESYREL) 50 MG tablet Take 50 mg by mouth every evening.    [DISCONTINUED] citric acid-potassium citrate (POLYCITRA) 1,100-334 mg/5 mL solution Take by mouth 3 (three) times daily with meals.     Family History       Problem Relation (Age of Onset)    Cancer Brother    Heart attack Father, Brother    Stroke Father          Tobacco Use    Smoking status: Never     Passive exposure: Never    Smokeless tobacco: Never   Substance and Sexual Activity    Alcohol use: Never    Drug use: Never    Sexual activity: Not Currently     Review of Systems   Constitutional:  Positive for fatigue. Negative for fever.   HENT:  Negative for congestion, ear discharge, ear pain, facial swelling, mouth sores, sinus pressure, sinus pain, sore throat and trouble swallowing.         Dry oral mucosa   Eyes: Negative.    Respiratory: Negative.     Cardiovascular: Negative.    Gastrointestinal:  Positive for abdominal pain, diarrhea, nausea and vomiting.  Negative for abdominal distention, blood in stool and constipation.   Genitourinary:  Positive for dysuria, flank pain, frequency and urgency. Negative for difficulty urinating and hematuria.   Musculoskeletal:  Negative for arthralgias, back pain, gait problem, joint swelling, myalgias, neck pain and neck stiffness.   Skin: Negative.    Neurological: Negative.    Hematological: Negative.    Psychiatric/Behavioral: Negative.     All other systems reviewed and are negative.  Objective:     Vital Signs (Most Recent):  Temp: 98.4 °F (36.9 °C) (03/08/23 1213)  Pulse: 71 (03/08/23 1213)  Resp: 20 (03/08/23 1213)  BP: 117/65 (03/08/23 1213)  SpO2: 100 % (03/08/23 1235) Vital Signs (24h Range):  Temp:  [98.4 °F (36.9 °C)] 98.4 °F (36.9 °C)  Pulse:  [71] 71  Resp:  [20] 20  SpO2:  [100 %] 100 %  BP: (117)/(65) 117/65     Weight: 84.8 kg (187 lb)  Body mass index is 34.2 kg/m².    Physical Exam  Vitals and nursing note reviewed.   Constitutional:       General: She is not in acute distress.     Appearance: She is obese. She is ill-appearing. She is not toxic-appearing.   HENT:      Head: Normocephalic.      Right Ear: External ear normal.      Left Ear: External ear normal.      Nose: Nose normal.      Mouth/Throat:      Mouth: Mucous membranes are dry.      Pharynx: Oropharynx is clear. No oropharyngeal exudate or posterior oropharyngeal erythema.   Eyes:      Extraocular Movements: Extraocular movements intact.      Conjunctiva/sclera: Conjunctivae normal.      Pupils: Pupils are equal, round, and reactive to light.   Cardiovascular:      Rate and Rhythm: Normal rate and regular rhythm.      Pulses: Normal pulses.      Heart sounds: Normal heart sounds. No murmur heard.  Pulmonary:      Effort: Pulmonary effort is normal. No respiratory distress.      Breath sounds: Normal breath sounds. No stridor. No wheezing, rhonchi or rales.   Abdominal:      General: Abdomen is flat. There is no distension.      Palpations: Abdomen  is soft. There is no mass.      Tenderness: There is abdominal tenderness (diffuse abdominal tenderness and suprapubic tenderness noted on palpation.). There is right CVA tenderness. There is no left CVA tenderness, guarding or rebound.      Hernia: No hernia is present.   Musculoskeletal:         General: No swelling, tenderness, deformity or signs of injury. Normal range of motion.      Cervical back: Normal range of motion and neck supple. No rigidity or tenderness.      Right lower leg: No edema.      Left lower leg: No edema.   Lymphadenopathy:      Cervical: No cervical adenopathy.   Skin:     General: Skin is dry.      Capillary Refill: Capillary refill takes 2 to 3 seconds.      Coloration: Skin is not jaundiced or pale.      Findings: No bruising, erythema, lesion or rash.   Neurological:      General: No focal deficit present.      Mental Status: She is alert and oriented to person, place, and time. Mental status is at baseline.      Cranial Nerves: No cranial nerve deficit.      Sensory: No sensory deficit.      Coordination: Coordination normal.      Gait: Gait normal.   Psychiatric:         Mood and Affect: Mood normal.         Behavior: Behavior normal.         CRANIAL NERVES     CN III, IV, VI   Pupils are equal, round, and reactive to light.    Significant Labs: All pertinent labs within the past 24 hours have been reviewed.    Significant Imaging: I have reviewed all pertinent imaging results/findings within the past 24 hours.    Assessment/Plan:     Active Diagnoses:    Diagnosis Date Noted POA    PRINCIPAL PROBLEM:  Acute pyelonephritis [N10] 03/08/2023 Yes    Nausea and vomiting [R11.2] 03/08/2023 Yes    Hypokalemia [E87.6] 03/08/2023 Yes    Hypomagnesemia [E83.42] 03/08/2023 Unknown    Volume depletion [E86.9] 03/08/2023 Unknown    Elevated lactic acid level [R79.89] 03/08/2023 Unknown      Problems Resolved During this Admission:     VTE Risk Mitigation (From admission, onward)      None               Srini Angulo DO  Department of Hospital Medicine   [facility] - Emergency Department

## 2023-03-08 NOTE — NURSING
Patient transferred to room 103. VS assessed and admission assessment completed. Pt visibly shaking, states that she is nauseated and shakes when she is nauseated. Denies pain.     Pt requested that we contact her mother, Kenia, to notify her of the admission. Kenia contacted at 1650.

## 2023-03-08 NOTE — NURSING
Patient requested nausea medication, states that she cannot take zofran because the last time she took it it caused her hands to swell. Patient also has compazine ordered, will give that instead

## 2023-03-08 NOTE — ED TRIAGE NOTES
"C/o left flank pain starting 2 days ago"felt like it was a kidney stone -took norco and it went away" c/o vomiting multiple times  "

## 2023-03-08 NOTE — ED PROVIDER NOTES
Encounter Date: 3/8/2023       History     Chief Complaint   Patient presents with    Vomiting     C/o left flank pain starting about 2 days with n/v     Patient presents with nausea vomiting for the past 1-2 days, with severe left flank pain at home which has now resolved, now has some mild right flank pain.  Does have a history of kidney stones.  Reportedly had a fever of 104 per EMS.    Review of patient's allergies indicates:   Allergen Reactions    Oxycodone-acetaminophen Swelling and Edema    Oxycodone hcl     Zofran [ondansetron hcl] Edema     hands     Past Medical History:   Diagnosis Date    Acid reflux     Calculus of kidney     Depression     Gastrointestinal ulcerative mucositis 01/04/2023    Endoscopy Dr Ravi    Hypertension     Hypothyroidism     Low back pain     Lumbosacral radiculopathy 05/17/2021    Migraines     Multiple gastric ulcers     Pain     Postlaminectomy syndrome, lumbar region 05/17/2021    UTI (urinary tract infection) 08/23/2022     Past Surgical History:   Procedure Laterality Date    BACK SURGERY      CAUDAL EPIDURAL STEROID INJECTION Bilateral 11/08/2022    Procedure: CAUDAL BRENDA;  Surgeon: Elba Moon MD;  Location: [facility];  Service: Pain Management;  Laterality: Bilateral;  PT STATES HAD VAC  WILL BRING CARD    ENDOSCOPY  01/04/2023    INSERTION OF DORSAL COLUMN NERVE STIMULATOR FOR TRIAL N/A 07/15/2021    Procedure: INSERTION, NEUROSTIMULATOR, SPINAL CORD, DORSAL COLUMN, FOR TRIAL;  Surgeon: Elba Moon MD;  Location: [facility];  Service: Pain Management;  Laterality: N/A;  spoke to Dr Calin Burgess nurse, urine culture negative (results in Epic), pt cleared.    LITHOTRIPSY       Family History   Problem Relation Age of Onset    Stroke Father     Heart attack Father     Heart attack Brother     Cancer Brother      Social History     Tobacco Use    Smoking status: Never     Passive exposure: Never    Smokeless tobacco: Never   Substance Use Topics     Alcohol use: Never    Drug use: Never     Review of Systems   Constitutional:  Positive for activity change (decreased activity due to not feeling well.), appetite change (poor appetite), fatigue and fever. Negative for chills and diaphoresis.   HENT:  Negative for congestion, drooling, ear discharge, ear pain, facial swelling, rhinorrhea, sinus pressure, sinus pain and sore throat.         Reports dry mouth   Eyes: Negative.    Respiratory: Negative.     Cardiovascular: Negative.    Gastrointestinal:  Positive for abdominal pain (flank pain), nausea and vomiting. Negative for blood in stool, constipation and diarrhea.   Genitourinary:  Positive for dysuria and flank pain.   Musculoskeletal:  Negative for neck pain and neck stiffness.   Skin: Negative.    Neurological:  Positive for light-headedness. Negative for dizziness, tremors, seizures, syncope, facial asymmetry, speech difficulty, weakness, numbness and headaches.   Psychiatric/Behavioral: Negative.     All other systems reviewed and are negative.    Physical Exam     Initial Vitals [03/08/23 1213]   BP Pulse Resp Temp SpO2   117/65 71 20 98.4 °F (36.9 °C) 100 %      MAP       --         Physical Exam    Nursing note and vitals reviewed.  Constitutional: She appears well-developed and well-nourished. She appears ill.   HENT:   Head: Normocephalic.   Right Ear: External ear normal.   Left Ear: External ear normal.   Nose: Nose normal.   Mouth/Throat: Mucous membranes are dry.   Eyes: Conjunctivae and EOM are normal. Pupils are equal, round, and reactive to light.   Neck: Neck supple. No JVD present.   Normal range of motion.  Cardiovascular:  Normal rate, regular rhythm, normal heart sounds and intact distal pulses.           No murmur heard.  Pulmonary/Chest: Breath sounds normal. No stridor. No respiratory distress. She has no wheezes. She has no rhonchi. She has no rales.   Abdominal: She exhibits no distension and no mass. There is abdominal tenderness  (patient has right flank tenderness on percussion and right mid abdominal tenderness on palpation.  Also has some mild suprapubic tenderness on palpation.). There is no rebound and no guarding.   Musculoskeletal:         General: No tenderness or edema. Normal range of motion.      Cervical back: Normal range of motion and neck supple.     Lymphadenopathy:     She has no cervical adenopathy.   Neurological: She is alert and oriented to person, place, and time. She has normal strength. No cranial nerve deficit. GCS score is 15. GCS eye subscore is 4. GCS verbal subscore is 5. GCS motor subscore is 6.   Skin: Skin is warm and dry. Capillary refill takes more than 3 seconds. No rash noted. No erythema. No pallor.   Psychiatric: She has a normal mood and affect. Her behavior is normal.       Medical Screening Exam   See Full Note    ED Course   Procedures  Labs Reviewed   COMPREHENSIVE METABOLIC PANEL - Abnormal; Notable for the following components:       Result Value    Potassium 2.8 (*)     Chloride 108 (*)     Glucose 140 (*)     Creatinine 1.19 (*)     Total Protein 5.7 (*)     Albumin 2.7 (*)     AST 49 (*)     eGFR 55 (*)     All other components within normal limits   DRUG SCREEN, URINE (BEAKER) - Abnormal; Notable for the following components:    Opiates, Urine Positive (*)     All other components within normal limits    Narrative:     The results of screening tests should be considered presumptive. Confirmatory testing is available upon request.    Cutoff Points:  PCP:         25ng/mL  AMPH:        500ng/mL  STEPHANIE:        200ng/mL  EDWIN:        200ng/mL  THC:         50ng/mL  TAYLOR:         300ng/mL  OPI:         2000ng/mL   LACTIC ACID, PLASMA - Abnormal; Notable for the following components:    Lactic Acid 4.1 (*)     All other components within normal limits   MAGNESIUM - Abnormal; Notable for the following components:    Magnesium 1.4 (*)     All other components within normal limits   URINALYSIS, REFLEX TO  URINE CULTURE - Abnormal; Notable for the following components:    Leukocytes, UA Small (*)     Nitrites, UA Positive (*)     Protein,  (*)     Blood, UA Moderate (*)     All other components within normal limits   CBC WITH DIFFERENTIAL - Abnormal; Notable for the following components:    RBC 3.98 (*)     Hematocrit 37.8 (*)     Platelet Count 139 (*)     Neutrophils % 87.6 (*)     Lymphocytes % 7.3 (*)     Neutrophils, Abs 7.80 (*)     Lymphocytes, Absolute 0.65 (*)     Eosinophils % 0.0 (*)     Immature Granulocytes % 0.6 (*)     Immature Granulocytes, Absolute 0.05 (*)     All other components within normal limits   URINALYSIS, MICROSCOPIC - Abnormal; Notable for the following components:    WBC, UA 15-25 (*)     RBC, UA 10-15 (*)     Bacteria, UA Many (*)     Squamous Epithelial Cells, UA Moderate (*)     All other components within normal limits   LACTIC ACID, PLASMA - Abnormal; Notable for the following components:    Lactic Acid 2.7 (*)     All other components within normal limits   THROAT SCREEN, RAPID STREP - Normal   AMYLASE - Normal   LIPASE - Normal   SARS-COV2 (COVID) W/ FLU ANTIGEN - Normal    Narrative:     Negative SARS-CoV results should not be used as the sole basis for treatment or patient management decisions; negative results should be considered in the context of a patient's recent exposures, history and the presene of clinical signs and symptoms consistent with COVID-19.  Negative results should be treated as presumptive and confirmed by molecular assay, if necessary for patient management.   CBC W/ AUTO DIFFERENTIAL    Narrative:     The following orders were created for panel order CBC auto differential.  Procedure                               Abnormality         Status                     ---------                               -----------         ------                     CBC with Differential[[id number]]        Abnormal            Final result                 Please view results  for these tests on the individual orders.          Imaging Results              CT Renal Stone Study ABD Pelvis WO (Final result)  Result time 03/08/23 13:24:37      Final result by Colton Camacho DO (03/08/23 13:24:37)                   Impression:      Left-sided perinephric stranding.  Stranding surrounding the left-sided ureter.  No hydronephrosis.  No stone located within the left-sided ureter.    Multiple subcentimeter nonobstructing stones within the kidneys.      Electronically signed by: Colton Camacho  Date:    03/08/2023  Time:    13:24               Narrative:    EXAMINATION:  CT RENAL STONE STUDY ABD PELVIS WO    CLINICAL HISTORY:  Flank pain, kidney stone suspected;    COMPARISON:  1/17/23    TECHNIQUE:  CT RENAL STONE STUDY ABD PELVIS WO    FINDINGS:  Lower lobes: Clear.    Cardiac: No effusion.    Abdomen:    Hepatobiliary/gallbladder: Moderate fatty liver.  Gallbladder is removed.  No ductal obstruction.    Spleen: Normal for noncontrast technique.    Pancreas: Normal for noncontrast technique.    Adrenal/Genitourinary system: Left-sided perinephric stranding.  No hydronephrosis.  Stranding surrounding the left-sided ureter.  No stone located within the left-sided ureter.  Multiple subcentimeter nonobstructing stones within the kidneys.  Normal urinary bladder.    Bowel and Mesentery: There is no evidence for bowel obstruction.    Peritoneum: Normal.    Retroperitoneum: No enlarged lymph nodes.    Vasculature: Normal.    Lymph nodes: No enlarged lymph nodes.    Abdominal wall: Normal.    Osseous structures: Normal.                                       Medications   ketorolac injection 15 mg (15 mg Intravenous Given 3/10/23 0510)   sodium chloride 0.9% bolus 1,000 mL 1,000 mL (0 mLs Intravenous Stopped 3/8/23 1543)   0.9 % NaCl with KCl 20 mEq infusion (0 mL/hr Intravenous Stopped 3/8/23 1715)   magnesium sulfate 2g in water 50mL IVPB (premix) (0 g Intravenous Stopped 3/8/23 1630)    cefTRIAXone (ROCEPHIN) 1 g in dextrose 5 % in water (D5W) 5 % 50 mL IVPB (MB+) (0 g Intravenous Stopped 3/8/23 1609)   prochlorperazine injection Soln 10 mg (10 mg Intravenous Given 3/8/23 1539)   promethazine injection 50 mg (50 mg Intramuscular Given 3/8/23 2013)   piperacillin-tazobactam (ZOSYN) 4.5 g in dextrose 5 % in water (D5W) 5 % 100 mL IVPB (MB+) (0 g Intravenous Stopped 3/10/23 0929)   magnesium sulfate in dextrose IVPB (premix) 1 g (0 g Intravenous Stopped 3/11/23 1146)   dextrose 5 % in water (D5W) 5 % IVPB (100 mLs  New Bag 3/11/23 1206)   piperacillin-tazobactam (ZOSYN) 4.5 gram injection (4.5 mg  Given 3/11/23 1205)     Medical Decision Making:   History:   I obtained history from: someone other than patient and EMS provider.       <> Summary of History: EMS reports patient had temperature of 104° tympanic.  Old Medical Records: I decided to obtain old medical records.  Old Records Summarized: records from previous admission(s) and records from clinic visits.       <> Summary of Records: Patient has a history of lumbosacral radiculopathy, frequent visits to the emergency department for nausea vomiting and abdominal pain, as well as previous visits for kidney stones.  Initial Assessment:   Left flank pain, right flank pain, nausea and vomiting.  Differential Diagnosis:   Differential diagnosis includes gallbladder disease, renal lithiasis, pyelonephritis, gastroenteritis, pancreatitis, bowel obstruction, sepsis.  Clinical Tests:   Lab Tests: Ordered and Reviewed  The following lab test(s) were unremarkable: CBC and Lipase       <> Summary of Lab: Urinalysis is consistent with urinary infection.  Combined with clinical examination and CT scan results is indicative of left pyelonephritis.  CMP shows low potassium at 2.8, magnesium also low.  GFR slightly decreased from normal at 55.  COVID and influenza testing is negative.  Lactic acid level is elevated.  Urine drug screen is positive for opiates.   Amylase is normal.  Radiological Study: Ordered and Reviewed  ED Management:  Patient was given IV fluids, Zofran, and Toradol.  Patient given IV fluids in addition to previous liter for elevated lactic acid level and possible sepsis, she was also given IV potassium and IV magnesium for low levels.  Rocephin IV given for urinary infection with left pyelonephritis.    History, exam, and data is consistent with pyelonephritis.  Patient has elevated lactic acid level as well which remains elevated on repeat level.  Therefore recommend admission to the hospital for IV antibiotic and IV fluid treatment and monitoring of urine output.  Patient has had nausea and vomiting in the emergency department and will not tolerate outpatient treatment with oral antibiotics.          Reviewed radiologist's report for CT scan of the abdomen and pelvis, there is stranding around the left kidney but no evidence of obstructing stone and no dilatation of the ureter.  There are nonobstructing stones in the kidneys.       Clinical Impression:   Final diagnoses:  [N10] Acute pyelonephritis (Primary)  [R11.2] Nausea and vomiting, unspecified vomiting type  [E87.6] Hypokalemia  [E83.42] Hypomagnesemia  [E86.9] Volume depletion  [R79.89] Elevated lactic acid level        ED Disposition Condition    Observation Stable                Srini Angulo,   03/08/23 1550       Srini Angulo,   03/21/23 1051

## 2023-03-09 LAB
ALBUMIN SERPL BCP-MCNC: 2.6 G/DL (ref 3.5–5)
ALBUMIN/GLOB SERPL: 0.8 {RATIO}
ALP SERPL-CCNC: 71 U/L (ref 41–108)
ALT SERPL W P-5'-P-CCNC: 36 U/L (ref 13–56)
ANION GAP SERPL CALCULATED.3IONS-SCNC: 14 MMOL/L (ref 7–16)
AST SERPL W P-5'-P-CCNC: 37 U/L (ref 15–37)
BASOPHILS # BLD AUTO: 0.02 K/UL (ref 0–0.2)
BASOPHILS NFR BLD AUTO: 0.3 % (ref 0–1)
BILIRUB SERPL-MCNC: 0.8 MG/DL (ref ?–1.2)
BUN SERPL-MCNC: 12 MG/DL (ref 7–18)
BUN/CREAT SERPL: 12 (ref 6–20)
CALCIUM SERPL-MCNC: 8.2 MG/DL (ref 8.5–10.1)
CHLORIDE SERPL-SCNC: 111 MMOL/L (ref 98–107)
CO2 SERPL-SCNC: 20 MMOL/L (ref 21–32)
CREAT SERPL-MCNC: 0.98 MG/DL (ref 0.55–1.02)
DIFFERENTIAL METHOD BLD: ABNORMAL
EGFR (NO RACE VARIABLE) (RUSH/TITUS): 70 ML/MIN/1.73M²
EOSINOPHIL # BLD AUTO: 0.01 K/UL (ref 0–0.5)
EOSINOPHIL NFR BLD AUTO: 0.1 % (ref 1–4)
ERYTHROCYTE [DISTWIDTH] IN BLOOD BY AUTOMATED COUNT: 14.6 % (ref 11.5–14.5)
GLOBULIN SER-MCNC: 3.3 G/DL (ref 2–4)
GLUCOSE SERPL-MCNC: 103 MG/DL (ref 74–106)
HCT VFR BLD AUTO: 37.9 % (ref 38–47)
HGB BLD-MCNC: 12.4 G/DL (ref 12–16)
IMM GRANULOCYTES # BLD AUTO: 0.09 K/UL (ref 0–0.04)
IMM GRANULOCYTES NFR BLD: 1.3 % (ref 0–0.4)
LYMPHOCYTES # BLD AUTO: 0.82 K/UL (ref 1–4.8)
LYMPHOCYTES NFR BLD AUTO: 12 % (ref 27–41)
LYMPHOCYTES NFR BLD MANUAL: 14 % (ref 27–41)
MAGNESIUM SERPL-MCNC: 2 MG/DL (ref 1.7–2.3)
MCH RBC QN AUTO: 30.8 PG (ref 27–31)
MCHC RBC AUTO-ENTMCNC: 32.7 G/DL (ref 32–36)
MCV RBC AUTO: 94.3 FL (ref 80–96)
MONOCYTES # BLD AUTO: 0.22 K/UL (ref 0–0.8)
MONOCYTES NFR BLD AUTO: 3.2 % (ref 2–6)
MONOCYTES NFR BLD MANUAL: 2 % (ref 2–6)
MPC BLD CALC-MCNC: 11.4 FL (ref 9.4–12.4)
NEUTROPHILS # BLD AUTO: 5.66 K/UL (ref 1.8–7.7)
NEUTROPHILS NFR BLD AUTO: 83.1 % (ref 53–65)
NEUTS BAND NFR BLD MANUAL: 2 % (ref 1–5)
NEUTS SEG NFR BLD MANUAL: 82 % (ref 50–62)
NRBC BLD MANUAL-RTO: ABNORMAL %
PLATELET # BLD AUTO: 109 K/UL (ref 150–400)
PLATELET MORPHOLOGY: ABNORMAL
POTASSIUM SERPL-SCNC: 3.8 MMOL/L (ref 3.5–5.1)
PROT SERPL-MCNC: 5.9 G/DL (ref 6.4–8.2)
RBC # BLD AUTO: 4.02 M/UL (ref 4.2–5.4)
RBC MORPH BLD: NORMAL
SODIUM SERPL-SCNC: 141 MMOL/L (ref 136–145)
VERIGENE RESULT: ABNORMAL
WBC # BLD AUTO: 6.82 K/UL (ref 4.5–11)

## 2023-03-09 PROCEDURE — 80053 COMPREHEN METABOLIC PANEL: CPT | Performed by: EMERGENCY MEDICINE

## 2023-03-09 PROCEDURE — 63600175 PHARM REV CODE 636 W HCPCS: Performed by: EMERGENCY MEDICINE

## 2023-03-09 PROCEDURE — 94761 N-INVAS EAR/PLS OXIMETRY MLT: CPT

## 2023-03-09 PROCEDURE — 85025 COMPLETE CBC W/AUTO DIFF WBC: CPT | Performed by: EMERGENCY MEDICINE

## 2023-03-09 PROCEDURE — 25000003 PHARM REV CODE 250: Performed by: FAMILY MEDICINE

## 2023-03-09 PROCEDURE — 11000001 HC ACUTE MED/SURG PRIVATE ROOM

## 2023-03-09 PROCEDURE — 99232 PR SUBSEQUENT HOSPITAL CARE,LEVL II: ICD-10-PCS | Mod: ,,, | Performed by: FAMILY MEDICINE

## 2023-03-09 PROCEDURE — 25000003 PHARM REV CODE 250: Performed by: EMERGENCY MEDICINE

## 2023-03-09 PROCEDURE — 99232 SBSQ HOSP IP/OBS MODERATE 35: CPT | Mod: ,,, | Performed by: FAMILY MEDICINE

## 2023-03-09 PROCEDURE — 83735 ASSAY OF MAGNESIUM: CPT | Performed by: EMERGENCY MEDICINE

## 2023-03-09 RX ORDER — PROMETHAZINE HYDROCHLORIDE 25 MG/1
25 TABLET ORAL EVERY 6 HOURS PRN
Status: DISCONTINUED | OUTPATIENT
Start: 2023-03-09 | End: 2023-03-14 | Stop reason: HOSPADM

## 2023-03-09 RX ADMIN — FAMOTIDINE 20 MG: 10 INJECTION, SOLUTION INTRAVENOUS at 08:03

## 2023-03-09 RX ADMIN — BUPRENORPHINE AND NALOXONE 1 FILM: 2; .5 FILM, SOLUBLE BUCCAL; SUBLINGUAL at 08:03

## 2023-03-09 RX ADMIN — PROMETHAZINE HYDROCHLORIDE 25 MG: 25 TABLET ORAL at 09:03

## 2023-03-09 RX ADMIN — SENNOSIDES AND DOCUSATE SODIUM 1 TABLET: 50; 8.6 TABLET ORAL at 08:03

## 2023-03-09 RX ADMIN — SODIUM CHLORIDE AND POTASSIUM CHLORIDE 125 ML/HR: .9; .15 SOLUTION INTRAVENOUS at 04:03

## 2023-03-09 RX ADMIN — GABAPENTIN 400 MG: 300 CAPSULE ORAL at 08:03

## 2023-03-09 RX ADMIN — SUCRALFATE 1 G: 1 TABLET ORAL at 08:03

## 2023-03-09 RX ADMIN — SUCRALFATE 1 G: 1 TABLET ORAL at 01:03

## 2023-03-09 RX ADMIN — PROCHLORPERAZINE EDISYLATE 5 MG: 5 INJECTION INTRAMUSCULAR; INTRAVENOUS at 06:03

## 2023-03-09 RX ADMIN — SUCRALFATE 1 G: 1 TABLET ORAL at 09:03

## 2023-03-09 RX ADMIN — ENOXAPARIN SODIUM 40 MG: 40 INJECTION SUBCUTANEOUS at 04:03

## 2023-03-09 RX ADMIN — METOPROLOL TARTRATE 100 MG: 50 TABLET, FILM COATED ORAL at 04:03

## 2023-03-09 RX ADMIN — BUPRENORPHINE AND NALOXONE 1 FILM: 2; .5 FILM, SOLUBLE BUCCAL; SUBLINGUAL at 09:03

## 2023-03-09 RX ADMIN — PROMETHAZINE HYDROCHLORIDE 25 MG: 25 TABLET ORAL at 10:03

## 2023-03-09 RX ADMIN — POLYETHYLENE GLYCOL 3350 17 G: 17 POWDER, FOR SOLUTION ORAL at 08:03

## 2023-03-09 RX ADMIN — ACETAMINOPHEN 650 MG: 325 TABLET ORAL at 09:03

## 2023-03-09 RX ADMIN — FAMOTIDINE 20 MG: 10 INJECTION, SOLUTION INTRAVENOUS at 09:03

## 2023-03-09 RX ADMIN — PANTOPRAZOLE SODIUM 40 MG: 40 TABLET, DELAYED RELEASE ORAL at 08:03

## 2023-03-09 RX ADMIN — SODIUM CHLORIDE AND POTASSIUM CHLORIDE 125 ML/HR: .9; .15 SOLUTION INTRAVENOUS at 08:03

## 2023-03-09 RX ADMIN — CEFTRIAXONE SODIUM 1 G: 1 INJECTION, POWDER, FOR SOLUTION INTRAMUSCULAR; INTRAVENOUS at 02:03

## 2023-03-09 RX ADMIN — LEVOTHYROXINE SODIUM 112 MCG: 0.11 TABLET ORAL at 05:03

## 2023-03-09 RX ADMIN — LOSARTAN POTASSIUM 50 MG: 25 TABLET, FILM COATED ORAL at 08:03

## 2023-03-09 RX ADMIN — TRAZODONE HYDROCHLORIDE 50 MG: 50 TABLET ORAL at 09:03

## 2023-03-09 RX ADMIN — TOPIRAMATE 50 MG: 25 TABLET, FILM COATED ORAL at 09:03

## 2023-03-09 RX ADMIN — SUCRALFATE 1 G: 1 TABLET ORAL at 04:03

## 2023-03-09 RX ADMIN — TOPIRAMATE 50 MG: 25 TABLET, FILM COATED ORAL at 08:03

## 2023-03-09 NOTE — PLAN OF CARE
Problem: Adult Inpatient Plan of Care  Goal: Plan of Care Review  Flowsheets (Taken 3/9/2023 1721)  Plan of Care Reviewed With: patient  Goal: Absence of Hospital-Acquired Illness or Injury  Intervention: Identify and Manage Fall Risk  Flowsheets (Taken 3/9/2023 1721)  Safety Promotion/Fall Prevention:   assistive device/personal item within reach   diversional activities provided   side rails raised x 2   muscle strengthening facilitated   nonskid shoes/socks when out of bed   medications reviewed   high risk medications identified  Intervention: Prevent Skin Injury  Flowsheets (Taken 3/9/2023 1721)  Body Position: position changed independently  Skin Protection: adhesive use limited  Intervention: Prevent and Manage VTE (Venous Thromboembolism) Risk  Flowsheets (Taken 3/9/2023 1721)  Activity Management: Arm raise - L1  VTE Prevention/Management:   remove, assess skin, and reapply compression stockings   ROM (active) performed   ambulation promoted  Range of Motion: ROM (range of motion) performed  Goal: Optimal Comfort and Wellbeing  Intervention: Monitor Pain and Promote Comfort  Flowsheets (Taken 3/9/2023 1721)  Pain Management Interventions: pain management plan reviewed with patient/caregiver

## 2023-03-09 NOTE — SUBJECTIVE & OBJECTIVE
Interval History: pt. Is nauseated. Compazine is not helping.    Review of Systems  Objective:     Vital Signs (Most Recent):  Temp: 98.5 °F (36.9 °C) (03/09/23 0746)  Pulse: 79 (03/09/23 0746)  Resp: 18 (03/09/23 0746)  BP: 135/75 (03/09/23 0746)  SpO2: 99 % (03/09/23 0746) Vital Signs (24h Range):  Temp:  [98 °F (36.7 °C)-102.9 °F (39.4 °C)] 98.5 °F (36.9 °C)  Pulse:  [65-89] 79  Resp:  [16-20] 18  SpO2:  [96 %-100 %] 99 %  BP: (104-153)/(56-81) 135/75     Weight: 84.8 kg (186 lb 15.2 oz)  Body mass index is 34.19 kg/m².    Intake/Output Summary (Last 24 hours) at 3/9/2023 1484  Last data filed at 3/8/2023 1543  Gross per 24 hour   Intake 1000 ml   Output --   Net 1000 ml      Physical Exam    Significant Labs: All pertinent labs within the past 24 hours have been reviewed.    Significant Imaging: I have reviewed all pertinent imaging results/findings within the past 24 hours.

## 2023-03-09 NOTE — PROGRESS NOTES
[facility]  Hospital Medicine  Progress Note    Patient Name: Gloria Mitchell  MRN: [id number]  Patient Class: OP- Observation   Admission Date: 3/8/2023  Length of Stay: 0 days  Attending Physician: Srini Angulo DO  Primary Care Provider: Shannon Lange MD        Subjective:     Principal Problem:Acute pyelonephritis        HPI:  No notes on file    Overview/Hospital Course:  3/9/23 - pt. Is nauseated this AM.has been given compazine to no avail. Orders revised.      No new subjective & objective note has been filed under this hospital service since the last note was generated.      Assessment/Plan:      * Acute pyelonephritis    Admit for IV fluids and IV antibiotics.    Elevated lactic acid level    Treat with IV fluid rehydration.  Treat underlying cause of volume depletion which is nausea vomiting secondary to pyelonephritis infection.    Hypomagnesemia    Recheck level and treat if remains low.    Hypokalemia  Treat with IV repletion.      Nausea and vomiting  Treated with IV fluids and antiemetics, treat underlying cause presumably pyelonephritis with IV Rocephin.        VTE Risk Mitigation (From admission, onward)         Ordered     enoxaparin injection 40 mg  Daily         03/08/23 1655     IP VTE HIGH RISK PATIENT  Once         03/08/23 1655     Place sequential compression device  Until discontinued         03/08/23 1655                Discharge Planning   JOSIE:      Code Status: Full Code   Is the patient medically ready for discharge?:     Reason for patient still in hospital (select all that apply): Patient trending condition                     Ora Kay MD  Department of Hospital Medicine   [facility]

## 2023-03-09 NOTE — PROGRESS NOTES
[facility]  Hospital Medicine  Progress Note    Patient Name: Gloria Mitchell  MRN: [id number]  Patient Class: OP- Observation   Admission Date: 3/8/2023  Length of Stay: 0 days  Attending Physician: Srini Angulo DO  Primary Care Provider: Shannon Lange MD        Subjective:     Principal Problem:Acute pyelonephritis        HPI:  No notes on file    Overview/Hospital Course:  3/9/23 - pt. Is nauseated this AM.has been given compazine to no avail. Orders revised.      Interval History: pt. Is nauseated. Compazine is not helping.    Review of Systems  Objective:     Vital Signs (Most Recent):  Temp: 98.5 °F (36.9 °C) (03/09/23 0746)  Pulse: 79 (03/09/23 0746)  Resp: 18 (03/09/23 0746)  BP: 135/75 (03/09/23 0746)  SpO2: 99 % (03/09/23 0746) Vital Signs (24h Range):  Temp:  [98 °F (36.7 °C)-102.9 °F (39.4 °C)] 98.5 °F (36.9 °C)  Pulse:  [65-89] 79  Resp:  [16-20] 18  SpO2:  [96 %-100 %] 99 %  BP: (104-153)/(56-81) 135/75     Weight: 84.8 kg (186 lb 15.2 oz)  Body mass index is 34.19 kg/m².    Intake/Output Summary (Last 24 hours) at 3/9/2023 0758  Last data filed at 3/8/2023 1543  Gross per 24 hour   Intake 1000 ml   Output --   Net 1000 ml      Physical Exam    Significant Labs: All pertinent labs within the past 24 hours have been reviewed.    Significant Imaging: I have reviewed all pertinent imaging results/findings within the past 24 hours.      Assessment/Plan:      * Acute pyelonephritis    Admit for IV fluids and IV antibiotics.    Elevated lactic acid level    Treat with IV fluid rehydration.  Treat underlying cause of volume depletion which is nausea vomiting secondary to pyelonephritis infection.    Hypomagnesemia    Recheck level and treat if remains low.    Hypokalemia  Treat with IV repletion.      Nausea and vomiting  Treated with IV fluids and antiemetics, treat underlying cause presumably pyelonephritis with IV Rocephin.        VTE Risk Mitigation (From  admission, onward)         Ordered     enoxaparin injection 40 mg  Daily         03/08/23 1655     IP VTE HIGH RISK PATIENT  Once         03/08/23 1655     Place sequential compression device  Until discontinued         03/08/23 1655                Discharge Planning   JOSIE:      Code Status: Full Code   Is the patient medically ready for discharge?:     Reason for patient still in hospital (select all that apply): Patient trending condition                     Ora Kay MD  Department of Hospital Medicine   [facility]

## 2023-03-09 NOTE — NURSING
Per Interqual guidelines, pt qualifies for inpatient level of care due to her positive nitrite and leukocyte ua, positive blood culture, and elevated lactic acid and creatinine. Pt will be changed from obs. To inpatient level of care

## 2023-03-09 NOTE — HOSPITAL COURSE
3/9/23 - pt. Is nauseated this AM.has been given compazine to no avail. Orders revised.    3/10/23 - pt is staying nauseated. Her hemoglobin has dropped. Orders revised.    3/11-- Pt reports nausea with eating therfore not eating much. She is drinking. Remains IVF. IV Abx changed yesterday. Bcx with E coli pansensitive.Ucx ecoli and Enterococcus.    Stooling with diarrhea    3/12-- Pt reports better. Nausea with eating but ate more this morning. Drinking well. Lots of sprite. Pt ambulating on their own. Dicussded she needs to eat even with the nausea. She needs to be OOB to chair and moving around.    3/13/23 - pt. Feels better today. Has been eating without nausea. Orders revised.    3/14/23 - pt. Woke up and became nauseated after eating grits. Will send home on amoxil instead of augmentin. To follow up with her GI and PCP.

## 2023-03-10 LAB
ALBUMIN SERPL BCP-MCNC: 2 G/DL (ref 3.5–5)
ALBUMIN/GLOB SERPL: 0.7 {RATIO}
ALP SERPL-CCNC: 52 U/L (ref 41–108)
ALT SERPL W P-5'-P-CCNC: 24 U/L (ref 13–56)
ANION GAP SERPL CALCULATED.3IONS-SCNC: 12 MMOL/L (ref 7–16)
AST SERPL W P-5'-P-CCNC: 25 U/L (ref 15–37)
BASOPHILS # BLD AUTO: 0.02 K/UL (ref 0–0.2)
BASOPHILS NFR BLD AUTO: 0.5 % (ref 0–1)
BILIRUB SERPL-MCNC: 0.5 MG/DL (ref ?–1.2)
BUN SERPL-MCNC: 12 MG/DL (ref 7–18)
BUN/CREAT SERPL: 13 (ref 6–20)
CALCIUM SERPL-MCNC: 7.6 MG/DL (ref 8.5–10.1)
CHLORIDE SERPL-SCNC: 112 MMOL/L (ref 98–107)
CO2 SERPL-SCNC: 21 MMOL/L (ref 21–32)
CREAT SERPL-MCNC: 0.95 MG/DL (ref 0.55–1.02)
DEPRECATED S PYO AG THROAT QL EIA: NORMAL
DIFFERENTIAL METHOD BLD: ABNORMAL
EGFR (NO RACE VARIABLE) (RUSH/TITUS): 72 ML/MIN/1.73M²
EOSINOPHIL # BLD AUTO: 0.01 K/UL (ref 0–0.5)
EOSINOPHIL NFR BLD AUTO: 0.2 % (ref 1–4)
ERYTHROCYTE [DISTWIDTH] IN BLOOD BY AUTOMATED COUNT: 14.6 % (ref 11.5–14.5)
GLOBULIN SER-MCNC: 2.7 G/DL (ref 2–4)
GLUCOSE SERPL-MCNC: 134 MG/DL (ref 74–106)
HCT VFR BLD AUTO: 29.9 % (ref 38–47)
HGB BLD-MCNC: 9.7 G/DL (ref 12–16)
IMM GRANULOCYTES # BLD AUTO: 0.03 K/UL (ref 0–0.04)
IMM GRANULOCYTES NFR BLD: 0.7 % (ref 0–0.4)
LYMPHOCYTES # BLD AUTO: 0.65 K/UL (ref 1–4.8)
LYMPHOCYTES NFR BLD AUTO: 16.1 % (ref 27–41)
MAGNESIUM SERPL-MCNC: 1.6 MG/DL (ref 1.7–2.3)
MCH RBC QN AUTO: 30.5 PG (ref 27–31)
MCHC RBC AUTO-ENTMCNC: 32.4 G/DL (ref 32–36)
MCV RBC AUTO: 94 FL (ref 80–96)
MONOCYTES # BLD AUTO: 0.08 K/UL (ref 0–0.8)
MONOCYTES NFR BLD AUTO: 2 % (ref 2–6)
MPC BLD CALC-MCNC: 11.2 FL (ref 9.4–12.4)
NEUTROPHILS # BLD AUTO: 3.25 K/UL (ref 1.8–7.7)
NEUTROPHILS NFR BLD AUTO: 80.5 % (ref 53–65)
PLATELET # BLD AUTO: 103 K/UL (ref 150–400)
POTASSIUM SERPL-SCNC: 3.7 MMOL/L (ref 3.5–5.1)
PROT SERPL-MCNC: 4.7 G/DL (ref 6.4–8.2)
RBC # BLD AUTO: 3.18 M/UL (ref 4.2–5.4)
SODIUM SERPL-SCNC: 141 MMOL/L (ref 136–145)
UA COMPLETE W REFLEX CULTURE PNL UR: ABNORMAL
UA COMPLETE W REFLEX CULTURE PNL UR: ABNORMAL
WBC # BLD AUTO: 4.04 K/UL (ref 4.5–11)

## 2023-03-10 PROCEDURE — 99900031 HC PATIENT EDUCATION (STAT)

## 2023-03-10 PROCEDURE — 25000003 PHARM REV CODE 250: Performed by: FAMILY MEDICINE

## 2023-03-10 PROCEDURE — 99232 SBSQ HOSP IP/OBS MODERATE 35: CPT | Mod: ,,, | Performed by: FAMILY MEDICINE

## 2023-03-10 PROCEDURE — 63600175 PHARM REV CODE 636 W HCPCS: Performed by: EMERGENCY MEDICINE

## 2023-03-10 PROCEDURE — 25000003 PHARM REV CODE 250: Performed by: EMERGENCY MEDICINE

## 2023-03-10 PROCEDURE — 94761 N-INVAS EAR/PLS OXIMETRY MLT: CPT

## 2023-03-10 PROCEDURE — 11000001 HC ACUTE MED/SURG PRIVATE ROOM

## 2023-03-10 PROCEDURE — 85025 COMPLETE CBC W/AUTO DIFF WBC: CPT | Performed by: EMERGENCY MEDICINE

## 2023-03-10 PROCEDURE — 80053 COMPREHEN METABOLIC PANEL: CPT | Performed by: EMERGENCY MEDICINE

## 2023-03-10 PROCEDURE — 99232 PR SUBSEQUENT HOSPITAL CARE,LEVL II: ICD-10-PCS | Mod: ,,, | Performed by: FAMILY MEDICINE

## 2023-03-10 PROCEDURE — 99900035 HC TECH TIME PER 15 MIN (STAT)

## 2023-03-10 PROCEDURE — 83735 ASSAY OF MAGNESIUM: CPT | Performed by: EMERGENCY MEDICINE

## 2023-03-10 PROCEDURE — 63600175 PHARM REV CODE 636 W HCPCS: Performed by: FAMILY MEDICINE

## 2023-03-10 RX ADMIN — TOPIRAMATE 50 MG: 25 TABLET, FILM COATED ORAL at 08:03

## 2023-03-10 RX ADMIN — GABAPENTIN 400 MG: 300 CAPSULE ORAL at 08:03

## 2023-03-10 RX ADMIN — SUCRALFATE 1 G: 1 TABLET ORAL at 12:03

## 2023-03-10 RX ADMIN — SODIUM CHLORIDE AND POTASSIUM CHLORIDE: .9; .15 SOLUTION INTRAVENOUS at 02:03

## 2023-03-10 RX ADMIN — PIPERACILLIN SODIUM AND TAZOBACTAM SODIUM 4.5 G: 4; .5 INJECTION, POWDER, LYOPHILIZED, FOR SOLUTION INTRAVENOUS at 12:03

## 2023-03-10 RX ADMIN — KETOROLAC TROMETHAMINE 15 MG: 30 INJECTION, SOLUTION INTRAMUSCULAR at 05:03

## 2023-03-10 RX ADMIN — PANTOPRAZOLE SODIUM 40 MG: 40 TABLET, DELAYED RELEASE ORAL at 08:03

## 2023-03-10 RX ADMIN — ENOXAPARIN SODIUM 40 MG: 40 INJECTION SUBCUTANEOUS at 04:03

## 2023-03-10 RX ADMIN — BUPRENORPHINE AND NALOXONE 1 FILM: 2; .5 FILM, SOLUBLE BUCCAL; SUBLINGUAL at 08:03

## 2023-03-10 RX ADMIN — SODIUM CHLORIDE AND POTASSIUM CHLORIDE: .9; .15 SOLUTION INTRAVENOUS at 10:03

## 2023-03-10 RX ADMIN — FAMOTIDINE 20 MG: 10 INJECTION, SOLUTION INTRAVENOUS at 08:03

## 2023-03-10 RX ADMIN — TRAZODONE HYDROCHLORIDE 50 MG: 50 TABLET ORAL at 08:03

## 2023-03-10 RX ADMIN — PROMETHAZINE HYDROCHLORIDE 25 MG: 25 TABLET ORAL at 06:03

## 2023-03-10 RX ADMIN — ACETAMINOPHEN 650 MG: 325 TABLET ORAL at 03:03

## 2023-03-10 RX ADMIN — ACETAMINOPHEN 650 MG: 325 TABLET ORAL at 05:03

## 2023-03-10 RX ADMIN — METOPROLOL TARTRATE 100 MG: 50 TABLET, FILM COATED ORAL at 04:03

## 2023-03-10 RX ADMIN — PIPERACILLIN SODIUM AND TAZOBACTAM SODIUM 4.5 G: 4; .5 INJECTION, POWDER, LYOPHILIZED, FOR SOLUTION INTRAVENOUS at 08:03

## 2023-03-10 RX ADMIN — SUCRALFATE 1 G: 1 TABLET ORAL at 04:03

## 2023-03-10 RX ADMIN — LEVOTHYROXINE SODIUM 112 MCG: 0.11 TABLET ORAL at 05:03

## 2023-03-10 RX ADMIN — LOSARTAN POTASSIUM 50 MG: 25 TABLET, FILM COATED ORAL at 08:03

## 2023-03-10 RX ADMIN — SUCRALFATE 1 G: 1 TABLET ORAL at 08:03

## 2023-03-10 RX ADMIN — PROMETHAZINE HYDROCHLORIDE 25 MG: 25 TABLET ORAL at 05:03

## 2023-03-10 NOTE — NURSING
0805 Hat placed in toilet for stool collection. Instructed pt on reason for collection and to call nurse after next BM. Pt verbalizes understanding.

## 2023-03-10 NOTE — PROGRESS NOTES
[facility]  Hospital Medicine  Progress Note    Patient Name: Gloria Mitchell  MRN: [id number]  Patient Class: IP- Inpatient   Admission Date: 3/8/2023  Length of Stay: 2 days  Attending Physician: Srini Angulo DO  Primary Care Provider: Shannon Lange MD        Subjective:     Principal Problem:Acute pyelonephritis        HPI:  No notes on file    Overview/Hospital Course:  3/9/23 - pt. Is nauseated this AM.has been given compazine to no avail. Orders revised.    3/10/23 - pt is staying nauseated. Her hemoglobin has dropped. Orders revised.      Interval History: staying nauseated. Orders revised. Hemoglobin has dropped    Review of Systems  Objective:     Vital Signs (Most Recent):  Temp: 99.2 °F (37.3 °C) (03/10/23 0300)  Pulse: 70 (03/10/23 0703)  Resp: 20 (03/10/23 0703)  BP: (!) 143/96 (03/10/23 0300)  SpO2: (!) 92 % (03/10/23 0703)   Vital Signs (24h Range):  Temp:  [98.5 °F (36.9 °C)-99.9 °F (37.7 °C)] 99.2 °F (37.3 °C)  Pulse:  [70-91] 70  Resp:  [18-20] 20  SpO2:  [92 %-97 %] 92 %  BP: (127-143)/(56-96) 143/96     Weight: 84.8 kg (186 lb 15.2 oz)  Body mass index is 34.19 kg/m².    Intake/Output Summary (Last 24 hours) at 3/10/2023 0816  Last data filed at 3/9/2023 1157  Gross per 24 hour   Intake 0 ml   Output --   Net 0 ml      Physical Exam    Significant Labs: All pertinent labs within the past 24 hours have been reviewed.    Significant Imaging: I have reviewed all pertinent imaging results/findings within the past 24 hours.      Assessment/Plan:      * Acute pyelonephritis    Admit for IV fluids and IV antibiotics.    Elevated lactic acid level    Treat with IV fluid rehydration.  Treat underlying cause of volume depletion which is nausea vomiting secondary to pyelonephritis infection.    Hypomagnesemia    Recheck level and treat if remains low.    Hypokalemia  Treat with IV repletion.      Nausea and vomiting  Treated with IV fluids and antiemetics,  treat underlying cause presumably pyelonephritis with IV Rocephin.        VTE Risk Mitigation (From admission, onward)         Ordered     enoxaparin injection 40 mg  Daily         03/08/23 1655     IP VTE HIGH RISK PATIENT  Once         03/08/23 1655     Place sequential compression device  Until discontinued         03/08/23 1655                Discharge Planning   JOSIE:      Code Status: Full Code   Is the patient medically ready for discharge?:     Reason for patient still in hospital (select all that apply): Patient trending condition                     Ora Kay MD  Department of Hospital Medicine   [facility]

## 2023-03-10 NOTE — SUBJECTIVE & OBJECTIVE
Interval History: staying nauseated. Orders revised. Hemoglobin has dropped    Review of Systems  Objective:     Vital Signs (Most Recent):  Temp: 99.2 °F (37.3 °C) (03/10/23 0300)  Pulse: 70 (03/10/23 0703)  Resp: 20 (03/10/23 0703)  BP: (!) 143/96 (03/10/23 0300)  SpO2: (!) 92 % (03/10/23 0703)   Vital Signs (24h Range):  Temp:  [98.5 °F (36.9 °C)-99.9 °F (37.7 °C)] 99.2 °F (37.3 °C)  Pulse:  [70-91] 70  Resp:  [18-20] 20  SpO2:  [92 %-97 %] 92 %  BP: (127-143)/(56-96) 143/96     Weight: 84.8 kg (186 lb 15.2 oz)  Body mass index is 34.19 kg/m².    Intake/Output Summary (Last 24 hours) at 3/10/2023 0816  Last data filed at 3/9/2023 1157  Gross per 24 hour   Intake 0 ml   Output --   Net 0 ml      Physical Exam    Significant Labs: All pertinent labs within the past 24 hours have been reviewed.    Significant Imaging: I have reviewed all pertinent imaging results/findings within the past 24 hours.

## 2023-03-10 NOTE — PLAN OF CARE
Problem: Adult Inpatient Plan of Care  Goal: Plan of Care Review  Outcome: Ongoing, Progressing  Flowsheets (Taken 3/10/2023 0107)  Plan of Care Reviewed With: patient  Goal: Optimal Comfort and Wellbeing  Outcome: Ongoing, Progressing  Goal: Readiness for Transition of Care  Outcome: Ongoing, Progressing

## 2023-03-11 PROBLEM — R78.81 BACTEREMIA: Status: ACTIVE | Noted: 2023-03-11

## 2023-03-11 LAB
ALBUMIN SERPL BCP-MCNC: 2.3 G/DL (ref 3.5–5)
ALBUMIN/GLOB SERPL: 0.7 {RATIO}
ALP SERPL-CCNC: 65 U/L (ref 41–108)
ALT SERPL W P-5'-P-CCNC: 28 U/L (ref 13–56)
ANION GAP SERPL CALCULATED.3IONS-SCNC: 14 MMOL/L (ref 7–16)
AST SERPL W P-5'-P-CCNC: 36 U/L (ref 15–37)
BACTERIA BLD CULT: ABNORMAL
BASOPHILS # BLD AUTO: 0.03 K/UL (ref 0–0.2)
BASOPHILS NFR BLD AUTO: 0.8 % (ref 0–1)
BILIRUB SERPL-MCNC: 0.6 MG/DL (ref ?–1.2)
BUN SERPL-MCNC: 10 MG/DL (ref 7–18)
BUN/CREAT SERPL: 9 (ref 6–20)
CALCIUM SERPL-MCNC: 7.8 MG/DL (ref 8.5–10.1)
CHLORIDE SERPL-SCNC: 110 MMOL/L (ref 98–107)
CO2 SERPL-SCNC: 18 MMOL/L (ref 21–32)
CREAT SERPL-MCNC: 1.08 MG/DL (ref 0.55–1.02)
DIFFERENTIAL METHOD BLD: ABNORMAL
EGFR (NO RACE VARIABLE) (RUSH/TITUS): 62 ML/MIN/1.73M²
EOSINOPHIL # BLD AUTO: 0.23 K/UL (ref 0–0.5)
EOSINOPHIL NFR BLD AUTO: 5.8 % (ref 1–4)
ERYTHROCYTE [DISTWIDTH] IN BLOOD BY AUTOMATED COUNT: 14.8 % (ref 11.5–14.5)
GLOBULIN SER-MCNC: 3.1 G/DL (ref 2–4)
GLUCOSE SERPL-MCNC: 95 MG/DL (ref 74–106)
GRAM STN SPEC: ABNORMAL
HCT VFR BLD AUTO: 33.9 % (ref 38–47)
HGB BLD-MCNC: 11 G/DL (ref 12–16)
IMM GRANULOCYTES # BLD AUTO: 0.01 K/UL (ref 0–0.04)
IMM GRANULOCYTES NFR BLD: 0.3 % (ref 0–0.4)
LYMPHOCYTES # BLD AUTO: 0.85 K/UL (ref 1–4.8)
LYMPHOCYTES NFR BLD AUTO: 21.4 % (ref 27–41)
MAGNESIUM SERPL-MCNC: 1.5 MG/DL (ref 1.7–2.3)
MCH RBC QN AUTO: 30.1 PG (ref 27–31)
MCHC RBC AUTO-ENTMCNC: 32.4 G/DL (ref 32–36)
MCV RBC AUTO: 92.9 FL (ref 80–96)
MONOCYTES # BLD AUTO: 0.12 K/UL (ref 0–0.8)
MONOCYTES NFR BLD AUTO: 3 % (ref 2–6)
MPC BLD CALC-MCNC: 11 FL (ref 9.4–12.4)
NEUTROPHILS # BLD AUTO: 2.74 K/UL (ref 1.8–7.7)
NEUTROPHILS NFR BLD AUTO: 68.7 % (ref 53–65)
OCCULT BLOOD: NEGATIVE
PLATELET # BLD AUTO: 146 K/UL (ref 150–400)
POTASSIUM SERPL-SCNC: 3.2 MMOL/L (ref 3.5–5.1)
PROT SERPL-MCNC: 5.4 G/DL (ref 6.4–8.2)
RBC # BLD AUTO: 3.65 M/UL (ref 4.2–5.4)
SODIUM SERPL-SCNC: 139 MMOL/L (ref 136–145)
WBC # BLD AUTO: 3.98 K/UL (ref 4.5–11)

## 2023-03-11 PROCEDURE — 83735 ASSAY OF MAGNESIUM: CPT | Performed by: EMERGENCY MEDICINE

## 2023-03-11 PROCEDURE — 85025 COMPLETE CBC W/AUTO DIFF WBC: CPT | Performed by: EMERGENCY MEDICINE

## 2023-03-11 PROCEDURE — 25000003 PHARM REV CODE 250

## 2023-03-11 PROCEDURE — 82272 OCCULT BLD FECES 1-3 TESTS: CPT | Performed by: FAMILY MEDICINE

## 2023-03-11 PROCEDURE — 99233 PR SUBSEQUENT HOSPITAL CARE,LEVL III: ICD-10-PCS | Mod: ,,, | Performed by: PEDIATRICS

## 2023-03-11 PROCEDURE — 25000003 PHARM REV CODE 250: Performed by: EMERGENCY MEDICINE

## 2023-03-11 PROCEDURE — 80053 COMPREHEN METABOLIC PANEL: CPT | Performed by: EMERGENCY MEDICINE

## 2023-03-11 PROCEDURE — 94761 N-INVAS EAR/PLS OXIMETRY MLT: CPT

## 2023-03-11 PROCEDURE — 63600175 PHARM REV CODE 636 W HCPCS: Performed by: EMERGENCY MEDICINE

## 2023-03-11 PROCEDURE — 63600175 PHARM REV CODE 636 W HCPCS: Performed by: PEDIATRICS

## 2023-03-11 PROCEDURE — 99233 SBSQ HOSP IP/OBS HIGH 50: CPT | Mod: ,,, | Performed by: PEDIATRICS

## 2023-03-11 PROCEDURE — 63600175 PHARM REV CODE 636 W HCPCS: Performed by: FAMILY MEDICINE

## 2023-03-11 PROCEDURE — 63600175 PHARM REV CODE 636 W HCPCS

## 2023-03-11 PROCEDURE — 11000001 HC ACUTE MED/SURG PRIVATE ROOM

## 2023-03-11 PROCEDURE — 25000003 PHARM REV CODE 250: Performed by: FAMILY MEDICINE

## 2023-03-11 RX ORDER — PIPERACILLIN SODIUM, TAZOBACTAM SODIUM 4; .5 G/20ML; G/20ML
INJECTION, POWDER, LYOPHILIZED, FOR SOLUTION INTRAVENOUS
Status: COMPLETED
Start: 2023-03-11 | End: 2023-03-11

## 2023-03-11 RX ORDER — DEXTROSE MONOHYDRATE 5 G/100ML
INJECTION INTRAVENOUS
Status: COMPLETED
Start: 2023-03-11 | End: 2023-03-11

## 2023-03-11 RX ORDER — MAGNESIUM SULFATE 1 G/100ML
1 INJECTION INTRAVENOUS ONCE
Status: COMPLETED | OUTPATIENT
Start: 2023-03-11 | End: 2023-03-11

## 2023-03-11 RX ADMIN — ACETAMINOPHEN 650 MG: 325 TABLET ORAL at 08:03

## 2023-03-11 RX ADMIN — ACETAMINOPHEN 650 MG: 325 TABLET ORAL at 02:03

## 2023-03-11 RX ADMIN — SODIUM CHLORIDE AND POTASSIUM CHLORIDE: .9; .15 SOLUTION INTRAVENOUS at 08:03

## 2023-03-11 RX ADMIN — TOPIRAMATE 50 MG: 25 TABLET, FILM COATED ORAL at 08:03

## 2023-03-11 RX ADMIN — TRAZODONE HYDROCHLORIDE 50 MG: 50 TABLET ORAL at 08:03

## 2023-03-11 RX ADMIN — SUCRALFATE 1 G: 1 TABLET ORAL at 08:03

## 2023-03-11 RX ADMIN — MAGNESIUM SULFATE HEPTAHYDRATE 1 G: 1 INJECTION, SOLUTION INTRAVENOUS at 10:03

## 2023-03-11 RX ADMIN — SUCRALFATE 1 G: 1 TABLET ORAL at 12:03

## 2023-03-11 RX ADMIN — PIPERACILLIN SODIUM AND TAZOBACTAM SODIUM 4.5 G: 4; .5 INJECTION, POWDER, LYOPHILIZED, FOR SOLUTION INTRAVENOUS at 03:03

## 2023-03-11 RX ADMIN — PANTOPRAZOLE SODIUM 40 MG: 40 TABLET, DELAYED RELEASE ORAL at 08:03

## 2023-03-11 RX ADMIN — PROMETHAZINE HYDROCHLORIDE 25 MG: 25 TABLET ORAL at 02:03

## 2023-03-11 RX ADMIN — SODIUM CHLORIDE AND POTASSIUM CHLORIDE: .9; .15 SOLUTION INTRAVENOUS at 12:03

## 2023-03-11 RX ADMIN — SODIUM CHLORIDE AND POTASSIUM CHLORIDE: .9; .15 SOLUTION INTRAVENOUS at 03:03

## 2023-03-11 RX ADMIN — ENOXAPARIN SODIUM 40 MG: 40 INJECTION SUBCUTANEOUS at 04:03

## 2023-03-11 RX ADMIN — SENNOSIDES AND DOCUSATE SODIUM 1 TABLET: 50; 8.6 TABLET ORAL at 08:03

## 2023-03-11 RX ADMIN — PIPERACILLIN SODIUM AND TAZOBACTAM SODIUM 4.5 MG: 4; .5 INJECTION, POWDER, LYOPHILIZED, FOR SOLUTION INTRAVENOUS at 12:03

## 2023-03-11 RX ADMIN — PIPERACILLIN SODIUM AND TAZOBACTAM SODIUM 4.5 G: 4; .5 INJECTION, POWDER, LYOPHILIZED, FOR SOLUTION INTRAVENOUS at 08:03

## 2023-03-11 RX ADMIN — PROMETHAZINE HYDROCHLORIDE 25 MG: 25 TABLET ORAL at 08:03

## 2023-03-11 RX ADMIN — SUCRALFATE 1 G: 1 TABLET ORAL at 05:03

## 2023-03-11 RX ADMIN — PROMETHAZINE HYDROCHLORIDE 25 MG: 25 TABLET ORAL at 07:03

## 2023-03-11 RX ADMIN — BUPRENORPHINE AND NALOXONE 1 FILM: 2; .5 FILM, SOLUBLE BUCCAL; SUBLINGUAL at 08:03

## 2023-03-11 RX ADMIN — LOSARTAN POTASSIUM 50 MG: 25 TABLET, FILM COATED ORAL at 08:03

## 2023-03-11 RX ADMIN — FAMOTIDINE 20 MG: 10 INJECTION, SOLUTION INTRAVENOUS at 08:03

## 2023-03-11 RX ADMIN — METOPROLOL TARTRATE 100 MG: 50 TABLET, FILM COATED ORAL at 04:03

## 2023-03-11 RX ADMIN — LEVOTHYROXINE SODIUM 112 MCG: 0.11 TABLET ORAL at 06:03

## 2023-03-11 RX ADMIN — DEXTROSE MONOHYDRATE 100 ML: 5 INJECTION INTRAVENOUS at 12:03

## 2023-03-11 RX ADMIN — GABAPENTIN 400 MG: 300 CAPSULE ORAL at 08:03

## 2023-03-11 NOTE — NURSING
Pt's mother Kenia came to nurses station and asked to see Dr. Currie about pt's ongoing confusion. Dr. Currie contacted per phone and states unless the pt is unstable, he is busy in ED and can come later if he gets relieved of ED pts. Made him pt is stable, just with confusion. Will make mother aware.

## 2023-03-11 NOTE — SUBJECTIVE & OBJECTIVE
Interval History: reports a little better    Review of Systems  Objective:     Vital Signs (Most Recent):  Temp: 99 °F (37.2 °C) (03/11/23 0408)  Pulse: 69 (03/11/23 0708)  Resp: 20 (03/11/23 0708)  BP: (!) 146/98 (03/11/23 0408)  SpO2: 96 % (03/11/23 0708)   Vital Signs (24h Range):  Temp:  [98.9 °F (37.2 °C)-99 °F (37.2 °C)] 99 °F (37.2 °C)  Pulse:  [67-86] 69  Resp:  [20] 20  SpO2:  [96 %-98 %] 96 %  BP: (129-146)/(80-98) 146/98     Weight: 84.8 kg (186 lb 15.2 oz)  Body mass index is 34.19 kg/m².    Intake/Output Summary (Last 24 hours) at 3/11/2023 0848  Last data filed at 3/10/2023 2310  Gross per 24 hour   Intake 810 ml   Output --   Net 810 ml      Physical Exam    Significant Labs: All pertinent labs within the past 24 hours have been reviewed.  Recent Lab Results         03/11/23  0756   03/11/23  0618        Albumin/Globulin Ratio   0.7       Albumin   2.3       Alkaline Phosphatase   65       ALT   28       Anion Gap   14       AST   36       Baso #   0.03       Basophil %   0.8       BILIRUBIN TOTAL   0.6       BUN   10       BUN/CREAT RATIO   9       Calcium   7.8       Chloride   110       CO2   18       Creatinine   1.08       Differential Type   Auto       eGFR   62       Eos #   0.23       Eosinophil %   5.8       Globulin, Total   3.1       Glucose   95       Hematocrit   33.9       Hemoglobin   11.0       Immature Grans (Abs)   0.01       Immature Granulocytes   0.3       Lymph #   0.85       Lymph %   21.4       Magnesium   1.5       MCH   30.1       MCHC   32.4       MCV   92.9       Mono #   0.12       Mono %   3.0       MPV   11.0       Neutrophils, Abs   2.74       Neutrophils Relative   68.7       Occult Blood Negative         Platelets   146       Potassium   3.2       PROTEIN TOTAL   5.4       RBC   3.65       RDW   14.8       Sodium   139       WBC   3.98               Significant Imaging: I have reviewed all pertinent imaging results/findings within the past 24 hours.  I have reviewed  and interpreted all pertinent imaging results/findings within the past 24 hours.

## 2023-03-11 NOTE — NURSING
"Nurse rec'd called from Dr. Claudia Barahona at this time expressing concern about pt's confusion reported by pt's mother. Nurse and tech spoke with pt at bedside and asked if nurse may speak with Dr. Barahona regarding hospital stay. Pt states "Tell her anything she wants to know".   Nurse made Dr. Barahona aware of current plan of care for pyelonephritis and bacteremia, medications being given, pt is being given her home suboxone and no opiates have been given. Also made her aware of pt's drug seeking behavior with asking for PRN phenergan before next dose is due, along with home gabapentin and trazadone. Dr. Barahona verbalizes understanding of treatment plan and expresses appreciation of care.     Phone numbers left by Dr. Barahona for any questions/concerns:   Office: [phone]  Cell: [phone]  "

## 2023-03-11 NOTE — NURSING
11:00 Lunch tray served. Pt in bed with TV volume very loud. Pt changing buttons on controller and asked nurse if there is smoke coming out of the TV. Nurse and mother attempted to reorient pt. Mother remains at bedside.

## 2023-03-11 NOTE — PLAN OF CARE
Problem: Adult Inpatient Plan of Care  Goal: Plan of Care Review  Outcome: Ongoing, Progressing  Goal: Patient-Specific Goal (Individualized)  Outcome: Ongoing, Progressing  Goal: Absence of Hospital-Acquired Illness or Injury  Outcome: Ongoing, Progressing  Goal: Optimal Comfort and Wellbeing  Outcome: Ongoing, Progressing  Goal: Readiness for Transition of Care  Outcome: Ongoing, Progressing     Problem: Fall Injury Risk  Goal: Absence of Fall and Fall-Related Injury  Outcome: Ongoing, Progressing     Problem: Electrolyte Imbalance  Goal: Electrolyte Balance  Outcome: Ongoing, Progressing     Problem: Infection  Goal: Absence of Infection Signs and Symptoms  Outcome: Ongoing, Progressing     Problem: Nausea and Vomiting  Goal: Fluid and Electrolyte Balance  Outcome: Ongoing, Progressing

## 2023-03-11 NOTE — NURSING
Received pt sitting on the side of the bed, glasses on for vision, watching tv. Denies pain and discomfort. No complaints of N/V at this time. Right forearm 20g int intact and infusing NS with 20kcl at 125ml/hr without difficulty. Eren hose and nonskid socks on. Safety measures intact, cb and cell phone within reach. Will continue to monitor.

## 2023-03-11 NOTE — ASSESSMENT & PLAN NOTE
Continue  IV antibiotics. Zosyn (Started march 10) day 2 for bactermia--pansensitive E coli on 1 blood culture    previous on rocephin x 3 doses

## 2023-03-11 NOTE — PLAN OF CARE
Problem: Adult Inpatient Plan of Care  Goal: Plan of Care Review  Outcome: Ongoing, Progressing  Goal: Patient-Specific Goal (Individualized)  Outcome: Ongoing, Progressing     Problem: Fall Injury Risk  Goal: Absence of Fall and Fall-Related Injury  Outcome: Ongoing, Progressing  Intervention: Identify and Manage Contributors  Flowsheets (Taken 3/11/2023 1441)  Self-Care Promotion:   independence encouraged   BADL personal objects within reach   BADL personal routines maintained  Medication Review/Management:   medications reviewed   high-risk medications identified  Intervention: Promote Injury-Free Environment  Flowsheets (Taken 3/11/2023 1441)  Safety Promotion/Fall Prevention:   assistive device/personal item within reach   diversional activities provided   medications reviewed   high risk medications identified   muscle strengthening facilitated   nonskid shoes/socks when out of bed   side rails raised x 3   instructed to call staff for mobility

## 2023-03-11 NOTE — PROGRESS NOTES
[facility]  Hospital Medicine  Progress Note    Patient Name: Gloria Mitchell  MRN: [id number]  Patient Class: IP- Inpatient   Admission Date: 3/8/2023  Length of Stay: 3 days  Attending Physician: Ora Kay, *  Primary Care Provider: Shannon Lange MD        Subjective:     Principal Problem:Acute pyelonephritis        HPI:  No notes on file    Overview/Hospital Course:  3/9/23 - pt. Is nauseated this AM.has been given compazine to no avail. Orders revised.    3/10/23 - pt is staying nauseated. Her hemoglobin has dropped. Orders revised.    3/11-- Pt reports nausea with eating therfore not eating much. She is drinking. Remains IVF. IV Abx changed yesterday. Bcx with E coli pansensitive.Ucx ecoli and Enterococcus.    Stooling with diarrhea          Interval History: reports a little better    Review of Systems  Objective:     Vital Signs (Most Recent):  Temp: 99 °F (37.2 °C) (03/11/23 0408)  Pulse: 69 (03/11/23 0708)  Resp: 20 (03/11/23 0708)  BP: (!) 146/98 (03/11/23 0408)  SpO2: 96 % (03/11/23 0708)   Vital Signs (24h Range):  Temp:  [98.9 °F (37.2 °C)-99 °F (37.2 °C)] 99 °F (37.2 °C)  Pulse:  [67-86] 69  Resp:  [20] 20  SpO2:  [96 %-98 %] 96 %  BP: (129-146)/(80-98) 146/98     Weight: 84.8 kg (186 lb 15.2 oz)  Body mass index is 34.19 kg/m².    Intake/Output Summary (Last 24 hours) at 3/11/2023 0848  Last data filed at 3/10/2023 2310  Gross per 24 hour   Intake 810 ml   Output --   Net 810 ml      Physical Exam    Significant Labs: All pertinent labs within the past 24 hours have been reviewed.  Recent Lab Results         03/11/23  0756   03/11/23  0618        Albumin/Globulin Ratio   0.7       Albumin   2.3       Alkaline Phosphatase   65       ALT   28       Anion Gap   14       AST   36       Baso #   0.03       Basophil %   0.8       BILIRUBIN TOTAL   0.6       BUN   10       BUN/CREAT RATIO   9       Calcium   7.8       Chloride   110       CO2   18        Creatinine   1.08       Differential Type   Auto       eGFR   62       Eos #   0.23       Eosinophil %   5.8       Globulin, Total   3.1       Glucose   95       Hematocrit   33.9       Hemoglobin   11.0       Immature Grans (Abs)   0.01       Immature Granulocytes   0.3       Lymph #   0.85       Lymph %   21.4       Magnesium   1.5       MCH   30.1       MCHC   32.4       MCV   92.9       Mono #   0.12       Mono %   3.0       MPV   11.0       Neutrophils, Abs   2.74       Neutrophils Relative   68.7       Occult Blood Negative         Platelets   146       Potassium   3.2       PROTEIN TOTAL   5.4       RBC   3.65       RDW   14.8       Sodium   139       WBC   3.98               Significant Imaging: I have reviewed all pertinent imaging results/findings within the past 24 hours.  I have reviewed and interpreted all pertinent imaging results/findings within the past 24 hours.      Assessment/Plan:      * Acute pyelonephritis    Continue  IV antibiotics. Zosyn (Started march 10) day 2 for bactermia--pansensitive E coli on 1 blood culture    previous on rocephin x 3 doses    Elevated lactic acid level    Treat with IV fluid rehydration.  Treat underlying cause of volume depletion which is nausea vomiting secondary to pyelonephritis infection.    Hypomagnesemia    Recheck level and treat if remains low. Will give dose IV magnesium 1 gram    Hypokalemia  Treat with IV repletion.      Nausea and vomiting  Treated with IV fluids and antiemetics.         VTE Risk Mitigation (From admission, onward)         Ordered     enoxaparin injection 40 mg  Daily         03/08/23 1655     IP VTE HIGH RISK PATIENT  Once         03/08/23 1655     Place sequential compression device  Until discontinued         03/08/23 1655                Discharge Planning   JOSIE:      Code Status: Full Code   Is the patient medically ready for discharge?:     Reason for patient still in hospital (select all that apply): Treatment                      Sanju Currie MD  Department of Hospital Medicine   [facility]

## 2023-03-12 LAB
ALBUMIN SERPL BCP-MCNC: 2 G/DL (ref 3.5–5)
ALBUMIN/GLOB SERPL: 0.7 {RATIO}
ALP SERPL-CCNC: 68 U/L (ref 41–108)
ALT SERPL W P-5'-P-CCNC: 22 U/L (ref 13–56)
ANION GAP SERPL CALCULATED.3IONS-SCNC: 11 MMOL/L (ref 7–16)
AST SERPL W P-5'-P-CCNC: 23 U/L (ref 15–37)
BASOPHILS # BLD AUTO: 0.02 K/UL (ref 0–0.2)
BASOPHILS NFR BLD AUTO: 0.8 % (ref 0–1)
BILIRUB SERPL-MCNC: 0.5 MG/DL (ref ?–1.2)
BUN SERPL-MCNC: 5 MG/DL (ref 7–18)
BUN/CREAT SERPL: 5 (ref 6–20)
CALCIUM SERPL-MCNC: 7.5 MG/DL (ref 8.5–10.1)
CHLORIDE SERPL-SCNC: 112 MMOL/L (ref 98–107)
CO2 SERPL-SCNC: 22 MMOL/L (ref 21–32)
CREAT SERPL-MCNC: 0.96 MG/DL (ref 0.55–1.02)
DIFFERENTIAL METHOD BLD: ABNORMAL
EGFR (NO RACE VARIABLE) (RUSH/TITUS): 71 ML/MIN/1.73M²
EOSINOPHIL # BLD AUTO: 0.2 K/UL (ref 0–0.5)
EOSINOPHIL NFR BLD AUTO: 8.4 % (ref 1–4)
EOSINOPHIL NFR BLD MANUAL: 6 % (ref 1–4)
ERYTHROCYTE [DISTWIDTH] IN BLOOD BY AUTOMATED COUNT: 14.6 % (ref 11.5–14.5)
GLOBULIN SER-MCNC: 2.7 G/DL (ref 2–4)
GLUCOSE SERPL-MCNC: 92 MG/DL (ref 74–106)
HCT VFR BLD AUTO: 30.4 % (ref 38–47)
HGB BLD-MCNC: 10 G/DL (ref 12–16)
IMM GRANULOCYTES # BLD AUTO: 0.01 K/UL (ref 0–0.04)
IMM GRANULOCYTES NFR BLD: 0.4 % (ref 0–0.4)
LYMPHOCYTES # BLD AUTO: 0.81 K/UL (ref 1–4.8)
LYMPHOCYTES NFR BLD AUTO: 34 % (ref 27–41)
LYMPHOCYTES NFR BLD MANUAL: 31 % (ref 27–41)
MAGNESIUM SERPL-MCNC: 1.7 MG/DL (ref 1.7–2.3)
MCH RBC QN AUTO: 30.7 PG (ref 27–31)
MCHC RBC AUTO-ENTMCNC: 32.9 G/DL (ref 32–36)
MCV RBC AUTO: 93.3 FL (ref 80–96)
MONOCYTES # BLD AUTO: 0.24 K/UL (ref 0–0.8)
MONOCYTES NFR BLD AUTO: 10.1 % (ref 2–6)
MONOCYTES NFR BLD MANUAL: 8 % (ref 2–6)
MPC BLD CALC-MCNC: 10.7 FL (ref 9.4–12.4)
NEUTROPHILS # BLD AUTO: 1.1 K/UL (ref 1.8–7.7)
NEUTROPHILS NFR BLD AUTO: 46.3 % (ref 53–65)
NEUTS SEG NFR BLD MANUAL: 55 % (ref 50–62)
NRBC BLD MANUAL-RTO: ABNORMAL %
OCCULT BLOOD: NEGATIVE
OCCULT BLOOD: NEGATIVE
PLATELET # BLD AUTO: 120 K/UL (ref 150–400)
PLATELET MORPHOLOGY: NORMAL
POTASSIUM SERPL-SCNC: 3.3 MMOL/L (ref 3.5–5.1)
PROT SERPL-MCNC: 4.7 G/DL (ref 6.4–8.2)
RBC # BLD AUTO: 3.26 M/UL (ref 4.2–5.4)
RBC MORPH BLD: NORMAL
SODIUM SERPL-SCNC: 142 MMOL/L (ref 136–145)
WBC # BLD AUTO: 2.38 K/UL (ref 4.5–11)

## 2023-03-12 PROCEDURE — 80053 COMPREHEN METABOLIC PANEL: CPT | Performed by: EMERGENCY MEDICINE

## 2023-03-12 PROCEDURE — 25000003 PHARM REV CODE 250: Performed by: EMERGENCY MEDICINE

## 2023-03-12 PROCEDURE — 99900035 HC TECH TIME PER 15 MIN (STAT)

## 2023-03-12 PROCEDURE — 85025 COMPLETE CBC W/AUTO DIFF WBC: CPT | Performed by: EMERGENCY MEDICINE

## 2023-03-12 PROCEDURE — 63600175 PHARM REV CODE 636 W HCPCS: Performed by: PEDIATRICS

## 2023-03-12 PROCEDURE — 83735 ASSAY OF MAGNESIUM: CPT | Performed by: EMERGENCY MEDICINE

## 2023-03-12 PROCEDURE — 11000001 HC ACUTE MED/SURG PRIVATE ROOM

## 2023-03-12 PROCEDURE — 25000003 PHARM REV CODE 250: Performed by: FAMILY MEDICINE

## 2023-03-12 PROCEDURE — 99231 PR SUBSEQUENT HOSPITAL CARE,LEVL I: ICD-10-PCS | Mod: ,,, | Performed by: PEDIATRICS

## 2023-03-12 PROCEDURE — 99231 SBSQ HOSP IP/OBS SF/LOW 25: CPT | Mod: ,,, | Performed by: PEDIATRICS

## 2023-03-12 PROCEDURE — 63600175 PHARM REV CODE 636 W HCPCS: Performed by: FAMILY MEDICINE

## 2023-03-12 PROCEDURE — 63600175 PHARM REV CODE 636 W HCPCS: Performed by: EMERGENCY MEDICINE

## 2023-03-12 PROCEDURE — 82272 OCCULT BLD FECES 1-3 TESTS: CPT | Performed by: FAMILY MEDICINE

## 2023-03-12 PROCEDURE — 94761 N-INVAS EAR/PLS OXIMETRY MLT: CPT

## 2023-03-12 RX ADMIN — BUPRENORPHINE AND NALOXONE 1 FILM: 2; .5 FILM, SOLUBLE BUCCAL; SUBLINGUAL at 08:03

## 2023-03-12 RX ADMIN — ACETAMINOPHEN 650 MG: 325 TABLET ORAL at 02:03

## 2023-03-12 RX ADMIN — SUCRALFATE 1 G: 1 TABLET ORAL at 08:03

## 2023-03-12 RX ADMIN — LOSARTAN POTASSIUM 50 MG: 25 TABLET, FILM COATED ORAL at 08:03

## 2023-03-12 RX ADMIN — PIPERACILLIN SODIUM AND TAZOBACTAM SODIUM 4.5 G: 4; .5 INJECTION, POWDER, LYOPHILIZED, FOR SOLUTION INTRAVENOUS at 08:03

## 2023-03-12 RX ADMIN — SUCRALFATE 1 G: 1 TABLET ORAL at 12:03

## 2023-03-12 RX ADMIN — TOPIRAMATE 50 MG: 25 TABLET, FILM COATED ORAL at 08:03

## 2023-03-12 RX ADMIN — SUCRALFATE 1 G: 1 TABLET ORAL at 04:03

## 2023-03-12 RX ADMIN — GABAPENTIN 400 MG: 300 CAPSULE ORAL at 08:03

## 2023-03-12 RX ADMIN — FAMOTIDINE 20 MG: 10 INJECTION, SOLUTION INTRAVENOUS at 08:03

## 2023-03-12 RX ADMIN — LEVOTHYROXINE SODIUM 112 MCG: 0.11 TABLET ORAL at 05:03

## 2023-03-12 RX ADMIN — ACETAMINOPHEN 650 MG: 325 TABLET ORAL at 06:03

## 2023-03-12 RX ADMIN — SENNOSIDES AND DOCUSATE SODIUM 1 TABLET: 50; 8.6 TABLET ORAL at 08:03

## 2023-03-12 RX ADMIN — SODIUM CHLORIDE AND POTASSIUM CHLORIDE: .9; .15 SOLUTION INTRAVENOUS at 05:03

## 2023-03-12 RX ADMIN — PIPERACILLIN SODIUM AND TAZOBACTAM SODIUM 4.5 G: 4; .5 INJECTION, POWDER, LYOPHILIZED, FOR SOLUTION INTRAVENOUS at 04:03

## 2023-03-12 RX ADMIN — PROMETHAZINE HYDROCHLORIDE 25 MG: 25 TABLET ORAL at 07:03

## 2023-03-12 RX ADMIN — PANTOPRAZOLE SODIUM 40 MG: 40 TABLET, DELAYED RELEASE ORAL at 08:03

## 2023-03-12 RX ADMIN — SODIUM CHLORIDE AND POTASSIUM CHLORIDE: .9; .15 SOLUTION INTRAVENOUS at 06:03

## 2023-03-12 RX ADMIN — METOPROLOL TARTRATE 100 MG: 50 TABLET, FILM COATED ORAL at 04:03

## 2023-03-12 RX ADMIN — ENOXAPARIN SODIUM 40 MG: 40 INJECTION SUBCUTANEOUS at 04:03

## 2023-03-12 RX ADMIN — TRAZODONE HYDROCHLORIDE 50 MG: 50 TABLET ORAL at 08:03

## 2023-03-12 RX ADMIN — PIPERACILLIN SODIUM AND TAZOBACTAM SODIUM 4.5 G: 4; .5 INJECTION, POWDER, LYOPHILIZED, FOR SOLUTION INTRAVENOUS at 12:03

## 2023-03-12 NOTE — PLAN OF CARE
Problem: Adult Inpatient Plan of Care  Goal: Plan of Care Review  Outcome: Ongoing, Progressing  Flowsheets (Taken 3/12/2023 7177)  Plan of Care Reviewed With: patient  Goal: Optimal Comfort and Wellbeing  Outcome: Ongoing, Progressing     Problem: Fall Injury Risk  Goal: Absence of Fall and Fall-Related Injury  Outcome: Ongoing, Progressing     Problem: Electrolyte Imbalance  Goal: Electrolyte Balance  Outcome: Ongoing, Progressing     Problem: Infection  Goal: Absence of Infection Signs and Symptoms  Outcome: Ongoing, Progressing

## 2023-03-12 NOTE — ASSESSMENT & PLAN NOTE
Continue  IV antibiotics. Zosyn (Started march 10) day 3 for bactermia--pansensitive E coli on 1 blood culture    previous on rocephin x 3 doses

## 2023-03-12 NOTE — PROGRESS NOTES
[facility]  Hospital Medicine  Progress Note    Patient Name: Gloria Mitchell  MRN: [id number]  Patient Class: IP- Inpatient   Admission Date: 3/8/2023  Length of Stay: 4 days  Attending Physician: rOa Kay, *  Primary Care Provider: Shannon Lange MD        Subjective:     Principal Problem:Acute pyelonephritis        HPI:  No notes on file    Overview/Hospital Course:  3/9/23 - pt. Is nauseated this AM.has been given compazine to no avail. Orders revised.    3/10/23 - pt is staying nauseated. Her hemoglobin has dropped. Orders revised.    3/11-- Pt reports nausea with eating therfore not eating much. She is drinking. Remains IVF. IV Abx changed yesterday. Bcx with E coli pansensitive.Ucx ecoli and Enterococcus.    Stooling with diarrhea    3/12-- Pt reports better. Nausea with eating but ate more this morning. Drinking well. Lots of sprite. Pt ambulating on their own. Dicussded she needs to eat even with the nausea. She needs to be OOB to chair and moving around.      Interval History: reports she is better. OOB with RR x 3 this Am.     Review of Systems   Constitutional:  Negative for fatigue.   Gastrointestinal:  Negative for diarrhea and nausea.   All other systems reviewed and are negative.  Objective:     Vital Signs (Most Recent):  Temp: 97.4 °F (36.3 °C) (03/12/23 0742)  Pulse: 69 (03/12/23 0742)  Resp: 20 (03/12/23 0742)  BP: (!) 151/92 (03/12/23 0742)  SpO2: 98 % (03/12/23 0742)   Vital Signs (24h Range):  Temp:  [97.4 °F (36.3 °C)-98.9 °F (37.2 °C)] 97.4 °F (36.3 °C)  Pulse:  [66-79] 69  Resp:  [16-20] 20  SpO2:  [94 %-99 %] 98 %  BP: (128-151)/(69-92) 151/92     Weight: 84.8 kg (186 lb 15.2 oz)  Body mass index is 34.19 kg/m².    Intake/Output Summary (Last 24 hours) at 3/12/2023 0841  Last data filed at 3/12/2023 0828  Gross per 24 hour   Intake 1800 ml   Output --   Net 1800 ml      Physical Exam  Vitals and nursing note reviewed. Exam conducted  with a chaperone present.   Constitutional:       General: She is not in acute distress.     Appearance: Normal appearance. She is obese. She is not ill-appearing.   Cardiovascular:      Rate and Rhythm: Normal rate and regular rhythm.      Pulses: Normal pulses.      Heart sounds: Normal heart sounds. No murmur heard.  Pulmonary:      Effort: Pulmonary effort is normal. No respiratory distress.      Breath sounds: Normal breath sounds.   Abdominal:      General: Abdomen is flat. Bowel sounds are normal. There is no distension.      Tenderness: There is no abdominal tenderness.   Skin:     General: Skin is warm and dry.      Capillary Refill: Capillary refill takes less than 2 seconds.   Neurological:      General: No focal deficit present.      Mental Status: She is alert and oriented to person, place, and time.   Psychiatric:         Mood and Affect: Mood normal.         Behavior: Behavior normal.         Thought Content: Thought content normal.         Judgment: Judgment normal.       Significant Labs: All pertinent labs within the past 24 hours have been reviewed.  Recent Lab Results         03/12/23  0745   03/12/23  0541   03/11/23  0756        Albumin/Globulin Ratio   0.7         Albumin   2.0         Alkaline Phosphatase   68         ALT   22         Anion Gap   11         AST   23         Baso #   0.02         Basophil %   0.8         BILIRUBIN TOTAL   0.5         BUN   5         BUN/CREAT RATIO   5         Calcium   7.5         Chloride   112         CO2   22         Creatinine   0.96         Differential Type   Scan Smear         eGFR   71         Eos #   0.20         Eosinophil %   8.4            6         Globulin, Total   2.7         Glucose   92         Hematocrit   30.4         Hemoglobin   10.0         Immature Grans (Abs)   0.01         Immature Granulocytes   0.4         Lymph #   0.81         Lymph %   34.0            31         Magnesium   1.7         MCH   30.7         MCHC   32.9         MCV    93.3         Mono #   0.24         Mono %   10.1            8         MPV   10.7         Neutrophils, Abs   1.10         Neutrophils Relative   46.3         nRBC           Occult Blood Negative     Negative       PLATELET MORPHOLOGY   Normal         Platelets   120         Potassium   3.3         PROTEIN TOTAL   4.7         RBC   3.26         RBC Morphology   Normal         RDW   14.6         Segmented Neutrophils, Man %   55         Sodium   142         WBC   2.38                 Significant Imaging: I have reviewed all pertinent imaging results/findings within the past 24 hours.  No new      Assessment/Plan:      * Acute pyelonephritis    Continue  IV antibiotics. Zosyn (Started march 10) day 3 for bactermia--pansensitive E coli on 1 blood culture    previous on rocephin x 3 doses    Bacteremia  On zosyn day 3 for pansensitive e.coli.     Consider changing abx       Elevated lactic acid level    Treat with IV fluid rehydration.  Treat underlying cause of volume depletion which is nausea vomiting secondary to pyelonephritis infection.    Volume depletion  Resolved 3/12      Hypomagnesemia    Recheck level and treat if remains low. Imprved with IV magnesium. MOntior    Hypokalemia  Treat with IV repletion.      Nausea and vomiting  Treated with IV fluids and antiemetics.         VTE Risk Mitigation (From admission, onward)         Ordered     enoxaparin injection 40 mg  Daily         03/08/23 1655     IP VTE HIGH RISK PATIENT  Once         03/08/23 1655     Place sequential compression device  Until discontinued         03/08/23 1655                Discharge Planning   JOSIE:      Code Status: Full Code   Is the patient medically ready for discharge?:     Reason for patient still in hospital (select all that apply): Treatment                     Sanju Currie MD  Department of Hospital Medicine   [facility]

## 2023-03-13 LAB
ALBUMIN SERPL BCP-MCNC: 1.8 G/DL (ref 3.5–5)
ALBUMIN/GLOB SERPL: 0.7 {RATIO}
ALP SERPL-CCNC: 73 U/L (ref 41–108)
ALT SERPL W P-5'-P-CCNC: 17 U/L (ref 13–56)
ANION GAP SERPL CALCULATED.3IONS-SCNC: 10 MMOL/L (ref 7–16)
AST SERPL W P-5'-P-CCNC: 16 U/L (ref 15–37)
BASOPHILS # BLD AUTO: 0.03 K/UL (ref 0–0.2)
BASOPHILS NFR BLD AUTO: 1.2 % (ref 0–1)
BASOPHILS NFR BLD MANUAL: 1 % (ref 0–1)
BILIRUB SERPL-MCNC: 0.4 MG/DL (ref ?–1.2)
BUN SERPL-MCNC: 4 MG/DL (ref 7–18)
BUN/CREAT SERPL: 5 (ref 6–20)
CALCIUM SERPL-MCNC: 7.6 MG/DL (ref 8.5–10.1)
CHLORIDE SERPL-SCNC: 113 MMOL/L (ref 98–107)
CO2 SERPL-SCNC: 21 MMOL/L (ref 21–32)
CREAT SERPL-MCNC: 0.83 MG/DL (ref 0.55–1.02)
DIFFERENTIAL METHOD BLD: ABNORMAL
EGFR (NO RACE VARIABLE) (RUSH/TITUS): 85 ML/MIN/1.73M²
EOSINOPHIL # BLD AUTO: 0.24 K/UL (ref 0–0.5)
EOSINOPHIL NFR BLD AUTO: 9.2 % (ref 1–4)
EOSINOPHIL NFR BLD MANUAL: 9 % (ref 1–4)
ERYTHROCYTE [DISTWIDTH] IN BLOOD BY AUTOMATED COUNT: 14.9 % (ref 11.5–14.5)
GLOBULIN SER-MCNC: 2.6 G/DL (ref 2–4)
GLUCOSE SERPL-MCNC: 72 MG/DL (ref 74–106)
HCT VFR BLD AUTO: 29.6 % (ref 38–47)
HGB BLD-MCNC: 9.6 G/DL (ref 12–16)
IMM GRANULOCYTES # BLD AUTO: 0.02 K/UL (ref 0–0.04)
IMM GRANULOCYTES NFR BLD: 0.8 % (ref 0–0.4)
LYMPHOCYTES # BLD AUTO: 1.17 K/UL (ref 1–4.8)
LYMPHOCYTES NFR BLD AUTO: 45 % (ref 27–41)
LYMPHOCYTES NFR BLD MANUAL: 46 % (ref 27–41)
MCH RBC QN AUTO: 30.2 PG (ref 27–31)
MCHC RBC AUTO-ENTMCNC: 32.4 G/DL (ref 32–36)
MCV RBC AUTO: 93.1 FL (ref 80–96)
MONOCYTES # BLD AUTO: 0.28 K/UL (ref 0–0.8)
MONOCYTES NFR BLD AUTO: 10.8 % (ref 2–6)
MONOCYTES NFR BLD MANUAL: 5 % (ref 2–6)
MPC BLD CALC-MCNC: 10.7 FL (ref 9.4–12.4)
NEUTROPHILS # BLD AUTO: 0.86 K/UL (ref 1.8–7.7)
NEUTROPHILS NFR BLD AUTO: 33 % (ref 53–65)
NEUTS SEG NFR BLD MANUAL: 39 % (ref 50–62)
NRBC BLD MANUAL-RTO: ABNORMAL %
PLATELET # BLD AUTO: 134 K/UL (ref 150–400)
PLATELET MORPHOLOGY: ABNORMAL
POTASSIUM SERPL-SCNC: 3.3 MMOL/L (ref 3.5–5.1)
PROT SERPL-MCNC: 4.4 G/DL (ref 6.4–8.2)
RBC # BLD AUTO: 3.18 M/UL (ref 4.2–5.4)
RBC MORPH BLD: NORMAL
SODIUM SERPL-SCNC: 141 MMOL/L (ref 136–145)
WBC # BLD AUTO: 2.6 K/UL (ref 4.5–11)

## 2023-03-13 PROCEDURE — 99232 PR SUBSEQUENT HOSPITAL CARE,LEVL II: ICD-10-PCS | Mod: ,,, | Performed by: FAMILY MEDICINE

## 2023-03-13 PROCEDURE — 25000003 PHARM REV CODE 250: Performed by: FAMILY MEDICINE

## 2023-03-13 PROCEDURE — 25000003 PHARM REV CODE 250: Performed by: EMERGENCY MEDICINE

## 2023-03-13 PROCEDURE — 99900035 HC TECH TIME PER 15 MIN (STAT)

## 2023-03-13 PROCEDURE — 80053 COMPREHEN METABOLIC PANEL: CPT | Performed by: EMERGENCY MEDICINE

## 2023-03-13 PROCEDURE — 85025 COMPLETE CBC W/AUTO DIFF WBC: CPT | Performed by: EMERGENCY MEDICINE

## 2023-03-13 PROCEDURE — 63600175 PHARM REV CODE 636 W HCPCS: Performed by: EMERGENCY MEDICINE

## 2023-03-13 PROCEDURE — 11000001 HC ACUTE MED/SURG PRIVATE ROOM

## 2023-03-13 PROCEDURE — 63600175 PHARM REV CODE 636 W HCPCS: Performed by: FAMILY MEDICINE

## 2023-03-13 PROCEDURE — 99232 SBSQ HOSP IP/OBS MODERATE 35: CPT | Mod: ,,, | Performed by: FAMILY MEDICINE

## 2023-03-13 PROCEDURE — 94761 N-INVAS EAR/PLS OXIMETRY MLT: CPT

## 2023-03-13 RX ORDER — AMOXICILLIN AND CLAVULANATE POTASSIUM 500; 125 MG/1; MG/1
1 TABLET, FILM COATED ORAL 2 TIMES DAILY
Status: DISCONTINUED | OUTPATIENT
Start: 2023-03-13 | End: 2023-03-14

## 2023-03-13 RX ORDER — FAMOTIDINE 20 MG/1
20 TABLET, FILM COATED ORAL 2 TIMES DAILY
Status: DISCONTINUED | OUTPATIENT
Start: 2023-03-13 | End: 2023-03-14 | Stop reason: HOSPADM

## 2023-03-13 RX ORDER — PROMETHAZINE HYDROCHLORIDE 25 MG/1
TABLET ORAL
COMMUNITY
Start: 2023-01-17 | End: 2023-04-04 | Stop reason: SDUPTHER

## 2023-03-13 RX ORDER — ESCITALOPRAM OXALATE 10 MG/1
10 TABLET ORAL DAILY
COMMUNITY
Start: 2023-02-02 | End: 2024-01-03 | Stop reason: CLARIF

## 2023-03-13 RX ADMIN — SUCRALFATE 1 G: 1 TABLET ORAL at 01:03

## 2023-03-13 RX ADMIN — METOPROLOL TARTRATE 100 MG: 50 TABLET, FILM COATED ORAL at 04:03

## 2023-03-13 RX ADMIN — FAMOTIDINE 20 MG: 20 TABLET ORAL at 08:03

## 2023-03-13 RX ADMIN — PANTOPRAZOLE SODIUM 40 MG: 40 TABLET, DELAYED RELEASE ORAL at 08:03

## 2023-03-13 RX ADMIN — FAMOTIDINE 20 MG: 10 INJECTION, SOLUTION INTRAVENOUS at 08:03

## 2023-03-13 RX ADMIN — ACETAMINOPHEN 650 MG: 325 TABLET ORAL at 08:03

## 2023-03-13 RX ADMIN — AMOXICILLIN AND CLAVULANATE POTASSIUM 500 MG: 500; 125 TABLET, FILM COATED ORAL at 08:03

## 2023-03-13 RX ADMIN — SUCRALFATE 1 G: 1 TABLET ORAL at 08:03

## 2023-03-13 RX ADMIN — BUPRENORPHINE AND NALOXONE 1 FILM: 2; .5 FILM, SOLUBLE BUCCAL; SUBLINGUAL at 08:03

## 2023-03-13 RX ADMIN — TRAZODONE HYDROCHLORIDE 50 MG: 50 TABLET ORAL at 08:03

## 2023-03-13 RX ADMIN — SUCRALFATE 1 G: 1 TABLET ORAL at 04:03

## 2023-03-13 RX ADMIN — PROMETHAZINE HYDROCHLORIDE 25 MG: 25 TABLET ORAL at 08:03

## 2023-03-13 RX ADMIN — GABAPENTIN 400 MG: 300 CAPSULE ORAL at 08:03

## 2023-03-13 RX ADMIN — TOPIRAMATE 50 MG: 25 TABLET, FILM COATED ORAL at 08:03

## 2023-03-13 RX ADMIN — ENOXAPARIN SODIUM 40 MG: 40 INJECTION SUBCUTANEOUS at 04:03

## 2023-03-13 RX ADMIN — SENNOSIDES AND DOCUSATE SODIUM 1 TABLET: 50; 8.6 TABLET ORAL at 08:03

## 2023-03-13 RX ADMIN — LEVOTHYROXINE SODIUM 112 MCG: 0.11 TABLET ORAL at 06:03

## 2023-03-13 RX ADMIN — PROMETHAZINE HYDROCHLORIDE 25 MG: 25 TABLET ORAL at 10:03

## 2023-03-13 RX ADMIN — PIPERACILLIN SODIUM AND TAZOBACTAM SODIUM 4.5 G: 4; .5 INJECTION, POWDER, LYOPHILIZED, FOR SOLUTION INTRAVENOUS at 03:03

## 2023-03-13 RX ADMIN — LOSARTAN POTASSIUM 50 MG: 25 TABLET, FILM COATED ORAL at 08:03

## 2023-03-13 RX ADMIN — ACETAMINOPHEN 650 MG: 325 TABLET ORAL at 12:03

## 2023-03-13 RX ADMIN — PROMETHAZINE HYDROCHLORIDE 25 MG: 25 TABLET ORAL at 01:03

## 2023-03-13 NOTE — NURSING
Pt complaining of abd pain described as cramping in LUQ. Earlier pt complained of burning in upper abd. Advised pt to ambulate to relive gas pain. PRN Phenergan given as often as possible. Spoke with MD about GI referral.

## 2023-03-13 NOTE — SUBJECTIVE & OBJECTIVE
Interval History: feels better. Orders revised.    Review of Systems  Objective:     Vital Signs (Most Recent):  Temp: 97.6 °F (36.4 °C) (03/13/23 0712)  Pulse: 73 (03/13/23 0712)  Resp: 18 (03/13/23 0712)  BP: (!) 182/84 (03/13/23 0712)  SpO2: 100 % (03/13/23 0712)   Vital Signs (24h Range):  Temp:  [97.6 °F (36.4 °C)-98.6 °F (37 °C)] 97.6 °F (36.4 °C)  Pulse:  [60-73] 73  Resp:  [18-20] 18  SpO2:  [98 %-100 %] 100 %  BP: (127-182)/(69-84) 182/84     Weight: 93.6 kg (206 lb 5.6 oz)  Body mass index is 37.74 kg/m².    Intake/Output Summary (Last 24 hours) at 3/13/2023 0807  Last data filed at 3/12/2023 1255  Gross per 24 hour   Intake 720 ml   Output --   Net 720 ml      Physical Exam    Significant Labs: All pertinent labs within the past 24 hours have been reviewed.    Significant Imaging: I have reviewed all pertinent imaging results/findings within the past 24 hours.

## 2023-03-13 NOTE — PLAN OF CARE
Problem: Electrolyte Imbalance  Goal: Electrolyte Balance  Outcome: Ongoing, Progressing  Intervention: Monitor and Manage Electrolyte Imbalance  Flowsheets (Taken 3/13/2023 1148)  Fluid/Electrolyte Management: fluids provided     Problem: Infection  Goal: Absence of Infection Signs and Symptoms  Outcome: Ongoing, Progressing     Problem: Nausea and Vomiting  Goal: Fluid and Electrolyte Balance  Outcome: Ongoing, Progressing  Intervention: Prevent and Manage Nausea and Vomiting  Flowsheets (Taken 3/13/2023 1148)  Environmental Support: calm environment promoted

## 2023-03-13 NOTE — NURSING
Received pt sitting in chair, no acute distress noted. Denies pain/n/v/ and diarrhea at this time. Left fa 22g intact and infusing NS with 20Kcl at 75ml/hr. Right hand noted with small amt of swelling. Pt encourage to elevate hand on pillows. Nonskid socks and reg hose on, safety measures intact, cb near. Will continue to monitor.

## 2023-03-13 NOTE — NURSING
Pt resting in bed with no complaints. A&O x4. NS infusing without difficulty. See p/a for details. Safety measures met at this time

## 2023-03-13 NOTE — PROGRESS NOTES
Pharmacist Intervention IV to PO Note    Gloria Mitchell is a 52 y.o. female being treated with IV medication famotidine    Patient Data:    Vital Signs (Most Recent):  Temp: 97.6 °F (36.4 °C) (03/13/23 0712)  Pulse: 73 (03/13/23 0712)  Resp: 18 (03/13/23 0712)  BP: (!) 182/84 (03/13/23 0712)  SpO2: 100 % (03/13/23 0712)   Vital Signs (72h Range):  Temp:  [97.4 °F (36.3 °C)-99 °F (37.2 °C)]   Pulse:  [60-86]   Resp:  [16-20]   BP: (127-182)/(69-98)   SpO2:  [94 %-100 %]      CBC:  Recent Labs   Lab 03/11/23  0618 03/12/23  0541 03/13/23  0537   WBC 3.98* 2.38* 2.60*   RBC 3.65* 3.26* 3.18*   HGB 11.0* 10.0* 9.6*   HCT 33.9* 30.4* 29.6*   * 120* 134*   MCV 92.9 93.3 93.1   MCH 30.1 30.7 30.2   MCHC 32.4 32.9 32.4     CMP:     Recent Labs   Lab 03/11/23 0618 03/12/23  0541 03/13/23  0537   GLU 95 92 72*   CALCIUM 7.8* 7.5* 7.6*   ALBUMIN 2.3* 2.0* 1.8*   PROT 5.4* 4.7* 4.4*    142 141   K 3.2* 3.3* 3.3*   CO2 18* 22 21   * 112* 113*   BUN 10 5* 4*   CREATININE 1.08* 0.96 0.83   ALKPHOS 65 68 73   ALT 28 22 17   AST 36 23 16   BILITOT 0.6 0.5 0.4       Dietary Orders:  Diet Orders            Diet Low Sodium, 2gm: Low Sodium, 2gm starting at 03/08 1655            Based on the following criteria, this patient qualifies for intravenous to oral conversion:  [x] The patients gastrointestinal tract is functioning (tolerating medications via oral or enteral route for 24 hours and tolerating food or enteral feeds for 24 hours).  [x] The patient is hemodynamically stable for 24 hours (heart rate <100 beats per minute, systolic blood pressure >99 mm Hg, and respiratory rate <20 breaths per minute).  [x] The patient shows clinical improvement (afebrile for at least 24 hours and white blood cell count downtrending or normalized). Additionally, the patient must be non-neutropenic (absolute neutrophil count >500 cells/mm3).  [x] For antimicrobials, the patient has received IV therapy for at least 24  hours.    IV medication famotidine 20 mg BID will be changed to oral medication famotidine 20 mg bid     Pharmacist's Name: Venus Machado  Pharmacist's Extension: 147

## 2023-03-13 NOTE — PROGRESS NOTES
[facility]  Hospital Medicine  Progress Note    Patient Name: Gloria Mitchell  MRN: [id number]  Patient Class: IP- Inpatient   Admission Date: 3/8/2023  Length of Stay: 5 days  Attending Physician: Ora Kay, *  Primary Care Provider: Shannon Lange MD        Subjective:     Principal Problem:Acute pyelonephritis        HPI:  No notes on file    Overview/Hospital Course:  3/9/23 - pt. Is nauseated this AM.has been given compazine to no avail. Orders revised.    3/10/23 - pt is staying nauseated. Her hemoglobin has dropped. Orders revised.    3/11-- Pt reports nausea with eating therfore not eating much. She is drinking. Remains IVF. IV Abx changed yesterday. Bcx with E coli pansensitive.Ucx ecoli and Enterococcus.    Stooling with diarrhea    3/12-- Pt reports better. Nausea with eating but ate more this morning. Drinking well. Lots of sprite. Pt ambulating on their own. Dicussded she needs to eat even with the nausea. She needs to be OOB to chair and moving around.    3/13/23 - pt. Feels better today. Has been eating without nausea. Orders revised.      Interval History: feels better. Orders revised.    Review of Systems  Objective:     Vital Signs (Most Recent):  Temp: 97.6 °F (36.4 °C) (03/13/23 0712)  Pulse: 73 (03/13/23 0712)  Resp: 18 (03/13/23 0712)  BP: (!) 182/84 (03/13/23 0712)  SpO2: 100 % (03/13/23 0712)   Vital Signs (24h Range):  Temp:  [97.6 °F (36.4 °C)-98.6 °F (37 °C)] 97.6 °F (36.4 °C)  Pulse:  [60-73] 73  Resp:  [18-20] 18  SpO2:  [98 %-100 %] 100 %  BP: (127-182)/(69-84) 182/84     Weight: 93.6 kg (206 lb 5.6 oz)  Body mass index is 37.74 kg/m².    Intake/Output Summary (Last 24 hours) at 3/13/2023 0807  Last data filed at 3/12/2023 1255  Gross per 24 hour   Intake 720 ml   Output --   Net 720 ml      Physical Exam    Significant Labs: All pertinent labs within the past 24 hours have been reviewed.    Significant Imaging: I have reviewed all  pertinent imaging results/findings within the past 24 hours.      Assessment/Plan:      * Acute pyelonephritis    Continue  IV antibiotics. Zosyn (Started march 10) day 3 for bactermia--pansensitive E coli on 1 blood culture    previous on rocephin x 3 doses    Bacteremia  On zosyn day 3 for pansensitive e.coli.     Consider changing abx       Elevated lactic acid level    Treat with IV fluid rehydration.  Treat underlying cause of volume depletion which is nausea vomiting secondary to pyelonephritis infection.    Volume depletion  Resolved 3/12      Hypomagnesemia    Recheck level and treat if remains low. Imprved with IV magnesium. MOntior    Hypokalemia  Treat with IV repletion.      Nausea and vomiting  Treated with IV fluids and antiemetics.         VTE Risk Mitigation (From admission, onward)         Ordered     enoxaparin injection 40 mg  Daily         03/08/23 1655     IP VTE HIGH RISK PATIENT  Once         03/08/23 1655     Place sequential compression device  Until discontinued         03/08/23 1655                Discharge Planning   JOSIE:      Code Status: Full Code   Is the patient medically ready for discharge?:     Reason for patient still in hospital (select all that apply): Patient trending condition                     Ora aKy MD  Department of Hospital Medicine   [facility]

## 2023-03-14 VITALS
RESPIRATION RATE: 18 BRPM | WEIGHT: 206.38 LBS | TEMPERATURE: 98 F | OXYGEN SATURATION: 100 % | HEIGHT: 62 IN | BODY MASS INDEX: 37.98 KG/M2 | DIASTOLIC BLOOD PRESSURE: 91 MMHG | HEART RATE: 66 BPM | SYSTOLIC BLOOD PRESSURE: 176 MMHG

## 2023-03-14 LAB
ALBUMIN SERPL BCP-MCNC: 1.9 G/DL (ref 3.5–5)
ALBUMIN/GLOB SERPL: 0.8 {RATIO}
ALP SERPL-CCNC: 86 U/L (ref 41–108)
ALT SERPL W P-5'-P-CCNC: 15 U/L (ref 13–56)
ANION GAP SERPL CALCULATED.3IONS-SCNC: 11 MMOL/L (ref 7–16)
AST SERPL W P-5'-P-CCNC: 18 U/L (ref 15–37)
BACTERIA BLD CULT: NORMAL
BASOPHILS # BLD AUTO: 0.02 K/UL (ref 0–0.2)
BASOPHILS NFR BLD AUTO: 0.8 % (ref 0–1)
BILIRUB SERPL-MCNC: 0.2 MG/DL (ref ?–1.2)
BUN SERPL-MCNC: 3 MG/DL (ref 7–18)
BUN/CREAT SERPL: 4 (ref 6–20)
CALCIUM SERPL-MCNC: 7.8 MG/DL (ref 8.5–10.1)
CHLORIDE SERPL-SCNC: 110 MMOL/L (ref 98–107)
CO2 SERPL-SCNC: 24 MMOL/L (ref 21–32)
CREAT SERPL-MCNC: 0.75 MG/DL (ref 0.55–1.02)
DIFFERENTIAL METHOD BLD: ABNORMAL
EGFR (NO RACE VARIABLE) (RUSH/TITUS): 96 ML/MIN/1.73M²
EOSINOPHIL # BLD AUTO: 0.19 K/UL (ref 0–0.5)
EOSINOPHIL NFR BLD AUTO: 7.5 % (ref 1–4)
EOSINOPHIL NFR BLD MANUAL: 6 % (ref 1–4)
ERYTHROCYTE [DISTWIDTH] IN BLOOD BY AUTOMATED COUNT: 14.9 % (ref 11.5–14.5)
GLOBULIN SER-MCNC: 2.5 G/DL (ref 2–4)
GLUCOSE SERPL-MCNC: 80 MG/DL (ref 74–106)
HCT VFR BLD AUTO: 30.2 % (ref 38–47)
HGB BLD-MCNC: 9.5 G/DL (ref 12–16)
IMM GRANULOCYTES # BLD AUTO: 0.04 K/UL (ref 0–0.04)
IMM GRANULOCYTES NFR BLD: 1.6 % (ref 0–0.4)
LYMPHOCYTES # BLD AUTO: 1.22 K/UL (ref 1–4.8)
LYMPHOCYTES NFR BLD AUTO: 48.2 % (ref 27–41)
LYMPHOCYTES NFR BLD MANUAL: 54 % (ref 27–41)
MCH RBC QN AUTO: 29.5 PG (ref 27–31)
MCHC RBC AUTO-ENTMCNC: 31.5 G/DL (ref 32–36)
MCV RBC AUTO: 93.8 FL (ref 80–96)
MICROCYTES BLD QL SMEAR: ABNORMAL
MONOCYTES # BLD AUTO: 0.28 K/UL (ref 0–0.8)
MONOCYTES NFR BLD AUTO: 11.1 % (ref 2–6)
MONOCYTES NFR BLD MANUAL: 6 % (ref 2–6)
MPC BLD CALC-MCNC: 10 FL (ref 9.4–12.4)
NEUTROPHILS # BLD AUTO: 0.78 K/UL (ref 1.8–7.7)
NEUTROPHILS NFR BLD AUTO: 30.8 % (ref 53–65)
NEUTS SEG NFR BLD MANUAL: 34 % (ref 50–62)
NRBC BLD MANUAL-RTO: ABNORMAL %
PLATELET # BLD AUTO: 192 K/UL (ref 150–400)
PLATELET MORPHOLOGY: ABNORMAL
POTASSIUM SERPL-SCNC: 3.1 MMOL/L (ref 3.5–5.1)
PROT SERPL-MCNC: 4.4 G/DL (ref 6.4–8.2)
RBC # BLD AUTO: 3.22 M/UL (ref 4.2–5.4)
SODIUM SERPL-SCNC: 142 MMOL/L (ref 136–145)
WBC # BLD AUTO: 2.53 K/UL (ref 4.5–11)

## 2023-03-14 PROCEDURE — 80053 COMPREHEN METABOLIC PANEL: CPT | Performed by: EMERGENCY MEDICINE

## 2023-03-14 PROCEDURE — 25000003 PHARM REV CODE 250: Performed by: FAMILY MEDICINE

## 2023-03-14 PROCEDURE — 99238 HOSP IP/OBS DSCHRG MGMT 30/<: CPT | Mod: ,,, | Performed by: FAMILY MEDICINE

## 2023-03-14 PROCEDURE — 85025 COMPLETE CBC W/AUTO DIFF WBC: CPT | Performed by: EMERGENCY MEDICINE

## 2023-03-14 PROCEDURE — 99900035 HC TECH TIME PER 15 MIN (STAT)

## 2023-03-14 PROCEDURE — 99238 PR HOSPITAL DISCHARGE DAY,<30 MIN: ICD-10-PCS | Mod: ,,, | Performed by: FAMILY MEDICINE

## 2023-03-14 PROCEDURE — 25000003 PHARM REV CODE 250: Performed by: EMERGENCY MEDICINE

## 2023-03-14 PROCEDURE — 94761 N-INVAS EAR/PLS OXIMETRY MLT: CPT

## 2023-03-14 RX ORDER — AMOXICILLIN 500 MG/1
500 CAPSULE ORAL 3 TIMES DAILY
Status: DISCONTINUED | OUTPATIENT
Start: 2023-03-14 | End: 2023-03-14 | Stop reason: HOSPADM

## 2023-03-14 RX ADMIN — BUPRENORPHINE AND NALOXONE 1 FILM: 2; .5 FILM, SOLUBLE BUCCAL; SUBLINGUAL at 08:03

## 2023-03-14 RX ADMIN — SUCRALFATE 1 G: 1 TABLET ORAL at 08:03

## 2023-03-14 RX ADMIN — TOPIRAMATE 50 MG: 25 TABLET, FILM COATED ORAL at 08:03

## 2023-03-14 RX ADMIN — GABAPENTIN 400 MG: 300 CAPSULE ORAL at 08:03

## 2023-03-14 RX ADMIN — PANTOPRAZOLE SODIUM 40 MG: 40 TABLET, DELAYED RELEASE ORAL at 08:03

## 2023-03-14 RX ADMIN — FAMOTIDINE 20 MG: 20 TABLET ORAL at 08:03

## 2023-03-14 RX ADMIN — PROMETHAZINE HYDROCHLORIDE 25 MG: 25 TABLET ORAL at 08:03

## 2023-03-14 RX ADMIN — LOSARTAN POTASSIUM 50 MG: 25 TABLET, FILM COATED ORAL at 08:03

## 2023-03-14 RX ADMIN — LEVOTHYROXINE SODIUM 112 MCG: 0.11 TABLET ORAL at 05:03

## 2023-03-14 RX ADMIN — AMOXICILLIN AND CLAVULANATE POTASSIUM 500 MG: 500; 125 TABLET, FILM COATED ORAL at 08:03

## 2023-03-14 NOTE — PLAN OF CARE
Problem: Adult Inpatient Plan of Care  Goal: Plan of Care Review  Outcome: Adequate for Care Transition  Goal: Patient-Specific Goal (Individualized)  Outcome: Adequate for Care Transition  Goal: Absence of Hospital-Acquired Illness or Injury  Outcome: Adequate for Care Transition  Goal: Optimal Comfort and Wellbeing  Outcome: Adequate for Care Transition  Goal: Readiness for Transition of Care  Outcome: Adequate for Care Transition     Problem: Fall Injury Risk  Goal: Absence of Fall and Fall-Related Injury  Outcome: Adequate for Care Transition     Problem: Electrolyte Imbalance  Goal: Electrolyte Balance  Outcome: Adequate for Care Transition     Problem: Infection  Goal: Absence of Infection Signs and Symptoms  Outcome: Adequate for Care Transition     Problem: Nausea and Vomiting  Goal: Fluid and Electrolyte Balance  Outcome: Adequate for Care Transition

## 2023-03-14 NOTE — DISCHARGE SUMMARY
[facility]  Hospital Medicine  Discharge Summary      Patient Name: Gloria Mitchell  MRN: [id number]  LE: [id number]  Patient Class: IP- Inpatient  Admission Date: 3/8/2023  Hospital Length of Stay: 6 days  Discharge Date and Time:  03/14/2023 8:18 AM  Attending Physician: Ora Kay, *   Discharging Provider: Ora Kay MD  Primary Care Provider: Shannon Lange MD    Primary Care Team: Networked reference to record PCT     HPI:   No notes on file    * No surgery found *      Hospital Course:   3/9/23 - pt. Is nauseated this AM.has been given compazine to no avail. Orders revised.    3/10/23 - pt is staying nauseated. Her hemoglobin has dropped. Orders revised.    3/11-- Pt reports nausea with eating therfore not eating much. She is drinking. Remains IVF. IV Abx changed yesterday. Bcx with E coli pansensitive.Ucx ecoli and Enterococcus.    Stooling with diarrhea    3/12-- Pt reports better. Nausea with eating but ate more this morning. Drinking well. Lots of sprite. Pt ambulating on their own. Dicussded she needs to eat even with the nausea. She needs to be OOB to chair and moving around.    3/13/23 - pt. Feels better today. Has been eating without nausea. Orders revised.    3/14/23 - pt. Woke up and became nauseated after eating grits. Will send home on amoxil instead of augmentin. To follow up with her GI and PCP.       Goals of Care Treatment Preferences:  Code Status: Full Code      Consults:     No new Assessment & Plan notes have been filed under this hospital service since the last note was generated.  Service: Hospital Medicine    Final Active Diagnoses:    Diagnosis Date Noted POA    PRINCIPAL PROBLEM:  Acute pyelonephritis [N10] 03/08/2023 Yes    Bacteremia [R78.81] 03/11/2023 Yes    Nausea and vomiting [R11.2] 03/08/2023 Yes    Hypokalemia [E87.6] 03/08/2023 Yes    Hypomagnesemia [E83.42] 03/08/2023 Unknown    Volume depletion  [E86.9] 03/08/2023 Yes    Elevated lactic acid level [R79.89] 03/08/2023 Unknown      Problems Resolved During this Admission:       Discharged Condition: stable    Disposition:     Follow Up:    Patient Instructions:   No discharge procedures on file.    Significant Diagnostic Studies: Labs: All labs within the past 24 hours have been reviewed    Pending Diagnostic Studies:     None         Medications:  Reconciled Home Medications:      Medication List      CHANGE how you take these medications    gabapentin 800 MG tablet  Commonly known as: NEURONTIN  Take 1 tablet (800 mg total) by mouth 3 (three) times daily.  What changed:   · how much to take  · when to take this  · additional instructions     tiZANidine 4 mg Cap  Take 1 capsule by mouth every 8 (eight) hours.  What changed: when to take this        CONTINUE taking these medications    AIMOVIG AUTOINJECTOR 140 mg/mL autoinjector  Generic drug: erenumab-aooe  Inject 1 mL (140 mg total) into the skin every 28 days.     buprenorphine-naloxone 2-0.5 mg 2-0.5 mg Subl  Commonly known as: SUBOXONE  Place 1 tablet under the tongue. Patient states she takes twice daily. Prescription bottle is ordered for 3 times daily     EScitalopram oxalate 10 MG tablet  Commonly known as: LEXAPRO  Take 10 mg by mouth.     levothyroxine 112 MCG tablet  Commonly known as: SYNTHROID  Take 112 mcg by mouth before breakfast.     losartan 50 MG tablet  Commonly known as: COZAAR     metoprolol tartrate 50 MG tablet  Commonly known as: LOPRESSOR  Take 100 mg by mouth Daily.     omeprazole 20 MG capsule  Commonly known as: PRILOSEC  1 capsule 30 minutes before morning meal     promethazine 25 MG tablet  Commonly known as: PHENERGAN     sucralfate 1 gram tablet  Commonly known as: CARAFATE  Take 1 tablet by mouth 4 (four) times daily.     topiramate 50 MG tablet  Commonly known as: TOPAMAX  Take 1 tablet (50 mg total) by mouth 2 (two) times daily.     traZODone 50 MG tablet  Commonly known  as: DESYREL  Take 50 mg by mouth every evening.        STOP taking these medications    citric acid-potassium citrate 1,100-334 mg/5 mL solution  Commonly known as: POLYCITRA            Indwelling Lines/Drains at time of discharge:   Lines/Drains/Airways     None                 Time spent on the discharge of patient: 30 minutes         Ora Kay MD  Department of Hospital Medicine  [facility]

## 2023-03-14 NOTE — NURSING
Assisted patient to car via wheelchair, patient discharged home with son. Suboxone returned to patient on discharge.

## 2023-03-14 NOTE — NURSING
Follow up appointment made with PCP, Dr. Barahona for 3/28 @11:15. Patient will EGD scheduled in May, called and spoke to nurse for Dr. Ravi, GI MD, who reports patient has not been seen in clinic and has only been seen in [place] for EGD, new referral made for clinic appointment on 4/11 @ 12:30 at this time per request of Dr. Kay due to continuous nausea and GI symptoms.

## 2023-03-14 NOTE — NURSING
Discharge teaching reviewed with patient, instructed on potassium rich diet in reference to education in discharge packet, keeping follow up appointments, and discharge medications. IV to left FA removed with catheter intact. Patient requesting prescription for promethazine, Dr. Kay called and order received, new medications called to [facility]. Notified patient and patient verbalized understanding of discharge teaching, papers, and medications. Reports her son will be picking her up after lunch.

## 2023-03-15 ENCOUNTER — PATIENT OUTREACH (OUTPATIENT)
Dept: ADMINISTRATIVE | Facility: CLINIC | Age: 53
End: 2023-03-15

## 2023-03-15 ENCOUNTER — NURSE TRIAGE (OUTPATIENT)
Dept: ADMINISTRATIVE | Facility: CLINIC | Age: 53
End: 2023-03-15

## 2023-03-15 NOTE — PROGRESS NOTES
C3 nurse attempted to contact Gloria Mitchell for a TCC post hospital discharge follow up call. No answer. Left voicemail with callback information. The patient has a scheduled HOSFU appointment with Claudia Barahona on 03/29/2023 @ 0807.

## 2023-03-15 NOTE — PROGRESS NOTES
C3 nurse spoke with Gloria Mitchell for a TCC post hospital discharge follow up call. The patient has a scheduled HOSFU appointment with Dr. Bolivar  on 03/29/2023 @ 6356.  Patient reports to amoxicillin tid but is unsure of doseage.

## 2023-03-16 NOTE — TELEPHONE ENCOUNTER
Patient was recently discharged from the hospital due to Acute Pyelonephritis. Patient states current c/o generalized swelling with main swelling noted in hands and wrists. Patient also c/o difficulty breathing with exertion that is a new onset post discharge.     Patient advised to Go to ED Now and to have another adult drive her to ED. Patient states understanding of care advice at this time.     Reason for Disposition   [1] Difficulty breathing with exertion (e.g., walking) AND [2] new-onset or worsening    Additional Information   Negative: SEVERE difficulty breathing (e.g., struggling for each breath, speaks in single words)   Negative: Sounds like a life-threatening emergency to the triager   Negative: Followed a hand injury   Negative: Swelling followed an insect bite to the area   Negative: Swelling followed a bee, wasp, or other sting to the area   Negative: Swelling of arm is main symptom   Negative: Swelling of wrist is main symptom   Negative: Cast problems or questions   Negative: Pain, redness, swelling, or pus at IV Site   Negative: Difficulty breathing at rest   Negative: Looks like a broken bone or dislocated joint (e.g., crooked or deformed)   Negative: Entire hand is cool or blue in comparison to other side   Negative: [1] Can't use hand or can barely use hand AND [2] new-onset    Protocols used: Hand Swelling-A-AH

## 2023-03-20 DIAGNOSIS — N39.0 URINARY TRACT INFECTION WITHOUT HEMATURIA, SITE UNSPECIFIED: Primary | ICD-10-CM

## 2023-03-20 RX ORDER — METHENAMINE HIPPURATE 1000 MG/1
1 TABLET ORAL 2 TIMES DAILY
Qty: 90 TABLET | Refills: 5 | Status: SHIPPED | OUTPATIENT
Start: 2023-03-20 | End: 2023-03-28

## 2023-03-20 NOTE — TELEPHONE ENCOUNTER
Patient called stating she was in the ER last week with sepsis due to a UTI and was treated with antibiotics which she has now completed.  She states her symptoms prior to going to the ER was chills, sweating, back pain, foul smelling urine and fever up to almost 105.  She states she is starting to have the same back pain and wanted to let Dr. Ruvalcaba know and she what can be done to prevent sepsis again.  Dr. Ruvalcaba was notified, chart reviewed, order given to send in Methenamine Hippurate 1 gm bid for 6 months. Patient was called back and notified, she verbalized understanding and agreed.  Pharmacy of choice is [facility] in [place]

## 2023-03-28 ENCOUNTER — HOSPITAL ENCOUNTER (OUTPATIENT)
Dept: RADIOLOGY | Facility: HOSPITAL | Age: 53
Discharge: HOME | End: 2023-03-28
Attending: UROLOGY
Payer: MEDICARE

## 2023-03-28 ENCOUNTER — OFFICE VISIT (OUTPATIENT)
Dept: NEUROLOGY | Facility: CLINIC | Age: 53
End: 2023-03-28
Payer: MEDICARE

## 2023-03-28 ENCOUNTER — OFFICE VISIT (OUTPATIENT)
Dept: UROLOGY | Facility: CLINIC | Age: 53
End: 2023-03-28
Payer: MEDICARE

## 2023-03-28 VITALS
DIASTOLIC BLOOD PRESSURE: 78 MMHG | SYSTOLIC BLOOD PRESSURE: 110 MMHG | HEIGHT: 62 IN | RESPIRATION RATE: 18 BRPM | BODY MASS INDEX: 33.31 KG/M2 | WEIGHT: 181 LBS

## 2023-03-28 VITALS — HEIGHT: 62 IN | DIASTOLIC BLOOD PRESSURE: 50 MMHG | BODY MASS INDEX: 37.74 KG/M2 | SYSTOLIC BLOOD PRESSURE: 78 MMHG

## 2023-03-28 DIAGNOSIS — R31.9 URINARY TRACT INFECTION WITH HEMATURIA, SITE UNSPECIFIED: ICD-10-CM

## 2023-03-28 DIAGNOSIS — G43.719 INTRACTABLE CHRONIC MIGRAINE WITHOUT AURA AND WITHOUT STATUS MIGRAINOSUS: Primary | ICD-10-CM

## 2023-03-28 DIAGNOSIS — N20.0 KIDNEY STONES: ICD-10-CM

## 2023-03-28 DIAGNOSIS — N39.0 URINARY TRACT INFECTION WITHOUT HEMATURIA, SITE UNSPECIFIED: Primary | ICD-10-CM

## 2023-03-28 DIAGNOSIS — N39.0 URINARY TRACT INFECTION WITH HEMATURIA, SITE UNSPECIFIED: ICD-10-CM

## 2023-03-28 DIAGNOSIS — Z87.440 HISTORY OF RECURRENT UTIS: ICD-10-CM

## 2023-03-28 PROCEDURE — 74018 RADEX ABDOMEN 1 VIEW: CPT | Mod: 26,,, | Performed by: STUDENT IN AN ORGANIZED HEALTH CARE EDUCATION/TRAINING PROGRAM

## 2023-03-28 PROCEDURE — 3078F DIAST BP <80 MM HG: CPT | Mod: ,,, | Performed by: NURSE PRACTITIONER

## 2023-03-28 PROCEDURE — 3074F SYST BP LT 130 MM HG: CPT | Mod: ,,, | Performed by: NURSE PRACTITIONER

## 2023-03-28 PROCEDURE — 74018 XR KUB: ICD-10-PCS | Mod: 26,,, | Performed by: STUDENT IN AN ORGANIZED HEALTH CARE EDUCATION/TRAINING PROGRAM

## 2023-03-28 PROCEDURE — 99213 OFFICE O/P EST LOW 20 MIN: CPT | Mod: S$PBB,,, | Performed by: NURSE PRACTITIONER

## 2023-03-28 PROCEDURE — 99213 OFFICE O/P EST LOW 20 MIN: CPT | Mod: S$PBB,,, | Performed by: UROLOGY

## 2023-03-28 PROCEDURE — 3008F PR BODY MASS INDEX (BMI) DOCUMENTED: ICD-10-PCS | Mod: ,,, | Performed by: UROLOGY

## 2023-03-28 PROCEDURE — 3008F PR BODY MASS INDEX (BMI) DOCUMENTED: ICD-10-PCS | Mod: ,,, | Performed by: NURSE PRACTITIONER

## 2023-03-28 PROCEDURE — 87086 CULTURE, URINE: ICD-10-PCS | Mod: ,,, | Performed by: CLINICAL MEDICAL LABORATORY

## 2023-03-28 PROCEDURE — 3078F PR MOST RECENT DIASTOLIC BLOOD PRESSURE < 80 MM HG: ICD-10-PCS | Mod: ,,, | Performed by: NURSE PRACTITIONER

## 2023-03-28 PROCEDURE — 3074F PR MOST RECENT SYSTOLIC BLOOD PRESSURE < 130 MM HG: ICD-10-PCS | Mod: ,,, | Performed by: UROLOGY

## 2023-03-28 PROCEDURE — 3074F SYST BP LT 130 MM HG: CPT | Mod: ,,, | Performed by: UROLOGY

## 2023-03-28 PROCEDURE — 3078F DIAST BP <80 MM HG: CPT | Mod: ,,, | Performed by: UROLOGY

## 2023-03-28 PROCEDURE — 87186 CULTURE, URINE: ICD-10-PCS | Mod: ,,, | Performed by: CLINICAL MEDICAL LABORATORY

## 2023-03-28 PROCEDURE — 74018 RADEX ABDOMEN 1 VIEW: CPT | Mod: TC

## 2023-03-28 PROCEDURE — 99213 PR OFFICE/OUTPT VISIT, EST, LEVL III, 20-29 MIN: ICD-10-PCS | Mod: S$PBB,,, | Performed by: UROLOGY

## 2023-03-28 PROCEDURE — 3008F BODY MASS INDEX DOCD: CPT | Mod: ,,, | Performed by: UROLOGY

## 2023-03-28 PROCEDURE — 3078F PR MOST RECENT DIASTOLIC BLOOD PRESSURE < 80 MM HG: ICD-10-PCS | Mod: ,,, | Performed by: UROLOGY

## 2023-03-28 PROCEDURE — 87186 SC STD MICRODIL/AGAR DIL: CPT | Mod: ,,, | Performed by: CLINICAL MEDICAL LABORATORY

## 2023-03-28 PROCEDURE — 87077 CULTURE, URINE: ICD-10-PCS | Mod: ,,, | Performed by: CLINICAL MEDICAL LABORATORY

## 2023-03-28 PROCEDURE — 87086 URINE CULTURE/COLONY COUNT: CPT | Mod: ,,, | Performed by: CLINICAL MEDICAL LABORATORY

## 2023-03-28 PROCEDURE — 99214 OFFICE O/P EST MOD 30 MIN: CPT | Mod: PBBFAC | Performed by: NURSE PRACTITIONER

## 2023-03-28 PROCEDURE — 3008F BODY MASS INDEX DOCD: CPT | Mod: ,,, | Performed by: NURSE PRACTITIONER

## 2023-03-28 PROCEDURE — 87077 CULTURE AEROBIC IDENTIFY: CPT | Mod: ,,, | Performed by: CLINICAL MEDICAL LABORATORY

## 2023-03-28 PROCEDURE — 99214 OFFICE O/P EST MOD 30 MIN: CPT | Mod: PBBFAC | Performed by: UROLOGY

## 2023-03-28 PROCEDURE — 3074F PR MOST RECENT SYSTOLIC BLOOD PRESSURE < 130 MM HG: ICD-10-PCS | Mod: ,,, | Performed by: NURSE PRACTITIONER

## 2023-03-28 PROCEDURE — 99213 PR OFFICE/OUTPT VISIT, EST, LEVL III, 20-29 MIN: ICD-10-PCS | Mod: S$PBB,,, | Performed by: NURSE PRACTITIONER

## 2023-03-28 RX ORDER — METHENAMINE HIPPURATE 1000 MG/1
1 TABLET ORAL 2 TIMES DAILY
Qty: 180 TABLET | Refills: 3 | Status: SHIPPED | OUTPATIENT
Start: 2023-03-28 | End: 2024-02-08 | Stop reason: SDUPTHER

## 2023-03-28 RX ORDER — ERENUMAB-AOOE 140 MG/ML
140 INJECTION, SOLUTION SUBCUTANEOUS
Qty: 1 EACH | Refills: 11 | Status: SHIPPED | OUTPATIENT
Start: 2023-03-28 | End: 2023-08-21 | Stop reason: SDUPTHER

## 2023-03-28 NOTE — PROGRESS NOTES
Subjective:       Patient ID: Gloria Mitchell is a 52 y.o. female     Chief Complaint:    Chief Complaint   Patient presents with    Follow-up    Migraine            Allergies:  Oxycodone-acetaminophen, Oxycodone hcl, and Zofran [ondansetron hcl]    Current Medications:    Outpatient Encounter Medications as of 3/28/2023   Medication Sig Dispense Refill    buprenorphine-naloxone 2-0.5 mg (SUBOXONE) 2-0.5 mg Subl Place 1 tablet under the tongue. Patient states she takes twice daily. Prescription bottle is ordered for 3 times daily      EScitalopram oxalate (LEXAPRO) 10 MG tablet Take 10 mg by mouth once daily.      levothyroxine (SYNTHROID) 112 MCG tablet Take 112 mcg by mouth before breakfast.      losartan (COZAAR) 50 MG tablet Take 50 mg by mouth once daily.      methenamine (HIPREX) 1 gram Tab Take 1 tablet (1 g total) by mouth 2 (two) times daily. 180 tablet 3    metoprolol tartrate (LOPRESSOR) 50 MG tablet Take 100 mg by mouth Daily.      omeprazole (PRILOSEC) 20 MG capsule 1 capsule 30 minutes before morning meal      promethazine (PHENERGAN) 25 MG tablet       sucralfate (CARAFATE) 1 gram tablet Take 1 tablet by mouth 4 (four) times daily.      tiZANidine 4 mg Cap Take 1 capsule by mouth every 8 (eight) hours. (Patient taking differently: Take 1 capsule by mouth every evening.) 90 capsule 1    topiramate (TOPAMAX) 50 MG tablet Take 1 tablet (50 mg total) by mouth 2 (two) times daily. 60 tablet 11    traZODone (DESYREL) 50 MG tablet Take 50 mg by mouth every evening.      erenumab-aooe (AIMOVIG AUTOINJECTOR) 140 mg/mL autoinjector Inject 1 mL (140 mg total) into the skin every 28 days. 1 each 11    gabapentin (NEURONTIN) 800 MG tablet Take 1 tablet (800 mg total) by mouth 3 (three) times daily. (Patient taking differently: Take 400 mg by mouth once daily. Patient reports taking 400mg daily) 90 tablet 1    [DISCONTINUED] erenumab-aooe (AIMOVIG AUTOINJECTOR) 140 mg/mL autoinjector Inject 1 mL (140 mg  "total) into the skin every 28 days. (Patient not taking: Reported on 3/15/2023) 1 each 11    [DISCONTINUED] methenamine (HIPREX) 1 gram Tab Take 1 tablet (1 g total) by mouth 2 (two) times daily. 90 tablet 5     No facility-administered encounter medications on file as of 3/28/2023.       History of Present Illness  52 yr old white female presents with migraines that started as a teenager.     Currently treated with topamax 50mg bid (do not recommend higher dosing due to prior kidney stones) and aimovig 140mg once monthly injections.  Prior effexor in 2062-2867 not effective any longer.  She was doing well with Aimovig, but I was notificed in January that she said headaches got worse, and that the "shot stopped working".  This is what I was told.  She says she hasn't had the shot in 3 months, so this is likely the reasonl.  She has current approval for Aimovig through December of 2023, I am going to send it to [facility], she is to notify me if any problems getting the med.  She has history of kidney stones, so topamax is not good option.    Currently only taking topamax 50mg once daily         Review of Systems  Review of Systems   Constitutional:  Negative for diaphoresis and fever.   HENT:  Negative for congestion, hearing loss and tinnitus.    Eyes:  Negative for blurred vision, double vision, photophobia, discharge and redness.   Respiratory:  Negative for cough and shortness of breath.    Cardiovascular:  Negative for chest pain.   Gastrointestinal:  Negative for abdominal pain, nausea and vomiting.   Musculoskeletal:  Negative for back pain, joint pain, myalgias and neck pain.   Skin:  Negative for itching and rash.   Neurological:  Positive for headaches. Negative for dizziness, tremors, sensory change, speech change, focal weakness, seizures, loss of consciousness and weakness.   Psychiatric/Behavioral:  Negative for depression, hallucinations and memory loss. The patient has insomnia.    All other systems " reviewed and are negative.   Objective:     NEUROLOGICAL EXAMINATION:     MENTAL STATUS   Oriented to person, place, and time.   Registration: recalls 3 of 3 objects. Recall at 5 minutes: recalls 3 of 3 objects.   Attention: normal. Concentration: normal.   Speech: speech is normal   Level of consciousness: alert  Knowledge: good and consistent with education.   Normal comprehension.     CRANIAL NERVES     CN II   Visual fields full to confrontation.   Visual acuity: normal  Right visual field deficit: none  Left visual field deficit: none     CN III, IV, VI   Pupils are equal, round, and reactive to light.  Extraocular motions are normal.   Right pupil: Size: 3 mm. Shape: regular. Reactivity: brisk. Consensual response: intact. Accommodation: intact.   Left pupil: Size: 3 mm. Shape: regular. Reactivity: brisk. Consensual response: intact. Accommodation: intact.   CN III: no CN III palsy  CN VI: no CN VI palsy  Nystagmus: none   Diplopia: none  Upgaze: normal  Downgaze: normal  Conjugate gaze: present  Vestibulo-ocular reflex: present    CN V   Facial sensation intact.   Right facial sensation deficit: none  Left facial sensation deficit: none  Right corneal reflex: normal  Left corneal reflex: normal    CN VII   Facial expression full, symmetric.   Right facial weakness: none  Left facial weakness: none  Right taste: normal  Left taste: normal    CN VIII   CN VIII normal.   Hearing: intact    CN IX, X   CN IX normal.   CN X normal.   Palate: symmetric    CN XI   CN XI normal.   Right sternocleidomastoid strength: normal  Left sternocleidomastoid strength: normal  Right trapezius strength: normal  Left trapezius strength: normal    CN XII   CN XII normal.   Tongue: not atrophic  Fasciculations: absent  Tongue deviation: none    MOTOR EXAM   Muscle bulk: normal  Overall muscle tone: normal  Right arm tone: normal  Left arm tone: normal  Right arm pronator drift: absent  Left arm pronator drift: absent  Right leg  tone: normal  Left leg tone: normal    Strength   Right neck flexion: 5/5  Left neck flexion: 5/5  Right neck extension: 5/5  Left neck extension: 5/5  Right deltoid: 5/5  Left deltoid: 5/5  Right biceps: 5/5  Left biceps: /5  Right triceps: 5/5  Left triceps: 5/  Right wrist flexion: 5/  Left wrist flexion: 5/  Right wrist extension: /  Left wrist extension:   Right interossei:   Left interossei:   Right iliopsoas:   Left iliopsoas:   Right quadriceps:   Left quadriceps:   Right hamstrin/5  Left hamstrin/5  Right anterior tibial:   Left anterior tibial:   Right posterior tibial:   Left posterior tibial:   Right gastroc:   Left gastroc:     REFLEXES     Reflexes   Right brachioradialis: 2+  Left brachioradialis: 2+  Right biceps: 2+  Left biceps: 2+  Right triceps: 2+  Left triceps: 2+  Right patellar: 2+  Left patellar: 2+  Right achilles: 2+  Left achilles: 2+  Right plantar: normal  Left plantar: normal  Right Hernandez: absent  Left Hernandez: absent  Right ankle clonus: absent  Left ankle clonus: absent  Right pendular knee jerk: absent  Left pendular knee jerk: absent    SENSORY EXAM   Light touch normal.   Right arm light touch: normal  Left arm light touch: normal  Right leg light touch: normal  Left leg light touch: normal  Vibration normal.   Right arm vibration: normal  Left arm vibration: normal  Right leg vibration: normal  Left leg vibration: normal  Proprioception normal.   Right arm proprioception: normal  Left arm proprioception: normal  Right leg proprioception: normal  Left leg proprioception: normal  Pinprick normal.   Right arm pinprick: normal  Left arm pinprick: normal  Right leg pinprick: normal  Left leg pinprick: normal    GAIT AND COORDINATION     Gait  Gait: normal     Coordination   Finger to nose coordination: normal  Heel to shin coordination: normal  Tandem walking coordination: normal    Tremor   Resting tremor: absent  Intention  tremor: absent  Action tremor: absent     Physical Exam  Vitals and nursing note reviewed.   Constitutional:       Appearance: Normal appearance.   HENT:      Head: Normocephalic.   Eyes:      Extraocular Movements: Extraocular movements intact and EOM normal.      Pupils: Pupils are equal, round, and reactive to light.   Cardiovascular:      Rate and Rhythm: Normal rate and regular rhythm.   Pulmonary:      Effort: Pulmonary effort is normal.      Breath sounds: Normal breath sounds.   Musculoskeletal:         General: No swelling or tenderness. Normal range of motion.      Cervical back: Normal range of motion and neck supple.      Right lower leg: No edema.      Left lower leg: No edema.   Skin:     General: Skin is warm and dry.      Coloration: Skin is not jaundiced.      Findings: No rash.   Neurological:      General: No focal deficit present.      Mental Status: She is alert and oriented to person, place, and time.      GCS: GCS eye subscore is 4. GCS verbal subscore is 5. GCS motor subscore is 6.      Cranial Nerves: No cranial nerve deficit.      Sensory: No sensory deficit.      Motor: Motor function is intact. No weakness.      Coordination: Coordination is intact. Coordination normal. Finger-Nose-Finger Test and Heel to Shin Test normal.      Gait: Gait is intact. Gait and tandem walk normal.      Deep Tendon Reflexes: Reflexes normal.      Reflex Scores:       Tricep reflexes are 2+ on the right side and 2+ on the left side.       Bicep reflexes are 2+ on the right side and 2+ on the left side.       Brachioradialis reflexes are 2+ on the right side and 2+ on the left side.       Patellar reflexes are 2+ on the right side and 2+ on the left side.       Achilles reflexes are 2+ on the right side and 2+ on the left side.  Psychiatric:         Mood and Affect: Mood normal.         Speech: Speech normal.         Behavior: Behavior normal.        Assessment:     Problem List Items Addressed This Visit           Neuro    Intractable chronic migraine without aura and without status migrainosus - Primary    Relevant Medications    erenumab-aooe (AIMOVIG AUTOINJECTOR) 140 mg/mL autoinjector          Primary Diagnosis and ICD10  Intractable chronic migraine without aura and without status migrainosus [G43.719]    Plan:     Patient Instructions   Continue current topamax 50mg once daily  Continue Aimovig 140mg once monthly, I have sent Rx to [facility]  Continue phenergan as needed    Medications Discontinued During This Encounter   Medication Reason    erenumab-aooe (AIMOVIG AUTOINJECTOR) 140 mg/mL autoinjector Reorder             Requested Prescriptions     Signed Prescriptions Disp Refills    erenumab-aooe (AIMOVIG AUTOINJECTOR) 140 mg/mL autoinjector 1 each 11     Sig: Inject 1 mL (140 mg total) into the skin every 28 days.       No orders of the defined types were placed in this encounter.

## 2023-03-28 NOTE — PROGRESS NOTES
Subjective     Patient ID: Gloria Mitchell is a 52 y.o. female.    Chief Complaint: Follow-up (Six  month KUB and office visit. )    Ms. Mitchell presented to the clinic today because of left ureteral colic.  She had onset pain on April 2nd and went to the emergency room at [facility].  She was found to have a large stone at the left ureteropelvic junction it measures about 6 x 10 mm.  CT scan was done but she did not have a KUB.  She had a lot pain that day and then did okay for couple of days but today has been having increasing left flank pain again.  She did require Dilaudid and has only a few Dilaudid left.  Presented to the clinic in acute pain as she says she did not hurt any until after she arrived in [place].  She desires to go ahead with definitive treatment because the amount of pain she has had on intermittent basis.  Patient had left ureteroscopy with laser lithotripsy of stone in the distal left ureter in July 2020.  That was the last active stones she has had until this one.  She has a long history of stone disease dating back a good many years.  Previous stone risk profile revealed her to have low citrate and she has been taking her potassium citrate as directed which is two 10 mEq tablets b.i.d..  She admits she does not drink as much water she should.  Has had multiple procedures in the past.  (April 6, 2021  ---------------------------------------------------------------------------------------------------------------------------------------------------------------------     Ms. Mitchell underwent left ureteroscopy with laser lithotripsy of stone and stent insertion on April 8. She was supposed to keep her stent until Monday but accidentally pulled it out on Saturday.  She has had a lot of pain since then and continues to have a lot of pain.  She presented clinic with severe pain on left side.  She took her last Dilaudid at 9:30 a.m. today.  She is also concerned she may have some infection.   The urine culture that was collected initially at her clinic visit grew 30,000 colonies Citrobacter and E coli and 20,000 colonies of Enterococcus faecalis.  She did get Bactrim DS which is appropriate and she is not febrile.  Presented to clinic with her mother.  (April 12, 2021)     Ms. Mitchell is in for KUB and follow-up since her left ureteroscopy.  She is not having any more stone pain.  She passed a stone on or about April 25th which apparently was a small fragment.  She did not have any pain associated with it.  Comfortable now and back to her baseline.  She is going to pain clinic for her back problems.  Nothing that sounds like any additional stone issues.  She is drinking only water and trying to drink more.  She is taking her potassium citrate to help with stone prevention..  In today for KUB and follow-up.  Her mother was with her today.  (May 17, 2021)     Ms. Mitchell is seen in clinic for the 1st time in over a year but has had at least 3 urinary tract infection during the past year.  We treated her for at least 2 and she was treated in [facility] for infection about 2 weeks ago.  That treatment was with Cipro and clinically seemed to improve although we are not sure what culture showed or if a culture was done.  Now off the Cipro for the last few days with no symptoms.  She has had no recent stone problems.  No other health problems that she is aware of except for pneumonia that was treated as outpatient.  (June 6, 2022)     Ms. Mitchell was seen is work-in patient today because she says she feels she has a stone as she has been hurting in her left flank since it began Saturday night and also feels she has another infection.  She is also having dysuria. (June 20, 2022)  XXXX[id number]XXXXXXXXXXXXXXXXXXXXXXXXXXXXXXXXXXXXXXXXXXXXXXXX     Ms. Mitchell is an established patient of Dr. Onofre with history of renal stones.  She has been worked into clinic today  "for c/o left flank and pelvic pain x 4 days.  She   Left Ureteroscopy with laser lithotripsy 4/8/21  Seen for left flank pain 6/20, no ureteral stone seen on KUB, and was to f/u if pain persisted for updated CT - last one year prior.  Urine positive for 30,000 E. Coli.  (8/19/2022)----------------------------------------------------------------        Ms. Mitchell is a 51 yo female who has returned today with her mother after obtaining CT Imaging this morning in [place] for L/R renal colic.  Dr. Ruvalcaba to room to speak with patient and review imaging.  Patient was COVID positive 7/29.  States resolution of symptoms.      -  Chronic pain syndrome:  Followed by pain tx for chronic low back pain, treated with Norco 10 BID.     -  Left /Right flank pain:  Unable to confirm on KUB last visit.  CX positive, tx started today (see below)  CT Stone Protocol today resulted:  "Similar mild-to-moderate left-sided hydronephrosis. There has been some distal migration of previously described left UPJ stone measuring a mm which is now at the S1 level. A few punctate calculi are adjacent to this within the more proximal left ureter.  Similar 2 mm calculus within the distal right ureter at the inferior sacral level without significant hydronephrosis on the right. Multiple subcentimeter nonobstructing bilateral renal calculi are present."     -  UTI (urinary tract infection):  Urine culture returned positive for significant E. Coli and Yeast infections.  Will treat with 10 day course of Ceftin 250 mg po BID with daily probiotics.  Instruct patient not to take Omeprazole (Prilosec) in conjunction with this antx.  Urine was also positive for microhematuria.  This may be present b/c of UTI, and/or secondary UTI from active renal stone.     -  Yeast cystitis:    Significant growth seen in urine studies.  tx with diflucan x one (ordered this am PTA).  (8/23/2022)--------------------------------------------------------------------------      " Review of Systems   Constitutional: Negative.    HENT: Negative.    Eyes: Negative.    Respiratory: Negative.    Cardiovascular: Negative.    Gastrointestinal: Negative.    Endocrine: Negative.    Genitourinary: Positive for dysuria, flank pain, frequency, hematuria, pelvic pain, urgency and vaginal discharge. Negative for decreased urine volume, difficulty urinating, genital sores, menstrual problem, vaginal bleeding and vaginal pain.        L/R bilateral flank pain;  Vaginal itching   Musculoskeletal: Positive for back pain.        Chronic low back pain - followed by [facility].   Skin: Negative.    Allergic/Immunologic: Negative.    Neurological: Negative.    Hematological: Negative.    Psychiatric/Behavioral: Negative.    ---------------------------------------------------------------------------------------------------------------------------------------------------------------------------------------  The above is from the history of Ms. Cayla Gamino of August 23rd prior to the left ureteroscopy of August 26.  Ms. Mitchell is doing better since that time.  She had no trouble removing her stent postop.  No sensation of infection today and basically back to her baseline.  She is on potassium citrate 3 times daily like she has been on.  Previous stone risk profile in 2014 revealed very low urine citrate level as well as low urine output.  She is taking her medication as prescribed.  Still having stones but fewer than she formerly had. [September 26, 2022]    Ms. Mitchell was hospitalized in [place] for urinary tract infection with possible sepsis few weeks ago.  Doing better now with no symptoms residual infection.  She did require IV antibiotics.  Urine culture grew over 100,000 colonies of E coli and 51,000 colonies of Enterococcus.  She is feeling better now but blood pressure today is quite low.  She is actually on a lot of blood pressure medication and may need to omit some for the time being.  She feels  very tired but otherwise doing okay with no symptoms of active infection.  [March 28, 2023]    Review of Systems       Objective     Physical Exam  Constitutional:       Appearance: Normal appearance. She is normal weight.   Neurological:      Mental Status: She is alert.   Psychiatric:         Mood and Affect: Mood normal.         Behavior: Behavior normal.      Urinalysis revealed 2 or 3 red cells with occasional pus cells.  Dipstick is nitrite positive with 2+ leukocytes, 3+ blood, 1+ protein, pH of 6 and specific gravity 1.025  Assessment and Plan     Problem List Items Addressed This Visit    None  Visit Diagnoses       Urinary tract infection without hematuria, site unspecified    -  Primary    Relevant Medications    methenamine (HIPREX) 1 gram Tab    Other Relevant Orders    X-Ray KUB    Kidney stones        Urinary tract infection with hematuria, site unspecified        Relevant Orders    Urine culture    History of recurrent UTIs              Patient's blood pressure is quite low at 78/50.  She will omit her metoprolol tonight.  She is using a doctor in [facility] and will see her tomorrow.  Urine sent for culture and we will treat specifically if still infected.  We will see when she has stones which will inevitably occur.  Otherwise 1 year appointment with CELINA.

## 2023-03-28 NOTE — PATIENT INSTRUCTIONS
Patient's blood pressure is quite low at 78/50.  She will omit her metoprolol tonight.  She is using a doctor in [facility] and will see her tomorrow.  Urine sent for culture and we will treat specifically if still infected.  Prescription for methenamine hippurate submitted.  She probably needs to be on that long term.  We will see when she has stones which will inevitably occur.  Otherwise 1 year appointment with CELINA.      Please have CELINA done prior to Dr Ruvalcaba appointment

## 2023-03-28 NOTE — PATIENT INSTRUCTIONS
Continue current topamax 50mg once daily  Continue Aimovig 140mg once monthly, I have sent Rx to [facility]  Continue phenergan as needed

## 2023-03-31 DIAGNOSIS — N39.0 URINARY TRACT INFECTION WITHOUT HEMATURIA, SITE UNSPECIFIED: Primary | ICD-10-CM

## 2023-03-31 LAB
UA COMPLETE W REFLEX CULTURE PNL UR: ABNORMAL
UA COMPLETE W REFLEX CULTURE PNL UR: ABNORMAL

## 2023-03-31 RX ORDER — NITROFURANTOIN 25; 75 MG/1; MG/1
100 CAPSULE ORAL 2 TIMES DAILY
Qty: 20 CAPSULE | Refills: 0 | Status: SHIPPED | OUTPATIENT
Start: 2023-03-31 | End: 2024-01-03 | Stop reason: CLARIF

## 2023-03-31 RX ORDER — FLUCONAZOLE 100 MG/1
100 TABLET ORAL DAILY
Qty: 8 TABLET | Refills: 0 | Status: SHIPPED | OUTPATIENT
Start: 2023-03-31 | End: 2023-04-07

## 2023-03-31 NOTE — TELEPHONE ENCOUNTER
Urine culture was performed and resulted in >100,000 E-Coli and >100,000 yeast. I spoke with patient and reviewed culture results and allergies. Patient denies any know problem taking Macrobid or Diflucan. Patient voiced understanding she will stop/hold Hiprex and take Macrobid 100 mg one tablet in am and one tablet in pm for 10 days with food. Restart Hiprex and start Diflucan 100 mg two tablets on day 1 the 1 tablet daily for 7 days.  Requested order for meds to be sent to [facility] in [place], [place].

## 2023-04-04 DIAGNOSIS — N20.0 KIDNEY STONES: Primary | ICD-10-CM

## 2023-04-04 RX ORDER — PROMETHAZINE HYDROCHLORIDE 25 MG/1
25 TABLET ORAL EVERY 4 HOURS PRN
Qty: 24 TABLET | Refills: 0 | Status: SHIPPED | OUTPATIENT
Start: 2023-04-04 | End: 2023-05-22 | Stop reason: SDUPTHER

## 2023-04-04 NOTE — PROGRESS NOTES
Patient called and left message saying she needs prescription refill on Phenergan 25 mg for nausea due to kidney stones.  Last seen 3/28/23, next scheduled visit is on 4/1/24

## 2023-04-12 ENCOUNTER — PATIENT MESSAGE (OUTPATIENT)
Dept: NEUROLOGY | Facility: CLINIC | Age: 53
End: 2023-04-12
Payer: MEDICARE

## 2023-05-04 ENCOUNTER — LAB VISIT (OUTPATIENT)
Dept: LAB | Facility: HOSPITAL | Age: 53
End: 2023-05-04
Attending: FAMILY MEDICINE
Payer: MEDICARE

## 2023-05-04 DIAGNOSIS — Z79.899 ENCOUNTER FOR LONG-TERM (CURRENT) USE OF OTHER MEDICATIONS: ICD-10-CM

## 2023-05-04 DIAGNOSIS — I10 ESSENTIAL (PRIMARY) HYPERTENSION: Primary | ICD-10-CM

## 2023-05-04 LAB
BASOPHILS # BLD AUTO: 0.04 K/UL (ref 0–0.2)
BASOPHILS NFR BLD AUTO: 0.8 % (ref 0–1)
CK SERPL-CCNC: 96 U/L (ref 26–192)
DIFFERENTIAL METHOD BLD: ABNORMAL
EOSINOPHIL # BLD AUTO: 0.22 K/UL (ref 0–0.5)
EOSINOPHIL NFR BLD AUTO: 4.2 % (ref 1–4)
ERYTHROCYTE [DISTWIDTH] IN BLOOD BY AUTOMATED COUNT: 15.1 % (ref 11.5–14.5)
HCT VFR BLD AUTO: 39.2 % (ref 38–47)
HGB BLD-MCNC: 12.4 G/DL (ref 12–16)
IMM GRANULOCYTES # BLD AUTO: 0 K/UL (ref 0–0.04)
IMM GRANULOCYTES NFR BLD: 0 % (ref 0–0.4)
LYMPHOCYTES # BLD AUTO: 2.22 K/UL (ref 1–4.8)
LYMPHOCYTES NFR BLD AUTO: 42.1 % (ref 27–41)
MCH RBC QN AUTO: 29.7 PG (ref 27–31)
MCHC RBC AUTO-ENTMCNC: 31.6 G/DL (ref 32–36)
MCV RBC AUTO: 93.8 FL (ref 80–96)
MONOCYTES # BLD AUTO: 0.33 K/UL (ref 0–0.8)
MONOCYTES NFR BLD AUTO: 6.3 % (ref 2–6)
MPC BLD CALC-MCNC: 10.1 FL (ref 9.4–12.4)
NEUTROPHILS # BLD AUTO: 2.46 K/UL (ref 1.8–7.7)
NEUTROPHILS NFR BLD AUTO: 46.6 % (ref 53–65)
PLATELET # BLD AUTO: 256 K/UL (ref 150–400)
RBC # BLD AUTO: 4.18 M/UL (ref 4.2–5.4)
WBC # BLD AUTO: 5.27 K/UL (ref 4.5–11)

## 2023-05-04 PROCEDURE — 82550 ASSAY OF CK (CPK): CPT

## 2023-05-04 PROCEDURE — 36415 COLL VENOUS BLD VENIPUNCTURE: CPT

## 2023-05-04 PROCEDURE — 85025 COMPLETE CBC W/AUTO DIFF WBC: CPT

## 2023-05-22 DIAGNOSIS — R11.0 NAUSEA: Primary | ICD-10-CM

## 2023-05-22 DIAGNOSIS — N20.0 KIDNEY STONES: ICD-10-CM

## 2023-05-22 RX ORDER — PROMETHAZINE HYDROCHLORIDE 25 MG/1
25 TABLET ORAL EVERY 4 HOURS PRN
Qty: 24 TABLET | Refills: 0 | Status: SHIPPED | OUTPATIENT
Start: 2023-05-22 | End: 2024-01-25

## 2023-05-22 NOTE — TELEPHONE ENCOUNTER
Patient sent in a request for Phenergan 25 mg til her migraine medication comes in; she states she is in need of nausea med.

## 2023-06-12 PROBLEM — N10 ACUTE PYELONEPHRITIS: Status: RESOLVED | Noted: 2023-03-08 | Resolved: 2023-06-12

## 2023-08-21 DIAGNOSIS — G43.719 INTRACTABLE CHRONIC MIGRAINE WITHOUT AURA AND WITHOUT STATUS MIGRAINOSUS: ICD-10-CM

## 2023-08-21 RX ORDER — ERENUMAB-AOOE 140 MG/ML
140 INJECTION, SOLUTION SUBCUTANEOUS
Qty: 1 EACH | Refills: 11 | Status: SHIPPED | OUTPATIENT
Start: 2023-08-21 | End: 2024-01-26 | Stop reason: SDUPTHER

## 2023-08-23 DIAGNOSIS — M25.512 ACUTE PAIN OF BOTH SHOULDERS: Primary | ICD-10-CM

## 2023-08-23 DIAGNOSIS — M25.511 ACUTE PAIN OF BOTH SHOULDERS: Primary | ICD-10-CM

## 2023-08-24 ENCOUNTER — OFFICE VISIT (OUTPATIENT)
Dept: ORTHOPEDICS | Facility: CLINIC | Age: 53
End: 2023-08-24
Payer: MEDICARE

## 2023-08-24 ENCOUNTER — HOSPITAL ENCOUNTER (OUTPATIENT)
Dept: RADIOLOGY | Facility: HOSPITAL | Age: 53
Discharge: HOME | End: 2023-08-24
Attending: NURSE PRACTITIONER
Payer: MEDICARE

## 2023-08-24 VITALS
TEMPERATURE: 98 F | WEIGHT: 181 LBS | HEIGHT: 62 IN | RESPIRATION RATE: 20 BRPM | OXYGEN SATURATION: 99 % | HEART RATE: 84 BPM | BODY MASS INDEX: 33.31 KG/M2

## 2023-08-24 DIAGNOSIS — M25.512 ACUTE PAIN OF BOTH SHOULDERS: ICD-10-CM

## 2023-08-24 DIAGNOSIS — M25.511 ACUTE PAIN OF BOTH SHOULDERS: ICD-10-CM

## 2023-08-24 DIAGNOSIS — M75.40 SHOULDER IMPINGEMENT SYNDROME, UNSPECIFIED LATERALITY: Primary | ICD-10-CM

## 2023-08-24 PROCEDURE — 3008F PR BODY MASS INDEX (BMI) DOCUMENTED: ICD-10-PCS | Mod: CPTII,,, | Performed by: NURSE PRACTITIONER

## 2023-08-24 PROCEDURE — 99203 OFFICE O/P NEW LOW 30 MIN: CPT | Mod: S$PBB,,, | Performed by: NURSE PRACTITIONER

## 2023-08-24 PROCEDURE — 1160F PR REVIEW ALL MEDS BY PRESCRIBER/CLIN PHARMACIST DOCUMENTED: ICD-10-PCS | Mod: CPTII,,, | Performed by: NURSE PRACTITIONER

## 2023-08-24 PROCEDURE — 73030 XR SHOULDER COMPLETE 2 OR MORE VIEWS BILATERAL: ICD-10-PCS | Mod: 26,50,, | Performed by: RADIOLOGY

## 2023-08-24 PROCEDURE — 4010F ACE/ARB THERAPY RXD/TAKEN: CPT | Mod: CPTII,,, | Performed by: NURSE PRACTITIONER

## 2023-08-24 PROCEDURE — 99215 OFFICE O/P EST HI 40 MIN: CPT | Mod: PBBFAC | Performed by: NURSE PRACTITIONER

## 2023-08-24 PROCEDURE — 1160F RVW MEDS BY RX/DR IN RCRD: CPT | Mod: CPTII,,, | Performed by: NURSE PRACTITIONER

## 2023-08-24 PROCEDURE — 73030 X-RAY EXAM OF SHOULDER: CPT | Mod: TC,50

## 2023-08-24 PROCEDURE — 4010F PR ACE/ARB THEARPY RXD/TAKEN: ICD-10-PCS | Mod: CPTII,,, | Performed by: NURSE PRACTITIONER

## 2023-08-24 PROCEDURE — 73030 X-RAY EXAM OF SHOULDER: CPT | Mod: 26,50,, | Performed by: RADIOLOGY

## 2023-08-24 PROCEDURE — 3008F BODY MASS INDEX DOCD: CPT | Mod: CPTII,,, | Performed by: NURSE PRACTITIONER

## 2023-08-24 PROCEDURE — 1159F MED LIST DOCD IN RCRD: CPT | Mod: CPTII,,, | Performed by: NURSE PRACTITIONER

## 2023-08-24 PROCEDURE — 99203 PR OFFICE/OUTPT VISIT, NEW, LEVL III, 30-44 MIN: ICD-10-PCS | Mod: S$PBB,,, | Performed by: NURSE PRACTITIONER

## 2023-08-24 PROCEDURE — 1159F PR MEDICATION LIST DOCUMENTED IN MEDICAL RECORD: ICD-10-PCS | Mod: CPTII,,, | Performed by: NURSE PRACTITIONER

## 2023-08-24 RX ORDER — MELOXICAM 15 MG/1
15 TABLET ORAL DAILY PRN
Qty: 30 TABLET | Refills: 2 | Status: SHIPPED | OUTPATIENT
Start: 2023-08-24 | End: 2023-09-23

## 2023-08-24 NOTE — PATIENT INSTRUCTIONS
Safety guidelines and activity restrictions discussed with the patient.  Verbalized understanding.  We discussed ice and rest of her shoulders.  Prescription Mobic 15 mg 1 p.o. daily p.r.n. pain dispense 30 with 2 refills sent to [facility] [address] [place] [address].  We will set her up physical therapy for range of motion strengthening exercises bilateral shoulders 2 to 3 times a week x6 weeks.  If she fails to improve or worsens she is to call back for scheduling.

## 2023-08-24 NOTE — PROGRESS NOTES
53-year-old female presents ambulatory to orthopedic clinic complaint of bilateral shoulder pain.  Left shoulder has been bothering her for a proximally 1 year.  Right shoulder has been bothering for 2 months.  States symptoms began insidiously.  She was right-hand dominant.  She states her left shoulder hurts in the front in his painful at night in his disruptive of sleep.  States at time the pain goes all way down to her fingers.  Right shoulder is painful at night and prevents sleeping.  Denies radiation.  She has not been taking oral NSAID medications.  She does state that she was disabled with a history of 4 back surgeries in the past.      X-ray: X-rays today of bilateral shoulders AP, lateral why scapula view shows glenohumeral joint to be located.  No evidence acute fracture, dislocation or pathologic bone.  Humeral head is not high riding.      PE:  In general she is awake, alert oriented appropriate.  No acute distress noted.  Respirations even unlabored.  Skin is warm dry and intact.  Physical exam right shoulder shoulder has normal contour.  Passive range motion right shoulder is 90° abduction.  Hernandez Talat test reproduces pain symptoms.  Neer test reproduces pain symptoms at 180°.  Empty can test is negative.  Right radial pulse 2/4.  Capillary refill all digits right hand less 3 seconds.  Physical exam left shoulder Neer test is positive for reproduction of pain symptoms at 180°.  Empty can test is positive for reproduction of can pain symptoms.  Hernandez Talat test is negative.  Left radial pulse 2/4.  Capillary refill all digits left hand less than 3 seconds.      Impression:  1.)  Shoulder impingement-bilateral 2.) symptoms suggestive of cervical radiculopathy with left-sided involvement.      Plan:  Safety guidelines and activity restrictions discussed with the patient.  Verbalized understanding.  We discussed ice and rest of her shoulders.  Prescription Mobic 15 mg 1 p.o. daily p.r.n. pain  dispense 30 with 2 refills sent to [facility] [address] [place] [address].  We will set her up physical therapy for range of motion strengthening exercises bilateral shoulders 2 to 3 times a week x6 weeks.  If she fails to improve or worsens she is to call back for scheduling.

## 2023-09-06 ENCOUNTER — CLINICAL SUPPORT (OUTPATIENT)
Dept: REHABILITATION | Facility: HOSPITAL | Age: 53
End: 2023-09-06
Payer: MEDICARE

## 2023-09-06 DIAGNOSIS — M25.512 CHRONIC PAIN OF BOTH SHOULDERS: ICD-10-CM

## 2023-09-06 DIAGNOSIS — M25.511 CHRONIC PAIN OF BOTH SHOULDERS: ICD-10-CM

## 2023-09-06 DIAGNOSIS — G89.29 CHRONIC PAIN OF BOTH SHOULDERS: ICD-10-CM

## 2023-09-06 DIAGNOSIS — M75.40 SHOULDER IMPINGEMENT SYNDROME, UNSPECIFIED LATERALITY: ICD-10-CM

## 2023-09-06 PROCEDURE — 97110 THERAPEUTIC EXERCISES: CPT

## 2023-09-06 PROCEDURE — 97161 PT EVAL LOW COMPLEX 20 MIN: CPT

## 2023-09-06 NOTE — PLAN OF CARE
" [facility]   Physical Therapy Initial Evaluation      Name: Gloria Mitchell  Clinic Number: [id number]    Therapy Diagnosis:   Encounter Diagnoses   Name Primary?    Shoulder impingement syndrome, unspecified laterality     Chronic pain of both shoulders         Physician: Bassam Hernandez FNP    Physician Orders: PT Eval and Treat   Medical Diagnosis from Referral: Shoulder Impingement   Evaluation Date: 9/6/2023  Authorization Period Expiration: 8/23/2023  Plan of Care Expiration: 10/6/2023  Progress Note Due: 10/6/2023  Visit # / Visits authorized: 1/ 12   FOTO: due on sixth visit     Precautions: Standard     Time In: 12:30  Time Out: 13:08  Total Billable Time: 38 minutes    Subjective     Date of onset: about one year ago     History of current condition - Gloria reports: chronic history of L shoulder pain that began insidiously about one year ago. Patient reports that she began sleeping on her R side due to L shoulder pain and now she is having similar symptoms in her R shoulder. She reports that pain is disrupting her sleep and ability to raise her arms. Patient reports occasional burning and tingling pain especially in her R hand. Patient is R hand dominant. She reports that MD gave her Maloxicam for pain, but that has not helped her pain.      Falls: None     Imaging: Xrays: X-rays today of bilateral shoulders AP, lateral why scapula view shows glenohumeral joint to be located.  No evidence acute fracture, dislocation or pathologic bone.  Humeral head is not high riding. (Copied from 8/24/2023 visit)     Prior Therapy: None   Social History:  lives alone  Occupation: Disabled   Prior Level of Function: Independent   Current Level of Function: Independent     Pain:  Current 6/10, worst 10/10, best 1/10   Location: bilateral shoulders   Description: Burning and Sharp  Aggravating Factors: Lifting arms or laying on her side   Easing Factors: rest    Patients goals: "I " "don't want to have surgery, but I am tired of hurting."      Medical History:   Past Medical History:   Diagnosis Date    Acid reflux     Calculus of kidney     Depression     Gastrointestinal ulcerative mucositis 01/04/2023    Endoscopy Dr Ravi    Hypertension     Hypothyroidism     Low back pain     Lumbosacral radiculopathy 05/17/2021    Migraines     Multiple gastric ulcers     Pain     Postlaminectomy syndrome, lumbar region 05/17/2021    UTI (urinary tract infection) 08/23/2022       Surgical History:   Gloria Mitchell  has a past surgical history that includes Lithotripsy; Back surgery; Insertion of dorsal column nerve stimulator for trial (N/A, 07/15/2021); Caudal epidural steroid injection (Bilateral, 11/08/2022); and Endoscopy (01/04/2023).    Medications:   Gloria has a current medication list which includes the following prescription(s): buprenorphine-naloxone 2-0.5 mg, aimovig autoinjector, escitalopram oxalate, gabapentin, levothyroxine, losartan, meloxicam, methenamine, metoprolol tartrate, nitrofurantoin (macrocrystal-monohydrate), omeprazole, promethazine, sucralfate, tizanidine, topiramate, and trazodone.    Allergies:   Review of patient's allergies indicates:   Allergen Reactions    Oxycodone-acetaminophen Swelling and Edema    Oxycodone hcl     Zofran [ondansetron hcl] Edema     hands        Objective    Posture: rounded shoulders Yes, forward head Yes, increased kyphotic curve Yes,   Clear cervical spine: Spurling's test negative    Range of motion  Motion Right  Left    Shoulder flexion WITHIN FUNCTIONAL LIMITS with pain at beginning around 90 degrees  WITHIN FUNCTIONAL LIMITS with pain beginning around 80 degrees    Shoulder abduction WITHIN FUNCTIONAL LIMITS with pain at EROM  WITHIN FUNCTIONAL LIMITS with pain at EROM    Shoulder internal rotation T10 functional reach  T10 functional reach    Shoulder external rotation Base of occiput functional reach  Base of occiput functional reach  " "  Elbow flexion WITHIN FUNCTIONAL LIMITS WITHIN FUNCTIONAL LIMITS   Elbow extension WITHIN FUNCTIONAL LIMITS WITHIN FUNCTIONAL LIMITS     MANUAL MUSCLE TEST  Muscle Right  Left    Shoulder flexion MMT strength: 3+/5 MMT strength: 3+/5   Shoulder abduction MMT strength: 4/5 MMT strength: 4/5   Shoulder internal rotation MMT strength: 4/5 MMT strength: 4/5   Shoulder external rotation MMT strength: 4/5 MMT strength: 4/5   Elbow flexion MMT strength: 4/5 MMT strength: 4/5   Elbow extension MMT strength: 4+/5 MMT strength: 4+/5     Functional ability:  Dressing: AMB PT LEVEL OF ASSISTANCE: independent  Driving: AMB PT LEVEL OF ASSISTANCE: independent  Overhead activity: AMB PT LEVEL OF ASSISTANCE: decreased tolerance due to pain   Work/hobbies: AMB PT LEVEL OF ASSISTANCE: independent      Clinical test:  Apprehension test: negative bilaterally   Neer's test: positive bilaterally   Scour test: negative  O'drew's test: negative bilaterally   Drop arm test: negative bilaterally   Empty can test: negative bilaterally   Hawkin's rosalind test: positive bilaterally       Palpation:  Patient presents with increased anterior muscle tightness including scalenes, SCM, and pectoralis muscles     Intake Outcome Measure for FOTO Survey    Therapist reviewed FOTO scores for Gloria Mitchell on 9/6/2023.   FOTO report - see Media section or FOTO account episode details.    Intake Score: 50%         Treatment     Total Treatment time (time-based codes) separate from Evaluation: 22 minutes     Gloria received the treatments listed below:      therapeutic exercises to develop strength, endurance, ROM, flexibility, and posture for 22 minutes including:    Ther-Ex Reps   Pulleys  3 minutes flexion   Scap retractions 20 x 3"    Upper Trap Stretch 2 x 20" each    Levator Scap Stretch 2 x 20" each    Doorway stretch  2 x 20"    Chin Tucks  20 x 3"      Patient Education and Home Exercises     Education provided:   - eval findings, plan of " care, and home exercise program     Written Home Exercises Provided: yes. Exercises were reviewed and Gloria was able to demonstrate them prior to the end of the session.  Gloria demonstrated good  understanding of the education provided. See EMR under Patient Instructions for exercises provided during therapy sessions.    Assessment     Gloria is a 53 y.o. female referred to outpatient Physical Therapy with a medical diagnosis of shoulder impingement. Patient presents with B shoulder pain, decreased B shoulder ROM, muscle tightness, and B UE muscle weakness. Patient's impairments have limited her overall activity tolerance as well as interfered with her sleep.  Patient also presents with UE radiating symptoms that could be related to cervical involvement. If she does not improve with PT interventions and MRI of her neck and shoulders may be needed.     PT provided patient with visual verbal and tactile cues for decreased B shoulder elevation and guarding during therapy tasks. Patient had no reports of increased pain or adverse effects to therapy. She reported that her pain actually decreased with scapular retractions. PT provided education on sleeping positions and body mechanics with driving.     Patient prognosis is Good.   Patient will benefit from skilled outpatient Physical Therapy to address the deficits stated above and in the chart below, provide patient /family education, and to maximize patientt's level of independence.     Plan of care discussed with patient: Yes  Patient's spiritual, cultural and educational needs considered and patient is agreeable to the plan of care and goals as stated below:     Anticipated Barriers for therapy: guarding, posture, compliance with home exercise program     Medical Necessity is demonstrated by the following  History  Co-morbidities and personal factors that may impact the plan of care [x] LOW: no personal factors / co-morbidities  [] MODERATE: 1-2 personal factors /  co-morbidities  [] HIGH: 3+ personal factors / co-morbidities    Moderate / High Support Documentation:   Co-morbidities affecting plan of care: None     Personal Factors:   no deficits     Examination  Body Structures and Functions, activity limitations and participation restrictions that may impact the plan of care [x] LOW: addressing 1-2 elements  [] MODERATE: 3+ elements  [] HIGH: 4+ elements (please support below)    Moderate / High Support Documentation: None      Clinical Presentation [x] LOW: stable  [] MODERATE: Evolving  [] HIGH: Unstable     Decision Making/ Complexity Score: low       Goals:  Short Term Goals: 2 weeks   Patient will independently complete home exercise program with correct form.   Patient will report decreased B shoulder pain at rest to less than or equal to 4/10 for improved quality of life.     Long Term Goals: 4 weeks   Pt will increase B shoulder flexion to 160 degrees, external rotation to C7, and internal rotation to T7 for increased tolerance to dressing and grooming tasks with less pain.   Pt will increase B upper shoulder to 4/5 to allow patient to perform activities of daily living independently  Pt will report decrease in B shoulder pain to 2/10 to improve quality of life  Pt will place 5 pound object in overhead shelf without hike and with less than or equal to 3/10 pain to allow patient to return to prior level of function   Patient will have increased FOTO score to greater than or equal to 64 indicating improved function.   Plan     Plan of care Certification: 9/6/2023 to 10/6/2023.    Outpatient Physical Therapy 3 times weekly for 4 weeks to include the following interventions: Electrical Stimulation unattended, Manual Therapy, Moist Heat/ Ice, Patient Education, Therapeutic Activities, Therapeutic Exercise, and Ultrasound.     Aubrey Salas, PT, DPT       Physician's Signature: _________________________________________ Date: ________________

## 2023-09-08 ENCOUNTER — CLINICAL SUPPORT (OUTPATIENT)
Dept: REHABILITATION | Facility: HOSPITAL | Age: 53
End: 2023-09-08
Payer: MEDICARE

## 2023-09-08 DIAGNOSIS — M25.512 CHRONIC PAIN OF BOTH SHOULDERS: Primary | ICD-10-CM

## 2023-09-08 DIAGNOSIS — M25.511 CHRONIC PAIN OF BOTH SHOULDERS: Primary | ICD-10-CM

## 2023-09-08 DIAGNOSIS — G89.29 CHRONIC PAIN OF BOTH SHOULDERS: Primary | ICD-10-CM

## 2023-09-08 PROCEDURE — 97110 THERAPEUTIC EXERCISES: CPT | Mod: CQ

## 2023-09-08 NOTE — PROGRESS NOTES
"[facility]   Physical Therapy Treatment Note      Name: Gloria Mitchell  Clinic Number: [id number]      Visit Date: 9/8/2023    Therapy Diagnosis:        Encounter Diagnoses   Name Primary?    Shoulder impingement syndrome, unspecified laterality      Chronic pain of both shoulders          Physician: Bassam Hernandez FNP     Physician Orders: PT Eval and Treat   Medical Diagnosis from Referral: Shoulder Impingement   Evaluation Date: 9/6/2023  Authorization Period Expiration: 8/23/2023  Plan of Care Expiration: 10/6/2023  Progress Note Due: 10/6/2023  Visit # / Visits authorized: 2/ 12   FOTO: due on sixth visit      Precautions: Standard      Time In: 9:38  Time Out: 10:20  Total Billable Time: 32 minutes   PTA Visit #: 1/5       Subjective     Patient reports: tightness and achiness in bilateral shoulders.  Patient states "I brought my controller to turn off pain stimulator."  She was compliant with home exercise program.  Response to previous treatment: NO adverse effects noted   Functional change: Early in Plan of Care     Pain: 4/10  Location: bilateral shoulders      Objective      Objective Measures updated at progress report unless specified.     Treatment     Gloria received the treatments listed below:      therapeutic exercises to develop strength, endurance, ROM, flexibility, and posture.  See flow-sheet below.      Ther-Ex Reps   Pulleys  3 minutes flexion   Scap retractions 20 x 3"    Upper Trap Stretch 2 x 20" each    Levator Scap Stretch 2 x 20" each    Doorway stretch  2 x 20"    Chin Tucks  20 x 3"          hot pack for 10 minutes to Right shoulder.      Patient Education and Home Exercises       Education provided:   - Plan of Care, Home exercise program, Delayed onset muscle soreness     Written Home Exercises Provided: Patient instructed to cont prior HEP. Exercises were reviewed and Gloria was able to demonstrate them prior to the end of the session.  Gloria " demonstrated fair  understanding of the education provided. See Electronic Medical Record under Patient Instructions for exercises provided during therapy sessions    Assessment     Gloria is a 53 y.o. female referred to outpatient Physical Therapy with a medical diagnosis of shoulder impingement. Patient presents with B shoulder pain, decreased B shoulder ROM, muscle tightness, and B UE muscle weakness. Patient's impairments have limited her overall activity tolerance as well as interfered with her sleep.  Patient requires mod verbal and visual cues to progress through completion of exercises with proper alignment, speed of movement, count, and hold times.  Patient denies reports of increased pain at end of treatment. Unable to apply E-stim due to patient unable to turn off stimulator with remote access.     Gloria Is progressing well towards her goals.   Patient prognosis is Good.     Patient will continue to benefit from skilled outpatient physical therapy to address the deficits listed in the problem list box on initial evaluation, provide pt/family education and to maximize pt's level of independence in the home and community environment.     Patient's spiritual, cultural and educational needs considered and pt agreeable to plan of care and goals.     Anticipated barriers to physical therapy: guarding, posture, compliance with home exercise program     Goals:   Short Term Goals: 2 weeks   Patient will independently complete home exercise program with correct form.   Patient will report decreased B shoulder pain at rest to less than or equal to 4/10 for improved quality of life.      Long Term Goals: 4 weeks   Pt will increase B shoulder flexion to 160 degrees, external rotation to C7, and internal rotation to T7 for increased tolerance to dressing and grooming tasks with less pain.   Pt will increase B upper shoulder to 4/5 to allow patient to perform activities of daily living independently  Pt will report  decrease in B shoulder pain to 2/10 to improve quality of life  Pt will place 5 pound object in overhead shelf without hike and with less than or equal to 3/10 pain to allow patient to return to prior level of function   Patient will have increased FOTO score to greater than or equal to 64 indicating improved function.     Plan     Plan of care Certification: 9/6/2023 to 10/6/2023.  Outpatient Physical Therapy 3 times weekly for 4 weeks to include the following interventions: Electrical Stimulation unattended, Manual Therapy, Moist Heat/ Ice, Patient Education, Therapeutic Activities, Therapeutic Exercise, and Ultrasound.     Continue Plan of Care per PT order to progress patient toward rehab goals as tolerated by patient.     Doreen Moon, PTA  9/8/2023

## 2023-09-11 ENCOUNTER — CLINICAL SUPPORT (OUTPATIENT)
Dept: REHABILITATION | Facility: HOSPITAL | Age: 53
End: 2023-09-11
Payer: MEDICARE

## 2023-09-11 DIAGNOSIS — G89.29 CHRONIC PAIN OF BOTH SHOULDERS: Primary | ICD-10-CM

## 2023-09-11 DIAGNOSIS — M25.512 CHRONIC PAIN OF BOTH SHOULDERS: Primary | ICD-10-CM

## 2023-09-11 DIAGNOSIS — M25.511 CHRONIC PAIN OF BOTH SHOULDERS: Primary | ICD-10-CM

## 2023-09-11 PROCEDURE — 97014 ELECTRIC STIMULATION THERAPY: CPT | Mod: CQ

## 2023-09-11 PROCEDURE — 97110 THERAPEUTIC EXERCISES: CPT | Mod: CQ

## 2023-09-11 NOTE — PROGRESS NOTES
"[facility]   Physical Therapy Treatment Note      Name: Gloria Mitchell  Clinic Number: [id number]      Visit Date: 9/11/2023    Therapy Diagnosis:        Encounter Diagnoses   Name Primary?    Shoulder impingement syndrome, unspecified laterality      Chronic pain of both shoulders          Physician: Bassam Hernandez FNP     Physician Orders: PT Eval and Treat   Medical Diagnosis from Referral: Shoulder Impingement   Evaluation Date: 9/6/2023  Authorization Period Expiration: 8/23/2023  Plan of Care Expiration: 10/6/2023  Progress Note Due: 10/6/2023  Visit # / Visits authorized: 3/12   FOTO: due on sixth visit      Precautions: Standard      Time In: 12:30  Time Out: 13:25  Total Billable Time: 55 minutes   PTA Visit #: 2/5       Subjective     Patient reports: "My pain simulator is off today."  She was compliant with home exercise program.  Response to previous treatment: NO adverse effects noted   Functional change: Early in Plan of Care     Pain: 4/10  Location: bilateral shoulders      Objective      Objective Measures updated at progress report unless specified.     Treatment     Gloria received the treatments listed below:      therapeutic exercises to develop strength, endurance, ROM, flexibility, and posture.  See flow-sheet below.      Ther-Ex Reps   Pulleys 3 minutes flexion   Ball Rolls 5 x 10"   Finger Ladder 5 x 10"   Scap retractions 20 x 3"    Upper Trap Stretch 2 x 20" each    Levator Scap Stretch 2 x 20" each    Doorway stretch  2 x 20"    Chin Tucks  20 x 3"          supervised modalities after being cleared for contradictions: NMES Electrical Stimulation:  Gloria received NMES Electrical Stimulation to elicit muscle contraction of the bilateral upper traps and rhomboids. Pt received stimulation at a rate of 2 pps with symmetric current. Patient tolerated treatment well without any adverse effects.     hot pack for 10 minutes to Right shoulder.      Patient " Education and Home Exercises       Education provided:   - Plan of Care, Home exercise program, Delayed onset muscle soreness     Written Home Exercises Provided: Patient instructed to cont prior HEP. Exercises were reviewed and Gloria was able to demonstrate them prior to the end of the session.  Gloria demonstrated fair  understanding of the education provided. See Electronic Medical Record under Patient Instructions for exercises provided during therapy sessions    Assessment     Gloria is a 53 y.o. female referred to outpatient Physical Therapy with a medical diagnosis of shoulder impingement. Patient presents with B shoulder pain, decreased B shoulder ROM, muscle tightness, and B UE muscle weakness. Patient's impairments have limited her overall activity tolerance as well as interfered with her sleep.  Pt continues to require mod verbal and visual cues to progress through completion of exercises with proper alignment, speed of movement, count, and hold times. LPTA made minor increases to tx intensity according to pt tolerance. E-stim applied secondary to pt turning off stimulator prior to tx session. No adverse effects noted     Gloria Is progressing well towards her goals.   Patient prognosis is Good.     Patient will continue to benefit from skilled outpatient physical therapy to address the deficits listed in the problem list box on initial evaluation, provide pt/family education and to maximize pt's level of independence in the home and community environment.     Patient's spiritual, cultural and educational needs considered and pt agreeable to plan of care and goals.     Anticipated barriers to physical therapy: guarding, posture, compliance with home exercise program     Goals:   Short Term Goals: 2 weeks   Patient will independently complete home exercise program with correct form.   Patient will report decreased B shoulder pain at rest to less than or equal to 4/10 for improved quality of life.      Long  Term Goals: 4 weeks   Pt will increase B shoulder flexion to 160 degrees, external rotation to C7, and internal rotation to T7 for increased tolerance to dressing and grooming tasks with less pain.   Pt will increase B upper shoulder to 4/5 to allow patient to perform activities of daily living independently  Pt will report decrease in B shoulder pain to 2/10 to improve quality of life  Pt will place 5 pound object in overhead shelf without hike and with less than or equal to 3/10 pain to allow patient to return to prior level of function   Patient will have increased FOTO score to greater than or equal to 64 indicating improved function.     Plan     Plan of care Certification: 9/6/2023 to 10/6/2023.  Outpatient Physical Therapy 3 times weekly for 4 weeks to include the following interventions: Electrical Stimulation unattended, Manual Therapy, Moist Heat/ Ice, Patient Education, Therapeutic Activities, Therapeutic Exercise, and Ultrasound.     Continue Plan of Care per PT order to progress patient toward rehab goals as tolerated by patient.   JESSI Arciniega  9/11/2023

## 2023-09-15 ENCOUNTER — CLINICAL SUPPORT (OUTPATIENT)
Dept: REHABILITATION | Facility: HOSPITAL | Age: 53
End: 2023-09-15
Payer: MEDICARE

## 2023-09-15 DIAGNOSIS — M25.512 CHRONIC PAIN OF BOTH SHOULDERS: Primary | ICD-10-CM

## 2023-09-15 DIAGNOSIS — G89.29 CHRONIC PAIN OF BOTH SHOULDERS: Primary | ICD-10-CM

## 2023-09-15 DIAGNOSIS — M25.511 CHRONIC PAIN OF BOTH SHOULDERS: Primary | ICD-10-CM

## 2023-09-15 PROCEDURE — 97110 THERAPEUTIC EXERCISES: CPT

## 2023-09-15 NOTE — PROGRESS NOTES
"[facility]   Physical Therapy Treatment Note      Name: Gloria Mitchell  Clinic Number: [id number]      Visit Date: 9/15/2023    Therapy Diagnosis:        Encounter Diagnoses   Name Primary?    Shoulder impingement syndrome, unspecified laterality      Chronic pain of both shoulders          Physician: Bassam Hernandez FNP     Physician Orders: PT Eval and Treat   Medical Diagnosis from Referral: Shoulder Impingement   Evaluation Date: 9/6/2023  Authorization Period Expiration: 8/23/2023  Plan of Care Expiration: 10/6/2023  Progress Note Due: 10/6/2023  Visit # / Visits authorized: 4/12   FOTO: due on sixth visit      Precautions: Standard      Time In: 13:30  Time Out: 14:12  Total Billable Time: 42  minutes (8 minutes not billable)   PTA Visit #: 0/5       Subjective     Patient reports: "I forgot to turn my pain stimulator off before I lost Internet service."   She was compliant with home exercise program.  Response to previous treatment: NO adverse effects noted   Functional change: Early in Plan of Care     Pain: 4/10  Location: bilateral shoulders      Objective      Objective Measures updated at progress report unless specified.     Treatment     Gloria received the treatments listed below:      therapeutic exercises to develop strength, endurance, ROM, flexibility, and posture.  See flow-sheet below.   *indicates increases in reps or resistance during this treatment session       Ther-Ex Reps   Pulleys 3 minutes flexion   Ball Rolls 5 x 10"   Finger Ladder 5 x 10"   Scap retractions 30 x 3" *   Upper Trap Stretch 2 x 20" each    Levator Scap Stretch 2 x 20" each    Doorway stretch  3 x 20" *   Chin Tucks  30 x 3" *   Wand flexion stretch  5 x 5"      hot pack for 8 minutes to Right shoulder.      Patient Education and Home Exercises       Education provided:   - Plan of Care, Home exercise program, Delayed onset muscle soreness     Written Home Exercises Provided: Patient " instructed to cont prior HEP. Exercises were reviewed and Gloria was able to demonstrate them prior to the end of the session.  Gloria demonstrated fair  understanding of the education provided. See Electronic Medical Record under Patient Instructions for exercises provided during therapy sessions    Assessment     Gloria is a 53 y.o. female referred to outpatient Physical Therapy with a medical diagnosis of shoulder impingement. Patient presents with B shoulder pain, decreased B shoulder ROM, muscle tightness, and B UE muscle weakness. Patient's impairments have limited her overall activity tolerance as well as interfered with her sleep.  PT provided patient with visual and verbal cueing throughout treatment session for correct reps, hold times, and posture. Patient had no reports of increased pain or adverse effects to treatment. PT with held ESTIM today due to patient's pain stimulator not being turned off today.   Gloria Is progressing well towards her goals.   Patient prognosis is Good.     Patient will continue to benefit from skilled outpatient physical therapy to address the deficits listed in the problem list box on initial evaluation, provide pt/family education and to maximize pt's level of independence in the home and community environment.     Patient's spiritual, cultural and educational needs considered and pt agreeable to plan of care and goals.     Anticipated barriers to physical therapy: guarding, posture, compliance with home exercise program     Goals:   Short Term Goals: 2 weeks   Patient will independently complete home exercise program with correct form.   Patient will report decreased B shoulder pain at rest to less than or equal to 4/10 for improved quality of life.      Long Term Goals: 4 weeks   Pt will increase B shoulder flexion to 160 degrees, external rotation to C7, and internal rotation to T7 for increased tolerance to dressing and grooming tasks with less pain.   Pt will increase B  upper shoulder to 4/5 to allow patient to perform activities of daily living independently  Pt will report decrease in B shoulder pain to 2/10 to improve quality of life  Pt will place 5 pound object in overhead shelf without hike and with less than or equal to 3/10 pain to allow patient to return to prior level of function   Patient will have increased FOTO score to greater than or equal to 64 indicating improved function.     Plan     Plan of care Certification: 9/6/2023 to 10/6/2023.  Outpatient Physical Therapy 3 times weekly for 4 weeks to include the following interventions: Electrical Stimulation unattended, Manual Therapy, Moist Heat/ Ice, Patient Education, Therapeutic Activities, Therapeutic Exercise, and Ultrasound.     Continue Plan of Care per PT order to progress patient toward rehab goals as tolerated by patient.   JESSI Arciniega  9/15/2023

## 2023-09-18 ENCOUNTER — CLINICAL SUPPORT (OUTPATIENT)
Dept: REHABILITATION | Facility: HOSPITAL | Age: 53
End: 2023-09-18
Payer: MEDICARE

## 2023-09-18 DIAGNOSIS — M25.512 CHRONIC PAIN OF BOTH SHOULDERS: Primary | ICD-10-CM

## 2023-09-18 DIAGNOSIS — G89.29 CHRONIC PAIN OF BOTH SHOULDERS: Primary | ICD-10-CM

## 2023-09-18 DIAGNOSIS — M25.511 CHRONIC PAIN OF BOTH SHOULDERS: Primary | ICD-10-CM

## 2023-09-18 PROCEDURE — 97140 MANUAL THERAPY 1/> REGIONS: CPT | Mod: CQ

## 2023-09-18 PROCEDURE — 97110 THERAPEUTIC EXERCISES: CPT | Mod: CQ

## 2023-09-18 PROCEDURE — 97014 ELECTRIC STIMULATION THERAPY: CPT | Mod: CQ

## 2023-09-18 NOTE — PROGRESS NOTES
"[facility]   Physical Therapy Treatment Note      Name: Gloria Mitchell  Clinic Number: [id number]      Visit Date: 9/18/2023    Therapy Diagnosis:        Encounter Diagnoses   Name Primary?    Shoulder impingement syndrome, unspecified laterality      Chronic pain of both shoulders          Physician: Bassam Hernandez FNP     Physician Orders: PT Eval and Treat   Medical Diagnosis from Referral: Shoulder Impingement   Evaluation Date: 9/6/2023  Authorization Period Expiration: 8/23/2023  Plan of Care Expiration: 10/6/2023  Progress Note Due: 10/6/2023  Visit # / Visits authorized: 5/12   FOTO: due on sixth visit      Precautions: Standard      Time In: 12:31  Time Out: 13:14  Total Billable Time:  43 minutes   PTA Visit #: 1/5      Subjective     Patient reports: "I'm hurting pretty bad today".     She was compliant with home exercise program.  Response to previous treatment: NO adverse effects noted   Functional change: Early in Plan of Care     Pain: 4/10  Location: bilateral shoulders      Objective      Objective Measures updated at progress report unless specified.     Treatment     Gloria received the treatments listed below:      therapeutic exercises to develop strength, endurance, ROM, flexibility, and posture.  See flow-sheet below.   *indicates increases in reps or resistance during this treatment session       Ther-Ex Reps   Pulleys 3 minutes flexion   Ball Rolls 5 x 10"   Finger Ladder 5 x 10"   Scap retractions 30 x 3" *   Upper Trap Stretch 2 x 20" each    Levator Scap Stretch 2 x 20" each    Doorway stretch  3 x 20" *   Chin Tucks  30 x 3" *   Wand flexion stretch  5 x 5"        MANUAL THERAPY:  STM/MFR to decrease muscle tension and MYF adhesions in bilateral cervical paraspinals and suboccipitals.     CP x 10 minutes to Right shoulder.    Biphasic E-stim x 10 minutes @ 2pps to Left and Right Upper Trap and Middle Trap/Rhomboid to decrease muscle tension and pain. " "    Patient Education and Home Exercises       Education provided:   - Plan of Care, Home exercise program, Delayed onset muscle soreness     Written Home Exercises Provided: Patient instructed to cont prior HEP. Exercises were reviewed and Gloria was able to demonstrate them prior to the end of the session.  Gloria demonstrated fair  understanding of the education provided. See Electronic Medical Record under Patient Instructions for exercises provided during therapy sessions    Assessment     Gloria is a 53 y.o. female referred to outpatient Physical Therapy with a medical diagnosis of shoulder impingement. Patient presents with B shoulder pain, decreased B shoulder ROM, muscle tightness, and B UE muscle weakness. Patient's impairments have limited her overall activity tolerance as well as interfered with her sleep.  LPTA provided patient with visual and verbal cueing throughout treatment session for correct reps, hold times, and posture.  Patient reports feeling tenderness and discomfort during STM/MFR technique application and after E-Stim, but states at end of physical therapy treatment "I don't have any pain right now".         Gloria Is progressing well towards her goals.   Patient prognosis is Good.     Patient will continue to benefit from skilled outpatient physical therapy to address the deficits listed in the problem list box on initial evaluation, provide pt/family education and to maximize pt's level of independence in the home and community environment.     Patient's spiritual, cultural and educational needs considered and pt agreeable to plan of care and goals.     Anticipated barriers to physical therapy: guarding, posture, compliance with home exercise program     Goals:   Short Term Goals: 2 weeks   Patient will independently complete home exercise program with correct form.   Patient will report decreased B shoulder pain at rest to less than or equal to 4/10 for improved quality of life.      Long " Term Goals: 4 weeks   Pt will increase B shoulder flexion to 160 degrees, external rotation to C7, and internal rotation to T7 for increased tolerance to dressing and grooming tasks with less pain.   Pt will increase B upper shoulder to 4/5 to allow patient to perform activities of daily living independently  Pt will report decrease in B shoulder pain to 2/10 to improve quality of life  Pt will place 5 pound object in overhead shelf without hike and with less than or equal to 3/10 pain to allow patient to return to prior level of function   Patient will have increased FOTO score to greater than or equal to 64 indicating improved function.     Plan     Plan of care Certification: 9/6/2023 to 10/6/2023.  Outpatient Physical Therapy 3 times weekly for 4 weeks to include the following interventions: Electrical Stimulation unattended, Manual Therapy, Moist Heat/ Ice, Patient Education, Therapeutic Activities, Therapeutic Exercise, and Ultrasound.     Continue Plan of Care per PT order to progress patient toward rehab goals as tolerated by patient.   JESSI Miramontes   9/18/2023

## 2023-09-20 ENCOUNTER — CLINICAL SUPPORT (OUTPATIENT)
Dept: REHABILITATION | Facility: HOSPITAL | Age: 53
End: 2023-09-20
Payer: MEDICARE

## 2023-09-20 DIAGNOSIS — G89.29 CHRONIC PAIN OF BOTH SHOULDERS: Primary | ICD-10-CM

## 2023-09-20 DIAGNOSIS — M25.512 CHRONIC PAIN OF BOTH SHOULDERS: Primary | ICD-10-CM

## 2023-09-20 DIAGNOSIS — M25.511 CHRONIC PAIN OF BOTH SHOULDERS: Primary | ICD-10-CM

## 2023-09-20 PROCEDURE — 97014 ELECTRIC STIMULATION THERAPY: CPT | Mod: CQ

## 2023-09-20 PROCEDURE — 97110 THERAPEUTIC EXERCISES: CPT | Mod: CQ

## 2023-09-20 PROCEDURE — 97140 MANUAL THERAPY 1/> REGIONS: CPT | Mod: CQ

## 2023-09-20 NOTE — PROGRESS NOTES
"[facility]   Physical Therapy Treatment Note      Name: Gloria Mitchell  Clinic Number: [id number]      Visit Date: 9/20/2023    Therapy Diagnosis:        Encounter Diagnoses   Name Primary?    Shoulder impingement syndrome, unspecified laterality      Chronic pain of both shoulders          Physician: Bassam Hernandez FNP     Physician Orders: PT Eval and Treat   Medical Diagnosis from Referral: Shoulder Impingement   Evaluation Date: 9/6/2023  Authorization Period Expiration: 8/23/2023  Plan of Care Expiration: 10/6/2023  Progress Note Due: 10/6/2023  Visit # / Visits authorized: 6/12   FOTO: due on sixth visit      Precautions: Standard      Time In: 12:36  Time Out: 13:15  Total Billable Time:  39 minutes   PTA Visit #: 2/5      Subjective     Patient reports: "My neck really does feel better today".     She was compliant with home exercise program.  Response to previous treatment: Patient reports decreased cervical pain   Functional change: Early in Plan of Care     Pain: 2/10  Location: bilateral shoulders      Objective      Objective Measures updated at progress report unless specified.     Treatment     Gloria received the treatments listed below:      therapeutic exercises to develop strength, endurance, ROM, flexibility, and posture.  See flow-sheet below.   *indicates increases in reps or resistance during this treatment session       Ther-Ex Reps   Pulleys 3 minutes flexion/3 minutes scaption *   Ball Rolls 5 x 10"   Finger Ladder 5 x 10"   Scap retractions 30 x 3"    Upper Trap Stretch 2 x 20" each    Levator Scap Stretch 2 x 20" each    Doorway stretch  3 x 20"    Chin Tucks  30 x 3"    Wand flexion stretch  5 x 5"        MANUAL THERAPY:  STM/MFR to decrease muscle tension and MYF adhesions in bilateral cervical paraspinals and suboccipitals.     Biphasic E-stim x 10 minutes @ 2pps to Left and Right Upper Trap and Middle Trap/Rhomboid to decrease muscle tension and " "pain.     Patient Education and Home Exercises       Education provided:   - Plan of Care, Home exercise program, Delayed onset muscle soreness     Written Home Exercises Provided: Patient instructed to cont prior HEP. Exercises were reviewed and Gloria was able to demonstrate them prior to the end of the session.  Gloria demonstrated fair  understanding of the education provided. See Electronic Medical Record under Patient Instructions for exercises provided during therapy sessions    Assessment     Gloria is a 53 y.o. female referred to outpatient Physical Therapy with a medical diagnosis of shoulder impingement. Patient presents with B shoulder pain, decreased B shoulder ROM, muscle tightness, and B UE muscle weakness. Patient's impairments have limited her overall activity tolerance as well as interfered with her sleep.  LPTA provided patient with visual and verbal cueing throughout treatment session for correct reps, hold times, and posture.  Patient reports feeling less tenderness and discomfort today than previous physical therapy treatment than during STM/MFR technique application and after E-Stim, but states at end of physical therapy treatment "I don't have any pain right now".         Gloria Is progressing well towards her goals.   Patient prognosis is Good.     Patient will continue to benefit from skilled outpatient physical therapy to address the deficits listed in the problem list box on initial evaluation, provide pt/family education and to maximize pt's level of independence in the home and community environment.     Patient's spiritual, cultural and educational needs considered and pt agreeable to plan of care and goals.     Anticipated barriers to physical therapy: guarding, posture, compliance with home exercise program     Goals:   Short Term Goals: 2 weeks   Patient will independently complete home exercise program with correct form.   Patient will report decreased B shoulder pain at rest to less " than or equal to 4/10 for improved quality of life.      Long Term Goals: 4 weeks   Pt will increase B shoulder flexion to 160 degrees, external rotation to C7, and internal rotation to T7 for increased tolerance to dressing and grooming tasks with less pain.   Pt will increase B upper shoulder to 4/5 to allow patient to perform activities of daily living independently  Pt will report decrease in B shoulder pain to 2/10 to improve quality of life  Pt will place 5 pound object in overhead shelf without hike and with less than or equal to 3/10 pain to allow patient to return to prior level of function   Patient will have increased FOTO score to greater than or equal to 64 indicating improved function.     Plan     Plan of care Certification: 9/6/2023 to 10/6/2023.  Outpatient Physical Therapy 3 times weekly for 4 weeks to include the following interventions: Electrical Stimulation unattended, Manual Therapy, Moist Heat/ Ice, Patient Education, Therapeutic Activities, Therapeutic Exercise, and Ultrasound.     Continue Plan of Care per PT order to progress patient toward rehab goals as tolerated by patient.   JESSI Miramontes   9/20/2023

## 2023-09-21 DIAGNOSIS — N20.0 KIDNEY STONE: Primary | ICD-10-CM

## 2023-09-21 NOTE — TELEPHONE ENCOUNTER
We received this message: PT CALLING TO GET A REFILL ON POTASSIUM SULFATE - [facility] - PT CALL BACK # [phone]     Spoke with pt and she voiced understanding that she is to take 2 tablets twice daily and order will be sent to [facility].   What Is The Reason For Today's Visit?: Full Body Skin Examination What Is The Reason For Today's Visit? (Being Monitored For X): concerning skin lesions on an annual basis

## 2023-09-22 ENCOUNTER — CLINICAL SUPPORT (OUTPATIENT)
Dept: REHABILITATION | Facility: HOSPITAL | Age: 53
End: 2023-09-22
Payer: MEDICARE

## 2023-09-22 DIAGNOSIS — M25.512 CHRONIC PAIN OF BOTH SHOULDERS: Primary | ICD-10-CM

## 2023-09-22 DIAGNOSIS — M25.511 CHRONIC PAIN OF BOTH SHOULDERS: Primary | ICD-10-CM

## 2023-09-22 DIAGNOSIS — G89.29 CHRONIC PAIN OF BOTH SHOULDERS: Primary | ICD-10-CM

## 2023-09-22 PROCEDURE — 97014 ELECTRIC STIMULATION THERAPY: CPT

## 2023-09-22 PROCEDURE — 97140 MANUAL THERAPY 1/> REGIONS: CPT

## 2023-09-22 PROCEDURE — 97110 THERAPEUTIC EXERCISES: CPT

## 2023-09-22 RX ORDER — POTASSIUM CITRATE 10 MEQ/1
20 TABLET, EXTENDED RELEASE ORAL 2 TIMES DAILY
Qty: 360 TABLET | Refills: 3 | Status: SHIPPED | OUTPATIENT
Start: 2023-09-22 | End: 2024-04-01 | Stop reason: SDUPTHER

## 2023-09-22 NOTE — PROGRESS NOTES
"[facility]   Physical Therapy Treatment Note      Name: Gloria Mitchell  Clinic Number: [id number]    Visit Date: 9/22/2023    Therapy Diagnosis:        Encounter Diagnoses   Name Primary?    Shoulder impingement syndrome, unspecified laterality      Chronic pain of both shoulders          Physician: Bassam Hernandez, LESIA     Physician Orders: PT Eval and Treat   Medical Diagnosis from Referral: Shoulder Impingement   Evaluation Date: 9/6/2023  Authorization Period Expiration: 8/23/2023  Plan of Care Expiration: 10/6/2023  Progress Note Due: 10/6/2023  Visit # / Visits authorized: 7/12   FOTO: due on sixth visit      Precautions: Standard      Time In: 12:37  Time Out: 13:32  Total Billable Time:  55 minutes   PTA Visit #: 0/5      Subjective     Patient reports: "I think it is getting better because it doesn't hurt constantly."  "I reached behind my back for something this morning though and it hurt really bad, but it has stopped."     She was compliant with home exercise program.  Response to previous treatment: Patient reports decreased cervical pain   Functional change: Early in Plan of Care     Pain: 3/10  Location: bilateral shoulders      Objective      Objective Measures updated at progress report unless specified.     Treatment     Gloria received the treatments listed below:      therapeutic exercises to develop strength, endurance, ROM, flexibility, and posture.  See flow-sheet below.   *indicates increases in reps or resistance during this treatment session    Ther-Ex Reps   Pulleys 3 minutes flexion/3 minutes scaption    Ball Rolls 5 x 10"   Finger Ladder 5 x 10"   Scap retractions 30 x 3"    Upper Trap Stretch 3 x 20" each    Levator Scap Stretch 3 x 20" each    Doorway stretch  3 x 20"    Chin Tucks  30 x 3"    Wand flexion stretch  5 x 5"    Rows with Tband  2 x 10 red TB   B extension with Tband  2 x 10 red TB     MANUAL THERAPY:  STM/MFR to decrease muscle tension " and MYF adhesions in R rhomboid and upper trapezius as well as R pectoralis muscles     Biphasic E-stim x 10 minutes @ 2pps to R upper trapezius and tricep and R pectoralis and bicep to decrease tissue tightness, irritation, and pain.   MHP applied to R shoulder with ESTIM for 10 minutes.   Patient Education and Home Exercises       Education provided:   - Plan of Care, Home exercise program, Delayed onset muscle soreness     Written Home Exercises Provided: Patient instructed to cont prior HEP. Exercises were reviewed and Gloria was able to demonstrate them prior to the end of the session.  Gloria demonstrated fair  understanding of the education provided. See Electronic Medical Record under Patient Instructions for exercises provided during therapy sessions    Assessment     Gloria is a 53 y.o. female referred to outpatient Physical Therapy with a medical diagnosis of shoulder impingement. Patient presents with B shoulder pain, decreased B shoulder ROM, muscle tightness, and B UE muscle weakness. Patient's impairments have limited her overall activity tolerance as well as interfered with her sleep.  PT provided patient with visual and verbal cues while she warmed up on pulley s for decrease guarding and shoulder hiking. Patient required occasional cues to progress through exercises with correct hold times. PT initiated periscapular strengthening to increase B shoulder stability and decrease forward shoulder posture.  Patient required visual, verbal and tactile cueing for form and decrease compensations with newly added tasks. PT noted improved L side tissue extensibility with continued increased R shoulder tightness. Patient had improved tissue extensibility and decreased tenderness on palpation following manual interventions. No adverse effects noted to treatment tasks.     Gloria Is progressing well towards her goals.   Patient prognosis is Good.     Patient will continue to benefit from skilled outpatient physical  therapy to address the deficits listed in the problem list box on initial evaluation, provide pt/family education and to maximize pt's level of independence in the home and community environment.     Patient's spiritual, cultural and educational needs considered and pt agreeable to plan of care and goals.     Anticipated barriers to physical therapy: guarding, posture, compliance with home exercise program     Goals:   Short Term Goals: 2 weeks   Patient will independently complete home exercise program with correct form.   Patient will report decreased B shoulder pain at rest to less than or equal to 4/10 for improved quality of life.      Long Term Goals: 4 weeks   Pt will increase B shoulder flexion to 160 degrees, external rotation to C7, and internal rotation to T7 for increased tolerance to dressing and grooming tasks with less pain.   Pt will increase B upper shoulder to 4/5 to allow patient to perform activities of daily living independently  Pt will report decrease in B shoulder pain to 2/10 to improve quality of life  Pt will place 5 pound object in overhead shelf without hike and with less than or equal to 3/10 pain to allow patient to return to prior level of function   Patient will have increased FOTO score to greater than or equal to 64 indicating improved function.     Plan     Plan of care Certification: 9/6/2023 to 10/6/2023.  Outpatient Physical Therapy 3 times weekly for 4 weeks to include the following interventions: Electrical Stimulation unattended, Manual Therapy, Moist Heat/ Ice, Patient Education, Therapeutic Activities, Therapeutic Exercise, and Ultrasound.     Continue Plan of Care per PT order to progress patient toward rehab goals as tolerated by patient.   Aubrey Salas, PT, DPT   9/22/2023

## 2023-09-25 ENCOUNTER — CLINICAL SUPPORT (OUTPATIENT)
Dept: REHABILITATION | Facility: HOSPITAL | Age: 53
End: 2023-09-25
Payer: MEDICARE

## 2023-09-25 DIAGNOSIS — G89.29 CHRONIC PAIN OF BOTH SHOULDERS: Primary | ICD-10-CM

## 2023-09-25 DIAGNOSIS — M25.512 CHRONIC PAIN OF BOTH SHOULDERS: Primary | ICD-10-CM

## 2023-09-25 DIAGNOSIS — M25.511 CHRONIC PAIN OF BOTH SHOULDERS: Primary | ICD-10-CM

## 2023-09-25 PROCEDURE — 97140 MANUAL THERAPY 1/> REGIONS: CPT | Mod: CQ

## 2023-09-25 PROCEDURE — 97110 THERAPEUTIC EXERCISES: CPT | Mod: CQ

## 2023-09-25 NOTE — PROGRESS NOTES
"[facility]   Physical Therapy Treatment Note      Name: Gloria Mitchell  Clinic Number: [id number]    Visit Date: 9/25/2023    Therapy Diagnosis:        Encounter Diagnoses   Name Primary?    Shoulder impingement syndrome, unspecified laterality      Chronic pain of both shoulders          Physician: Bassam Hernandez, LESIA     Physician Orders: PT Eval and Treat   Medical Diagnosis from Referral: Shoulder Impingement   Evaluation Date: 9/6/2023  Authorization Period Expiration: 8/23/2023  Plan of Care Expiration: 10/6/2023  Progress Note Due: 10/6/2023  Visit # / Visits authorized: 8/12   FOTO: due on sixth visit      Precautions: Standard      Time In: 12:30   Time Out:13:14  Total Billable Time:  44 minutes   PTA Visit #: 1/5      Subjective     Patient reports: "I had some pain when I first got up this morning, but it's not hurting at all right now".     She was compliant with home exercise program.  Response to previous treatment: Patient reports decreased cervical pain   Functional change: Early in Plan of Care     Pain: 0/10  Location: bilateral shoulders      Objective      Objective Measures updated at progress report unless specified.     Treatment     Gloria received the treatments listed below:      therapeutic exercises to develop strength, endurance, ROM, flexibility, and posture.  See flow-sheet below.   *indicates increases in reps or resistance during this treatment session    Ther-Ex Reps   Pulleys 3 minutes flexion/3 minutes scaption    Ball Rolls 5 x 10"   Finger Ladder 5 x 10"   Scap retractions 30 x 3"    Upper Trap Stretch 3 x 20" each    Levator Scap Stretch 3 x 20" each    Doorway stretch  3 x 20"    Chin Tucks  30 x 3"    Wand flexion stretch  5 x 5"    Rows with Tband  2 x 10 red TB   B extension with Tband  2 x 10 red TB     MANUAL THERAPY:  STM/MFR to decrease muscle tension and MYF adhesions in Left and Right Upper Traps, Scalenes, Suboccipitals, " "Paraspinals, Middle Traps, Lower Traps, and Rhomboids.     Not completed: Biphasic E-stim x 10 minutes @ 2pps to R upper trapezius and tricep and R pectoralis and bicep to decrease tissue tightness, irritation, and pain.   MHP applied to R shoulder with ESTIM for 10 minutes.   Patient Education and Home Exercises       Education provided:   - Plan of Care, Home exercise program, Delayed onset muscle soreness     Written Home Exercises Provided: Patient instructed to cont prior HEP. Exercises were reviewed and Gloria was able to demonstrate them prior to the end of the session.  Gloria demonstrated fair  understanding of the education provided. See Electronic Medical Record under Patient Instructions for exercises provided during therapy sessions    Assessment     Gloria is a 53 y.o. female referred to outpatient Physical Therapy with a medical diagnosis of shoulder impingement. Patient presents with B shoulder pain, decreased B shoulder ROM, muscle tightness, and B UE muscle weakness. Patient's impairments have limited her overall activity tolerance as well as interfered with her sleep.  LPTA provided patient with visual and verbal cues while she warmed up on pulley s for decrease guarding and shoulder hiking. Patient required occasional cues to progress through exercises with correct hold times.   Patient required visual, verbal and tactile cueing for form and decrease compensations with recent added Therapeutic exercises.  No modalities completed today. Patient states "I feel good" at end of physical therapy treatment session.     Gloria Is progressing well towards her goals.   Patient prognosis is Good.     Patient will continue to benefit from skilled outpatient physical therapy to address the deficits listed in the problem list box on initial evaluation, provide pt/family education and to maximize pt's level of independence in the home and community environment.     Patient's spiritual, cultural and educational " needs considered and pt agreeable to plan of care and goals.     Anticipated barriers to physical therapy: guarding, posture, compliance with home exercise program     Goals:   Short Term Goals: 2 weeks   Patient will independently complete home exercise program with correct form.   Patient will report decreased B shoulder pain at rest to less than or equal to 4/10 for improved quality of life.      Long Term Goals: 4 weeks   Pt will increase B shoulder flexion to 160 degrees, external rotation to C7, and internal rotation to T7 for increased tolerance to dressing and grooming tasks with less pain.   Pt will increase B upper shoulder to 4/5 to allow patient to perform activities of daily living independently  Pt will report decrease in B shoulder pain to 2/10 to improve quality of life  Pt will place 5 pound object in overhead shelf without hike and with less than or equal to 3/10 pain to allow patient to return to prior level of function   Patient will have increased FOTO score to greater than or equal to 64 indicating improved function.     Plan     Plan of care Certification: 9/6/2023 to 10/6/2023.  Outpatient Physical Therapy 3 times weekly for 4 weeks to include the following interventions: Electrical Stimulation unattended, Manual Therapy, Moist Heat/ Ice, Patient Education, Therapeutic Activities, Therapeutic Exercise, and Ultrasound.     Continue Plan of Care per PT order to progress patient toward rehab goals as tolerated by patient.   JESSI Miramontes   9/25/2023

## 2023-09-26 ENCOUNTER — CLINICAL SUPPORT (OUTPATIENT)
Dept: REHABILITATION | Facility: HOSPITAL | Age: 53
End: 2023-09-26
Payer: MEDICARE

## 2023-09-26 DIAGNOSIS — G89.29 CHRONIC PAIN OF BOTH SHOULDERS: Primary | ICD-10-CM

## 2023-09-26 DIAGNOSIS — M25.512 CHRONIC PAIN OF BOTH SHOULDERS: Primary | ICD-10-CM

## 2023-09-26 DIAGNOSIS — M25.511 CHRONIC PAIN OF BOTH SHOULDERS: Primary | ICD-10-CM

## 2023-09-26 PROCEDURE — 97110 THERAPEUTIC EXERCISES: CPT | Mod: CQ

## 2023-09-26 PROCEDURE — 97014 ELECTRIC STIMULATION THERAPY: CPT | Mod: CQ

## 2023-09-26 NOTE — PROGRESS NOTES
"[facility]   Physical Therapy Treatment Note      Name: Gloria Mitchell  Clinic Number: [id number]    Visit Date: 9/26/2023    Therapy Diagnosis:        Encounter Diagnoses   Name Primary?    Shoulder impingement syndrome, unspecified laterality      Chronic pain of both shoulders          Physician: Bassam Hernandez FNP     Physician Orders: PT Eval and Treat   Medical Diagnosis from Referral: Shoulder Impingement   Evaluation Date: 9/6/2023  Authorization Period Expiration: 8/23/2023  Plan of Care Expiration: 10/6/2023  Progress Note Due: 10/6/2023  Visit # / Visits authorized: 9/12   FOTO: due on sixth visit      Precautions: Standard      Time In: 8:05  Time Out:8:50  Total Billable Time:  45 minutes   PTA Visit #: 2/5      Subjective     Patient reports: "My neck feels good right now".       She was compliant with home exercise program.  Response to previous treatment: Patient reports decreased cervical pain   Functional change: Early in Plan of Care     Pain: 0/10  Location: bilateral shoulders      Objective      Objective Measures updated at progress report unless specified.     Treatment     Gloria received the treatments listed below:      therapeutic exercises to develop strength, endurance, ROM, flexibility, and posture.  See flow-sheet below.   *indicates increases in reps or resistance during this treatment session    Ther-Ex Reps   Pulleys 3 minutes flexion/3 minutes scaption    Ball Rolls 5 x 10"   Finger Ladder 5 x 10"   Scap retractions 30 x 3"    Upper Trap Stretch 3 x 20" each    Levator Scap Stretch 3 x 20" each    Doorway stretch  3 x 20"    Chin Tucks  30 x 3"    Wand flexion stretch  5 x 5"    Rows with Tband  2 x 10 red TB   B extension with Tband  2 x 10 red TB      Biphasic E-stim x 10 minutes @ 2pps to R upper trapezius and tricep and R pectoralis and bicep to decrease tissue tightness, irritation, and pain.   MHP applied to R shoulder with ESTIM for 10 " minutes.     Patient Education and Home Exercises       Education provided:   - Plan of Care, Home exercise program, Delayed onset muscle soreness     Written Home Exercises Provided: Patient instructed to cont prior HEP. Exercises were reviewed and Gloria was able to demonstrate them prior to the end of the session.  Gloria demonstrated fair  understanding of the education provided. See Electronic Medical Record under Patient Instructions for exercises provided during therapy sessions    Assessment     Gloria is a 53 y.o. female referred to outpatient Physical Therapy with a medical diagnosis of shoulder impingement. Patient presents with B shoulder pain, decreased B shoulder ROM, muscle tightness, and B UE muscle weakness. Patient's impairments have limited her overall activity tolerance as well as interfered with her sleep.  LPTA provided patient with visual and verbal cues while she warmed up on pulley s for decrease guarding and shoulder hiking. Patient required occasional cues to progress through exercises with correct hold times.   Patient required visual, verbal and tactile cueing for form and decrease compensations with recent added Therapeutic exercises.  Patient denies pain at end of physical therapy treatment session.     Gloria Is progressing well towards her goals.   Patient prognosis is Good.     Patient will continue to benefit from skilled outpatient physical therapy to address the deficits listed in the problem list box on initial evaluation, provide pt/family education and to maximize pt's level of independence in the home and community environment.     Patient's spiritual, cultural and educational needs considered and pt agreeable to plan of care and goals.     Anticipated barriers to physical therapy: guarding, posture, compliance with home exercise program     Goals:   Short Term Goals: 2 weeks   Patient will independently complete home exercise program with correct form.   Patient will report  decreased B shoulder pain at rest to less than or equal to 4/10 for improved quality of life.      Long Term Goals: 4 weeks   Pt will increase B shoulder flexion to 160 degrees, external rotation to C7, and internal rotation to T7 for increased tolerance to dressing and grooming tasks with less pain.   Pt will increase B upper shoulder to 4/5 to allow patient to perform activities of daily living independently  Pt will report decrease in B shoulder pain to 2/10 to improve quality of life  Pt will place 5 pound object in overhead shelf without hike and with less than or equal to 3/10 pain to allow patient to return to prior level of function   Patient will have increased FOTO score to greater than or equal to 64 indicating improved function.     Plan     Plan of care Certification: 9/6/2023 to 10/6/2023.  Outpatient Physical Therapy 3 times weekly for 4 weeks to include the following interventions: Electrical Stimulation unattended, Manual Therapy, Moist Heat/ Ice, Patient Education, Therapeutic Activities, Therapeutic Exercise, and Ultrasound.     Continue Plan of Care per PT order to progress patient toward rehab goals as tolerated by patient.   JESSI Miramontes   9/26/2023

## 2023-09-28 ENCOUNTER — OFFICE VISIT (OUTPATIENT)
Dept: NEUROLOGY | Facility: CLINIC | Age: 53
End: 2023-09-28
Payer: MEDICARE

## 2023-09-28 VITALS
DIASTOLIC BLOOD PRESSURE: 76 MMHG | OXYGEN SATURATION: 98 % | HEART RATE: 73 BPM | WEIGHT: 172 LBS | SYSTOLIC BLOOD PRESSURE: 114 MMHG | BODY MASS INDEX: 31.65 KG/M2 | HEIGHT: 62 IN

## 2023-09-28 DIAGNOSIS — G43.719 INTRACTABLE CHRONIC MIGRAINE WITHOUT AURA AND WITHOUT STATUS MIGRAINOSUS: Primary | ICD-10-CM

## 2023-09-28 PROCEDURE — 99212 OFFICE O/P EST SF 10 MIN: CPT | Mod: S$PBB,,, | Performed by: NURSE PRACTITIONER

## 2023-09-28 PROCEDURE — 1160F PR REVIEW ALL MEDS BY PRESCRIBER/CLIN PHARMACIST DOCUMENTED: ICD-10-PCS | Mod: CPTII,,, | Performed by: NURSE PRACTITIONER

## 2023-09-28 PROCEDURE — 3008F PR BODY MASS INDEX (BMI) DOCUMENTED: ICD-10-PCS | Mod: CPTII,,, | Performed by: NURSE PRACTITIONER

## 2023-09-28 PROCEDURE — 1159F MED LIST DOCD IN RCRD: CPT | Mod: CPTII,,, | Performed by: NURSE PRACTITIONER

## 2023-09-28 PROCEDURE — 3078F PR MOST RECENT DIASTOLIC BLOOD PRESSURE < 80 MM HG: ICD-10-PCS | Mod: CPTII,,, | Performed by: NURSE PRACTITIONER

## 2023-09-28 PROCEDURE — 3074F SYST BP LT 130 MM HG: CPT | Mod: CPTII,,, | Performed by: NURSE PRACTITIONER

## 2023-09-28 PROCEDURE — 1159F PR MEDICATION LIST DOCUMENTED IN MEDICAL RECORD: ICD-10-PCS | Mod: CPTII,,, | Performed by: NURSE PRACTITIONER

## 2023-09-28 PROCEDURE — 4010F ACE/ARB THERAPY RXD/TAKEN: CPT | Mod: CPTII,,, | Performed by: NURSE PRACTITIONER

## 2023-09-28 PROCEDURE — 99212 PR OFFICE/OUTPT VISIT, EST, LEVL II, 10-19 MIN: ICD-10-PCS | Mod: S$PBB,,, | Performed by: NURSE PRACTITIONER

## 2023-09-28 PROCEDURE — 1160F RVW MEDS BY RX/DR IN RCRD: CPT | Mod: CPTII,,, | Performed by: NURSE PRACTITIONER

## 2023-09-28 PROCEDURE — 3008F BODY MASS INDEX DOCD: CPT | Mod: CPTII,,, | Performed by: NURSE PRACTITIONER

## 2023-09-28 PROCEDURE — 99215 OFFICE O/P EST HI 40 MIN: CPT | Mod: PBBFAC | Performed by: NURSE PRACTITIONER

## 2023-09-28 PROCEDURE — 3074F PR MOST RECENT SYSTOLIC BLOOD PRESSURE < 130 MM HG: ICD-10-PCS | Mod: CPTII,,, | Performed by: NURSE PRACTITIONER

## 2023-09-28 PROCEDURE — 4010F PR ACE/ARB THEARPY RXD/TAKEN: ICD-10-PCS | Mod: CPTII,,, | Performed by: NURSE PRACTITIONER

## 2023-09-28 PROCEDURE — 3078F DIAST BP <80 MM HG: CPT | Mod: CPTII,,, | Performed by: NURSE PRACTITIONER

## 2023-09-28 RX ORDER — MELOXICAM 7.5 MG/1
7.5 TABLET ORAL DAILY
COMMUNITY
End: 2024-01-03 | Stop reason: CLARIF

## 2023-09-28 NOTE — PROGRESS NOTES
Subjective:       Patient ID: Gloria Mitchell is a 53 y.o. female     Chief Complaint:    Chief Complaint   Patient presents with    Follow-up            Allergies:  Oxycodone-acetaminophen, Oxycodone hcl, and Zofran [ondansetron hcl]    Current Medications:    Outpatient Encounter Medications as of 9/28/2023   Medication Sig Dispense Refill    buprenorphine-naloxone 2-0.5 mg (SUBOXONE) 2-0.5 mg Subl Place 1 tablet under the tongue. Patient states she takes twice daily. Prescription bottle is ordered for 3 times daily      erenumab-aooe (AIMOVIG AUTOINJECTOR) 140 mg/mL autoinjector Inject 1 mL (140 mg total) into the skin every 28 days. 1 each 11    EScitalopram oxalate (LEXAPRO) 10 MG tablet Take 10 mg by mouth once daily.      levothyroxine (SYNTHROID) 112 MCG tablet Take 112 mcg by mouth before breakfast.      losartan (COZAAR) 50 MG tablet Take 50 mg by mouth once daily.      meloxicam (MOBIC) 7.5 MG tablet Take 7.5 mg by mouth once daily.      methenamine (HIPREX) 1 gram Tab Take 1 tablet (1 g total) by mouth 2 (two) times daily. 180 tablet 3    omeprazole (PRILOSEC) 20 MG capsule 1 capsule 30 minutes before morning meal      potassium citrate (UROCIT-K) 10 mEq (1,080 mg) TbSR Take 2 tablets (20 mEq total) by mouth 2 (two) times a day. 360 tablet 3    promethazine (PHENERGAN) 25 MG tablet Take 1 tablet (25 mg total) by mouth every 4 (four) hours as needed for Nausea. 24 tablet 0    sucralfate (CARAFATE) 1 gram tablet Take 1 tablet by mouth 4 (four) times daily.      tiZANidine 4 mg Cap Take 1 capsule by mouth every 8 (eight) hours. (Patient taking differently: Take 1 capsule by mouth every evening.) 90 capsule 1    topiramate (TOPAMAX) 50 MG tablet Take 1 tablet (50 mg total) by mouth 2 (two) times daily. 60 tablet 11    traZODone (DESYREL) 50 MG tablet Take 50 mg by mouth every evening.      gabapentin (NEURONTIN) 800 MG tablet Take 1 tablet (800 mg total) by mouth 3 (three) times daily. (Patient  "taking differently: Take 400 mg by mouth once daily. Patient reports taking 400mg daily) 90 tablet 1    [] meloxicam (MOBIC) 15 MG tablet Take 1 tablet (15 mg total) by mouth daily as needed for Pain. I po daily prn pain.  Avoid all NSAIDs while taking Mobic 30 tablet 2    metoprolol tartrate (LOPRESSOR) 50 MG tablet Take 100 mg by mouth Daily.      nitrofurantoin, macrocrystal-monohydrate, (MACROBID) 100 MG capsule Take 1 capsule (100 mg total) by mouth 2 (two) times daily. Hold methenamine hippurate until completed (Patient not taking: Reported on 2023) 20 capsule 0     No facility-administered encounter medications on file as of 2023.       History of Present Illness  53 yr old white female presents with migraines that started as a teenager.     Currently treated with topamax 50mg bid (do not recommend higher dosing due to prior kidney stones) and aimovig 140mg once monthly injections.  Prior effexor in [id number] not effective any longer.  She was doing well with Aimovig, but I was notificed in January that she said headaches got worse, and that the "shot stopped working".  But at last followup 6 months ago was noted had not had the injection in 3 months, so this would be the obvious reason for it stopping working, she needed new PA.  We obtained then covered through 2023.  She has reduced the topamax to 50mg QOD and doing well.    Currently only taking topamax 50mg once daily           Review of Systems  Review of Systems   Constitutional:  Negative for diaphoresis and fever.   HENT:  Negative for congestion, hearing loss and tinnitus.    Eyes:  Negative for blurred vision, double vision, photophobia, discharge and redness.   Respiratory:  Negative for cough and shortness of breath.    Cardiovascular:  Negative for chest pain.   Gastrointestinal:  Negative for abdominal pain, nausea and vomiting.   Musculoskeletal:  Negative for back pain, joint pain, myalgias and neck pain.   Skin:  " Negative for itching and rash.   Neurological:  Positive for headaches. Negative for dizziness, tremors, sensory change, speech change, focal weakness, seizures, loss of consciousness and weakness.   Psychiatric/Behavioral:  Negative for depression, hallucinations and memory loss. The patient has insomnia.    All other systems reviewed and are negative.     Objective:     NEUROLOGICAL EXAMINATION:     MENTAL STATUS   Oriented to person, place, and time.   Registration: recalls 3 of 3 objects. Recall at 5 minutes: recalls 3 of 3 objects.   Attention: normal. Concentration: normal.   Speech: speech is normal   Level of consciousness: alert  Knowledge: good and consistent with education.   Normal comprehension.     CRANIAL NERVES     CN II   Visual fields full to confrontation.   Visual acuity: normal  Right visual field deficit: none  Left visual field deficit: none     CN III, IV, VI   Pupils are equal, round, and reactive to light.  Extraocular motions are normal.   Right pupil: Size: 3 mm. Shape: regular. Reactivity: brisk. Consensual response: intact. Accommodation: intact.   Left pupil: Size: 3 mm. Shape: regular. Reactivity: brisk. Consensual response: intact. Accommodation: intact.   CN III: no CN III palsy  CN VI: no CN VI palsy  Nystagmus: none   Diplopia: none  Upgaze: normal  Downgaze: normal  Conjugate gaze: present  Vestibulo-ocular reflex: present    CN V   Facial sensation intact.   Right facial sensation deficit: none  Left facial sensation deficit: none  Right corneal reflex: normal  Left corneal reflex: normal    CN VII   Facial expression full, symmetric.   Right facial weakness: none  Left facial weakness: none  Right taste: normal  Left taste: normal    CN VIII   CN VIII normal.   Hearing: intact    CN IX, X   CN IX normal.   CN X normal.   Palate: symmetric    CN XI   CN XI normal.   Right sternocleidomastoid strength: normal  Left sternocleidomastoid strength: normal  Right trapezius strength:  normal  Left trapezius strength: normal    CN XII   CN XII normal.   Tongue: not atrophic  Fasciculations: absent  Tongue deviation: none    MOTOR EXAM   Muscle bulk: normal  Overall muscle tone: normal  Right arm tone: normal  Left arm tone: normal  Right arm pronator drift: absent  Left arm pronator drift: absent  Right leg tone: normal  Left leg tone: normal    Strength   Right neck flexion: 5/5  Left neck flexion: 5/5  Right neck extension: 5/5  Left neck extension: 5/5  Right deltoid: 5/5  Left deltoid: 5/5  Right biceps: 5/5  Left biceps: 5/5  Right triceps: 5/5  Left triceps: 5/5  Right wrist flexion: 5/5  Left wrist flexion: 5/5  Right wrist extension: 5/5  Left wrist extension: 5/5  Right interossei: 5/5  Left interossei: 5/5  Right iliopsoas: 5/5  Left iliopsoas: 5/5  Right quadriceps: 5/5  Left quadriceps: 5/5  Right hamstrin/5  Left hamstrin/5  Right anterior tibial: 5/5  Left anterior tibial: 5/5  Right posterior tibial: 5/5  Left posterior tibial: 5/5  Right gastroc: 5/5  Left gastroc: 5/5    REFLEXES     Reflexes   Right brachioradialis: 2+  Left brachioradialis: 2+  Right biceps: 2+  Left biceps: 2+  Right triceps: 2+  Left triceps: 2+  Right patellar: 2+  Left patellar: 2+  Right achilles: 2+  Left achilles: 2+  Right plantar: normal  Left plantar: normal  Right Hernandez: absent  Left Hernandez: absent  Right ankle clonus: absent  Left ankle clonus: absent  Right pendular knee jerk: absent  Left pendular knee jerk: absent    SENSORY EXAM   Light touch normal.   Right arm light touch: normal  Left arm light touch: normal  Right leg light touch: normal  Left leg light touch: normal  Vibration normal.   Right arm vibration: normal  Left arm vibration: normal  Right leg vibration: normal  Left leg vibration: normal  Proprioception normal.   Right arm proprioception: normal  Left arm proprioception: normal  Right leg proprioception: normal  Left leg proprioception: normal  Pinprick normal.   Right  arm pinprick: normal  Left arm pinprick: normal  Right leg pinprick: normal  Left leg pinprick: normal    GAIT AND COORDINATION     Gait  Gait: normal     Coordination   Finger to nose coordination: normal  Heel to shin coordination: normal  Tandem walking coordination: normal    Tremor   Resting tremor: absent  Intention tremor: absent  Action tremor: absent       Physical Exam  Vitals and nursing note reviewed.   Constitutional:       Appearance: Normal appearance.   HENT:      Head: Normocephalic.   Eyes:      Extraocular Movements: Extraocular movements intact and EOM normal.      Pupils: Pupils are equal, round, and reactive to light.   Cardiovascular:      Rate and Rhythm: Normal rate and regular rhythm.   Pulmonary:      Effort: Pulmonary effort is normal.      Breath sounds: Normal breath sounds.   Musculoskeletal:         General: No swelling or tenderness. Normal range of motion.      Cervical back: Normal range of motion and neck supple.      Right lower leg: No edema.      Left lower leg: No edema.   Skin:     General: Skin is warm and dry.      Coloration: Skin is not jaundiced.      Findings: No rash.   Neurological:      General: No focal deficit present.      Mental Status: She is alert and oriented to person, place, and time.      GCS: GCS eye subscore is 4. GCS verbal subscore is 5. GCS motor subscore is 6.      Cranial Nerves: No cranial nerve deficit.      Sensory: No sensory deficit.      Motor: Motor function is intact. No weakness.      Coordination: Coordination is intact. Coordination normal. Finger-Nose-Finger Test and Heel to Shin Test normal.      Gait: Gait is intact. Gait and tandem walk normal.      Deep Tendon Reflexes: Reflexes normal.      Reflex Scores:       Tricep reflexes are 2+ on the right side and 2+ on the left side.       Bicep reflexes are 2+ on the right side and 2+ on the left side.       Brachioradialis reflexes are 2+ on the right side and 2+ on the left side.        Patellar reflexes are 2+ on the right side and 2+ on the left side.       Achilles reflexes are 2+ on the right side and 2+ on the left side.  Psychiatric:         Mood and Affect: Mood normal.         Speech: Speech normal.         Behavior: Behavior normal.          Assessment:     Problem List Items Addressed This Visit    None           Primary Diagnosis and ICD10  No primary diagnosis found.    Plan:     Patient Instructions   Continue current topamax 50mg once daily  Continue Aimovig 140mg once monthly, I have sent Rx to [facility]  Continue phenergan as needed    There are no discontinued medications.            Requested Prescriptions      No prescriptions requested or ordered in this encounter       No orders of the defined types were placed in this encounter.

## 2023-09-29 ENCOUNTER — CLINICAL SUPPORT (OUTPATIENT)
Dept: REHABILITATION | Facility: HOSPITAL | Age: 53
End: 2023-09-29
Payer: MEDICARE

## 2023-09-29 DIAGNOSIS — G89.29 CHRONIC PAIN OF BOTH SHOULDERS: Primary | ICD-10-CM

## 2023-09-29 DIAGNOSIS — M25.512 CHRONIC PAIN OF BOTH SHOULDERS: Primary | ICD-10-CM

## 2023-09-29 DIAGNOSIS — M25.511 CHRONIC PAIN OF BOTH SHOULDERS: Primary | ICD-10-CM

## 2023-09-29 PROCEDURE — 97110 THERAPEUTIC EXERCISES: CPT | Mod: CQ

## 2023-09-29 PROCEDURE — 97014 ELECTRIC STIMULATION THERAPY: CPT | Mod: CQ

## 2023-09-29 NOTE — PROGRESS NOTES
"[facility]   Physical Therapy Treatment Note      Name: Gloria Mitchell  Clinic Number: [id number]    Visit Date: 9/29/2023    Therapy Diagnosis:        Encounter Diagnoses   Name Primary?    Shoulder impingement syndrome, unspecified laterality      Chronic pain of both shoulders          Physician: Bassam Hernandez FNP     Physician Orders: PT Eval and Treat   Medical Diagnosis from Referral: Shoulder Impingement   Evaluation Date: 9/6/2023  Authorization Period Expiration: 8/23/2023  Plan of Care Expiration: 10/6/2023  Progress Note Due: 10/6/2023  Visit # / Visits authorized: 10/12   FOTO: due on sixth visit      Precautions: Standard      Time In: 12:30  Time Out:13:12  Total Billable Time:  42 minutes   PTA Visit #: 3/5      Subjective     Patient reports:  "I feel good right now.  I had some pain last night in joint line of my shoulders".       She was compliant with home exercise program.  Response to previous treatment: Patient reports decreased cervical pain   Functional change: Early in Plan of Care     Pain: 0/10  Location: bilateral shoulders      Objective      Objective Measures updated at progress report unless specified.     Treatment     Gloria received the treatments listed below:      therapeutic exercises to develop strength, endurance, ROM, flexibility, and posture.  See flow-sheet below.   *indicates increases in reps or resistance during this treatment session    Ther-Ex Reps   Pulleys 3 minutes flexion/3 minutes scaption    Ball Rolls 5 x 10" up wall *    Finger Ladder 5 x 10"   Scap retractions 30 x 3"    Upper Trap Stretch 3 x 20" each    Levator Scap Stretch 3 x 20" each    Doorway stretch  3 x 20"    Chin Tucks  30 x 3"    Wand flexion stretch  5 x 5"    Rows with Tband  2 x 10 red TB   B extension with Tband  2 x 10 red TB      Biphasic E-stim x 10 minutes @ 2pps to R upper trapezius and tricep and R pectoralis and bicep to decrease tissue tightness, " irritation, and pain.   MHP applied to R shoulder with ESTIM for 10 minutes.     Patient Education and Home Exercises       Education provided:   - Plan of Care, Home exercise program, Delayed onset muscle soreness     Written Home Exercises Provided: Patient instructed to cont prior HEP. Exercises were reviewed and Gloria was able to demonstrate them prior to the end of the session.  Gloria demonstrated fair  understanding of the education provided. See Electronic Medical Record under Patient Instructions for exercises provided during therapy sessions    Assessment     Gloria is a 53 y.o. female referred to outpatient Physical Therapy with a medical diagnosis of shoulder impingement. Patient presents with B shoulder pain, decreased B shoulder ROM, muscle tightness, and B UE muscle weakness. Patient's impairments have limited her overall activity tolerance as well as interfered with her sleep.  LPTA provided patient with visual and verbal cues while she warmed up on pulley s for decrease guarding and shoulder hiking.   Patient demonstrates good form completing exercises and good carryover progressing through treatment.  Patient denies pain at end of treatment session.     Gloria Is progressing well towards her goals.   Patient prognosis is Good.     Patient will continue to benefit from skilled outpatient physical therapy to address the deficits listed in the problem list box on initial evaluation, provide pt/family education and to maximize pt's level of independence in the home and community environment.     Patient's spiritual, cultural and educational needs considered and pt agreeable to plan of care and goals.     Anticipated barriers to physical therapy: guarding, posture, compliance with home exercise program     Goals:   Short Term Goals: 2 weeks   Patient will independently complete home exercise program with correct form.   Patient will report decreased B shoulder pain at rest to less than or equal to 4/10 for  improved quality of life.      Long Term Goals: 4 weeks   Pt will increase B shoulder flexion to 160 degrees, external rotation to C7, and internal rotation to T7 for increased tolerance to dressing and grooming tasks with less pain.   Pt will increase B upper shoulder to 4/5 to allow patient to perform activities of daily living independently  Pt will report decrease in B shoulder pain to 2/10 to improve quality of life  Pt will place 5 pound object in overhead shelf without hike and with less than or equal to 3/10 pain to allow patient to return to prior level of function   Patient will have increased FOTO score to greater than or equal to 64 indicating improved function.     Plan     Plan of care Certification: 9/6/2023 to 10/6/2023.  Outpatient Physical Therapy 3 times weekly for 4 weeks to include the following interventions: Electrical Stimulation unattended, Manual Therapy, Moist Heat/ Ice, Patient Education, Therapeutic Activities, Therapeutic Exercise, and Ultrasound.     Continue Plan of Care per PT order to progress patient toward rehab goals as tolerated by patient.   JESSI Miramontes   9/29/2023

## 2023-10-02 ENCOUNTER — TELEPHONE (OUTPATIENT)
Dept: ORTHOPEDICS | Facility: CLINIC | Age: 53
End: 2023-10-02
Payer: MEDICARE

## 2023-10-02 ENCOUNTER — CLINICAL SUPPORT (OUTPATIENT)
Dept: REHABILITATION | Facility: HOSPITAL | Age: 53
End: 2023-10-02
Payer: MEDICARE

## 2023-10-02 DIAGNOSIS — G89.29 CHRONIC PAIN OF BOTH SHOULDERS: Primary | ICD-10-CM

## 2023-10-02 DIAGNOSIS — M25.512 CHRONIC PAIN OF BOTH SHOULDERS: Primary | ICD-10-CM

## 2023-10-02 DIAGNOSIS — M25.511 CHRONIC PAIN OF BOTH SHOULDERS: Primary | ICD-10-CM

## 2023-10-02 PROCEDURE — 97110 THERAPEUTIC EXERCISES: CPT

## 2023-10-02 NOTE — PROGRESS NOTES
"[facility]   Physical Therapy Treatment Note      Name: Gloria Mitchell  Clinic Number: [id number]    Visit Date: 10/2/2023    Therapy Diagnosis:        Encounter Diagnoses   Name Primary?    Shoulder impingement syndrome, unspecified laterality      Chronic pain of both shoulders          Physician: Bassam Hernandez FNP     Physician Orders: PT Eval and Treat   Medical Diagnosis from Referral: Shoulder Impingement   Evaluation Date: 9/6/2023  Authorization Period Expiration: 8/23/2023  Plan of Care Expiration: 10/6/2023  Progress Note Due: 10/6/2023  Visit # / Visits authorized: 11/12   FOTO: due on sixth visit      Precautions: Standard      Time In: 12:28  Time Out:13:00  Total Billable Time:  32 minutes   PTA Visit #: 0/5      Subjective     Patient reports:  "I hurt so bad this weekend. I'm not sure what was causing it but it was bad."     She was compliant with home exercise program.  Response to previous treatment: Patient reports no adverse effects  Functional change: Continued episodes of increased B shoulder pain.     Pain: 0/10  Location: bilateral shoulders      Objective      Objective Measures updated at progress report unless specified.     Treatment     Gloria received the treatments listed below:      therapeutic exercises to develop strength, endurance, ROM, flexibility, and posture.  See flow-sheet below.       Ther-Ex Reps   Pulleys 3 minutes flexion/3 minutes scaption    Ball Rolls flexion and scaption 5 x 10" on floor    Finger Ladder 5 x 10" (not completed)    Scap retractions 30 x 3"    Upper Trap Stretch 3 x 20" each    Levator Scap Stretch 3 x 20" each    Doorway stretch  3 x 20"    Chin Tucks  30 x 3"    Wand flexion stretch  5 x 5" (not completed)    Rows with Tband  2 x 10 red TB   B extension with Tband  2 x 10 red TB       Patient Education and Home Exercises       Education provided:   - Plan of Care, Home exercise program, Delayed onset muscle " soreness     Written Home Exercises Provided: Patient instructed to cont prior HEP. Exercises were reviewed and Gloria was able to demonstrate them prior to the end of the session.  Gloria demonstrated fair  understanding of the education provided. See Electronic Medical Record under Patient Instructions for exercises provided during therapy sessions    Assessment     Gloria is a 53 y.o. female referred to outpatient Physical Therapy with a medical diagnosis of shoulder impingement. Patient presents with B shoulder pain, decreased B shoulder ROM, muscle tightness, and B UE muscle weakness. Patient's impairments have limited her overall activity tolerance as well as interfered with her sleep.  PT and patient discussed patient's progress with PT interventions, continued episodes of increased B shoulder pain, and possible referral for MRI. PT completed patient goal assessment for last scheduled visit in this plan of care. Patient continues to have R shoulder pain with end range internal rotation and episodes of increased B shoulder pain that continues to limit her activity tolerance on occasion. Patient also continued to report R UE radicular symptoms. PT recommends MRI of neck and shoulders at this time.  PT and patient agree to discharge to Home exercise program at this time.     Gloria Is progressing well towards her goals.   Patient prognosis is Good.     Patient will continue to benefit from skilled outpatient physical therapy to address the deficits listed in the problem list box on initial evaluation, provide pt/family education and to maximize pt's level of independence in the home and community environment.     Patient's spiritual, cultural and educational needs considered and pt agreeable to plan of care and goals.     Anticipated barriers to physical therapy: guarding, posture, compliance with home exercise program     Goals:   Short Term Goals: 2 weeks   Patient will independently complete home exercise program  with correct form. -MET   Patient will report decreased B shoulder pain at rest to less than or equal to 4/10 for improved quality of life. -Met, but not consistently      Long Term Goals: 4 weeks   Pt will increase B shoulder flexion to 160 degrees, external rotation to C7, and internal rotation to T7 for increased tolerance to dressing and grooming tasks with less pain. -Met with continued R shoulder pain with IR   Pt will increase B upper shoulder to 4/5 to allow patient to perform activities of daily living independently-NOT MET   Pt will report decrease in B shoulder pain to 2/10 to improve quality of life-Met, but not consistently   Pt will place 5 pound object in overhead shelf without hike and with less than or equal to 3/10 pain to allow patient to return to prior level of function -NOT met   Patient will have increased FOTO score to greater than or equal to 64 indicating improved function. -NOT MET; 57     Plan   Discharge to Home exercise program with recommendation for patient to return to referring MD.     Aubrey Salas, PT, DPT     10/2/2023

## 2023-10-17 DIAGNOSIS — M25.511 ACUTE PAIN OF BOTH SHOULDERS: Primary | ICD-10-CM

## 2023-10-17 DIAGNOSIS — M25.512 ACUTE PAIN OF BOTH SHOULDERS: Primary | ICD-10-CM

## 2023-10-18 ENCOUNTER — OFFICE VISIT (OUTPATIENT)
Dept: ORTHOPEDICS | Facility: CLINIC | Age: 53
End: 2023-10-18
Payer: MEDICARE

## 2023-10-18 ENCOUNTER — HOSPITAL ENCOUNTER (OUTPATIENT)
Dept: RADIOLOGY | Facility: HOSPITAL | Age: 53
Discharge: HOME | End: 2023-10-18
Attending: NURSE PRACTITIONER
Payer: MEDICARE

## 2023-10-18 VITALS
HEART RATE: 68 BPM | BODY MASS INDEX: 31.65 KG/M2 | TEMPERATURE: 98 F | OXYGEN SATURATION: 98 % | RESPIRATION RATE: 12 BRPM | WEIGHT: 172 LBS | HEIGHT: 62 IN

## 2023-10-18 DIAGNOSIS — M25.511 ACUTE PAIN OF BOTH SHOULDERS: ICD-10-CM

## 2023-10-18 DIAGNOSIS — G89.29 CHRONIC PAIN OF BOTH SHOULDERS: Primary | ICD-10-CM

## 2023-10-18 DIAGNOSIS — M75.40 IMPINGEMENT SYNDROME, SHOULDER, UNSPECIFIED LATERALITY: Primary | ICD-10-CM

## 2023-10-18 DIAGNOSIS — M25.512 ACUTE PAIN OF BOTH SHOULDERS: ICD-10-CM

## 2023-10-18 DIAGNOSIS — M25.512 CHRONIC PAIN OF BOTH SHOULDERS: Primary | ICD-10-CM

## 2023-10-18 DIAGNOSIS — M25.511 CHRONIC PAIN OF BOTH SHOULDERS: Primary | ICD-10-CM

## 2023-10-18 PROCEDURE — 99214 OFFICE O/P EST MOD 30 MIN: CPT | Mod: S$PBB,,, | Performed by: NURSE PRACTITIONER

## 2023-10-18 PROCEDURE — 1160F RVW MEDS BY RX/DR IN RCRD: CPT | Mod: CPTII,,, | Performed by: NURSE PRACTITIONER

## 2023-10-18 PROCEDURE — 73030 X-RAY EXAM OF SHOULDER: CPT | Mod: 26,50,, | Performed by: STUDENT IN AN ORGANIZED HEALTH CARE EDUCATION/TRAINING PROGRAM

## 2023-10-18 PROCEDURE — 1160F PR REVIEW ALL MEDS BY PRESCRIBER/CLIN PHARMACIST DOCUMENTED: ICD-10-PCS | Mod: CPTII,,, | Performed by: NURSE PRACTITIONER

## 2023-10-18 PROCEDURE — 73030 X-RAY EXAM OF SHOULDER: CPT | Mod: TC,50

## 2023-10-18 PROCEDURE — 1159F PR MEDICATION LIST DOCUMENTED IN MEDICAL RECORD: ICD-10-PCS | Mod: CPTII,,, | Performed by: NURSE PRACTITIONER

## 2023-10-18 PROCEDURE — 73030 XR SHOULDER COMPLETE 2 OR MORE VIEWS BILATERAL: ICD-10-PCS | Mod: 26,50,, | Performed by: STUDENT IN AN ORGANIZED HEALTH CARE EDUCATION/TRAINING PROGRAM

## 2023-10-18 PROCEDURE — 99214 PR OFFICE/OUTPT VISIT, EST, LEVL IV, 30-39 MIN: ICD-10-PCS | Mod: S$PBB,,, | Performed by: NURSE PRACTITIONER

## 2023-10-18 PROCEDURE — 1159F MED LIST DOCD IN RCRD: CPT | Mod: CPTII,,, | Performed by: NURSE PRACTITIONER

## 2023-10-18 PROCEDURE — 99215 OFFICE O/P EST HI 40 MIN: CPT | Mod: PBBFAC | Performed by: NURSE PRACTITIONER

## 2023-10-18 PROCEDURE — 4010F ACE/ARB THERAPY RXD/TAKEN: CPT | Mod: CPTII,,, | Performed by: NURSE PRACTITIONER

## 2023-10-18 PROCEDURE — 3008F BODY MASS INDEX DOCD: CPT | Mod: CPTII,,, | Performed by: NURSE PRACTITIONER

## 2023-10-18 PROCEDURE — 3008F PR BODY MASS INDEX (BMI) DOCUMENTED: ICD-10-PCS | Mod: CPTII,,, | Performed by: NURSE PRACTITIONER

## 2023-10-18 PROCEDURE — 4010F PR ACE/ARB THEARPY RXD/TAKEN: ICD-10-PCS | Mod: CPTII,,, | Performed by: NURSE PRACTITIONER

## 2023-10-18 NOTE — PROGRESS NOTES
53-year-old female patient presents back ambulatory orthopedic clinic re-evaluation bilateral shoulders.  She is known to orthopedic clinic being followed since August of 2023 for bilateral shoulder pain.  She is been undergoing formal physical therapy efforts with the last visit on 10/02/2023.  She states she initially had good response to therapy.  She states that she did develop significant pain and discomfort after with set back after a number of visits.  States physical therapist discussed the progress with PT interventions and continued episodes of increased pain of the shoulder in referral for MRI of the shoulder.  She denies any injury or trauma.     X-ray: X-rays today bilateral shoulders two views each AP and lateral why scapula view shows mild DJD changes.  No evidence acute fracture, dislocation pathologic bone noted.    PE:  Physical exam right shoulder shoulder has normal contour.  Passive range motion right shoulder is approximately 80° abduction.  Hernandez-Talat test reproduces pain symptoms.  Neer test is positive at 110°.  Empty can test reproduces pain symptoms.  Active range motion right shoulder is approximately 85° abduction independent scapulothoracic motion.  She is able to externally rotate placed hand side of her neck.  She is able to internally rotate just with a small of her back.  Physical exam left shoulder shoulder has normal contour.  Passive range of motion left shoulder is approximately 85° abduction.  Hernandez-Talat test reproduces pain symptoms.  Neer test is positive at approximately 160°.  Empty can test reproduces pain symptoms.  Active range of motion left shoulder is approximately 90° abduction independent scapulothoracic motion.  She is able to externally rotate placed hand base her neck.  She is able to internally rotate to the upper lumbar region.  Bilateral radial pulse is 2 out of 4.  Capillary refill all digits of both hands less than 3 seconds.      Impression:   Bilateral shoulder impingement syndrome refractory to conservative management of NSAIDs, ice therapy in physical therapy.      Plan:  Safety guidelines activity restrictions discussed patient.  Verbalized understanding.  We will schedule for MRI examination bilateral shoulders.  She was to return orthopedic clinic results or sooner as needed.

## 2023-10-18 NOTE — PATIENT INSTRUCTIONS
Safety guidelines activity restrictions discussed patient.  Verbalized understanding.  We will schedule for MRI examination bilateral shoulders.  She was to return orthopedic clinic results or sooner as needed.

## 2023-10-24 ENCOUNTER — HOSPITAL ENCOUNTER (OUTPATIENT)
Dept: RADIOLOGY | Facility: HOSPITAL | Age: 53
Discharge: HOME | End: 2023-10-24
Attending: NURSE PRACTITIONER
Payer: MEDICARE

## 2023-10-24 PROCEDURE — 73221 MRI JOINT UPR EXTREM W/O DYE: CPT | Mod: TC,RT

## 2023-10-26 ENCOUNTER — TELEPHONE (OUTPATIENT)
Dept: ORTHOPEDICS | Facility: CLINIC | Age: 53
End: 2023-10-26
Payer: MEDICARE

## 2023-11-01 ENCOUNTER — TELEPHONE (OUTPATIENT)
Dept: ORTHOPEDICS | Facility: CLINIC | Age: 53
End: 2023-11-01
Payer: MEDICARE

## 2023-11-20 ENCOUNTER — PATIENT MESSAGE (OUTPATIENT)
Dept: ORTHOPEDICS | Facility: CLINIC | Age: 53
End: 2023-11-20
Payer: MEDICARE

## 2023-11-20 ENCOUNTER — TELEPHONE (OUTPATIENT)
Dept: ORTHOPEDICS | Facility: CLINIC | Age: 53
End: 2023-11-20
Payer: MEDICARE

## 2023-11-20 ENCOUNTER — OFFICE VISIT (OUTPATIENT)
Dept: ORTHOPEDICS | Facility: CLINIC | Age: 53
End: 2023-11-20
Payer: MEDICARE

## 2023-11-20 DIAGNOSIS — M75.41 IMPINGEMENT SYNDROME OF BOTH SHOULDERS: Primary | ICD-10-CM

## 2023-11-20 DIAGNOSIS — M75.42 IMPINGEMENT SYNDROME OF BOTH SHOULDERS: Primary | ICD-10-CM

## 2023-11-20 PROCEDURE — 20610 PR DRAIN/INJECT LARGE JOINT/BURSA: ICD-10-PCS | Mod: S$PBB,50,, | Performed by: ORTHOPAEDIC SURGERY

## 2023-11-20 PROCEDURE — 4010F PR ACE/ARB THEARPY RXD/TAKEN: ICD-10-PCS | Mod: CPTII,,, | Performed by: ORTHOPAEDIC SURGERY

## 2023-11-20 PROCEDURE — 1159F MED LIST DOCD IN RCRD: CPT | Mod: CPTII,,, | Performed by: ORTHOPAEDIC SURGERY

## 2023-11-20 PROCEDURE — 1159F PR MEDICATION LIST DOCUMENTED IN MEDICAL RECORD: ICD-10-PCS | Mod: CPTII,,, | Performed by: ORTHOPAEDIC SURGERY

## 2023-11-20 PROCEDURE — 1160F RVW MEDS BY RX/DR IN RCRD: CPT | Mod: CPTII,,, | Performed by: ORTHOPAEDIC SURGERY

## 2023-11-20 PROCEDURE — 20610 DRAIN/INJ JOINT/BURSA W/O US: CPT | Mod: PBBFAC,50 | Performed by: ORTHOPAEDIC SURGERY

## 2023-11-20 PROCEDURE — 99214 OFFICE O/P EST MOD 30 MIN: CPT | Mod: S$PBB,25,, | Performed by: ORTHOPAEDIC SURGERY

## 2023-11-20 PROCEDURE — 4010F ACE/ARB THERAPY RXD/TAKEN: CPT | Mod: CPTII,,, | Performed by: ORTHOPAEDIC SURGERY

## 2023-11-20 PROCEDURE — 63600175 PHARM REV CODE 636 W HCPCS

## 2023-11-20 PROCEDURE — 99214 PR OFFICE/OUTPT VISIT, EST, LEVL IV, 30-39 MIN: ICD-10-PCS | Mod: S$PBB,25,, | Performed by: ORTHOPAEDIC SURGERY

## 2023-11-20 PROCEDURE — 20610 DRAIN/INJ JOINT/BURSA W/O US: CPT | Mod: S$PBB,50,, | Performed by: ORTHOPAEDIC SURGERY

## 2023-11-20 PROCEDURE — 1160F PR REVIEW ALL MEDS BY PRESCRIBER/CLIN PHARMACIST DOCUMENTED: ICD-10-PCS | Mod: CPTII,,, | Performed by: ORTHOPAEDIC SURGERY

## 2023-11-20 PROCEDURE — 99213 OFFICE O/P EST LOW 20 MIN: CPT | Mod: PBBFAC | Performed by: ORTHOPAEDIC SURGERY

## 2023-11-20 NOTE — PROGRESS NOTES
CLINIC NOTE       Chief Complaint   Patient presents with    Results        Gloria Mitchell is a 53 y.o. female seen today for evaluation of bilateral shoulder pain.  Symptoms began insidiously approximately a year and a half and been slowly progressive over time.  She describes pain radiating from the anterolateral aspect of the shoulder toward the deltoid insertion and associated with abduction and internal rotation.  She was seen in followed by Bassam GRAF.  She completed a course of physical therapy but found it aggravated her underlying condition.  She ultimately underwent MRI examination of both shoulders.  The MRI of the left shoulder performed at [facility] 11/09/2023 demonstrated focal high-grade full-thickness tearing of the supraspinatus tendon at its insertion.  MRI examination  of the right shoulder performed at [facility] on 10/24/2023 showed type 2 downsloping acromion process.  There was near full-thickness tearing of the supraspinatus tendon at its insertion.  X-rays of both shoulders 08/24/2023 showed AC joint DJD.  The glenohumeral joints were located and were not high-riding.  Past Medical History:   Diagnosis Date    Acid reflux     Calculus of kidney     Depression     Gastrointestinal ulcerative mucositis 01/04/2023    Endoscopy Dr Ravi    Hypertension     Hypothyroidism     Low back pain     Lumbosacral radiculopathy 05/17/2021    Migraines     Multiple gastric ulcers     Pain     Postlaminectomy syndrome, lumbar region 05/17/2021    UTI (urinary tract infection) 08/23/2022     Family History   Problem Relation Age of Onset    Stroke Father     Heart attack Father     Heart attack Brother     Cancer Brother      Current Outpatient Medications on File Prior to Visit   Medication Sig Dispense Refill    buprenorphine-naloxone 2-0.5 mg (SUBOXONE) 2-0.5 mg Subl Place 1 tablet under the tongue. Patient states she takes twice daily. Prescription bottle is  ordered for 3 times daily      erenumab-aooe (AIMOVIG AUTOINJECTOR) 140 mg/mL autoinjector Inject 1 mL (140 mg total) into the skin every 28 days. 1 each 11    EScitalopram oxalate (LEXAPRO) 10 MG tablet Take 10 mg by mouth once daily.      gabapentin (NEURONTIN) 800 MG tablet Take 1 tablet (800 mg total) by mouth 3 (three) times daily. (Patient taking differently: Take 400 mg by mouth once daily. Patient reports taking 400mg daily) 90 tablet 1    levothyroxine (SYNTHROID) 112 MCG tablet Take 112 mcg by mouth before breakfast.      losartan (COZAAR) 50 MG tablet Take 50 mg by mouth once daily.      meloxicam (MOBIC) 7.5 MG tablet Take 7.5 mg by mouth once daily.      methenamine (HIPREX) 1 gram Tab Take 1 tablet (1 g total) by mouth 2 (two) times daily. 180 tablet 3    metoprolol tartrate (LOPRESSOR) 50 MG tablet Take 100 mg by mouth Daily.      nitrofurantoin, macrocrystal-monohydrate, (MACROBID) 100 MG capsule Take 1 capsule (100 mg total) by mouth 2 (two) times daily. Hold methenamine hippurate until completed (Patient not taking: Reported on 9/28/2023) 20 capsule 0    omeprazole (PRILOSEC) 20 MG capsule 1 capsule 30 minutes before morning meal      potassium citrate (UROCIT-K) 10 mEq (1,080 mg) TbSR Take 2 tablets (20 mEq total) by mouth 2 (two) times a day. 360 tablet 3    promethazine (PHENERGAN) 25 MG tablet Take 1 tablet (25 mg total) by mouth every 4 (four) hours as needed for Nausea. 24 tablet 0    sucralfate (CARAFATE) 1 gram tablet Take 1 tablet by mouth 4 (four) times daily.      tiZANidine 4 mg Cap Take 1 capsule by mouth every 8 (eight) hours. (Patient taking differently: Take 1 capsule by mouth every evening.) 90 capsule 1    topiramate (TOPAMAX) 50 MG tablet Take 1 tablet (50 mg total) by mouth 2 (two) times daily. 60 tablet 11    traZODone (DESYREL) 50 MG tablet Take 50 mg by mouth every evening.       No current facility-administered medications on file prior to visit.       ROS     There were no  vitals filed for this visit.    Past Surgical History:   Procedure Laterality Date    BACK SURGERY      CAUDAL EPIDURAL STEROID INJECTION Bilateral 11/08/2022    Procedure: CAUDAL BRENDA;  Surgeon: Elba Moon MD;  Location: [facility];  Service: Pain Management;  Laterality: Bilateral;  PT STATES HAD VAC  WILL BRING CARD    ENDOSCOPY  01/04/2023    INSERTION OF DORSAL COLUMN NERVE STIMULATOR FOR TRIAL N/A 07/15/2021    Procedure: INSERTION, NEUROSTIMULATOR, SPINAL CORD, DORSAL COLUMN, FOR TRIAL;  Surgeon: Elba Moon MD;  Location: [facility];  Service: Pain Management;  Laterality: N/A;  spoke to Dr Calin Burgess nurse, urine culture negative (results in Epic), pt cleared.    LITHOTRIPSY          Review of patient's allergies indicates:   Allergen Reactions    Oxycodone-acetaminophen Swelling and Edema    Oxycodone hcl     Zofran [ondansetron hcl] Edema     hands        Ortho Exam :  Shoulders have normal contour.  She has near full motion of both shoulders with pain at the extremes abduct and internal rotation.    Radiographic Examination:    Technique:    Findings:    Impression:   See Above    Assessment and Plan  Patient Active Problem List    Diagnosis Date Noted    Impingement syndrome, shoulder, unspecified laterality 10/18/2023    Chronic pain of both shoulders 09/06/2023    Bacteremia 03/11/2023    Nausea and vomiting 03/08/2023    Hypokalemia 03/08/2023    Hypomagnesemia 03/08/2023    Volume depletion 03/08/2023    Elevated lactic acid level 03/08/2023    Pain 10/17/2022    Bilateral flank pain 08/19/2022    Calculus of kidney     Thoracic radiculopathy 02/16/2022    Intractable chronic migraine without aura and without status migrainosus 01/10/2022    Chest wall pain, chronic after insertion spinal cord stimulator leads 12/22/2021    Disorder of sacrum 09/20/2021    Chronic pain syndrome 05/17/2021    Postlaminectomy syndrome, lumbar region 05/17/2021    Lumbosacral radiculopathy  05/17/2021    Impression:  Bilateral shoulder impingement syndrome  Plan:  Discussed surgical nonsurgical options.  Arthroscopic subacromial decompression procedure was shown in detail using models.  She does not want to consider surgical intervention unless she is 1st tried subacromial steroid injection.  We discussed the risk of additional tearing requiring rotator cuff repair and the implications for rehab and recovery.  The right shoulder was prepped with Betadine subacromial steroid injection performed with triamcinolone and Marcaine 1 cc each.  Left shoulder prepped with Betadine and subacromial steroid injection performed with triamcinolone and Marcaine 1 cc each.  She will call for scheduling arthroscopic subacromial decompression if symptoms recur.      Fernando Richardson M.D.

## 2024-01-03 ENCOUNTER — HOSPITAL ENCOUNTER (EMERGENCY)
Facility: HOSPITAL | Age: 54
Discharge: HOME | End: 2024-01-03
Attending: EMERGENCY MEDICINE
Payer: MEDICARE

## 2024-01-03 VITALS
RESPIRATION RATE: 17 BRPM | BODY MASS INDEX: 31.65 KG/M2 | SYSTOLIC BLOOD PRESSURE: 134 MMHG | DIASTOLIC BLOOD PRESSURE: 79 MMHG | TEMPERATURE: 98 F | OXYGEN SATURATION: 98 % | HEART RATE: 67 BPM | WEIGHT: 172 LBS | HEIGHT: 62 IN

## 2024-01-03 DIAGNOSIS — A08.4 VIRAL GASTROENTERITIS: Primary | ICD-10-CM

## 2024-01-03 PROCEDURE — 63600175 PHARM REV CODE 636 W HCPCS: Performed by: EMERGENCY MEDICINE

## 2024-01-03 PROCEDURE — 25000003 PHARM REV CODE 250: Performed by: EMERGENCY MEDICINE

## 2024-01-03 PROCEDURE — 99284 EMERGENCY DEPT VISIT MOD MDM: CPT | Mod: ,,, | Performed by: EMERGENCY MEDICINE

## 2024-01-03 PROCEDURE — 99284 EMERGENCY DEPT VISIT MOD MDM: CPT | Mod: 25

## 2024-01-03 PROCEDURE — 96374 THER/PROPH/DIAG INJ IV PUSH: CPT

## 2024-01-03 PROCEDURE — 96361 HYDRATE IV INFUSION ADD-ON: CPT

## 2024-01-03 RX ORDER — PROCHLORPERAZINE EDISYLATE 5 MG/ML
10 INJECTION INTRAMUSCULAR; INTRAVENOUS
Status: COMPLETED | OUTPATIENT
Start: 2024-01-03 | End: 2024-01-03

## 2024-01-03 RX ADMIN — PROCHLORPERAZINE EDISYLATE 10 MG: 5 INJECTION INTRAMUSCULAR; INTRAVENOUS at 09:01

## 2024-01-03 RX ADMIN — SODIUM CHLORIDE 1000 ML: 9 INJECTION, SOLUTION INTRAVENOUS at 09:01

## 2024-01-03 NOTE — ED PROVIDER NOTES
Encounter Date: 1/3/2024       History     Chief Complaint   Patient presents with    Vomiting      Patient presents with nausea vomiting and diarrhea for the past 2 days.  Saw her primary physician yesterday and got an injection of Phenergan and was improved but symptoms recurred.  She also reports she is tapering off Suboxone had been taking her Suboxone for over a year, currently was taking her Suboxone every other night but has not taken it for the past 2 nights as she is trying to stop taking it.  Patient does report others in the household have been ill, she had a negative flu test yesterday at her primary care physician's office.      Review of patient's allergies indicates:   Allergen Reactions    Oxycodone-acetaminophen Swelling and Edema    Oxycodone hcl     Zofran [ondansetron hcl] Edema     hands     Past Medical History:   Diagnosis Date    Acid reflux     Calculus of kidney     Depression     Gastrointestinal ulcerative mucositis 01/04/2023    Endoscopy Dr Ravi    Hypertension     Hypothyroidism     Low back pain     Lumbosacral radiculopathy 05/17/2021    Migraines     Multiple gastric ulcers     Pain     Postlaminectomy syndrome, lumbar region 05/17/2021    UTI (urinary tract infection) 08/23/2022     Past Surgical History:   Procedure Laterality Date    BACK SURGERY      CAUDAL EPIDURAL STEROID INJECTION Bilateral 11/08/2022    Procedure: CAUDAL BRENDA;  Surgeon: Elba Moon MD;  Location: [facility];  Service: Pain Management;  Laterality: Bilateral;  PT STATES HAD VAC  WILL BRING CARD    ENDOSCOPY  01/04/2023    INSERTION OF DORSAL COLUMN NERVE STIMULATOR FOR TRIAL N/A 07/15/2021    Procedure: INSERTION, NEUROSTIMULATOR, SPINAL CORD, DORSAL COLUMN, FOR TRIAL;  Surgeon: Elba Moon MD;  Location: [facility];  Service: Pain Management;  Laterality: N/A;  spoke to Dr Calin Burgess nurse, urine culture negative (results in Epic), pt cleared.    LITHOTRIPSY       Family History    Problem Relation Age of Onset    Stroke Father     Heart attack Father     Heart attack Brother     Cancer Brother      Social History     Tobacco Use    Smoking status: Never     Passive exposure: Past    Smokeless tobacco: Never   Substance Use Topics    Alcohol use: Never    Drug use: Never     Review of Systems   Constitutional: Negative.    HENT: Negative.     Eyes: Negative.    Respiratory: Negative.     Cardiovascular: Negative.    Gastrointestinal:  Positive for diarrhea, nausea and vomiting. Negative for abdominal pain, blood in stool and constipation.   Genitourinary: Negative.    Musculoskeletal: Negative.    Skin: Negative.    Neurological: Negative.    Psychiatric/Behavioral: Negative.     All other systems reviewed and are negative.      Physical Exam     Initial Vitals [01/03/24 0845]   BP Pulse Resp Temp SpO2   (!) 133/59 86 17 98.4 °F (36.9 °C) 98 %      MAP       --         Physical Exam    Nursing note and vitals reviewed.  Constitutional: She appears well-developed and well-nourished. No distress.   HENT:   Right Ear: External ear normal.   Left Ear: External ear normal.   Nose: Nose normal.   Mouth/Throat: Mucous membranes are dry.   Eyes: Conjunctivae and EOM are normal. Pupils are equal, round, and reactive to light.   Neck: Neck supple. No JVD present.   Normal range of motion.  Cardiovascular:  Normal rate, regular rhythm, normal heart sounds and intact distal pulses.           No murmur heard.  Pulmonary/Chest: Breath sounds normal. No stridor. No respiratory distress. She has no wheezes. She has no rhonchi. She has no rales.   Abdominal: Abdomen is soft. Bowel sounds are normal. She exhibits no distension. There is no abdominal tenderness.   Musculoskeletal:         General: No tenderness or edema. Normal range of motion.      Cervical back: Normal range of motion and neck supple.     Lymphadenopathy:     She has no cervical adenopathy.   Neurological: She is alert and oriented to  person, place, and time. She has normal strength. No cranial nerve deficit. GCS score is 15. GCS eye subscore is 4. GCS verbal subscore is 5. GCS motor subscore is 6.   Skin: Skin is warm and dry. Capillary refill takes less than 2 seconds. No rash noted. No erythema. No pallor.   Psychiatric: She has a normal mood and affect. Her behavior is normal.         Medical Screening Exam   See Full Note    ED Course   Procedures  Labs Reviewed - No data to display       Imaging Results    None          Medications   sodium chloride 0.9% bolus 1,000 mL 1,000 mL (1,000 mLs Intravenous New Bag 1/3/24 0919)   prochlorperazine injection Soln 10 mg (10 mg Intravenous Given 1/3/24 0917)     Medical Decision Making   Differential diagnosis includes viral gastroenteritis, colitis, enteritis, opioid withdrawal symptoms.      Patient was treated with IV fluid  and IV prochlorperazine. Patient advised she may need to taper more slowly on her Suboxone.      Discharge and follow up instructions given.    Risk  Prescription drug management.                                      Clinical Impression:   Final diagnoses:  [A08.4] Viral gastroenteritis (Primary)        ED Disposition Condition    Discharge Stable          ED Prescriptions    None       Follow-up Information       Follow up With Specialties Details Why Contact Info    Shannon Lange MD Family Medicine Schedule an appointment as soon as possible for a visit in 1 day To recheck; sooner if worse, not improving, or if any new symptoms. [id number]  [facility] [place] [id number]  [phone]               Srini Angulo DO  01/03/24 0959

## 2024-01-03 NOTE — ED TRIAGE NOTES
PATIENT PRESENTED TO ER ALONE WITH C/O VOMITING X 2 DAYS; SEEN YESTERDAY @ DR. BETANCOURT'S OFFICE FOR SAME COMPLAINT & RECEIVED PHENERGAN & STEROID SHOTS; PATIENT STARTED VOMITING AGAIN @ 7 AM & DR. BETANCOURT'S OFFICE SAID TO COME TO ER; PATIENT IS TRYING TO GET OFF HER SUBOXONE WITH DOCTOR'S SUPERVISION & LAST DOSE WAS 2 NIGHTS AGO    PATIENT STATED SHE WAS SICK DAY AFTER ERENDIRA & PEOPLE IN HER FAMILY HAD FLU; TESTED YESTERDAY WITH NEGATIVE RESULTS

## 2024-01-03 NOTE — ED NOTES
PATIENT C/O RESTLESS LEGS & WANTED SOME MEDICATION; ATTEMPTED PATIENT TEACHING R/T TAKING HER GABAPENTIN AS ORDERED; REQUESTED AGAIN FOR MEDICATION; NOTIFIED DR. CHERRY WITH PATIENT'S REQUEST WITH ORDER TO INFORM PATIENT TO TAKE 1/2 OF A SABOXONE WHEN SHE GOT HOME & TAKE HER GABAPENTIN AS ORDERED; PATIENT REQUESTED TO LEAVE ER STATING SHE FELT BETTER & HAD NO NAUSEA

## 2024-01-11 ENCOUNTER — PATIENT MESSAGE (OUTPATIENT)
Dept: NEUROLOGY | Facility: CLINIC | Age: 54
End: 2024-01-11
Payer: MEDICARE

## 2024-01-25 ENCOUNTER — OFFICE VISIT (OUTPATIENT)
Dept: NEUROLOGY | Facility: CLINIC | Age: 54
End: 2024-01-25
Payer: MEDICARE

## 2024-01-25 VITALS
HEART RATE: 62 BPM | OXYGEN SATURATION: 98 % | DIASTOLIC BLOOD PRESSURE: 70 MMHG | BODY MASS INDEX: 33.68 KG/M2 | WEIGHT: 183 LBS | SYSTOLIC BLOOD PRESSURE: 122 MMHG | HEIGHT: 62 IN

## 2024-01-25 DIAGNOSIS — R11.0 NAUSEA: ICD-10-CM

## 2024-01-25 PROCEDURE — 3074F SYST BP LT 130 MM HG: CPT | Mod: CPTII,,, | Performed by: NURSE PRACTITIONER

## 2024-01-25 PROCEDURE — 99214 OFFICE O/P EST MOD 30 MIN: CPT | Mod: PBBFAC | Performed by: NURSE PRACTITIONER

## 2024-01-25 PROCEDURE — 3078F DIAST BP <80 MM HG: CPT | Mod: CPTII,,, | Performed by: NURSE PRACTITIONER

## 2024-01-25 PROCEDURE — 4010F ACE/ARB THERAPY RXD/TAKEN: CPT | Mod: CPTII,,, | Performed by: NURSE PRACTITIONER

## 2024-01-25 PROCEDURE — 1160F RVW MEDS BY RX/DR IN RCRD: CPT | Mod: CPTII,,, | Performed by: NURSE PRACTITIONER

## 2024-01-25 PROCEDURE — 99213 OFFICE O/P EST LOW 20 MIN: CPT | Mod: S$PBB,,, | Performed by: NURSE PRACTITIONER

## 2024-01-25 PROCEDURE — 1159F MED LIST DOCD IN RCRD: CPT | Mod: CPTII,,, | Performed by: NURSE PRACTITIONER

## 2024-01-25 PROCEDURE — 3008F BODY MASS INDEX DOCD: CPT | Mod: CPTII,,, | Performed by: NURSE PRACTITIONER

## 2024-01-25 RX ORDER — PROMETHAZINE HYDROCHLORIDE 25 MG/1
TABLET ORAL
Qty: 20 TABLET | Refills: 1 | Status: SHIPPED | OUTPATIENT
Start: 2024-01-25

## 2024-01-25 NOTE — PATIENT INSTRUCTIONS
Continue current topamax 50mg once daily  Continue Aimovig 140mg once monthly, I have sent Rx to [facility]  Continue phenergan as needed as directed

## 2024-01-25 NOTE — PROGRESS NOTES
Subjective:       Patient ID: Gloria Mitchell is a 53 y.o. female     Chief Complaint:    Chief Complaint   Patient presents with    Follow-up     Pt states that she has had a migraine for three days.            Allergies:  Oxycodone-acetaminophen, Oxycodone hcl, and Zofran [ondansetron hcl]    Current Medications:    Outpatient Encounter Medications as of 1/25/2024   Medication Sig Dispense Refill    buprenorphine-naloxone 2-0.5 mg (SUBOXONE) 2-0.5 mg Subl Place 1 tablet under the tongue. Patient states she takes twice daily. Prescription bottle is ordered for 3 times daily      erenumab-aooe (AIMOVIG AUTOINJECTOR) 140 mg/mL autoinjector Inject 1 mL (140 mg total) into the skin every 28 days. 1 each 11    gabapentin (NEURONTIN) 800 MG tablet Take 1 tablet (800 mg total) by mouth 3 (three) times daily. (Patient taking differently: Take 400 mg by mouth once daily. Patient reports taking 400mg daily) 90 tablet 1    levothyroxine (SYNTHROID) 112 MCG tablet Take 112 mcg by mouth before breakfast.      losartan (COZAAR) 50 MG tablet Take 50 mg by mouth once daily.      methenamine (HIPREX) 1 gram Tab Take 1 tablet (1 g total) by mouth 2 (two) times daily. 180 tablet 3    omeprazole (PRILOSEC) 20 MG capsule 1 capsule 30 minutes before morning meal      potassium citrate (UROCIT-K) 10 mEq (1,080 mg) TbSR Take 2 tablets (20 mEq total) by mouth 2 (two) times a day. 360 tablet 3    promethazine (PHENERGAN) 25 MG tablet Take 1 tablet (25 mg total) by mouth every 4 (four) hours as needed for Nausea. 24 tablet 0    tiZANidine 4 mg Cap Take 1 capsule by mouth every 8 (eight) hours. (Patient taking differently: Take 1 capsule by mouth every evening.) 90 capsule 1    traZODone (DESYREL) 50 MG tablet Take 50 mg by mouth every evening.      metoprolol tartrate (LOPRESSOR) 50 MG tablet Take 100 mg by mouth Daily.       No facility-administered encounter medications on file as of 1/25/2024.       History of Present Illness  53  yr old white female presents with migraines that started as a teenager.     Currently treated with topamax 50mg bid (do not recommend higher dosing due to prior kidney stones) and aimovig 140mg once monthly injections.  Prior effexor in [id number] not effective any longer.  We obtained then covered through December of 2023.  She has reduced the topamax to 50mg QOD and doing well.  For most part her headaches have been doing very well, until 3 days ago has had migraine for the last 3 days.  Likely this is due to barametric changes with weather.  I am giving her samples of Ubrelvy here in clinic for her to use.    Currently only taking topamax 50mg once daily           Review of Systems  Review of Systems   Constitutional:  Negative for diaphoresis and fever.   HENT:  Negative for congestion, hearing loss and tinnitus.    Eyes:  Negative for blurred vision, double vision, photophobia, discharge and redness.   Respiratory:  Negative for cough and shortness of breath.    Cardiovascular:  Negative for chest pain.   Gastrointestinal:  Negative for abdominal pain, nausea and vomiting.   Musculoskeletal:  Negative for back pain, joint pain, myalgias and neck pain.   Skin:  Negative for itching and rash.   Neurological:  Positive for headaches. Negative for dizziness, tremors, sensory change, speech change, focal weakness, seizures, loss of consciousness and weakness.   Psychiatric/Behavioral:  Negative for depression, hallucinations and memory loss. The patient has insomnia.    All other systems reviewed and are negative.     Objective:     NEUROLOGICAL EXAMINATION:     MENTAL STATUS   Oriented to person, place, and time.   Registration: recalls 3 of 3 objects. Recall at 5 minutes: recalls 3 of 3 objects.   Attention: normal. Concentration: normal.   Speech: speech is normal   Level of consciousness: alert  Knowledge: good and consistent with education.   Normal comprehension.     CRANIAL NERVES     CN II   Visual fields  full to confrontation.   Visual acuity: normal  Right visual field deficit: none  Left visual field deficit: none     CN III, IV, VI   Pupils are equal, round, and reactive to light.  Extraocular motions are normal.   Right pupil: Size: 3 mm. Shape: regular. Reactivity: brisk. Consensual response: intact. Accommodation: intact.   Left pupil: Size: 3 mm. Shape: regular. Reactivity: brisk. Consensual response: intact. Accommodation: intact.   CN III: no CN III palsy  CN VI: no CN VI palsy  Nystagmus: none   Diplopia: none  Upgaze: normal  Downgaze: normal  Conjugate gaze: present  Vestibulo-ocular reflex: present    CN V   Facial sensation intact.   Right facial sensation deficit: none  Left facial sensation deficit: none  Right corneal reflex: normal  Left corneal reflex: normal    CN VII   Facial expression full, symmetric.   Right facial weakness: none  Left facial weakness: none  Right taste: normal  Left taste: normal    CN VIII   CN VIII normal.   Hearing: intact    CN IX, X   CN IX normal.   CN X normal.   Palate: symmetric    CN XI   CN XI normal.   Right sternocleidomastoid strength: normal  Left sternocleidomastoid strength: normal  Right trapezius strength: normal  Left trapezius strength: normal    CN XII   CN XII normal.   Tongue: not atrophic  Fasciculations: absent  Tongue deviation: none    MOTOR EXAM   Muscle bulk: normal  Overall muscle tone: normal  Right arm tone: normal  Left arm tone: normal  Right arm pronator drift: absent  Left arm pronator drift: absent  Right leg tone: normal  Left leg tone: normal    Strength   Right neck flexion: 5/5  Left neck flexion: 5/5  Right neck extension: 5/5  Left neck extension: 5/5  Right deltoid: 5/5  Left deltoid: 5/5  Right biceps: 5/5  Left biceps: 5/5  Right triceps: 5/5  Left triceps: 5/5  Right wrist flexion: 5/5  Left wrist flexion: 5/5  Right wrist extension: 5/5  Left wrist extension: 5/5  Right interossei: 5/5  Left interossei: 5/5  Right iliopsoas:  5/5  Left iliopsoas: 5/5  Right quadriceps: 5/5  Left quadriceps: 5/5  Right hamstrin/5  Left hamstrin/5  Right anterior tibial: 5/5  Left anterior tibial: 5/5  Right posterior tibial: 5/5  Left posterior tibial: 5/5  Right gastroc: 5/5  Left gastroc: 5/5    REFLEXES     Reflexes   Right brachioradialis: 2+  Left brachioradialis: 2+  Right biceps: 2+  Left biceps: 2+  Right triceps: 2+  Left triceps: 2+  Right patellar: 2+  Left patellar: 2+  Right achilles: 2+  Left achilles: 2+  Right plantar: normal  Left plantar: normal  Right Hernandez: absent  Left Hernandez: absent  Right ankle clonus: absent  Left ankle clonus: absent  Right pendular knee jerk: absent  Left pendular knee jerk: absent    SENSORY EXAM   Light touch normal.   Right arm light touch: normal  Left arm light touch: normal  Right leg light touch: normal  Left leg light touch: normal  Vibration normal.   Right arm vibration: normal  Left arm vibration: normal  Right leg vibration: normal  Left leg vibration: normal  Proprioception normal.   Right arm proprioception: normal  Left arm proprioception: normal  Right leg proprioception: normal  Left leg proprioception: normal  Pinprick normal.   Right arm pinprick: normal  Left arm pinprick: normal  Right leg pinprick: normal  Left leg pinprick: normal    GAIT AND COORDINATION     Gait  Gait: normal     Coordination   Finger to nose coordination: normal  Heel to shin coordination: normal  Tandem walking coordination: normal    Tremor   Resting tremor: absent  Intention tremor: absent  Action tremor: absent       Physical Exam  Vitals and nursing note reviewed.   Constitutional:       Appearance: Normal appearance.   HENT:      Head: Normocephalic.   Eyes:      Extraocular Movements: Extraocular movements intact and EOM normal.      Pupils: Pupils are equal, round, and reactive to light.   Cardiovascular:      Rate and Rhythm: Normal rate and regular rhythm.   Pulmonary:      Effort: Pulmonary effort  is normal.      Breath sounds: Normal breath sounds.   Musculoskeletal:         General: No swelling or tenderness. Normal range of motion.      Cervical back: Normal range of motion and neck supple.      Right lower leg: No edema.      Left lower leg: No edema.   Skin:     General: Skin is warm and dry.      Coloration: Skin is not jaundiced.      Findings: No rash.   Neurological:      General: No focal deficit present.      Mental Status: She is alert and oriented to person, place, and time.      GCS: GCS eye subscore is 4. GCS verbal subscore is 5. GCS motor subscore is 6.      Cranial Nerves: No cranial nerve deficit.      Sensory: No sensory deficit.      Motor: Motor function is intact. No weakness.      Coordination: Coordination is intact. Coordination normal. Finger-Nose-Finger Test and Heel to Shin Test normal.      Gait: Gait is intact. Gait and tandem walk normal.      Deep Tendon Reflexes: Reflexes normal.      Reflex Scores:       Tricep reflexes are 2+ on the right side and 2+ on the left side.       Bicep reflexes are 2+ on the right side and 2+ on the left side.       Brachioradialis reflexes are 2+ on the right side and 2+ on the left side.       Patellar reflexes are 2+ on the right side and 2+ on the left side.       Achilles reflexes are 2+ on the right side and 2+ on the left side.  Psychiatric:         Mood and Affect: Mood normal.         Speech: Speech normal.         Behavior: Behavior normal.        Assessment:     Problem List Items Addressed This Visit    None           Primary Diagnosis and ICD10  No primary diagnosis found.    Plan:     There are no Patient Instructions on file for this visit.    There are no discontinued medications.            Requested Prescriptions      No prescriptions requested or ordered in this encounter       No orders of the defined types were placed in this encounter.

## 2024-01-26 DIAGNOSIS — G43.719 INTRACTABLE CHRONIC MIGRAINE WITHOUT AURA AND WITHOUT STATUS MIGRAINOSUS: ICD-10-CM

## 2024-01-26 RX ORDER — ERENUMAB-AOOE 140 MG/ML
140 INJECTION, SOLUTION SUBCUTANEOUS
Qty: 1 EACH | Refills: 11 | Status: SHIPPED | OUTPATIENT
Start: 2024-01-26

## 2024-02-06 ENCOUNTER — HOSPITAL ENCOUNTER (OUTPATIENT)
Dept: RADIOLOGY | Facility: HOSPITAL | Age: 54
Discharge: HOME | End: 2024-02-06
Attending: FAMILY MEDICINE
Payer: COMMERCIAL

## 2024-02-06 DIAGNOSIS — R10.9 ACUTE ABDOMINAL PAIN: ICD-10-CM

## 2024-02-06 DIAGNOSIS — K59.00 CONSTIPATED: ICD-10-CM

## 2024-02-06 PROCEDURE — 74018 RADEX ABDOMEN 1 VIEW: CPT | Mod: TC

## 2024-02-08 DIAGNOSIS — N39.0 URINARY TRACT INFECTION WITHOUT HEMATURIA, SITE UNSPECIFIED: ICD-10-CM

## 2024-02-08 RX ORDER — METHENAMINE HIPPURATE 1000 MG/1
1 TABLET ORAL 2 TIMES DAILY
Qty: 180 TABLET | Refills: 3 | Status: SHIPPED | OUTPATIENT
Start: 2024-02-08 | End: 2025-02-02

## 2024-02-08 NOTE — TELEPHONE ENCOUNTER
----- Message from Jeni Gamino sent at 2/8/2024 10:29 AM CST -----  calling to get a refill on methenamine (HIPREX) 1 gram Tab sent to [facility] - pt is completely out    Patients next appointment with Dr Ruvalcaba is 4/1/2024 at 10:30.

## 2024-03-07 ENCOUNTER — PATIENT MESSAGE (OUTPATIENT)
Dept: NEUROLOGY | Facility: CLINIC | Age: 54
End: 2024-03-07
Payer: MEDICAID

## 2024-04-01 ENCOUNTER — OFFICE VISIT (OUTPATIENT)
Dept: UROLOGY | Facility: CLINIC | Age: 54
End: 2024-04-01
Payer: COMMERCIAL

## 2024-04-01 ENCOUNTER — HOSPITAL ENCOUNTER (OUTPATIENT)
Dept: RADIOLOGY | Facility: HOSPITAL | Age: 54
Discharge: HOME | End: 2024-04-01
Attending: UROLOGY
Payer: COMMERCIAL

## 2024-04-01 VITALS
BODY MASS INDEX: 35.7 KG/M2 | WEIGHT: 194 LBS | SYSTOLIC BLOOD PRESSURE: 108 MMHG | HEIGHT: 62 IN | DIASTOLIC BLOOD PRESSURE: 72 MMHG

## 2024-04-01 DIAGNOSIS — N39.0 URINARY TRACT INFECTION WITHOUT HEMATURIA, SITE UNSPECIFIED: ICD-10-CM

## 2024-04-01 DIAGNOSIS — Z87.440 HISTORY OF RECURRENT UTIS: ICD-10-CM

## 2024-04-01 DIAGNOSIS — I10 ESSENTIAL HYPERTENSION: ICD-10-CM

## 2024-04-01 DIAGNOSIS — N20.0 CALCULUS OF KIDNEY: ICD-10-CM

## 2024-04-01 DIAGNOSIS — N20.0 KIDNEY STONE: Primary | ICD-10-CM

## 2024-04-01 PROCEDURE — 3008F BODY MASS INDEX DOCD: CPT | Mod: CPTII,,, | Performed by: UROLOGY

## 2024-04-01 PROCEDURE — 1159F MED LIST DOCD IN RCRD: CPT | Mod: CPTII,,, | Performed by: UROLOGY

## 2024-04-01 PROCEDURE — 3078F DIAST BP <80 MM HG: CPT | Mod: CPTII,,, | Performed by: UROLOGY

## 2024-04-01 PROCEDURE — 1160F RVW MEDS BY RX/DR IN RCRD: CPT | Mod: CPTII,,, | Performed by: UROLOGY

## 2024-04-01 PROCEDURE — 4010F ACE/ARB THERAPY RXD/TAKEN: CPT | Mod: CPTII,,, | Performed by: UROLOGY

## 2024-04-01 PROCEDURE — 3074F SYST BP LT 130 MM HG: CPT | Mod: CPTII,,, | Performed by: UROLOGY

## 2024-04-01 PROCEDURE — 99213 OFFICE O/P EST LOW 20 MIN: CPT | Mod: S$PBB,,, | Performed by: UROLOGY

## 2024-04-01 PROCEDURE — 74018 RADEX ABDOMEN 1 VIEW: CPT | Mod: TC

## 2024-04-01 PROCEDURE — 99214 OFFICE O/P EST MOD 30 MIN: CPT | Mod: PBBFAC,25 | Performed by: UROLOGY

## 2024-04-01 PROCEDURE — 74018 RADEX ABDOMEN 1 VIEW: CPT | Mod: 26,,, | Performed by: RADIOLOGY

## 2024-04-01 RX ORDER — POTASSIUM CITRATE 10 MEQ/1
20 TABLET, EXTENDED RELEASE ORAL 2 TIMES DAILY
Qty: 360 TABLET | Refills: 3 | Status: SHIPPED | OUTPATIENT
Start: 2024-04-01 | End: 2025-03-27

## 2024-04-01 RX ORDER — GABAPENTIN 800 MG/1
TABLET ORAL
COMMUNITY

## 2024-04-01 RX ORDER — TIZANIDINE 4 MG/1
4 TABLET ORAL NIGHTLY
COMMUNITY
Start: 2023-02-10

## 2024-04-01 RX ORDER — ESCITALOPRAM OXALATE 10 MG/1
10 TABLET ORAL DAILY
COMMUNITY
Start: 2024-01-30

## 2024-04-01 NOTE — PATIENT INSTRUCTIONS
Patient doing well clinically at present.  KUB reviewed with patient.  Medications renewed.  Ms. Mitchell understands I plan to retire June 30th.  Appointment in 1 year with Mr. Matty Mae with [facility].

## 2024-04-01 NOTE — PROGRESS NOTES
Subjective     Patient ID: Gloria Mitchell is a 53 y.o. female.    Chief Complaint: Follow-up (One year KUB and office visit. )    Chief Complaint: Follow-up (Six  month KUB and office visit. )     Ms. Mitchell presented to the clinic today because of left ureteral colic.  She had onset pain on April 2nd and went to the emergency room at [facility].  She was found to have a large stone at the left ureteropelvic junction it measures about 6 x 10 mm.  CT scan was done but she did not have a KUB.  She had a lot pain that day and then did okay for couple of days but today has been having increasing left flank pain again.  She did require Dilaudid and has only a few Dilaudid left.  Presented to the clinic in acute pain as she says she did not hurt any until after she arrived in [place].  She desires to go ahead with definitive treatment because the amount of pain she has had on intermittent basis.  Patient had left ureteroscopy with laser lithotripsy of stone in the distal left ureter in July 2020.  That was the last active stones she has had until this one.  She has a long history of stone disease dating back a good many years.  Previous stone risk profile revealed her to have low citrate and she has been taking her potassium citrate as directed which is two 10 mEq tablets b.i.d..  She admits she does not drink as much water she should.  Has had multiple procedures in the past.  (April 6, 2021  ---------------------------------------------------------------------------------------------------------------------------------------------------------------------     Ms. Mitchell underwent left ureteroscopy with laser lithotripsy of stone and stent insertion on April 8. She was supposed to keep her stent until Monday but accidentally pulled it out on Saturday.  She has had a lot of pain since then and continues to have a lot of pain.  She presented clinic with severe pain on left side.  She took her last Dilaudid at 9:30  a.m. today.  She is also concerned she may have some infection.  The urine culture that was collected initially at her clinic visit grew 30,000 colonies Citrobacter and E coli and 20,000 colonies of Enterococcus faecalis.  She did get Bactrim DS which is appropriate and she is not febrile.  Presented to clinic with her mother.  (April 12, 2021)     Ms. Mitchell is in for KUB and follow-up since her left ureteroscopy.  She is not having any more stone pain.  She passed a stone on or about April 25th which apparently was a small fragment.  She did not have any pain associated with it.  Comfortable now and back to her baseline.  She is going to pain clinic for her back problems.  Nothing that sounds like any additional stone issues.  She is drinking only water and trying to drink more.  She is taking her potassium citrate to help with stone prevention..  In today for KUB and follow-up.  Her mother was with her today.  (May 17, 2021)     Ms. Mitchell is seen in clinic for the 1st time in over a year but has had at least 3 urinary tract infection during the past year.  We treated her for at least 2 and she was treated in [facility] for infection about 2 weeks ago.  That treatment was with Cipro and clinically seemed to improve although we are not sure what culture showed or if a culture was done.  Now off the Cipro for the last few days with no symptoms.  She has had no recent stone problems.  No other health problems that she is aware of except for pneumonia that was treated as outpatient.  (June 6, 2022)     Ms. Mitchell was seen is work-in patient today because she says she feels she has a stone as she has been hurting in her left flank since it began Saturday night and also feels she has another infection.  She is also having dysuria. (June 20, 2022)  XXXXXXXXXXXXXXXXXXXXXXXXXXXXXXXXXXXXXXXXXXXXXXXXXXXXXXXXXXXXXXXXXXXXXXXXXXXXXXXXXXXXXXXXXXXXXXXXXXXXXXXX     Ms. Mitchell is an established patient of Dr. Ruvalcaba's with  "history of renal stones.  She has been worked into clinic today for c/o left flank and pelvic pain x 4 days.  She   Left Ureteroscopy with laser lithotripsy 4/8/21  Seen for left flank pain 6/20, no ureteral stone seen on KUB, and was to f/u if pain persisted for updated CT - last one year prior.  Urine positive for 30,000 E. Coli.  (8/19/2022)----------------------------------------------------------------        Ms. Mitchell is a 51 yo female who has returned today with her mother after obtaining CT Imaging this morning in [place] for L/R renal colic.  Dr. Ruvalcaba to room to speak with patient and review imaging.  Patient was COVID positive 7/29.  States resolution of symptoms.      -  Chronic pain syndrome:  Followed by pain tx for chronic low back pain, treated with Norco 10 BID.     -  Left /Right flank pain:  Unable to confirm on KUB last visit.  CX positive, tx started today (see below)  CT Stone Protocol today resulted:  "Similar mild-to-moderate left-sided hydronephrosis. There has been some distal migration of previously described left UPJ stone measuring a mm which is now at the S1 level. A few punctate calculi are adjacent to this within the more proximal left ureter.  Similar 2 mm calculus within the distal right ureter at the inferior sacral level without significant hydronephrosis on the right. Multiple subcentimeter nonobstructing bilateral renal calculi are present."     -  UTI (urinary tract infection):  Urine culture returned positive for significant E. Coli and Yeast infections.  Will treat with 10 day course of Ceftin 250 mg po BID with daily probiotics.  Instruct patient not to take Omeprazole (Prilosec) in conjunction with this antx.  Urine was also positive for microhematuria.  This may be present b/c of UTI, and/or secondary UTI from active renal stone.     -  Yeast cystitis:    Significant growth seen in urine studies.  tx with diflucan x one (ordered this am " PTA).  (8/23/2022)--------------------------------------------------------------------------      Review of Systems   Constitutional: Negative.    HENT: Negative.    Eyes: Negative.    Respiratory: Negative.    Cardiovascular: Negative.    Gastrointestinal: Negative.    Endocrine: Negative.    Genitourinary: Positive for dysuria, flank pain, frequency, hematuria, pelvic pain, urgency and vaginal discharge. Negative for decreased urine volume, difficulty urinating, genital sores, menstrual problem, vaginal bleeding and vaginal pain.        L/R bilateral flank pain;  Vaginal itching   Musculoskeletal: Positive for back pain.        Chronic low back pain - followed by [facility].   Skin: Negative.    Allergic/Immunologic: Negative.    Neurological: Negative.    Hematological: Negative.    Psychiatric/Behavioral: Negative.    ---------------------------------------------------------------------------------------------------------------------------------------------------------------------------------------  The above is from the history of Ms. Cayla Gamino of August 23rd prior to the left ureteroscopy of August 26.  Ms. Mitchell is doing better since that time.  She had no trouble removing her stent postop.  No sensation of infection today and basically back to her baseline.  She is on potassium citrate 3 times daily like she has been on.  Previous stone risk profile in 2014 revealed very low urine citrate level as well as low urine output.  She is taking her medication as prescribed.  Still having stones but fewer than she formerly had. [September 26, 2022]     Ms. Mitchell was hospitalized in [place] for urinary tract infection with possible sepsis few weeks ago.  Doing better now with no symptoms residual infection.  She did require IV antibiotics.  Urine culture grew over 100,000 colonies of E coli and 51,000 colonies of Enterococcus.  She is feeling better now but blood pressure today is quite low.  She is  actually on a lot of blood pressure medication and may need to omit some for the time being.  She feels very tired but otherwise doing okay with no symptoms of active infection.  [March 28, 2023]    Ms. Mitchell is in for 1st visit in 1 year.  Doing well currently on combination of methenamine hippurate and potassium citrate.  She has not had any recent stones and has not had any clinical infections in recent past.  Feeling well but has had another 12 lb weight gain.  Nothing to report as far as health issues for the past year.   KUB reviewed and I do not see definite stones but there may be faint calcifications overlying both renal areas.  We know she has stones from previous imaging. [April 1, 2024]           Review of Systems       Objective     Physical Exam  Constitutional:       Appearance: Normal appearance. She is normal weight.   Neurological:      Mental Status: She is alert.   Psychiatric:         Mood and Affect: Mood normal.         Behavior: Behavior normal.     Urinalysis had only occasional pus cells and I did not see any red cells.  Dipstick negative with pH 7.0 and specific gravity 1.010.     Assessment and Plan     1. Kidney stone  -     potassium citrate (UROCIT-K) 10 mEq (1,080 mg) TbSR; Take 2 tablets (20 mEq total) by mouth 2 (two) times a day.  Dispense: 360 tablet; Refill: 3  -     X-Ray KUB; Future; Expected date: 04/02/2025    2. Urinary tract infection without hematuria, site unspecified    3. History of recurrent UTIs    4. Calculus of kidney    5. Essential hypertension      Patient doing well clinically at present.  KUB reviewed with patient.  Medications renewed.  Ms. Mitchell understands I plan to retire June 30th.  Appointment in 1 year with Mr. Matty Mae with [facility].          Follow up in 1 year (on 4/8/2025) for Tuesday, April 8, 2025 at 10:30 AM for , 1 yr. with [facility].

## 2024-04-09 ENCOUNTER — TELEPHONE (OUTPATIENT)
Dept: NEUROLOGY | Facility: CLINIC | Age: 54
End: 2024-04-09
Payer: COMMERCIAL

## 2024-07-16 ENCOUNTER — TELEPHONE (OUTPATIENT)
Dept: UROLOGY | Facility: CLINIC | Age: 54
End: 2024-07-16
Payer: MEDICARE

## 2024-07-16 ENCOUNTER — HOSPITAL ENCOUNTER (OUTPATIENT)
Dept: RADIOLOGY | Facility: HOSPITAL | Age: 54
Discharge: HOME | End: 2024-07-16
Attending: NURSE PRACTITIONER
Payer: MEDICARE

## 2024-07-16 ENCOUNTER — OFFICE VISIT (OUTPATIENT)
Dept: UROLOGY | Facility: CLINIC | Age: 54
End: 2024-07-16
Payer: MEDICARE

## 2024-07-16 VITALS
HEIGHT: 62 IN | SYSTOLIC BLOOD PRESSURE: 128 MMHG | RESPIRATION RATE: 18 BRPM | DIASTOLIC BLOOD PRESSURE: 62 MMHG | HEART RATE: 61 BPM | BODY MASS INDEX: 36.44 KG/M2 | TEMPERATURE: 98 F | WEIGHT: 198 LBS | OXYGEN SATURATION: 98 %

## 2024-07-16 DIAGNOSIS — R52 PAIN: ICD-10-CM

## 2024-07-16 DIAGNOSIS — R39.15 URGENCY OF URINATION: ICD-10-CM

## 2024-07-16 DIAGNOSIS — N20.1 URETERAL STONE: ICD-10-CM

## 2024-07-16 DIAGNOSIS — R35.1 NOCTURIA: ICD-10-CM

## 2024-07-16 DIAGNOSIS — N20.0 CALCULUS OF KIDNEY: ICD-10-CM

## 2024-07-16 DIAGNOSIS — N20.0 CALCULUS OF KIDNEY: Primary | ICD-10-CM

## 2024-07-16 PROBLEM — E66.01 MORBID OBESITY: Status: ACTIVE | Noted: 2024-07-16

## 2024-07-16 PROCEDURE — 99215 OFFICE O/P EST HI 40 MIN: CPT | Mod: PBBFAC,25 | Performed by: NURSE PRACTITIONER

## 2024-07-16 PROCEDURE — 74176 CT ABD & PELVIS W/O CONTRAST: CPT | Mod: TC

## 2024-07-16 PROCEDURE — 99213 OFFICE O/P EST LOW 20 MIN: CPT | Mod: S$PBB,,, | Performed by: NURSE PRACTITIONER

## 2024-07-16 PROCEDURE — 3008F BODY MASS INDEX DOCD: CPT | Mod: CPTII,,, | Performed by: NURSE PRACTITIONER

## 2024-07-16 PROCEDURE — 99999 PR PBB SHADOW E&M-EST. PATIENT-LVL V: CPT | Mod: PBBFAC,,, | Performed by: NURSE PRACTITIONER

## 2024-07-16 PROCEDURE — 87086 URINE CULTURE/COLONY COUNT: CPT | Mod: ,,, | Performed by: CLINICAL MEDICAL LABORATORY

## 2024-07-16 PROCEDURE — 4010F ACE/ARB THERAPY RXD/TAKEN: CPT | Mod: CPTII,,, | Performed by: NURSE PRACTITIONER

## 2024-07-16 PROCEDURE — 74176 CT ABD & PELVIS W/O CONTRAST: CPT | Mod: 26,,, | Performed by: RADIOLOGY

## 2024-07-16 PROCEDURE — 1159F MED LIST DOCD IN RCRD: CPT | Mod: CPTII,,, | Performed by: NURSE PRACTITIONER

## 2024-07-16 PROCEDURE — 3078F DIAST BP <80 MM HG: CPT | Mod: CPTII,,, | Performed by: NURSE PRACTITIONER

## 2024-07-16 PROCEDURE — 1160F RVW MEDS BY RX/DR IN RCRD: CPT | Mod: CPTII,,, | Performed by: NURSE PRACTITIONER

## 2024-07-16 PROCEDURE — 3074F SYST BP LT 130 MM HG: CPT | Mod: CPTII,,, | Performed by: NURSE PRACTITIONER

## 2024-07-16 RX ORDER — HYDROMORPHONE HYDROCHLORIDE 2 MG/1
2 TABLET ORAL EVERY 4 HOURS PRN
Qty: 12 TABLET | Refills: 0 | Status: SHIPPED | OUTPATIENT
Start: 2024-07-16

## 2024-07-16 RX ORDER — PROMETHAZINE HYDROCHLORIDE 25 MG/1
TABLET ORAL
Qty: 12 TABLET | Refills: 0 | Status: SHIPPED | OUTPATIENT
Start: 2024-07-16

## 2024-07-16 NOTE — TELEPHONE ENCOUNTER
I called pt back and told her that NP Tu will see her today as a work in, may have to wait few minutes, but will get seen.  Also got the CT Scan renal study precerted, so come in about 1pm and go to imaging first and then come up to urology.  The automated messages will say CT at 4:30pm, but monica not come at 4:30pm, come and got there around 1pm.  She voiced understanding.

## 2024-07-16 NOTE — PATIENT INSTRUCTIONS
Urine culture   Continue Hiprex 1 gm twice a day   Continue Urocit K 10 mEq as prescribed   Rx Dilaudid 2 mg take one tablet every 4 hours as needed for stone pain, no working or driving when taking this medication   Rx Phenergan 25 mg take one tablet every 4 hours as needed for nausea, no driving when taking this medication   Stay hydrated   Strain all urine and bring any stone collected to the next visit   Follow up with Urology NP VINCE Gonzalez in one month or sooner if needed

## 2024-07-16 NOTE — TELEPHONE ENCOUNTER
----- Message from Jeni Gamino sent at 7/16/2024  2:46 PM CDT -----  Who Called: Gloria Mitchell    Pharmacy is calling to request assistance with Rx    Pharmacy name and phone number: [facility] [place]  Pharmacy contact: Connie [phone]  Prescription Name: HYDROmorphone (DILAUDID) 2 MG tablet       Preferred Method of Contact: Phone Call  Patient's Preferred Phone Number on File: [phone]   Best Call Back Number, if different:  I called pharmacy [facility] [place], and spoke with Connie.  She said the Dilaudid  is a 60 mme and the dose is too high, should be limited to no more than 5 per day, she can add that on with his permission, and make sure he know she was on Suboxone, last filled in April.  I relayed this information to him and he said it is ok to limit to 5 per day and she does not need to take the Dilaudid when she takes the Suboxone.  I relayed this info to Connie.

## 2024-07-16 NOTE — PROGRESS NOTES
Subjective     Patient ID: Gloria Mitchell is a 54 y.o. female.    Chief Complaint: No chief complaint on file.    Chief Complaint: Follow-up (Six  month KUB and office visit. )     Ms. Mitchell presented to the clinic today because of left ureteral colic.  She had onset pain on April 2nd and went to the emergency room at [facility].  She was found to have a large stone at the left ureteropelvic junction it measures about 6 x 10 mm.  CT scan was done but she did not have a KUB.  She had a lot pain that day and then did okay for couple of days but today has been having increasing left flank pain again.  She did require Dilaudid and has only a few Dilaudid left.  Presented to the clinic in acute pain as she says she did not hurt any until after she arrived in [place].  She desires to go ahead with definitive treatment because the amount of pain she has had on intermittent basis.  Patient had left ureteroscopy with laser lithotripsy of stone in the distal left ureter in July 2020.  That was the last active stones she has had until this one.  She has a long history of stone disease dating back a good many years.  Previous stone risk profile revealed her to have low citrate and she has been taking her potassium citrate as directed which is two 10 mEq tablets b.i.d..  She admits she does not drink as much water she should.  Has had multiple procedures in the past.  (April 6, 2021  ---------------------------------------------------------------------------------------------------------------------------------------------------------------------     Ms. Mitchell underwent left ureteroscopy with laser lithotripsy of stone and stent insertion on April 8. She was supposed to keep her stent until Monday but accidentally pulled it out on Saturday.  She has had a lot of pain since then and continues to have a lot of pain.  She presented clinic with severe pain on left side.  She took her last Dilaudid at 9:30 a.m. today.  She  is also concerned she may have some infection.  The urine culture that was collected initially at her clinic visit grew 30,000 colonies Citrobacter and E coli and 20,000 colonies of Enterococcus faecalis.  She did get Bactrim DS which is appropriate and she is not febrile.  Presented to clinic with her mother.  (April 12, 2021)     Ms. Mitchell is in for KUB and follow-up since her left ureteroscopy.  She is not having any more stone pain.  She passed a stone on or about April 25th which apparently was a small fragment.  She did not have any pain associated with it.  Comfortable now and back to her baseline.  She is going to pain clinic for her back problems.  Nothing that sounds like any additional stone issues.  She is drinking only water and trying to drink more.  She is taking her potassium citrate to help with stone prevention..  In today for KUB and follow-up.  Her mother was with her today.  (May 17, 2021)     Ms. Mitchell is seen in clinic for the 1st time in over a year but has had at least 3 urinary tract infection during the past year.  We treated her for at least 2 and she was treated in [facility] for infection about 2 weeks ago.  That treatment was with Cipro and clinically seemed to improve although we are not sure what culture showed or if a culture was done.  Now off the Cipro for the last few days with no symptoms.  She has had no recent stone problems.  No other health problems that she is aware of except for pneumonia that was treated as outpatient.  (June 6, 2022)     Ms. Mitchell was seen is work-in patient today because she says she feels she has a stone as she has been hurting in her left flank since it began Saturday night and also feels she has another infection.  She is also having dysuria. (June 20, 2022)  [id number]XXXXXXXXXXXX     Ms. Mitchell is an established patient of Dr. Ruvalcaba's with history of renal  "stones.  She has been worked into clinic today for c/o left flank and pelvic pain x 4 days.  She   Left Ureteroscopy with laser lithotripsy 4/8/21  Seen for left flank pain 6/20, no ureteral stone seen on KUB, and was to f/u if pain persisted for updated CT - last one year prior.  Urine positive for 30,000 E. Coli.  (8/19/2022)----------------------------------------------------------------        Ms. Mitchell is a 53 yo female who has returned today with her mother after obtaining CT Imaging this morning in [place] for L/R renal colic.  Dr. Ruvalcaba to room to speak with patient and review imaging.  Patient was COVID positive 7/29.  States resolution of symptoms.      -  Chronic pain syndrome:  Followed by pain tx for chronic low back pain, treated with Norco 10 BID.     -  Left /Right flank pain:  Unable to confirm on KUB last visit.  CX positive, tx started today (see below)  CT Stone Protocol today resulted:  "Similar mild-to-moderate left-sided hydronephrosis. There has been some distal migration of previously described left UPJ stone measuring a mm which is now at the S1 level. A few punctate calculi are adjacent to this within the more proximal left ureter.  Similar 2 mm calculus within the distal right ureter at the inferior sacral level without significant hydronephrosis on the right. Multiple subcentimeter nonobstructing bilateral renal calculi are present."     -  UTI (urinary tract infection):  Urine culture returned positive for significant E. Coli and Yeast infections.  Will treat with 10 day course of Ceftin 250 mg po BID with daily probiotics.  Instruct patient not to take Omeprazole (Prilosec) in conjunction with this antx.  Urine was also positive for microhematuria.  This may be present b/c of UTI, and/or secondary UTI from active renal stone.     -  Yeast cystitis:    Significant growth seen in urine studies.  tx with diflucan x one (ordered this am " PTA).  (8/23/2022)--------------------------------------------------------------------------      Review of Systems   Constitutional: Negative.    HENT: Negative.    Eyes: Negative.    Respiratory: Negative.    Cardiovascular: Negative.    Gastrointestinal: Negative.    Endocrine: Negative.    Genitourinary: Positive for dysuria, flank pain, frequency, hematuria, pelvic pain, urgency and vaginal discharge. Negative for decreased urine volume, difficulty urinating, genital sores, menstrual problem, vaginal bleeding and vaginal pain.        L/R bilateral flank pain;  Vaginal itching   Musculoskeletal: Positive for back pain.        Chronic low back pain - followed by [facility].   Skin: Negative.    Allergic/Immunologic: Negative.    Neurological: Negative.    Hematological: Negative.    Psychiatric/Behavioral: Negative.    ---------------------------------------------------------------------------------------------------------------------------------------------------------------------------------------  The above is from the history of Ms. Cayla Gamino of August 23rd prior to the left ureteroscopy of August 26.  Ms. Mitchell is doing better since that time.  She had no trouble removing her stent postop.  No sensation of infection today and basically back to her baseline.  She is on potassium citrate 3 times daily like she has been on.  Previous stone risk profile in 2014 revealed very low urine citrate level as well as low urine output.  She is taking her medication as prescribed.  Still having stones but fewer than she formerly had. [September 26, 2022]     Ms. Mitchell was hospitalized in [place] for urinary tract infection with possible sepsis few weeks ago.  Doing better now with no symptoms residual infection.  She did require IV antibiotics.  Urine culture grew over 100,000 colonies of E coli and 51,000 colonies of Enterococcus.  She is feeling better now but blood pressure today is quite low.  She is  actually on a lot of blood pressure medication and may need to omit some for the time being.  She feels very tired but otherwise doing okay with no symptoms of active infection.  [March 28, 2023]     Ms. Mitchell is in for 1st visit in 1 year.  Doing well currently on combination of methenamine hippurate and potassium citrate.  She has not had any recent stones and has not had any clinical infections in recent past.  Feeling well but has had another 12 lb weight gain.  Nothing to report as far as health issues for the past year.   KUB reviewed and I do not see definite stones but there may be faint calcifications overlying both renal areas.  We know she has stones from previous imaging. [April 1, 2024]  --------------------------------------------------------------------------------------------------------------------------------------------------------------------------------------------------------------------------------------------------------------------------------------------------------------------------    The above notes are from Dr. LARISSA Ruvalcaba in the EMR system      This pleasant 54 year old female presents to the clinic with mother as a work in for kidney stones and CT results.  She is a long standing patient of Dr. LARISSA Ruvalcaba for kidney stones and UTI's.  Patient states she had left flank pain on Friday July 12, 2024. She reports some nausea and back pain that she rates as a 3 on a scale of 0 to 10.  Her CT renal stone study done today showed Mild-to-moderate right hydronephrosis with calculus in the distal right ureter 4 mm in size.  I reviewed the CT with Dr. Ruvalcaba and discussed with the patient the results.  Dr. Ruvalcaba recommends giving the stone more time to pass and she is agreeable.  I will renew her Dilaudid 2 mg and Phenergan 25 mg as outlined in the plan.  She was encouraged to stay hydrated and strain all urine for stone.  She will bring any stone collected to the next visit. She will continue the Hiprex  and Urocit K 10 mEq as prescribed.  I will culture her urine and treat if indicated. She does report some urgency, incontinence, incomplete bladder emptying and nocturia 3 to 4 times a night. Her PVR was 129 mls.  She denies dysuria, hematuria, or symptoms of a UTI. She denies fever, chills or vomiting but did have some nausea.  She got a little weak when she got up off the table so we had her lay down with knees elevated.  We checked her vital signs and BP was 119/62, HR 76, O2 sat or room air was 99%.  Her blood sugar was checked and it was 76.  She ate a snickers bar while in the room.  She states she is feeling better.  I discussed the plan in detail with Dr. Ruvalcaba, the patient and mother and they are in agreement with the plan.  All her questions were answered at today's visit.     CT RENAL STONE STUDY ABD PELVIS WO done on July 16, 2023.   CLINICAL HISTORY:  renal stone;  TECHNIQUE:  Axial CT imaging of the abdomen and pelvis is performed without contrast.  CT dose reduction technique used - Dose Rite and tube current modulation.  COMPARISON:  8 March 2023  FINDINGS:  Cardiac and lung bases are within normal limits  CT abdomen: The liver, spleen, pancreas and adrenal glands are normal in size and density.  No evidence of focal lesion is demonstrated in these solid organs.  There is mild-to-moderate right hydronephrosis and hydroureter.  There is a calculus in the distal right ureter that measures 4 mm in width.  The bowel caliber is normal and no wall thickening or adjacent inflammatory change is seen.  No evidence of free fluid or free air is present.  Appendix is normal.  CT pelvis: The bowel and bladder appear within normal limits.  The pelvic organs show no evidence of abnormality   Impression:  Mild-to-moderate right hydronephrosis with calculus in the distal right ureter 4 mm in size.   Electronically signed by:Hernán Bo.  ---------------------------------------------------------------------  [July  16, 2024].             Review of Systems   Constitutional:  Negative for activity change and fever.   HENT:  Negative for hearing loss and trouble swallowing.    Eyes:  Negative for visual disturbance.   Respiratory:  Negative for cough, shortness of breath and wheezing.    Cardiovascular:  Negative for chest pain.   Gastrointestinal:  Negative for abdominal pain, diarrhea, nausea and vomiting.   Endocrine: Negative for polyuria.   Genitourinary:  Positive for nocturia and urgency. Negative for bladder incontinence, decreased urine volume, difficulty urinating, dysuria, enuresis, flank pain, frequency and hematuria.        Kidney stones        Right Ureteral Stone    Musculoskeletal:  Negative for back pain and gait problem.   Integumentary:  Negative for rash.   Neurological:  Negative for speech difficulty and weakness.   Psychiatric/Behavioral:  Negative for behavioral problems and confusion.           Objective     Physical Exam  Vitals and nursing note reviewed.   Constitutional:       General: She is not in acute distress.     Appearance: Normal appearance. She is not ill-appearing, toxic-appearing or diaphoretic.   HENT:      Head: Normocephalic.   Eyes:      Extraocular Movements: Extraocular movements intact.   Cardiovascular:      Rate and Rhythm: Normal rate and regular rhythm.      Heart sounds: Normal heart sounds.   Pulmonary:      Effort: Pulmonary effort is normal.      Breath sounds: Normal breath sounds. No wheezing, rhonchi or rales.   Abdominal:      General: Bowel sounds are normal.      Palpations: Abdomen is soft.      Tenderness: There is no abdominal tenderness. There is no right CVA tenderness, left CVA tenderness, guarding or rebound.   Musculoskeletal:         General: Normal range of motion.      Cervical back: Normal range of motion. No rigidity.   Skin:     General: Skin is warm and dry.   Neurological:      General: No focal deficit present.      Mental Status: She is alert and  oriented to person, place, and time.      Motor: No weakness.      Coordination: Coordination normal.      Gait: Gait normal.   Psychiatric:         Mood and Affect: Mood normal.         Behavior: Behavior normal.         Thought Content: Thought content normal.        Assessment and Plan     Problem List Items Addressed This Visit          Renal/    Calculus of kidney - Primary    Relevant Medications    promethazine (PHENERGAN) 25 MG tablet    HYDROmorphone (DILAUDID) 2 MG tablet    Ureteral stone    Relevant Medications    promethazine (PHENERGAN) 25 MG tablet    HYDROmorphone (DILAUDID) 2 MG tablet    Nocturia    Relevant Orders    Urine culture    Urgency of urination    Relevant Orders    Urine culture        Urine culture   Continue Hiprex 1 gm twice a day   Continue Urocit K 10 mEq as prescribed   Rx Dilaudid 2 mg take one tablet every 4 hours as needed for stone pain, no working or driving when taking this medication   Rx Phenergan 25 mg take one tablet every 4 hours as needed for nausea, no driving when taking this medication   Stay hydrated   Strain all urine and bring any stone collected to the next visit   Follow up with Urology NP VINCE Gonzalez in one month or sooner if needed

## 2024-07-16 NOTE — TELEPHONE ENCOUNTER
----- Message from Amanda Ott sent at 7/16/2024  9:21 AM CDT -----  Who Called: Gloria Mitchell    Caller is requesting assistance/information from provider's office.    Symptoms (please be specific): Pt states she has a stone    Requesting to speak w nurse about stone       Preferred Method of Contact: Phone Call  Patient's Preferred Phone Number on File: [phone]   Best Call Back Number, if different:  Additional Information:  Is being worked in today at 1pm for CT renal study and see NP at 2pm.  Pt was notified.

## 2024-07-18 ENCOUNTER — TELEPHONE (OUTPATIENT)
Dept: UROLOGY | Facility: CLINIC | Age: 54
End: 2024-07-18
Payer: MEDICARE

## 2024-07-18 LAB — UA COMPLETE W REFLEX CULTURE PNL UR: NORMAL

## 2024-07-18 NOTE — TELEPHONE ENCOUNTER
----- Message from Bassam Mae NP sent at 7/18/2024  7:50 AM CDT -----  Please let patient know her urine culture was negative, thanks   I called pt and relayed the above message to her.  She voiced understanding.  Had no questions.  Reminded her of her 1 month follow up in August.

## 2024-09-04 DIAGNOSIS — M25.511 BILATERAL SHOULDER PAIN, UNSPECIFIED CHRONICITY: Primary | ICD-10-CM

## 2024-09-04 DIAGNOSIS — M25.512 BILATERAL SHOULDER PAIN, UNSPECIFIED CHRONICITY: Primary | ICD-10-CM

## 2024-09-05 ENCOUNTER — HOSPITAL ENCOUNTER (OUTPATIENT)
Dept: RADIOLOGY | Facility: HOSPITAL | Age: 54
Discharge: HOME | End: 2024-09-05
Payer: MEDICARE

## 2024-09-05 ENCOUNTER — OFFICE VISIT (OUTPATIENT)
Dept: ORTHOPEDICS | Facility: CLINIC | Age: 54
End: 2024-09-05
Payer: MEDICARE

## 2024-09-05 VITALS — HEIGHT: 62 IN | WEIGHT: 205 LBS | BODY MASS INDEX: 37.73 KG/M2

## 2024-09-05 DIAGNOSIS — M75.41 IMPINGEMENT SYNDROME OF BOTH SHOULDERS: Primary | ICD-10-CM

## 2024-09-05 DIAGNOSIS — M25.512 BILATERAL SHOULDER PAIN, UNSPECIFIED CHRONICITY: ICD-10-CM

## 2024-09-05 DIAGNOSIS — M75.42 IMPINGEMENT SYNDROME OF BOTH SHOULDERS: Primary | ICD-10-CM

## 2024-09-05 DIAGNOSIS — M25.511 BILATERAL SHOULDER PAIN, UNSPECIFIED CHRONICITY: ICD-10-CM

## 2024-09-05 PROCEDURE — 99999 PR PBB SHADOW E&M-EST. PATIENT-LVL III: CPT | Mod: PBBFAC,,,

## 2024-09-05 PROCEDURE — 73030 X-RAY EXAM OF SHOULDER: CPT | Mod: 26,50,, | Performed by: RADIOLOGY

## 2024-09-05 PROCEDURE — 99213 OFFICE O/P EST LOW 20 MIN: CPT | Mod: PBBFAC,25

## 2024-09-05 PROCEDURE — 99999PBSHW PR PBB SHADOW TECHNICAL ONLY FILED TO HB: Mod: PBBFAC,,,

## 2024-09-05 PROCEDURE — 73030 X-RAY EXAM OF SHOULDER: CPT | Mod: TC,50

## 2024-09-05 PROCEDURE — 20605 DRAIN/INJ JOINT/BURSA W/O US: CPT | Mod: 50,PBBFAC

## 2024-09-05 RX ORDER — TRIAMCINOLONE ACETONIDE 40 MG/ML
80 INJECTION, SUSPENSION INTRA-ARTICULAR; INTRAMUSCULAR
Status: DISCONTINUED | OUTPATIENT
Start: 2024-09-05 | End: 2024-09-05 | Stop reason: HOSPADM

## 2024-09-05 RX ORDER — ERGOCALCIFEROL 1.25 MG/1
50000 CAPSULE ORAL
COMMUNITY
Start: 2024-04-11

## 2024-09-05 RX ORDER — BUPIVACAINE HYDROCHLORIDE 2.5 MG/ML
2 INJECTION, SOLUTION EPIDURAL; INFILTRATION; INTRACAUDAL
Status: DISCONTINUED | OUTPATIENT
Start: 2024-09-05 | End: 2024-09-05 | Stop reason: HOSPADM

## 2024-09-05 RX ORDER — CYANOCOBALAMIN 1000 UG/ML
1000 INJECTION, SOLUTION INTRAMUSCULAR; SUBCUTANEOUS
COMMUNITY
Start: 2024-04-11

## 2024-09-05 RX ADMIN — TRIAMCINOLONE ACETONIDE 80 MG: 40 INJECTION, SUSPENSION INTRA-ARTICULAR; INTRAMUSCULAR at 01:09

## 2024-09-05 RX ADMIN — BUPIVACAINE HYDROCHLORIDE 2 ML: 2.5 INJECTION, SOLUTION EPIDURAL; INFILTRATION; INTRACAUDAL at 01:09

## 2024-09-05 NOTE — PROCEDURES
Intermediate Joint Aspiration/Injection: L acromioclavicular, R acromioclavicular    Date/Time: 9/5/2024 1:00 PM    Performed by: Rani Jennings PA  Authorized by: Rani Jennings PA    Consent Done?:  Yes (Verbal)  Indications:  Arthritis and pain    Location:  Shoulder  Site:  L acromioclavicular and R acromioclavicular  Ultrasonic Guidance for needle placement: No  Needle size:  22 G  Approach:  Posterolateral  Medications:  2 mL BUPivacaine (PF) 0.25% (2.5 mg/ml) 0.25 % (2.5 mg/mL); 80 mg triamcinolone acetonide 40 mg/mL  Patient tolerance:  Patient tolerated the procedure well with no immediate complications

## 2024-09-05 NOTE — PROGRESS NOTES
CC:   Chief Complaint   Patient presents with    Left Shoulder - Pain    Right Shoulder - Pain          Gloria Mitchell is a 54 y.o. female seen today for follow up of Pain of the Left Shoulder and Pain of the Right Shoulder  Patient known previously to Dr. Richardson for impingement syndrome of both shoulders last seen in November of 2023.  She received subacromial injections of both shoulders at that time.  She reports several months of pain relief with the pain returning within the last week.  She denies any recent fall or injury.  No other complaints today.        PAST MEDICAL HISTORY:   Past Medical History:   Diagnosis Date    Acid reflux     Calculus of kidney     Depression     Gastrointestinal ulcerative mucositis 01/04/2023    Endoscopy Dr Ravi    Hypertension     Hypothyroidism     Low back pain     Lumbosacral radiculopathy 05/17/2021    Migraines     Multiple gastric ulcers     Pain     Postlaminectomy syndrome, lumbar region 05/17/2021    UTI (urinary tract infection) 08/23/2022        PAST SURGICAL HISTORY:   Past Surgical History:   Procedure Laterality Date    BACK SURGERY      CAUDAL EPIDURAL STEROID INJECTION Bilateral 11/08/2022    Procedure: CAUDAL BRENDA;  Surgeon: Elba Moon MD;  Location: [facility];  Service: Pain Management;  Laterality: Bilateral;  PT STATES HAD VAC  WILL BRING CARD    ENDOSCOPY  01/04/2023    INSERTION OF DORSAL COLUMN NERVE STIMULATOR FOR TRIAL N/A 07/15/2021    Procedure: INSERTION, NEUROSTIMULATOR, SPINAL CORD, DORSAL COLUMN, FOR TRIAL;  Surgeon: Elba Moon MD;  Location: [facility];  Service: Pain Management;  Laterality: N/A;  spoke to Dr Calin Burgess nurse, urine culture negative (results in Epic), pt cleared.    LITHOTRIPSY          ALLERGIES:   Review of patient's allergies indicates:   Allergen Reactions    Oxycodone-acetaminophen Swelling and Edema    Oxycodone hcl         MEDICATIONS :    Current Outpatient  Medications:     buprenorphine-naloxone 2-0.5 mg (SUBOXONE) 2-0.5 mg Subl, Place 1 tablet under the tongue. Patient states she takes twice daily. Prescription bottle is ordered for 3 times daily, Disp: , Rfl:     cyanocobalamin 1,000 mcg/mL injection, Inject 1,000 mcg into the muscle every 30 days., Disp: , Rfl:     erenumab-aooe (AIMOVIG AUTOINJECTOR) 140 mg/mL autoinjector, Inject 1 mL (140 mg total) into the skin every 28 days., Disp: 1 each, Rfl: 11    ergocalciferol (ERGOCALCIFEROL) 50,000 unit Cap, Take 50,000 Units by mouth every 7 days., Disp: , Rfl:     EScitalopram oxalate (LEXAPRO) 10 MG tablet, Take 10 mg by mouth once daily., Disp: , Rfl:     gabapentin (NEURONTIN) 800 MG tablet, 1 tablet Orally Once a day for 30 day(s), Disp: , Rfl:     levothyroxine (SYNTHROID) 112 MCG tablet, Take 112 mcg by mouth before breakfast., Disp: , Rfl:     losartan (COZAAR) 50 MG tablet, Take 50 mg by mouth once daily., Disp: , Rfl:     methenamine (HIPREX) 1 gram Tab, Take 1 tablet (1 g total) by mouth 2 (two) times daily., Disp: 180 tablet, Rfl: 3    metoprolol tartrate (LOPRESSOR) 50 MG tablet, Take 100 mg by mouth Daily., Disp: , Rfl:     omeprazole (PRILOSEC) 20 MG capsule, 1 capsule 30 minutes before morning meal, Disp: , Rfl:     potassium citrate (UROCIT-K) 10 mEq (1,080 mg) TbSR, Take 2 tablets (20 mEq total) by mouth 2 (two) times a day., Disp: 360 tablet, Rfl: 3    promethazine (PHENERGAN) 25 MG tablet, Take 1 tablet every 8 hours as needed for migraine.  Not more than 3 days of the week, Disp: 20 tablet, Rfl: 1    promethazine (PHENERGAN) 25 MG tablet, take one tablet every 4 hours as needed for stone pain, no working or driving when taking this medication, Disp: 12 tablet, Rfl: 0    tiZANidine (ZANAFLEX) 4 MG tablet, Take 4 mg by mouth every evening., Disp: , Rfl:     traZODone (DESYREL) 50 MG tablet, Take 50 mg by mouth every evening., Disp: , Rfl:     HYDROmorphone (DILAUDID) 2 MG tablet, Take 1 tablet (2 mg  total) by mouth every 4 (four) hours as needed for Pain (take one tablet every 4 hours as needed for stone pain, no working or driving when taking this medication). (Patient not taking: Reported on 9/5/2024), Disp: 12 tablet, Rfl: 0     SOCIAL HISTORY:   Social History     Socioeconomic History    Marital status:     Number of children: 2    Years of education: 12    Highest education level: 12th grade   Occupational History    Occupation: disabled   Tobacco Use    Smoking status: Never     Passive exposure: Past    Smokeless tobacco: Never   Substance and Sexual Activity    Alcohol use: Never    Drug use: Never    Sexual activity: Not Currently        FAMILY HISTORY:   Family History   Problem Relation Name Age of Onset    Stroke Father      Heart attack Father      Heart attack Brother      Cancer Brother            PHYSICAL EXAM:      There were no vitals filed for this visit.  Body mass index is 37.49 kg/m².    GENERAL: Well-developed, well-nourished female . The patient is alert, oriented and cooperative.    EXTREMITIES:  Bilateral shoulders was skin clean dry and intact, tenderness to palpation over bilateral AC joints, good range of motion of both shoulders with forward flexion greater than 160°, abduction to 90°, positive Hernandez Talat testing bilaterally, neurovascularly intact      RADIOGRAPHIC FINDINGS:   X-Ray Shoulder 2 or more views Bilat    Result Date: 9/5/2024  EXAMINATION: XR SHOULDER COMPLETE 2 OR MORE VIEWS BILATERAL CLINICAL HISTORY: Pain in right shoulder TECHNIQUE: Two view bilateral COMPARISON: None FINDINGS: There is no evidence fracture nor malalignment.  The adjacent soft tissues are unremarkable.     There is no evidence acute bony injury of either shoulder. Electronically signed by: Lisseth Huff MD Date:    09/05/2024 Time:    13:47       Patient Active Problem List    Diagnosis Date Noted    Morbid obesity 07/16/2024    Ureteral stone 07/16/2024    Nocturia 07/16/2024     Urgency of urination 07/16/2024    Impingement syndrome, shoulder, unspecified laterality 10/18/2023    Chronic pain of both shoulders 09/06/2023    Bacteremia 03/11/2023    Nausea and vomiting 03/08/2023    Hypokalemia 03/08/2023    Hypomagnesemia 03/08/2023    Volume depletion 03/08/2023    Elevated lactic acid level 03/08/2023    Pain 10/17/2022    Bilateral flank pain 08/19/2022    Calculus of kidney     Thoracic radiculopathy 02/16/2022    Intractable chronic migraine without aura and without status migrainosus 01/10/2022    Chest wall pain, chronic after insertion spinal cord stimulator leads 12/22/2021    Disorder of sacrum 09/20/2021    Chronic pain syndrome 05/17/2021    Postlaminectomy syndrome, lumbar region 05/17/2021    Lumbosacral radiculopathy 05/17/2021     IMPRESSION AND PLAN:  Impingement syndrome of both shoulders.  Discussed all treatment options with the patient today.  She is requesting repeat steroid injections.  See procedural note for details.  Discussed with her that injections can be repeated every 4 months as needed.  Follow up p.r.n..        No follow-ups on file.       Rani Jennings PA-C      (Subject to voice recognition error, transcription service not allowed)

## 2024-09-17 ENCOUNTER — HOSPITAL ENCOUNTER (OUTPATIENT)
Dept: RADIOLOGY | Facility: HOSPITAL | Age: 54
Discharge: HOME | End: 2024-09-17
Attending: FAMILY MEDICINE
Payer: MEDICARE

## 2024-09-17 DIAGNOSIS — G89.29 CHRONIC NONINTRACTABLE HEADACHE, UNSPECIFIED HEADACHE TYPE: Primary | ICD-10-CM

## 2024-09-17 DIAGNOSIS — R51.9 CHRONIC NONINTRACTABLE HEADACHE, UNSPECIFIED HEADACHE TYPE: ICD-10-CM

## 2024-09-17 DIAGNOSIS — R51.9 CHRONIC NONINTRACTABLE HEADACHE, UNSPECIFIED HEADACHE TYPE: Primary | ICD-10-CM

## 2024-09-17 DIAGNOSIS — G89.29 CHRONIC NONINTRACTABLE HEADACHE, UNSPECIFIED HEADACHE TYPE: ICD-10-CM

## 2024-09-17 PROCEDURE — 70450 CT HEAD/BRAIN W/O DYE: CPT | Mod: 26,,, | Performed by: RADIOLOGY

## 2024-09-17 PROCEDURE — 70450 CT HEAD/BRAIN W/O DYE: CPT | Mod: TC

## 2024-10-17 ENCOUNTER — TELEPHONE (OUTPATIENT)
Dept: UROLOGY | Facility: CLINIC | Age: 54
End: 2024-10-17
Payer: MEDICARE

## 2024-10-17 DIAGNOSIS — N39.0 URINARY TRACT INFECTION WITHOUT HEMATURIA, SITE UNSPECIFIED: Primary | ICD-10-CM

## 2024-10-17 RX ORDER — NITROFURANTOIN 25; 75 MG/1; MG/1
CAPSULE ORAL
Qty: 20 CAPSULE | Refills: 0 | Status: SHIPPED | OUTPATIENT
Start: 2024-10-17

## 2024-10-17 NOTE — TELEPHONE ENCOUNTER
----- Message from Darlene sent at 10/17/2024  3:43 PM CDT -----  Regarding: FW: ciara Morgan I called her and told her we would need a urine for culture but she said well Dr Ruvalcaba calls me in antibiotics and I said well he is basically retired and she said yeah and I said is there a clinic in [place] that you could take a urine to and she said no and she said I have seen ligia herr already.. I told her I would just send it to you and ask.  ----- Message -----  From: ChitoImani  Sent: 10/17/2024   3:32 PM CDT  To: Tu PANIAGUA Staff  Subject: ciara                                       Who Called: Gloria Jules Mitchell    Caller is requesting assistance/information from provider's office.    Symptoms (please be specific): said she has a kidney infection and needs antibiotics  [facility] - [address] [address]  Phone: [phone] Fax: [phone]            Preferred Method of Contact: Phone Call  Patient's Preferred Phone Number on File: [phone]   Best Call Back Number, if different:  Additional Information:  I called  pt and spoke with her.   Says she started hurting yesterday and today was worse.  Says she has 1 small stone and 1 larger one that she knows will not be able to pass.  Says she cannot get here tomorrow to drop urine specimen off.  I told her NP Tu has spoken with Dr ruvalcaba and he said based on her last urine culture, they can send in Macrobid 100mg po BID x 10 days or if she wants to bring in urine for culture, they can start her on cipro samples.  She denies fever, chills, NV, denies foul odor to urine.  She said send in the Macrobid and if she is not better by Monday, she will try to come over here.  Send Macrobid to [facility]

## 2024-10-17 NOTE — TELEPHONE ENCOUNTER
----- Message from Imani sent at 10/17/2024  3:31 PM CDT -----  Regarding: medicaiton  Who Called: Gloria Mitchell    Caller is requesting assistance/information from provider's office.    Symptoms (please be specific): said she has a kidney infection and needs antibiotics  [facility] - [place] [address] [address]  Phone: [phone] Fax: [phone]            Preferred Method of Contact: Phone Call  Patient's Preferred Phone Number on File: [phone]   Best Call Back Number, if different:  Additional Information:

## 2024-10-21 ENCOUNTER — HOSPITAL ENCOUNTER (OUTPATIENT)
Dept: RADIOLOGY | Facility: HOSPITAL | Age: 54
Discharge: HOME | End: 2024-10-21
Attending: UROLOGY
Payer: MEDICARE

## 2024-10-21 ENCOUNTER — HOSPITAL ENCOUNTER (EMERGENCY)
Facility: HOSPITAL | Age: 54
Discharge: HOME | End: 2024-10-21
Payer: MEDICARE

## 2024-10-21 ENCOUNTER — OFFICE VISIT (OUTPATIENT)
Dept: UROLOGY | Facility: CLINIC | Age: 54
End: 2024-10-21
Payer: MEDICARE

## 2024-10-21 VITALS
SYSTOLIC BLOOD PRESSURE: 141 MMHG | DIASTOLIC BLOOD PRESSURE: 94 MMHG | HEART RATE: 61 BPM | HEIGHT: 62 IN | WEIGHT: 208 LBS | TEMPERATURE: 98 F | BODY MASS INDEX: 38.28 KG/M2

## 2024-10-21 VITALS
BODY MASS INDEX: 40.84 KG/M2 | OXYGEN SATURATION: 98 % | TEMPERATURE: 98 F | RESPIRATION RATE: 18 BRPM | HEIGHT: 60 IN | WEIGHT: 208 LBS | SYSTOLIC BLOOD PRESSURE: 141 MMHG | HEART RATE: 91 BPM | DIASTOLIC BLOOD PRESSURE: 85 MMHG

## 2024-10-21 DIAGNOSIS — G89.4 CHRONIC PAIN SYNDROME: Chronic | ICD-10-CM

## 2024-10-21 DIAGNOSIS — N13.2 HYDRONEPHROSIS CONCURRENT WITH AND DUE TO CALCULI OF KIDNEY AND URETER: ICD-10-CM

## 2024-10-21 DIAGNOSIS — N20.1 URETERAL STONE: ICD-10-CM

## 2024-10-21 DIAGNOSIS — R52 PAIN: ICD-10-CM

## 2024-10-21 DIAGNOSIS — M54.17 LUMBOSACRAL RADICULOPATHY: Chronic | ICD-10-CM

## 2024-10-21 DIAGNOSIS — N20.0 CALCULUS OF KIDNEY: Primary | ICD-10-CM

## 2024-10-21 DIAGNOSIS — N20.0 NEPHROLITHIASIS: ICD-10-CM

## 2024-10-21 DIAGNOSIS — R07.89 CHEST WALL PAIN, CHRONIC: Chronic | ICD-10-CM

## 2024-10-21 DIAGNOSIS — N20.0 CALCULUS OF KIDNEY: ICD-10-CM

## 2024-10-21 DIAGNOSIS — R11.2 NAUSEA AND VOMITING, UNSPECIFIED VOMITING TYPE: Primary | ICD-10-CM

## 2024-10-21 DIAGNOSIS — N20.1 URETEROLITHIASIS: Primary | ICD-10-CM

## 2024-10-21 DIAGNOSIS — M96.1 POSTLAMINECTOMY SYNDROME, LUMBAR REGION: Chronic | ICD-10-CM

## 2024-10-21 DIAGNOSIS — G89.29 CHEST WALL PAIN, CHRONIC: Chronic | ICD-10-CM

## 2024-10-21 LAB
ALBUMIN SERPL BCP-MCNC: 3.2 G/DL (ref 3.5–5)
ALBUMIN/GLOB SERPL: 0.9 {RATIO}
ALP SERPL-CCNC: 73 U/L (ref 41–108)
ALT SERPL W P-5'-P-CCNC: 18 U/L (ref 13–56)
ANION GAP SERPL CALCULATED.3IONS-SCNC: 8 MMOL/L (ref 7–16)
AST SERPL W P-5'-P-CCNC: 19 U/L (ref 15–37)
BASOPHILS # BLD AUTO: 0.05 K/UL (ref 0–0.2)
BASOPHILS NFR BLD AUTO: 0.9 % (ref 0–1)
BILIRUB SERPL-MCNC: 0.3 MG/DL (ref ?–1.2)
BILIRUB UR QL STRIP: NEGATIVE
BUN SERPL-MCNC: 17 MG/DL (ref 7–18)
BUN/CREAT SERPL: 16 (ref 6–20)
CALCIUM SERPL-MCNC: 9.4 MG/DL (ref 8.5–10.1)
CHLORIDE SERPL-SCNC: 105 MMOL/L (ref 98–107)
CLARITY UR: CLEAR
CO2 SERPL-SCNC: 28 MMOL/L (ref 21–32)
COLOR UR: ABNORMAL
CREAT SERPL-MCNC: 1.08 MG/DL (ref 0.55–1.02)
DIFFERENTIAL METHOD BLD: ABNORMAL
EGFR (NO RACE VARIABLE) (RUSH/TITUS): 61 ML/MIN/1.73M2
EOSINOPHIL # BLD AUTO: 0.15 K/UL (ref 0–0.5)
EOSINOPHIL NFR BLD AUTO: 2.6 % (ref 1–4)
ERYTHROCYTE [DISTWIDTH] IN BLOOD BY AUTOMATED COUNT: 14.6 % (ref 11.5–14.5)
GLOBULIN SER-MCNC: 3.4 G/DL (ref 2–4)
GLUCOSE SERPL-MCNC: 97 MG/DL (ref 74–106)
GLUCOSE UR STRIP-MCNC: NORMAL MG/DL
HCT VFR BLD AUTO: 39.1 % (ref 38–47)
HGB BLD-MCNC: 11.8 G/DL (ref 12–16)
IMM GRANULOCYTES # BLD AUTO: 0.02 K/UL (ref 0–0.04)
IMM GRANULOCYTES NFR BLD: 0.3 % (ref 0–0.4)
KETONES UR STRIP-SCNC: NEGATIVE MG/DL
LEUKOCYTE ESTERASE UR QL STRIP: NEGATIVE
LYMPHOCYTES # BLD AUTO: 2.24 K/UL (ref 1–4.8)
LYMPHOCYTES NFR BLD AUTO: 38.8 % (ref 27–41)
MCH RBC QN AUTO: 26.8 PG (ref 27–31)
MCHC RBC AUTO-ENTMCNC: 30.2 G/DL (ref 32–36)
MCV RBC AUTO: 88.7 FL (ref 80–96)
MONOCYTES # BLD AUTO: 0.32 K/UL (ref 0–0.8)
MONOCYTES NFR BLD AUTO: 5.5 % (ref 2–6)
MPC BLD CALC-MCNC: 9.8 FL (ref 9.4–12.4)
NEUTROPHILS # BLD AUTO: 3 K/UL (ref 1.8–7.7)
NEUTROPHILS NFR BLD AUTO: 51.9 % (ref 53–65)
NITRITE UR QL STRIP: NEGATIVE
NRBC # BLD AUTO: 0 X10E3/UL
NRBC, AUTO (.00): 0 %
PH UR STRIP: 5.5 PH UNITS
PLATELET # BLD AUTO: 290 K/UL (ref 150–400)
POTASSIUM SERPL-SCNC: 4.2 MMOL/L (ref 3.5–5.1)
PROT SERPL-MCNC: 6.6 G/DL (ref 6.4–8.2)
PROT UR QL STRIP: NEGATIVE
RBC # BLD AUTO: 4.41 M/UL (ref 4.2–5.4)
RBC # UR STRIP: ABNORMAL /UL
RBC #/AREA URNS HPF: 4 /HPF
SODIUM SERPL-SCNC: 137 MMOL/L (ref 136–145)
SP GR UR STRIP: 1.01
SQUAMOUS #/AREA URNS LPF: ABNORMAL /HPF
UROBILINOGEN UR STRIP-ACNC: NORMAL MG/DL
WBC # BLD AUTO: 5.78 K/UL (ref 4.5–11)
WBC #/AREA URNS HPF: 2 /HPF

## 2024-10-21 PROCEDURE — 99285 EMERGENCY DEPT VISIT HI MDM: CPT | Mod: 25

## 2024-10-21 PROCEDURE — 74018 RADEX ABDOMEN 1 VIEW: CPT | Mod: 26,,, | Performed by: RADIOLOGY

## 2024-10-21 PROCEDURE — 25000003 PHARM REV CODE 250: Performed by: NURSE PRACTITIONER

## 2024-10-21 PROCEDURE — 81001 URINALYSIS AUTO W/SCOPE: CPT | Performed by: NURSE PRACTITIONER

## 2024-10-21 PROCEDURE — 81003 URINALYSIS AUTO W/O SCOPE: CPT | Performed by: NURSE PRACTITIONER

## 2024-10-21 PROCEDURE — 80053 COMPREHEN METABOLIC PANEL: CPT | Performed by: NURSE PRACTITIONER

## 2024-10-21 PROCEDURE — 63600175 PHARM REV CODE 636 W HCPCS: Performed by: NURSE PRACTITIONER

## 2024-10-21 PROCEDURE — 99999 PR PBB SHADOW E&M-EST. PATIENT-LVL III: CPT | Mod: PBBFAC,,, | Performed by: UROLOGY

## 2024-10-21 PROCEDURE — 74018 RADEX ABDOMEN 1 VIEW: CPT | Mod: TC

## 2024-10-21 PROCEDURE — 96374 THER/PROPH/DIAG INJ IV PUSH: CPT

## 2024-10-21 PROCEDURE — 96375 TX/PRO/DX INJ NEW DRUG ADDON: CPT

## 2024-10-21 PROCEDURE — 99213 OFFICE O/P EST LOW 20 MIN: CPT | Mod: PBBFAC,25 | Performed by: UROLOGY

## 2024-10-21 PROCEDURE — 85025 COMPLETE CBC W/AUTO DIFF WBC: CPT | Performed by: NURSE PRACTITIONER

## 2024-10-21 RX ORDER — PROMETHAZINE HYDROCHLORIDE 25 MG/1
25 TABLET ORAL EVERY 6 HOURS PRN
Qty: 15 TABLET | Refills: 0 | Status: SHIPPED | OUTPATIENT
Start: 2024-10-21 | End: 2024-10-26 | Stop reason: ALTCHOICE

## 2024-10-21 RX ORDER — TAMSULOSIN HYDROCHLORIDE 0.4 MG/1
0.4 CAPSULE ORAL
Status: COMPLETED | OUTPATIENT
Start: 2024-10-21 | End: 2024-10-21

## 2024-10-21 RX ORDER — ONDANSETRON HYDROCHLORIDE 2 MG/ML
4 INJECTION, SOLUTION INTRAVENOUS
Status: COMPLETED | OUTPATIENT
Start: 2024-10-21 | End: 2024-10-21

## 2024-10-21 RX ORDER — HYDROMORPHONE HYDROCHLORIDE 2 MG/1
2 TABLET ORAL EVERY 4 HOURS PRN
Qty: 18 TABLET | Refills: 0 | Status: SHIPPED | OUTPATIENT
Start: 2024-10-21

## 2024-10-21 RX ORDER — HYDROMORPHONE HYDROCHLORIDE 1 MG/ML
1 INJECTION, SOLUTION INTRAMUSCULAR; INTRAVENOUS; SUBCUTANEOUS
Status: COMPLETED | OUTPATIENT
Start: 2024-10-21 | End: 2024-10-21

## 2024-10-21 RX ORDER — KETOROLAC TROMETHAMINE 30 MG/ML
30 INJECTION, SOLUTION INTRAMUSCULAR; INTRAVENOUS
Status: COMPLETED | OUTPATIENT
Start: 2024-10-21 | End: 2024-10-21

## 2024-10-21 RX ORDER — TAMSULOSIN HYDROCHLORIDE 0.4 MG/1
0.4 CAPSULE ORAL DAILY
Qty: 10 CAPSULE | Refills: 0 | Status: SHIPPED | OUTPATIENT
Start: 2024-10-21 | End: 2024-10-31

## 2024-10-21 RX ORDER — DIPHENHYDRAMINE HYDROCHLORIDE 50 MG/ML
25 INJECTION INTRAMUSCULAR; INTRAVENOUS
Status: COMPLETED | OUTPATIENT
Start: 2024-10-21 | End: 2024-10-21

## 2024-10-21 RX ADMIN — HYDROMORPHONE HYDROCHLORIDE 1 MG: 1 INJECTION, SOLUTION INTRAMUSCULAR; INTRAVENOUS; SUBCUTANEOUS at 11:10

## 2024-10-21 RX ADMIN — TAMSULOSIN HYDROCHLORIDE 0.4 MG: 0.4 CAPSULE ORAL at 11:10

## 2024-10-21 RX ADMIN — KETOROLAC TROMETHAMINE 30 MG: 30 INJECTION, SOLUTION INTRAMUSCULAR at 07:10

## 2024-10-21 RX ADMIN — DIPHENHYDRAMINE HYDROCHLORIDE 25 MG: 50 INJECTION INTRAMUSCULAR; INTRAVENOUS at 11:10

## 2024-10-21 RX ADMIN — ONDANSETRON 4 MG: 2 INJECTION INTRAMUSCULAR; INTRAVENOUS at 07:10

## 2024-10-21 NOTE — Clinical Note
"Gloria"Holly Mitchell was seen and treated in our emergency department on 10/21/2024.  She may return to work on 10/24/2024.       If you have any questions or concerns, please don't hesitate to call.      Maxine Garcia, FNP"

## 2024-10-21 NOTE — PROGRESS NOTES
Assessment:   1. Nausea and vomiting, unspecified vomiting type    2. Calculus of kidney    3. Chronic pain syndrome    4. Lumbosacral radiculopathy    5. Postlaminectomy syndrome, lumbar region    6. Chest wall pain, chronic after insertion spinal cord stimulator leads    7. Pain         Plan:       The patient has a complex past medical history of chronic pain and it is not clear to me the etiology of her vomiting as her physical exam suggests paraspinal spasms and tenderness along the ribcage as well as tenderness in the right costovertebral angle.  She is actively vomiting and distressed and so I have contacted the emergency room and requested for her to be evaluated for rule out stone as she may require admission for pain control and/or intervention.             Roge Rebollar MD  Urology  10/21/2024          UROLOGY HISTORY AND PHYSICAL EXAM    Subjective:      Patient ID: Gloria Mitchell is a 54 y.o. female.    Chief Complaint::   Flank Pain  Associated symptoms include abdominal pain. Pertinent negatives include no chest pain, fever, numbness or hematuria.     The patient is a 54-year-old female with a history of recurrent nephrolithiasis with a history of lumbosacral radiculopathy that was treated with laminectomy and decompression of which has been revised and the patient has a history of chronic pain syndrome.  She reports right-sided flank pain and back pain since Thursday last week she reports that it has been intermittent and colicky in nature lasting for several hours and then resolving at which point this morning she reported that the pain was not getting better and was severe and she sought care in the Urology walk-in clinic and reported that the pain is in the right flank and back and is associated with nausea and vomiting.  The patient is actively vomiting.       Past Medical History:   Diagnosis Date    Acid reflux     Calculus of kidney     Depression     Gastrointestinal ulcerative  mucositis 01/04/2023    Endoscopy Dr Ravi    Hypertension     Hypothyroidism     Low back pain     Lumbosacral radiculopathy 05/17/2021    Migraines     Multiple gastric ulcers     Pain     Postlaminectomy syndrome, lumbar region 05/17/2021    UTI (urinary tract infection) 08/23/2022     Past Surgical History:   Procedure Laterality Date    BACK SURGERY      CAUDAL EPIDURAL STEROID INJECTION Bilateral 11/08/2022    Procedure: CAUDAL BRENDA;  Surgeon: Elba Moon MD;  Location: [facility];  Service: Pain Management;  Laterality: Bilateral;  PT STATES HAD VAC  WILL BRING CARD    ENDOSCOPY  01/04/2023    INSERTION OF DORSAL COLUMN NERVE STIMULATOR FOR TRIAL N/A 07/15/2021    Procedure: INSERTION, NEUROSTIMULATOR, SPINAL CORD, DORSAL COLUMN, FOR TRIAL;  Surgeon: Elba Moon MD;  Location: [facility];  Service: Pain Management;  Laterality: N/A;  spoke to Dr Calin Burgess nurse, urine culture negative (results in Epic), pt cleared.    LITHOTRIPSY          Current Outpatient Medications on File Prior to Visit   Medication Sig Dispense Refill    buprenorphine-naloxone 2-0.5 mg (SUBOXONE) 2-0.5 mg Subl Place 1 tablet under the tongue. Patient states she takes twice daily. Prescription bottle is ordered for 3 times daily      cyanocobalamin 1,000 mcg/mL injection Inject 1,000 mcg into the muscle every 30 days.      erenumab-aooe (AIMOVIG AUTOINJECTOR) 140 mg/mL autoinjector Inject 1 mL (140 mg total) into the skin every 28 days. 1 each 11    ergocalciferol (ERGOCALCIFEROL) 50,000 unit Cap Take 50,000 Units by mouth every 7 days.      EScitalopram oxalate (LEXAPRO) 10 MG tablet Take 10 mg by mouth once daily.      gabapentin (NEURONTIN) 800 MG tablet 1 tablet Orally Once a day for 30 day(s)      HYDROmorphone (DILAUDID) 2 MG tablet Take 1 tablet (2 mg total) by mouth every 4 (four) hours as needed for Pain (take one tablet every 4 hours as needed for stone pain, no working or driving when taking this  medication). (Patient not taking: Reported on 9/5/2024) 12 tablet 0    levothyroxine (SYNTHROID) 112 MCG tablet Take 112 mcg by mouth before breakfast.      losartan (COZAAR) 50 MG tablet Take 50 mg by mouth once daily.      methenamine (HIPREX) 1 gram Tab Take 1 tablet (1 g total) by mouth 2 (two) times daily. 180 tablet 3    metoprolol tartrate (LOPRESSOR) 50 MG tablet Take 100 mg by mouth Daily.      nitrofurantoin, macrocrystal-monohydrate, (MACROBID) 100 MG capsule Take one capsule twice a day 20 capsule 0    omeprazole (PRILOSEC) 20 MG capsule 1 capsule 30 minutes before morning meal      potassium citrate (UROCIT-K) 10 mEq (1,080 mg) TbSR Take 2 tablets (20 mEq total) by mouth 2 (two) times a day. 360 tablet 3    promethazine (PHENERGAN) 25 MG tablet Take 1 tablet every 8 hours as needed for migraine.  Not more than 3 days of the week 20 tablet 1    promethazine (PHENERGAN) 25 MG tablet take one tablet every 4 hours as needed for stone pain, no working or driving when taking this medication 12 tablet 0    tiZANidine (ZANAFLEX) 4 MG tablet Take 4 mg by mouth every evening.      traZODone (DESYREL) 50 MG tablet Take 50 mg by mouth every evening.       No current facility-administered medications on file prior to visit.        Review of patient's allergies indicates:   Allergen Reactions    Oxycodone-acetaminophen Swelling and Edema    Oxycodone hcl      Vitals:    10/21/24 1612   BP: (!) 141/94   Pulse: 61   Temp: 97.9 °F (36.6 °C)          Review of Systems   Constitutional:  Positive for activity change, appetite change, diaphoresis and fatigue. Negative for fever.   Eyes:  Negative for visual disturbance.   Respiratory:  Negative for shortness of breath and wheezing.    Cardiovascular:  Negative for chest pain and leg swelling.   Gastrointestinal:  Positive for abdominal pain, nausea and vomiting.   Genitourinary:  Positive for flank pain. Negative for hematuria.   Musculoskeletal:  Positive for back pain.    Neurological:  Negative for tremors, seizures and numbness.   Psychiatric/Behavioral:  Positive for decreased concentration and dysphoric mood.       Objective:     Physical Exam  Vitals and nursing note reviewed.   Constitutional:       General: She is in acute distress.      Appearance: She is not ill-appearing.   Eyes:      General: No scleral icterus.     Conjunctiva/sclera: Conjunctivae normal.   Cardiovascular:      Rate and Rhythm: Normal rate.   Pulmonary:      Effort: Pulmonary effort is normal. No respiratory distress.      Breath sounds: No rales.   Chest:      Chest wall: Tenderness present.   Abdominal:      Palpations: Abdomen is soft.      Tenderness: There is abdominal tenderness. There is right CVA tenderness.      Comments: There is tenderness along the right paraspinal muscles and the ribcage as well as tenderness of the right kidney with ballottement.    Musculoskeletal:         General: No deformity.   Skin:     Findings: No erythema or rash.   Neurological:      General: No focal deficit present.      Mental Status: She is alert and oriented to person, place, and time.   Psychiatric:         Thought Content: Thought content normal.

## 2024-10-21 NOTE — ED TRIAGE NOTES
Pt presents cc of known kidney stone from urology upstairs. Pt states they were sent down for Ct to determine the size of the stone.

## 2024-10-22 NOTE — DISCHARGE INSTRUCTIONS
Take medication as prescribed.  Increase fluid intake to flush urinary tract.  Follow up with urology clinic 2-3 days for recheck if symptoms do not improve.  Return to the ER with new or worsening symptoms.

## 2024-10-24 NOTE — ED PROVIDER NOTES
Encounter Date: 10/21/2024       History     Chief Complaint   Patient presents with    Flank Pain     Right side     Patient presents to the ER with complaint of right back and right flank pain.  Patient was referred to the ER by urology clinic and Dr. Rebollar.  Patient was denies fever.  She reports history of kidney stones.  She reports dysuria and hematuria.  Patient also reports history of hypertension depression and hypothyroidism.    The history is provided by the patient and a relative. No  was used.     Review of patient's allergies indicates:   Allergen Reactions    Oxycodone-acetaminophen Swelling and Edema    Oxycodone hcl      Past Medical History:   Diagnosis Date    Acid reflux     Calculus of kidney     Depression     Gastrointestinal ulcerative mucositis 01/04/2023    Endoscopy Dr Ravi    Hypertension     Hypothyroidism     Low back pain     Lumbosacral radiculopathy 05/17/2021    Migraines     Multiple gastric ulcers     Pain     Postlaminectomy syndrome, lumbar region 05/17/2021    UTI (urinary tract infection) 08/23/2022     Past Surgical History:   Procedure Laterality Date    BACK SURGERY      CAUDAL EPIDURAL STEROID INJECTION Bilateral 11/08/2022    Procedure: CAUDAL BRENDA;  Surgeon: Elba Moon MD;  Location: [facility];  Service: Pain Management;  Laterality: Bilateral;  PT STATES HAD VAC  WILL BRING CARD    ENDOSCOPY  01/04/2023    INSERTION OF DORSAL COLUMN NERVE STIMULATOR FOR TRIAL N/A 07/15/2021    Procedure: INSERTION, NEUROSTIMULATOR, SPINAL CORD, DORSAL COLUMN, FOR TRIAL;  Surgeon: Elba Moon MD;  Location: [facility];  Service: Pain Management;  Laterality: N/A;  spoke to Dr Calin Burgess nurse, urine culture negative (results in Epic), pt cleared.    LITHOTRIPSY       Family History   Problem Relation Name Age of Onset    Stroke Father      Heart attack Father      Heart attack Brother      Cancer Brother       Social History     Tobacco  Use    Smoking status: Never     Passive exposure: Past    Smokeless tobacco: Never   Substance Use Topics    Alcohol use: Never    Drug use: Never     Review of Systems   Constitutional:  Positive for activity change, appetite change and fatigue.   Genitourinary:  Positive for dysuria, flank pain and hematuria.   Musculoskeletal:  Positive for back pain and myalgias.   All other systems reviewed and are negative.      Physical Exam     Initial Vitals   BP Pulse Resp Temp SpO2   10/21/24 1655 10/21/24 1653 10/21/24 1653 10/21/24 1653 10/21/24 1653   125/88 63 18 98 °F (36.7 °C) 95 %      MAP       --                Physical Exam    Nursing note and vitals reviewed.  Constitutional: She appears well-developed and well-nourished.   HENT:   Head: Normocephalic.   Nose: Nose normal. Mouth/Throat: Oropharynx is clear and moist.   Eyes: Conjunctivae and EOM are normal.   Neck: Neck supple.   Normal range of motion.  Cardiovascular:  Normal rate, normal heart sounds and intact distal pulses.           Pulmonary/Chest: Breath sounds normal.   Abdominal: Abdomen is soft and protuberant. Bowel sounds are normal. There is abdominal tenderness in the right lower quadrant and suprapubic area.   There is right CVA tenderness.      Genitourinary: : Acceptable.  Musculoskeletal:         General: Normal range of motion.      Cervical back: Normal range of motion and neck supple.     Neurological: She is alert and oriented to person, place, and time. She has normal strength. GCS score is 15. GCS eye subscore is 4. GCS verbal subscore is 5. GCS motor subscore is 6.   Skin: Capillary refill takes less than 2 seconds.   Psychiatric: She has a normal mood and affect. Thought content normal.         Medical Screening Exam   See Full Note    ED Course   Procedures  Labs Reviewed   COMPREHENSIVE METABOLIC PANEL - Abnormal       Result Value    Sodium 137      Potassium 4.2      Chloride 105      CO2 28      Anion Gap 8       Glucose 97      BUN 17      Creatinine 1.08 (*)     BUN/Creatinine Ratio 16      Calcium 9.4      Total Protein 6.6      Albumin 3.2 (*)     Globulin 3.4      A/G Ratio 0.9      Bilirubin, Total 0.3      Alk Phos 73      ALT 18      AST 19      eGFR 61     URINALYSIS, REFLEX TO URINE CULTURE - Abnormal    Color, UA Light-Yellow      Clarity, UA Clear      pH, UA 5.5      Leukocytes, UA Negative      Nitrites, UA Negative      Protein, UA Negative      Glucose, UA Normal      Ketones, UA Negative      Urobilinogen, UA Normal      Bilirubin, UA Negative      Blood, UA Small (*)     Specific [place], UA 1.014     CBC WITH DIFFERENTIAL - Abnormal    WBC 5.78      RBC 4.41      Hemoglobin 11.8 (*)     Hematocrit 39.1      MCV 88.7      MCH 26.8 (*)     MCHC 30.2 (*)     RDW 14.6 (*)     Platelet Count 290      MPV 9.8      Neutrophils % 51.9 (*)     Lymphocytes % 38.8      Monocytes % 5.5      Eosinophils % 2.6      Basophils % 0.9      Immature Granulocytes % 0.3      nRBC, Auto 0.0      Neutrophils, Abs 3.00      Lymphocytes, Absolute 2.24      Monocytes, Absolute 0.32      Eosinophils, Absolute 0.15      Basophils, Absolute 0.05      Immature Granulocytes, Absolute 0.02      nRBC, Absolute 0.00      Diff Type Auto     URINALYSIS, MICROSCOPIC - Abnormal    WBC, UA 2      RBC, UA 4 (*)     Squamous Epithelial Cells, UA Occasional (*)    CBC W/ AUTO DIFFERENTIAL    Narrative:     The following orders were created for panel order CBC auto differential.  Procedure                               Abnormality         Status                     ---------                               -----------         ------                     CBC with Differential[[id number]]       Abnormal            Final result                 Please view results for these tests on the individual orders.          Imaging Results               CT Renal Stone Study ABD Pelvis WO (Final result)  Result time 10/21/24 22:39:34      Final result by Jennifer  MD José Luis (10/21/24 22:39:34)                   Impression:      1. Mild hydronephrosis and hydroureter on the right secondary to a 4 mm stone in the distal right ureter.  Follow-up recommended.  2. Bilateral nonobstructing nephrolithiasis also.  3. Mild hepatomegaly.  4. Mild diverticulosis of the right colon.  5. Small hiatal hernia.  6. This report was flagged in Epic as abnormal.      Electronically signed by: José Luis Devlin  Date:    10/21/2024  Time:    22:39               Narrative:    EXAMINATION:  CT RENAL STONE STUDY ABD PELVIS WO    CLINICAL HISTORY:  Flank pain, kidney stone suspected;    TECHNIQUE:  Low dose axial images, sagittal and coronal reformations were obtained from the lung bases to the pubic symphysis.  Contrast was not administered.    COMPARISON:  None    FINDINGS:  Heart: Normal in size. No pericardial effusion.    Small hiatal hernia.    Lung Bases: Well aerated, without consolidation or pleural fluid.    Liver: Liver is mildly enlarged.  No focal abnormality.    Gallbladder: Status post cholecystectomy.    Bile Ducts: No evidence of dilated ducts.    Pancreas: No mass or peripancreatic fat stranding.    Spleen: Unremarkable.    Adrenals: Unremarkable.    Kidneys/ Ureters: There is mild hydronephrosis and hydroureter on the right secondary to a 4 mm stone in the distal right ureter.  Follow-up recommended.  There are multiple small other bilateral renal stones also.  No hydronephrosis or hydroureter on the left.    Bladder: No evidence of wall thickening.    Reproductive organs: Unremarkable.    GI Tract/Mesentery: No evidence of bowel obstruction or inflammation.  Mild diverticulosis of the right colon.  No evidence of acute diverticulitis.  Mild retained feces in the colon.    Peritoneal Space: No ascites. No free air.    Retroperitoneum: No significant adenopathy.    Abdominal wall: Unremarkable.    Vasculature: No significant atherosclerosis or aneurysm.    Bones: No acute  fracture.    Neurostimulator device posteriorly on the left with central canal leads.                                       Medications   ondansetron injection 4 mg (4 mg Intravenous Given 10/21/24 1929)   ketorolac injection 30 mg (30 mg Intravenous Given 10/21/24 1926)   HYDROmorphone injection 1 mg (1 mg Intravenous Given 10/21/24 2314)   tamsulosin 24 hr capsule 0.4 mg (0.4 mg Oral Given 10/21/24 2314)   diphenhydrAMINE injection 25 mg (25 mg Intravenous Given 10/21/24 2314)     Medical Decision Making  Patient presents to the ER with complaint of right back and right flank pain.  Patient was referred to the ER by urology clinic and Dr. Rebollar.  Patient was denies fever.  She reports history of kidney stones.  She reports dysuria and hematuria.  Patient also reports history of hypertension depression and hypothyroidism.      Amount and/or Complexity of Data Reviewed  External Data Reviewed: labs, radiology and notes.  Labs: ordered. Decision-making details documented in ED Course.  Radiology: ordered. Decision-making details documented in ED Course.  Discussion of management or test interpretation with external provider(s): Initiate IV, collect lab work  Medications  ondansetron injection 4 mg (4 mg Intravenous Given 10/21/24 1929)  ketorolac injection 30 mg (30 mg Intravenous Given 10/21/24 1926)  HYDROmorphone injection 1 mg (1 mg Intravenous Given 10/21/24 2314)  tamsulosin 24 hr capsule 0.4 mg (0.4 mg Oral Given 10/21/24 2314)  diphenhydrAMINE injection 25 mg (25 mg Intravenous Given 10/21/24 2314)    Patient was discharged home with diagnosis of nephro lithiasis, utero lithiasis, and hydronephrosis confirmed with due to calculi of the kidney and ureter.  She was given prescription for Dilaudid Phenergan and Flomax she was instructed to follow up with Urology in 2 days if symptoms do not improve with treatment return to the ER with new or worsening symptoms.      Risk  Prescription drug management.                                       Clinical Impression:   Final diagnoses:  [N20.1] Ureterolithiasis (Primary)  [N20.0] Nephrolithiasis  [N13.2] Hydronephrosis concurrent with and due to calculi of kidney and ureter        ED Disposition Condition    Discharge Stable          ED Prescriptions       Medication Sig Dispense Start Date End Date Auth. Provider    HYDROmorphone (DILAUDID) 2 MG tablet Take 1 tablet (2 mg total) by mouth every 4 (four) hours as needed for Pain. 18 tablet 10/21/2024 -- Maxine Garcia FNP    promethazine (PHENERGAN) 25 MG tablet Take 1 tablet (25 mg total) by mouth every 6 (six) hours as needed for Nausea. 15 tablet 10/21/2024 -- Maxine Garcia FNP    tamsulosin (FLOMAX) 0.4 mg Cap Take 1 capsule (0.4 mg total) by mouth once daily. for 10 days 10 capsule 10/21/2024 10/31/2024 Maxine Garcia FNP          Follow-up Information       Follow up With Specialties Details Why Contact Info    Roge Rebollar MD Urology Schedule an appointment as soon as possible for a visit in 2 days If symptoms worsen [address] [facility] [address]  [phone]               Maxine Garcia FNP  10/24/24 3224

## 2024-10-26 ENCOUNTER — HOSPITAL ENCOUNTER (EMERGENCY)
Facility: HOSPITAL | Age: 54
Discharge: HOME | End: 2024-10-26
Payer: MEDICARE

## 2024-10-26 VITALS
BODY MASS INDEX: 35.03 KG/M2 | SYSTOLIC BLOOD PRESSURE: 137 MMHG | TEMPERATURE: 98 F | OXYGEN SATURATION: 97 % | WEIGHT: 205.19 LBS | HEIGHT: 64 IN | DIASTOLIC BLOOD PRESSURE: 94 MMHG | RESPIRATION RATE: 18 BRPM | HEART RATE: 63 BPM

## 2024-10-26 DIAGNOSIS — R11.14 BILIOUS VOMITING WITH NAUSEA: ICD-10-CM

## 2024-10-26 DIAGNOSIS — N20.1 URETEROLITHIASIS: ICD-10-CM

## 2024-10-26 DIAGNOSIS — E86.0 DEHYDRATION: Primary | ICD-10-CM

## 2024-10-26 DIAGNOSIS — R19.7 DIARRHEA, UNSPECIFIED TYPE: ICD-10-CM

## 2024-10-26 LAB
ALBUMIN SERPL BCP-MCNC: 3.4 G/DL (ref 3.5–5)
ALBUMIN/GLOB SERPL: 1 {RATIO}
ALP SERPL-CCNC: 81 U/L (ref 41–108)
ALT SERPL W P-5'-P-CCNC: 21 U/L (ref 13–56)
ANION GAP SERPL CALCULATED.3IONS-SCNC: 11 MMOL/L (ref 7–16)
AST SERPL W P-5'-P-CCNC: 24 U/L (ref 15–37)
BACTERIA #/AREA URNS HPF: ABNORMAL /HPF
BASOPHILS # BLD AUTO: 0.05 K/UL (ref 0–0.2)
BASOPHILS NFR BLD AUTO: 0.9 % (ref 0–1)
BILIRUB SERPL-MCNC: 0.4 MG/DL (ref ?–1.2)
BILIRUB UR QL STRIP: NEGATIVE
BUN SERPL-MCNC: 11 MG/DL (ref 7–18)
BUN/CREAT SERPL: 11 (ref 6–20)
CALCIUM SERPL-MCNC: 8.7 MG/DL (ref 8.5–10.1)
CHLORIDE SERPL-SCNC: 104 MMOL/L (ref 98–107)
CLARITY UR: CLEAR
CO2 SERPL-SCNC: 28 MMOL/L (ref 21–32)
COLOR UR: YELLOW
CREAT SERPL-MCNC: 1.01 MG/DL (ref 0.55–1.02)
DIFFERENTIAL METHOD BLD: ABNORMAL
EGFR (NO RACE VARIABLE) (RUSH/TITUS): 66 ML/MIN/1.73M2
EOSINOPHIL # BLD AUTO: 0.13 K/UL (ref 0–0.5)
EOSINOPHIL NFR BLD AUTO: 2.3 % (ref 1–4)
ERYTHROCYTE [DISTWIDTH] IN BLOOD BY AUTOMATED COUNT: 14.7 % (ref 11.5–14.5)
GLOBULIN SER-MCNC: 3.5 G/DL (ref 2–4)
GLUCOSE SERPL-MCNC: 102 MG/DL (ref 74–106)
GLUCOSE UR STRIP-MCNC: NEGATIVE MG/DL
HCT VFR BLD AUTO: 39.6 % (ref 38–47)
HGB BLD-MCNC: 12.8 G/DL (ref 12–16)
IMM GRANULOCYTES # BLD AUTO: 0.01 K/UL (ref 0–0.04)
IMM GRANULOCYTES NFR BLD: 0.2 % (ref 0–0.4)
KETONES UR STRIP-SCNC: NEGATIVE MG/DL
LEUKOCYTE ESTERASE UR QL STRIP: NEGATIVE
LYMPHOCYTES # BLD AUTO: 2.13 K/UL (ref 1–4.8)
LYMPHOCYTES NFR BLD AUTO: 37.8 % (ref 27–41)
MCH RBC QN AUTO: 26.9 PG (ref 27–31)
MCHC RBC AUTO-ENTMCNC: 32.3 G/DL (ref 32–36)
MCV RBC AUTO: 83.4 FL (ref 80–96)
MONOCYTES # BLD AUTO: 0.31 K/UL (ref 0–0.8)
MONOCYTES NFR BLD AUTO: 5.5 % (ref 2–6)
MPC BLD CALC-MCNC: 11.1 FL (ref 9.4–12.4)
NEUTROPHILS # BLD AUTO: 3.01 K/UL (ref 1.8–7.7)
NEUTROPHILS NFR BLD AUTO: 53.3 % (ref 53–65)
NITRITE UR QL STRIP: NEGATIVE
NRBC # BLD AUTO: 0 X10E3/UL
NRBC, AUTO (.00): 0 %
PH UR STRIP: 5.5 PH UNITS
PLATELET # BLD AUTO: 211 K/UL (ref 150–400)
PLATELET MORPHOLOGY: ABNORMAL
POTASSIUM SERPL-SCNC: 3.9 MMOL/L (ref 3.5–5.1)
PROT SERPL-MCNC: 6.9 G/DL (ref 6.4–8.2)
PROT UR QL STRIP: NEGATIVE
RBC # BLD AUTO: 4.75 M/UL (ref 4.2–5.4)
RBC # UR STRIP: ABNORMAL /UL
RBC #/AREA URNS HPF: ABNORMAL /HPF
RBC MORPH BLD: NORMAL
SODIUM SERPL-SCNC: 139 MMOL/L (ref 136–145)
SP GR UR STRIP: 1.01
SQUAMOUS #/AREA URNS LPF: ABNORMAL /LPF
UROBILINOGEN UR STRIP-ACNC: 0.2 MG/DL
WBC # BLD AUTO: 5.64 K/UL (ref 4.5–11)
WBC #/AREA URNS HPF: ABNORMAL /HPF

## 2024-10-26 PROCEDURE — 25000003 PHARM REV CODE 250: Performed by: FAMILY MEDICINE

## 2024-10-26 PROCEDURE — 81001 URINALYSIS AUTO W/SCOPE: CPT | Performed by: FAMILY MEDICINE

## 2024-10-26 PROCEDURE — 80053 COMPREHEN METABOLIC PANEL: CPT | Performed by: FAMILY MEDICINE

## 2024-10-26 PROCEDURE — 99285 EMERGENCY DEPT VISIT HI MDM: CPT | Mod: 25

## 2024-10-26 PROCEDURE — 99284 EMERGENCY DEPT VISIT MOD MDM: CPT | Performed by: FAMILY MEDICINE

## 2024-10-26 PROCEDURE — 96374 THER/PROPH/DIAG INJ IV PUSH: CPT

## 2024-10-26 PROCEDURE — 63600175 PHARM REV CODE 636 W HCPCS: Performed by: FAMILY MEDICINE

## 2024-10-26 PROCEDURE — 96361 HYDRATE IV INFUSION ADD-ON: CPT

## 2024-10-26 PROCEDURE — 85025 COMPLETE CBC W/AUTO DIFF WBC: CPT | Performed by: FAMILY MEDICINE

## 2024-10-26 RX ORDER — ONDANSETRON 4 MG/1
8 TABLET, ORALLY DISINTEGRATING ORAL EVERY 6 HOURS PRN
Qty: 12 TABLET | Refills: 3 | Status: SHIPPED | OUTPATIENT
Start: 2024-10-26

## 2024-10-26 RX ORDER — NABUMETONE 750 MG/1
750 TABLET, FILM COATED ORAL 2 TIMES DAILY
Qty: 20 TABLET | Refills: 0 | Status: SHIPPED | OUTPATIENT
Start: 2024-10-26

## 2024-10-26 RX ORDER — ONDANSETRON HYDROCHLORIDE 2 MG/ML
8 INJECTION, SOLUTION INTRAVENOUS
Status: COMPLETED | OUTPATIENT
Start: 2024-10-26 | End: 2024-10-26

## 2024-10-26 RX ADMIN — ONDANSETRON 8 MG: 2 INJECTION INTRAMUSCULAR; INTRAVENOUS at 03:10

## 2024-10-26 RX ADMIN — SODIUM CHLORIDE 1000 ML: 9 INJECTION, SOLUTION INTRAVENOUS at 03:10

## 2024-10-30 ENCOUNTER — TELEPHONE (OUTPATIENT)
Dept: UROLOGY | Facility: CLINIC | Age: 54
End: 2024-10-30
Payer: MEDICARE

## 2024-10-30 DIAGNOSIS — N20.0 NEPHROLITHIASIS: ICD-10-CM

## 2024-10-30 DIAGNOSIS — N13.2 HYDRONEPHROSIS CONCURRENT WITH AND DUE TO CALCULI OF KIDNEY AND URETER: ICD-10-CM

## 2024-10-30 DIAGNOSIS — N20.1 URETEROLITHIASIS: ICD-10-CM

## 2024-10-30 DIAGNOSIS — N20.1 RIGHT URETERAL STONE: Primary | ICD-10-CM

## 2024-10-30 RX ORDER — HYDROMORPHONE HYDROCHLORIDE 2 MG/1
2 TABLET ORAL EVERY 4 HOURS PRN
Qty: 20 TABLET | Refills: 0 | Status: SHIPPED | OUTPATIENT
Start: 2024-10-30

## 2024-10-30 RX ORDER — PROMETHAZINE HYDROCHLORIDE 25 MG/1
25 TABLET ORAL EVERY 4 HOURS PRN
Qty: 20 TABLET | Refills: 0 | Status: SHIPPED | OUTPATIENT
Start: 2024-10-30

## 2024-11-06 ENCOUNTER — TELEPHONE (OUTPATIENT)
Dept: UROLOGY | Facility: CLINIC | Age: 54
End: 2024-11-06
Payer: MEDICARE

## 2024-11-06 NOTE — TELEPHONE ENCOUNTER
----- Message from Claudia sent at 11/6/2024  8:08 AM CST -----  Pt has been trying to pass a stone for last 2 weeks. Dr Ruvalcaba called her last Thurs to ck on her but she thought she was better and ended up not having a proc. She now has UTI/bladder infect  symptoms and  is asking for antibiotics. Send to [facility].   Who Called: Gloria Mitchell    Symptoms (please be specific): UTI symptoms/Stone pain     Patient's Preferred Phone Number on File: [phone]       Patient was called back by LARISSA Blair asking her to come in today to be seen by physician, patient told her she did not have a ride and would just call or go see her primary care physician.

## 2024-11-19 ENCOUNTER — PATIENT MESSAGE (OUTPATIENT)
Dept: NEUROLOGY | Facility: CLINIC | Age: 54
End: 2024-11-19
Payer: MEDICAID

## 2024-11-19 ENCOUNTER — TELEPHONE (OUTPATIENT)
Dept: NEUROLOGY | Facility: CLINIC | Age: 54
End: 2024-11-19
Payer: MEDICARE

## 2024-11-19 NOTE — TELEPHONE ENCOUNTER
See in basket.      ----- Message from Med Assistant Meza sent at 11/19/2024  1:39 PM CST -----  Who Called: Gloria Mitchell    Caller is requesting assistance/information from provider's office.      Preferred Method of Contact: Phone Call  Patient's Preferred Phone Number on File: [phone]   Best Call Back Number, if different:  Additional Information: Do you have any samples of migraine medicine

## 2024-12-04 ENCOUNTER — PATIENT MESSAGE (OUTPATIENT)
Dept: NEUROLOGY | Facility: CLINIC | Age: 54
End: 2024-12-04
Payer: MEDICAID

## 2024-12-16 DIAGNOSIS — G43.719 INTRACTABLE CHRONIC MIGRAINE WITHOUT AURA AND WITHOUT STATUS MIGRAINOSUS: ICD-10-CM

## 2024-12-17 RX ORDER — ERENUMAB-AOOE 140 MG/ML
INJECTION, SOLUTION SUBCUTANEOUS
Qty: 3 ML | Refills: 0 | Status: SHIPPED | OUTPATIENT
Start: 2024-12-17

## 2024-12-30 ENCOUNTER — HOSPITAL ENCOUNTER (EMERGENCY)
Facility: HOSPITAL | Age: 54
Discharge: HOME | End: 2024-12-30
Attending: EMERGENCY MEDICINE
Payer: MEDICAID

## 2024-12-30 VITALS
RESPIRATION RATE: 18 BRPM | HEART RATE: 69 BPM | WEIGHT: 210 LBS | OXYGEN SATURATION: 97 % | SYSTOLIC BLOOD PRESSURE: 115 MMHG | DIASTOLIC BLOOD PRESSURE: 75 MMHG | BODY MASS INDEX: 38.64 KG/M2 | TEMPERATURE: 98 F | HEIGHT: 62 IN

## 2024-12-30 DIAGNOSIS — R19.7 DIARRHEA OF PRESUMED INFECTIOUS ORIGIN: Primary | ICD-10-CM

## 2024-12-30 LAB
ALBUMIN SERPL BCP-MCNC: 2.9 G/DL (ref 3.5–5)
ALBUMIN/GLOB SERPL: 0.9 {RATIO}
ALP SERPL-CCNC: 71 U/L (ref 40–150)
ALT SERPL W P-5'-P-CCNC: 16 U/L
AMYLASE SERPL-CCNC: 25 U/L (ref 25–125)
ANION GAP SERPL CALCULATED.3IONS-SCNC: 13 MMOL/L (ref 7–16)
AST SERPL W P-5'-P-CCNC: 23 U/L (ref 5–34)
BACTERIA #/AREA URNS HPF: ABNORMAL /HPF
BASOPHILS # BLD AUTO: 0.01 K/UL (ref 0–0.2)
BASOPHILS NFR BLD AUTO: 0.3 % (ref 0–1)
BILIRUB SERPL-MCNC: 0.4 MG/DL
BILIRUB UR QL STRIP: NEGATIVE
BUN SERPL-MCNC: 14 MG/DL (ref 10–20)
BUN/CREAT SERPL: 15 (ref 6–20)
CALCIUM SERPL-MCNC: 8.6 MG/DL (ref 8.4–10.2)
CHLORIDE SERPL-SCNC: 104 MMOL/L (ref 98–107)
CLARITY UR: ABNORMAL
CO2 SERPL-SCNC: 25 MMOL/L (ref 22–29)
COLOR UR: YELLOW
CREAT SERPL-MCNC: 0.92 MG/DL (ref 0.55–1.02)
DIFFERENTIAL METHOD BLD: ABNORMAL
EGFR (NO RACE VARIABLE) (RUSH/TITUS): 74 ML/MIN/1.73M2
EOSINOPHIL # BLD AUTO: 0.02 K/UL (ref 0–0.5)
EOSINOPHIL NFR BLD AUTO: 0.6 % (ref 1–4)
ERYTHROCYTE [DISTWIDTH] IN BLOOD BY AUTOMATED COUNT: 14.4 % (ref 11.5–14.5)
GLOBULIN SER-MCNC: 3.3 G/DL (ref 2–4)
GLUCOSE SERPL-MCNC: 118 MG/DL (ref 74–100)
GLUCOSE UR STRIP-MCNC: NEGATIVE MG/DL
HCT VFR BLD AUTO: 37.3 % (ref 38–47)
HGB BLD-MCNC: 12.3 G/DL (ref 12–16)
IMM GRANULOCYTES # BLD AUTO: 0 K/UL (ref 0–0.04)
IMM GRANULOCYTES NFR BLD: 0 % (ref 0–0.4)
KETONES UR STRIP-SCNC: NEGATIVE MG/DL
LEUKOCYTE ESTERASE UR QL STRIP: NEGATIVE
LIPASE SERPL-CCNC: 12 U/L
LYMPHOCYTES # BLD AUTO: 0.53 K/UL (ref 1–4.8)
LYMPHOCYTES NFR BLD AUTO: 14.9 % (ref 27–41)
MAGNESIUM SERPL-MCNC: 1.9 MG/DL (ref 1.6–2.6)
MCH RBC QN AUTO: 27 PG (ref 27–31)
MCHC RBC AUTO-ENTMCNC: 33 G/DL (ref 32–36)
MCV RBC AUTO: 81.8 FL (ref 80–96)
MONOCYTES # BLD AUTO: 0.19 K/UL (ref 0–0.8)
MONOCYTES NFR BLD AUTO: 5.4 % (ref 2–6)
MPC BLD CALC-MCNC: 9.2 FL (ref 9.4–12.4)
NEUTROPHILS # BLD AUTO: 2.8 K/UL (ref 1.8–7.7)
NEUTROPHILS NFR BLD AUTO: 78.8 % (ref 53–65)
NITRITE UR QL STRIP: NEGATIVE
NRBC # BLD AUTO: 0 X10E3/UL
NRBC, AUTO (.00): 0 %
PH UR STRIP: 5.5 PH UNITS
PLATELET # BLD AUTO: 227 K/UL (ref 150–400)
POTASSIUM SERPL-SCNC: 3.9 MMOL/L (ref 3.5–5.1)
PROT SERPL-MCNC: 6.2 G/DL (ref 6.4–8.3)
PROT UR QL STRIP: 30
RBC # BLD AUTO: 4.56 M/UL (ref 4.2–5.4)
RBC # UR STRIP: ABNORMAL /UL
RBC #/AREA URNS HPF: ABNORMAL /HPF
SODIUM SERPL-SCNC: 138 MMOL/L (ref 136–145)
SP GR UR STRIP: >=1.03
SQUAMOUS #/AREA URNS LPF: ABNORMAL /LPF
UROBILINOGEN UR STRIP-ACNC: 0.2 MG/DL
WBC # BLD AUTO: 3.55 K/UL (ref 4.5–11)
WBC #/AREA URNS HPF: ABNORMAL /HPF

## 2024-12-30 PROCEDURE — 96365 THER/PROPH/DIAG IV INF INIT: CPT

## 2024-12-30 PROCEDURE — 63600175 PHARM REV CODE 636 W HCPCS: Performed by: EMERGENCY MEDICINE

## 2024-12-30 PROCEDURE — 36415 COLL VENOUS BLD VENIPUNCTURE: CPT | Performed by: EMERGENCY MEDICINE

## 2024-12-30 PROCEDURE — 87046 STOOL CULTR AEROBIC BACT EA: CPT | Performed by: EMERGENCY MEDICINE

## 2024-12-30 PROCEDURE — 96361 HYDRATE IV INFUSION ADD-ON: CPT

## 2024-12-30 PROCEDURE — 25000003 PHARM REV CODE 250: Performed by: EMERGENCY MEDICINE

## 2024-12-30 PROCEDURE — 83690 ASSAY OF LIPASE: CPT | Performed by: EMERGENCY MEDICINE

## 2024-12-30 PROCEDURE — 83735 ASSAY OF MAGNESIUM: CPT | Performed by: EMERGENCY MEDICINE

## 2024-12-30 PROCEDURE — 80053 COMPREHEN METABOLIC PANEL: CPT | Performed by: EMERGENCY MEDICINE

## 2024-12-30 PROCEDURE — 82150 ASSAY OF AMYLASE: CPT | Performed by: EMERGENCY MEDICINE

## 2024-12-30 PROCEDURE — 99284 EMERGENCY DEPT VISIT MOD MDM: CPT | Mod: 25

## 2024-12-30 PROCEDURE — 87427 SHIGA-LIKE TOXIN AG IA: CPT | Performed by: EMERGENCY MEDICINE

## 2024-12-30 PROCEDURE — 81003 URINALYSIS AUTO W/O SCOPE: CPT | Performed by: EMERGENCY MEDICINE

## 2024-12-30 PROCEDURE — 85025 COMPLETE CBC W/AUTO DIFF WBC: CPT | Performed by: EMERGENCY MEDICINE

## 2024-12-30 PROCEDURE — 96375 TX/PRO/DX INJ NEW DRUG ADDON: CPT

## 2024-12-30 PROCEDURE — 96367 TX/PROPH/DG ADDL SEQ IV INF: CPT

## 2024-12-30 RX ORDER — METRONIDAZOLE 500 MG/100ML
500 INJECTION, SOLUTION INTRAVENOUS
Status: COMPLETED | OUTPATIENT
Start: 2024-12-30 | End: 2024-12-30

## 2024-12-30 RX ORDER — TRIAMCINOLONE ACETONIDE 1 MG/G
CREAM TOPICAL
COMMUNITY

## 2024-12-30 RX ORDER — CIPROFLOXACIN 2 MG/ML
400 INJECTION, SOLUTION INTRAVENOUS
Status: COMPLETED | OUTPATIENT
Start: 2024-12-30 | End: 2024-12-30

## 2024-12-30 RX ORDER — CIPROFLOXACIN 250 MG/1
250 TABLET, FILM COATED ORAL 2 TIMES DAILY
Qty: 14 TABLET | Refills: 0 | Status: SHIPPED | OUTPATIENT
Start: 2024-12-30 | End: 2025-01-06

## 2024-12-30 RX ORDER — ONDANSETRON 4 MG/1
4 TABLET, ORALLY DISINTEGRATING ORAL EVERY 6 HOURS PRN
Qty: 12 TABLET | Refills: 3 | Status: SHIPPED | OUTPATIENT
Start: 2024-12-30 | End: 2025-01-06

## 2024-12-30 RX ORDER — ONDANSETRON HYDROCHLORIDE 2 MG/ML
4 INJECTION, SOLUTION INTRAVENOUS
Status: COMPLETED | OUTPATIENT
Start: 2024-12-30 | End: 2024-12-30

## 2024-12-30 RX ORDER — MINOCYCLINE HYDROCHLORIDE 100 MG/1
100 CAPSULE ORAL DAILY
COMMUNITY

## 2024-12-30 RX ORDER — SODIUM CHLORIDE 9 MG/ML
1000 INJECTION, SOLUTION INTRAVENOUS
Status: COMPLETED | OUTPATIENT
Start: 2024-12-30 | End: 2024-12-30

## 2024-12-30 RX ORDER — METFORMIN HYDROCHLORIDE 500 MG/1
500 TABLET, EXTENDED RELEASE ORAL 2 TIMES DAILY WITH MEALS
COMMUNITY

## 2024-12-30 RX ORDER — PROCHLORPERAZINE EDISYLATE 5 MG/ML
10 INJECTION INTRAMUSCULAR; INTRAVENOUS
Status: COMPLETED | OUTPATIENT
Start: 2024-12-30 | End: 2024-12-30

## 2024-12-30 RX ORDER — METRONIDAZOLE 500 MG/1
500 TABLET ORAL EVERY 12 HOURS
Qty: 14 TABLET | Refills: 0 | Status: SHIPPED | OUTPATIENT
Start: 2024-12-30 | End: 2025-01-06

## 2024-12-30 RX ORDER — METOPROLOL SUCCINATE 50 MG/1
50 TABLET, EXTENDED RELEASE ORAL DAILY
COMMUNITY

## 2024-12-30 RX ADMIN — METRONIDAZOLE 500 MG: 5 INJECTION, SOLUTION INTRAVENOUS at 02:12

## 2024-12-30 RX ADMIN — SODIUM CHLORIDE 1000 ML: 9 INJECTION, SOLUTION INTRAVENOUS at 04:12

## 2024-12-30 RX ADMIN — PROCHLORPERAZINE EDISYLATE 10 MG: 5 INJECTION INTRAMUSCULAR; INTRAVENOUS at 02:12

## 2024-12-30 RX ADMIN — ONDANSETRON 4 MG: 2 INJECTION INTRAMUSCULAR; INTRAVENOUS at 04:12

## 2024-12-30 RX ADMIN — CIPROFLOXACIN 400 MG: 2 INJECTION, SOLUTION INTRAVENOUS at 02:12

## 2024-12-30 RX ADMIN — SODIUM CHLORIDE 1000 ML: 9 INJECTION, SOLUTION INTRAVENOUS at 02:12

## 2024-12-30 NOTE — ED NOTES
RN into room to discharge pt and pt states that her ride will not be able to come for 30 to 40 minutes, pt can remain in room until that time

## 2024-12-30 NOTE — ED NOTES
PT BED LINEN CHANGED FROM [facility]/PT REPORTS SHE IS VOIDING WHEN SHE HAS BOWEL MOVEMENTS/WILL IN AND OUT CATH

## 2024-12-30 NOTE — ED PROVIDER NOTES
Encounter Date: 12/30/2024       History     Chief Complaint   Patient presents with    Vomiting     Patient presents with nausea vomiting diarrhea that started at 4:30 a.m. yesterday a.m., persists.  Arrived to the emergency department via EMS transport.      Review of patient's allergies indicates:   Allergen Reactions    Oxycodone-acetaminophen Swelling and Edema    Bupropion hcl      Other Reaction(s): Not available    Oxycodone hcl      Past Medical History:   Diagnosis Date    Acid reflux     Calculus of kidney     Depression     Gastrointestinal ulcerative mucositis 01/04/2023    Endoscopy Dr Ravi    Hypertension     Hypothyroidism     Low back pain     Lumbosacral radiculopathy 05/17/2021    Migraines     Multiple gastric ulcers     Pain     Postlaminectomy syndrome, lumbar region 05/17/2021    UTI (urinary tract infection) 08/23/2022     Past Surgical History:   Procedure Laterality Date    BACK SURGERY      CAUDAL EPIDURAL STEROID INJECTION Bilateral 11/08/2022    Procedure: CAUDAL BRENDA;  Surgeon: Elba Moon MD;  Location: [facility];  Service: Pain Management;  Laterality: Bilateral;  PT STATES HAD VAC  WILL BRING CARD    ENDOSCOPY  01/04/2023    INSERTION OF DORSAL COLUMN NERVE STIMULATOR FOR TRIAL N/A 07/15/2021    Procedure: INSERTION, NEUROSTIMULATOR, SPINAL CORD, DORSAL COLUMN, FOR TRIAL;  Surgeon: Elba Moon MD;  Location: [facility];  Service: Pain Management;  Laterality: N/A;  spoke to Dr Calin Burgess nurse, urine culture negative (results in Epic), pt cleared.    LITHOTRIPSY       Family History   Problem Relation Name Age of Onset    Stroke Father      Heart attack Father      Heart attack Brother      Cancer Brother       Social History     Tobacco Use    Smoking status: Never     Passive exposure: Past    Smokeless tobacco: Never   Substance Use Topics    Alcohol use: Never    Drug use: Never     Review of Systems   Constitutional:  Positive for activity change  (decreased activity for 1 day due to not feeling well.), appetite change (Poor appetite for 1 day) and fatigue.   HENT:  Negative for congestion, sore throat and trouble swallowing.         Reports dry mouth   Eyes: Negative.    Respiratory: Negative.     Cardiovascular: Negative.    Gastrointestinal:  Positive for abdominal pain (Intermittent crampy abdominal pain), diarrhea, nausea and vomiting. Negative for blood in stool and constipation.   Genitourinary:  Positive for decreased urine volume. Negative for difficulty urinating, dysuria, flank pain and hematuria.   Musculoskeletal: Negative.    Skin: Negative.    Neurological: Negative.    Psychiatric/Behavioral: Negative.     All other systems reviewed and are negative.      Physical Exam     Initial Vitals [12/30/24 0156]   BP Pulse Resp Temp SpO2   93/71 75 18 98.4 °F (36.9 °C) 95 %      MAP       --         Physical Exam    Nursing note and vitals reviewed.  Constitutional: She appears well-developed and well-nourished.   HENT:   Right Ear: External ear normal.   Left Ear: External ear normal.   Nose: Nose normal. Mouth/Throat: Mucous membranes are dry.   Eyes: Conjunctivae and EOM are normal. Pupils are equal, round, and reactive to light. No scleral icterus.   Neck: Neck supple. No JVD present.   Normal range of motion.  Cardiovascular:  Normal rate, regular rhythm and normal heart sounds.           No murmur heard.  Pulmonary/Chest: Breath sounds normal. No stridor. No respiratory distress. She has no wheezes. She has no rhonchi. She has no rales.   Abdominal: Abdomen is soft. Bowel sounds are normal. She exhibits no distension. There is abdominal tenderness (mild diffuse tenderness, no peritoneal signs.).   Bowel sounds gurgling and increased. There is no rebound and no guarding.   Musculoskeletal:         General: No tenderness or edema. Normal range of motion.      Cervical back: Normal range of motion and neck supple.     Lymphadenopathy:     She has no  cervical adenopathy.   Neurological: She is alert and oriented to person, place, and time. She has normal strength. No cranial nerve deficit. GCS score is 15. GCS eye subscore is 4. GCS verbal subscore is 5. GCS motor subscore is 6.   Skin: Skin is warm and dry. Capillary refill takes more than 3 seconds. No rash noted. No erythema. No pallor.         Medical Screening Exam   See Full Note    ED Course   Procedures  Labs Reviewed   COMPREHENSIVE METABOLIC PANEL - Abnormal       Result Value    Sodium 138      Potassium 3.9      Chloride 104      CO2 25      Anion Gap 13      Glucose 118 (*)     BUN 14      Creatinine 0.92      BUN/Creatinine Ratio 15      Calcium 8.6      Total Protein 6.2 (*)     Albumin 2.9 (*)     Globulin 3.3      A/G Ratio 0.9      Bilirubin, Total 0.4      Alk Phos 71      ALT 16      AST 23      eGFR 74     URINALYSIS, REFLEX TO URINE CULTURE - Abnormal    Color, UA Yellow      Clarity, UA Slightly Cloudy      pH, UA 5.5      Leukocytes, UA Negative      Nitrites, UA Negative      Protein, UA 30 (*)     Glucose, UA Negative      Ketones, UA Negative      Urobilinogen, UA 0.2      Bilirubin, UA Negative      Blood, UA Moderate (*)     Specific Gravity, UA >=1.030 (*)    CBC WITH DIFFERENTIAL - Abnormal    WBC 3.55 (*)     RBC 4.56      Hemoglobin 12.3      Hematocrit 37.3 (*)     MCV 81.8      MCH 27.0      MCHC 33.0      RDW 14.4      Platelet Count 227      MPV 9.2 (*)     Neutrophils % 78.8 (*)     Lymphocytes % 14.9 (*)     Monocytes % 5.4      Eosinophils % 0.6 (*)     Basophils % 0.3      Immature Granulocytes % 0.0      nRBC, Auto 0.0      Neutrophils, Abs 2.80      Lymphocytes, Absolute 0.53 (*)     Monocytes, Absolute 0.19      Eosinophils, Absolute 0.02      Basophils, Absolute 0.01      Immature Granulocytes, Absolute 0.00      nRBC, Absolute 0.00      Diff Type Auto     URINALYSIS, MICROSCOPIC - Abnormal    WBC, UA 0-5      RBC, UA 5-10 (*)     Bacteria, UA Few (*)     Squamous  Epithelial Cells, UA Moderate (*)    AMYLASE - Normal    Amylase 25     LIPASE - Normal    Lipase 12     MAGNESIUM - Normal    Magnesium 1.9     CULTURE, STOOL   CBC W/ AUTO DIFFERENTIAL    Narrative:     The following orders were created for panel order CBC auto differential.  Procedure                               Abnormality         Status                     ---------                               -----------         ------                     CBC with Differential[[id number]]       Abnormal            Final result                 Please view results for these tests on the individual orders.          Imaging Results    None          Medications   sodium chloride 0.9% bolus 1,000 mL 1,000 mL (0 mLs Intravenous Stopped 12/30/24 0317)   prochlorperazine injection Soln 10 mg (10 mg Intravenous Given 12/30/24 0200)   ciprofloxacin (CIPRO)400mg/200ml D5W IVPB 400 mg (0 mg Intravenous Stopped 12/30/24 0346)   metronidazole IVPB 500 mg (0 mg Intravenous Stopped 12/30/24 0332)   0.9% NaCl infusion (0 mLs Intravenous Stopped 12/30/24 0555)   ondansetron injection 4 mg (4 mg Intravenous Given 12/30/24 0440)     Medical Decision Making  Differential diagnosis includes viral gastroenteritis, infectious diarrhea, functional diarrhea, volume depletion, electrolyte abnormality, gallbladder disease.  Patient was treated with IV fluid and IV prochlorperazine.  Patient has presumed infectious diarrhea, she was treated with IV Cipro and IV Flagyl.    Patient was given additional IV fluids in his improved, stable for discharge home.  Prescription for Cipro and Flagyl.      Amount and/or Complexity of Data Reviewed  Labs: ordered. Decision-making details documented in ED Course.    Risk  Prescription drug management.               ED Course as of 12/30/24 0619   Mon Dec 30, 2024   0614 Comprehensive metabolic panel(!)  CMP shows glucose 118, normal BUN and creatinine, normal BUN creatinine ratio, estimated GFR of 74.  Albumin  slightly low at 2.9, normal transaminases [LM]   0614 CBC auto differential(!)  CBC shows white blood cell count slightly low at 3.55, hemoglobin 12.3 with a hematocrit 37.3, platelet count 227. [LM]   0615 Urinalysis, Reflex to Urine Culture(!)  Urinalysis shows 30 mg/dL proteinuria, moderate blood and specific gravity greater than 1.030. [LM]   0615 Urinalysis, Microscopic(!)  Microscopic urinalysis shows 0-5 WBCs and 5-10 RBCs with few bacteria. [LM]   0615 Magnesium  Magnesium level is normal [LM]   0615 Lipase  Lipase is not [LM]   0615 Amylase  Amylase is normal [LM]      ED Course User Index  [LM] Srini Angulo DO                           Clinical Impression:   Final diagnoses:  [R19.7] Diarrhea of presumed infectious origin (Primary)        ED Disposition Condition    Discharge Stable          ED Prescriptions       Medication Sig Dispense Start Date End Date Auth. Provider    ondansetron (ZOFRAN-ODT) 4 MG TbDL Take 1 tablet (4 mg total) by mouth every 6 (six) hours as needed (Nausea or vomiting). 12 tablet 12/30/2024 1/6/2025 Srini Angulo DO    ciprofloxacin HCl (CIPRO) 250 MG tablet Take 1 tablet (250 mg total) by mouth 2 (two) times daily. for 7 days 14 tablet 12/30/2024 1/6/2025 Srini Angulo DO    metroNIDAZOLE (FLAGYL) 500 MG tablet Take 1 tablet (500 mg total) by mouth every 12 (twelve) hours. for 7 days 14 tablet 12/30/2024 1/6/2025 Srini Angulo DO          Follow-up Information       Follow up With Specialties Details Why Contact Info    Claudia Barahona MD Family Medicine Schedule an appointment as soon as possible for a visit on 1/2/2025 To recheck; sooner if worse, not improving, or if any new symptoms. [address] [facility] [address] [phone]               Srini Angulo DO  12/30/24 7955

## 2024-12-30 NOTE — ED TRIAGE NOTES
PT BROUGHT IN BY [facility] WITH C/O N/V/D FOR 1 DAY/20G LEFT FOREARM PER [facility] AND ZOFRAN 4MG IVP GIVEN PTA PER [facility]    PT REPORTS N/V/D STARTED 12/29/2024 0430AM    MULTIPLE EPISODES OF DIARRHEA WHILE ATTEMPTING TO TRIAGE PT

## 2024-12-30 NOTE — ED NOTES
Pt's Iv site removed and discharge instructions provided  for pt to be able to leave and pt laid back down in bed and pulled her blankets up and asked RN to turn the lights off in the room

## 2024-12-31 LAB
CAMPYLOBACTER ANTIGEN: NEGATIVE
EHEC (SHIGA TOXIN 1): NEGATIVE
EHEC (SHIGA TOXIN 2): NEGATIVE

## 2025-01-01 LAB — SALM+SHIG+E COLI ETEC STL CULT: NORMAL

## 2025-01-02 NOTE — ADDENDUM NOTE
Encounter addended by: Smita Doe RN on: 1/2/2025 7:53 AM   Actions taken: Flowsheet accepted, Contacts section saved

## 2025-01-03 ENCOUNTER — PATIENT OUTREACH (OUTPATIENT)
Dept: EMERGENCY MEDICINE | Facility: HOSPITAL | Age: 55
End: 2025-01-03
Payer: MEDICAID

## 2025-01-03 NOTE — PROGRESS NOTES
1/3/25  Attempt X1 Unable to leave a message, not available to answer the telephone at the moment   Yuli Park Lpn-ED Navigator  Ph# [phone]    1/6/25  Attempt x 2 Unable to leave message for patient at this time Chart will be closed   Yuli Park Lpn-ED Navigator  # [phone]

## 2025-01-11 ENCOUNTER — HOSPITAL ENCOUNTER (EMERGENCY)
Facility: HOSPITAL | Age: 55
Discharge: HOME | End: 2025-01-11
Attending: OBSTETRICS & GYNECOLOGY
Payer: MEDICARE

## 2025-01-11 VITALS
TEMPERATURE: 98 F | WEIGHT: 204 LBS | DIASTOLIC BLOOD PRESSURE: 78 MMHG | HEIGHT: 62 IN | SYSTOLIC BLOOD PRESSURE: 121 MMHG | RESPIRATION RATE: 17 BRPM | BODY MASS INDEX: 37.54 KG/M2 | OXYGEN SATURATION: 96 % | HEART RATE: 67 BPM

## 2025-01-11 DIAGNOSIS — M25.562 LEFT KNEE PAIN, UNSPECIFIED CHRONICITY: Primary | ICD-10-CM

## 2025-01-11 DIAGNOSIS — S83.92XA SPRAIN OF LEFT KNEE, UNSPECIFIED LIGAMENT, INITIAL ENCOUNTER: ICD-10-CM
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
## 2025-01-11 PROCEDURE — 99283 EMERGENCY DEPT VISIT LOW MDM: CPT | Mod: 25

## 2025-01-11 PROCEDURE — 99283 EMERGENCY DEPT VISIT LOW MDM: CPT | Mod: ,,, | Performed by: OBSTETRICS & GYNECOLOGY

## 2025-01-11 RX ORDER — IBUPROFEN 800 MG/1
800 TABLET ORAL 3 TIMES DAILY
Qty: 21 TABLET | Refills: 0 | Status: SHIPPED | OUTPATIENT
Start: 2025-01-11 | End: 2025-01-18

## 2025-01-11 NOTE — ED PROVIDER NOTES
Encounter Date: 1/11/2025       History     Chief Complaint   Patient presents with    Knee Injury     States she fell last night and injured her left knee. Pt wearing boot due to fall on last Sunday that sprained her rt ankle and foot.     Patient presents for evaluation status post ground level fall last night when she injured her left knee.  Patient is already wearing a boot on her right ankle secondary to a ground level fall last week Sunday.        Review of patient's allergies indicates:   Allergen Reactions    Oxycodone-acetaminophen Swelling and Edema    Bupropion hcl      Other Reaction(s): Not available    Oxycodone hcl      Past Medical History:   Diagnosis Date    Acid reflux     Calculus of kidney     Depression     Gastrointestinal ulcerative mucositis 01/04/2023    Endoscopy Dr Ravi    Hypertension     Hypothyroidism     Low back pain     Lumbosacral radiculopathy 05/17/2021    Migraines     Multiple gastric ulcers     Pain     Postlaminectomy syndrome, lumbar region 05/17/2021    UTI (urinary tract infection) 08/23/2022     Past Surgical History:   Procedure Laterality Date    BACK SURGERY      CAUDAL EPIDURAL STEROID INJECTION Bilateral 11/08/2022    Procedure: CAUDAL BRENDA;  Surgeon: Elba Moon MD;  Location: [facility];  Service: Pain Management;  Laterality: Bilateral;  PT STATES HAD VAC  WILL BRING CARD    ENDOSCOPY  01/04/2023    INSERTION OF DORSAL COLUMN NERVE STIMULATOR FOR TRIAL N/A 07/15/2021    Procedure: INSERTION, NEUROSTIMULATOR, SPINAL CORD, DORSAL COLUMN, FOR TRIAL;  Surgeon: Elba Moon MD;  Location: [facility];  Service: Pain Management;  Laterality: N/A;  spoke to Dr Calin Burgess nurse, urine culture negative (results in Epic), pt cleared.    LITHOTRIPSY       Family History   Problem Relation Name Age of Onset    Stroke Father      Heart attack Father      Heart attack Brother      Cancer Brother       Social History     Tobacco Use    Smoking status:  Never     Passive exposure: Past    Smokeless tobacco: Never   Substance Use Topics    Alcohol use: Never    Drug use: Never     Review of Systems   All other systems reviewed and are negative.      Physical Exam     Initial Vitals [01/11/25 1247]   BP Pulse Resp Temp SpO2   121/78 67 17 -- 96 %      MAP       --         Physical Exam    Nursing note and vitals reviewed.  Constitutional: She appears well-developed.   HENT:   Head: Normocephalic.   Eyes: Pupils are equal, round, and reactive to light.   Neck:   Normal range of motion.  Cardiovascular:  Normal rate and regular rhythm.           Pulmonary/Chest: Breath sounds normal.   Abdominal: Abdomen is soft.   Musculoskeletal:         General: Tenderness: Tenderness left knee but no soft tissue swelling.      Cervical back: Normal range of motion.     Neurological: She is alert and oriented to person, place, and time.         Medical Screening Exam   See Full Note    ED Course   Procedures  Labs Reviewed - No data to display       Imaging Results    None       X-Rays:   Independently Interpreted Readings:   Other Readings:  Left knee x-ray is negative.  No fracture noted.    Medications - No data to display  Medical Decision Making  54-year-old patient with ground level fall with left knee pain.  No radiologic evidence of fracture.  Discussed with patient provisional diagnosis and proposed plan of care and all questions invited and answered.  Patient also has a walking cast boot on her right ankle and she should be able to take got off now.  It has been there for 7 days.  That will probably reduce her risk of having another ground level fall.    Amount and/or Complexity of Data Reviewed  Radiology: ordered.                                      Clinical Impression:   Final diagnoses:  [W18.30XA] Ground-level fall     Left knee sprain          Tara Ortiz MD  01/11/25 1340       Tara Ortiz MD  01/11/25 1341

## 2025-02-11 ENCOUNTER — OFFICE VISIT (OUTPATIENT)
Dept: ORTHOPEDICS | Facility: CLINIC | Age: 55
End: 2025-02-11
Payer: MEDICARE

## 2025-02-11 ENCOUNTER — HOSPITAL ENCOUNTER (OUTPATIENT)
Dept: RADIOLOGY | Facility: HOSPITAL | Age: 55
Discharge: HOME | End: 2025-02-11
Payer: MEDICARE

## 2025-02-11 VITALS — BODY MASS INDEX: 37.53 KG/M2 | WEIGHT: 203.94 LBS | HEIGHT: 62 IN

## 2025-02-11 DIAGNOSIS — M75.42 IMPINGEMENT SYNDROME OF BOTH SHOULDERS: ICD-10-CM

## 2025-02-11 DIAGNOSIS — R52 PAIN: Primary | ICD-10-CM

## 2025-02-11 DIAGNOSIS — M70.61 TROCHANTERIC BURSITIS OF BOTH HIPS: Primary | ICD-10-CM

## 2025-02-11 DIAGNOSIS — M70.62 TROCHANTERIC BURSITIS OF BOTH HIPS: Primary | ICD-10-CM

## 2025-02-11 DIAGNOSIS — R52 PAIN: ICD-10-CM

## 2025-02-11 DIAGNOSIS — M75.41 IMPINGEMENT SYNDROME OF BOTH SHOULDERS: ICD-10-CM

## 2025-02-11 PROCEDURE — 4010F ACE/ARB THERAPY RXD/TAKEN: CPT | Mod: CPTII,,,

## 2025-02-11 PROCEDURE — 20610 DRAIN/INJ JOINT/BURSA W/O US: CPT | Mod: PBBFAC,RT

## 2025-02-11 PROCEDURE — 99999PBSHW PR PBB SHADOW TECHNICAL ONLY FILED TO HB: Mod: PBBFAC,,,

## 2025-02-11 PROCEDURE — 73522 X-RAY EXAM HIPS BI 3-4 VIEWS: CPT | Mod: TC

## 2025-02-11 PROCEDURE — 3008F BODY MASS INDEX DOCD: CPT | Mod: CPTII,,,

## 2025-02-11 PROCEDURE — 99213 OFFICE O/P EST LOW 20 MIN: CPT | Mod: PBBFAC,25

## 2025-02-11 PROCEDURE — 20610 DRAIN/INJ JOINT/BURSA W/O US: CPT | Mod: PBBFAC,LT

## 2025-02-11 PROCEDURE — 99214 OFFICE O/P EST MOD 30 MIN: CPT | Mod: S$PBB,25,,

## 2025-02-11 PROCEDURE — 99999 PR PBB SHADOW E&M-EST. PATIENT-LVL III: CPT | Mod: PBBFAC,,,

## 2025-02-11 PROCEDURE — 73522 X-RAY EXAM HIPS BI 3-4 VIEWS: CPT | Mod: 26,,, | Performed by: RADIOLOGY

## 2025-02-11 RX ADMIN — BUPIVACAINE HYDROCHLORIDE 2 ML: 5 INJECTION, SOLUTION EPIDURAL; INTRACAUDAL; PERINEURAL at 01:02

## 2025-02-11 RX ADMIN — TRIAMCINOLONE ACETONIDE 40 MG: 40 INJECTION, SUSPENSION INTRA-ARTICULAR; INTRAMUSCULAR at 01:02

## 2025-02-11 RX ADMIN — BUPIVACAINE HYDROCHLORIDE 1 ML: 5 INJECTION, SOLUTION EPIDURAL; INTRACAUDAL; PERINEURAL at 01:02

## 2025-02-11 NOTE — PROGRESS NOTES
CC:   Chief Complaint   Patient presents with    Left Shoulder - Pain     Wanting steroid injection today      Right Shoulder - Pain        Gloria Mitchell is a 54 y.o. female seen today for follow up of Pain of the Left Shoulder (Wanting steroid injection today/) and Pain of the Right Shoulder  Patient known to me for impingement syndrome of both shoulders presents today with bilateral shoulder and bilateral hip pain.  She reports bilateral hip pain for 2-3 weeks now.  No fall or injury the onset of her symptoms.  She reports she can not sleep on either side at night due to hip pain.  She reports it mostly hurts on the outside of her hip.  Denies any groin pain.  No other complaints today.        PAST MEDICAL HISTORY:   Past Medical History:   Diagnosis Date    Acid reflux     Calculus of kidney     Depression     Gastrointestinal ulcerative mucositis 01/04/2023    Endoscopy Dr Ravi    Hypertension     Hypothyroidism     Low back pain     Lumbosacral radiculopathy 05/17/2021    Migraines     Multiple gastric ulcers     Pain     Postlaminectomy syndrome, lumbar region 05/17/2021    UTI (urinary tract infection) 08/23/2022        PAST SURGICAL HISTORY:   Past Surgical History:   Procedure Laterality Date    BACK SURGERY      CAUDAL EPIDURAL STEROID INJECTION Bilateral 11/08/2022    Procedure: CAUDAL BRENDA;  Surgeon: Elba Moon MD;  Location: [facility];  Service: Pain Management;  Laterality: Bilateral;  PT STATES HAD VAC  WILL BRING CARD    ENDOSCOPY  01/04/2023    INSERTION OF DORSAL COLUMN NERVE STIMULATOR FOR TRIAL N/A 07/15/2021    Procedure: INSERTION, NEUROSTIMULATOR, SPINAL CORD, DORSAL COLUMN, FOR TRIAL;  Surgeon: Elba Moon MD;  Location: [facility];  Service: Pain Management;  Laterality: N/A;  spoke to Dr Calin Burgess nurse, urine culture negative (results in Epic), pt cleared.    LITHOTRIPSY          ALLERGIES:   Review of patient's allergies indicates:    Allergen Reactions    Oxycodone-acetaminophen Swelling and Edema    Bupropion hcl      Other Reaction(s): Not available    Oxycodone hcl         MEDICATIONS :    Current Outpatient Medications:     AIMOVIG AUTOINJECTOR 140 mg/mL autoinjector, INJECT 1ML INTO THE SKIN EVERY 28 DAYS, Disp: 3 mL, Rfl: 0    buprenorphine-naloxone 2-0.5 mg (SUBOXONE) 2-0.5 mg Subl, Place 1 tablet under the tongue. Patient states she takes twice daily. Prescription bottle is ordered for 3 times daily, Disp: , Rfl:     cyanocobalamin 1,000 mcg/mL injection, Inject 1,000 mcg into the muscle every 30 days., Disp: , Rfl:     ergocalciferol (ERGOCALCIFEROL) 50,000 unit Cap, Take 50,000 Units by mouth every 7 days., Disp: , Rfl:     EScitalopram oxalate (LEXAPRO) 10 MG tablet, Take 10 mg by mouth once daily., Disp: , Rfl:     gabapentin (NEURONTIN) 800 MG tablet, 1 tablet Orally Once a day for 30 day(s), Disp: , Rfl:     levothyroxine (SYNTHROID) 112 MCG tablet, Take 112 mcg by mouth before breakfast., Disp: , Rfl:     losartan (COZAAR) 50 MG tablet, Take 50 mg by mouth once daily., Disp: , Rfl:     metFORMIN (GLUCOPHAGE-XR) 500 MG ER 24hr tablet, Take 500 mg by mouth 2 (two) times daily with meals., Disp: , Rfl:     metoprolol succinate (TOPROL-XL) 50 MG 24 hr tablet, Take 50 mg by mouth once daily., Disp: , Rfl:     minocycline (MINOCIN,DYNACIN) 100 MG capsule, Take 100 mg by mouth once daily., Disp: , Rfl:     nabumetone (RELAFEN) 750 MG tablet, Take 1 tablet (750 mg total) by mouth 2 (two) times daily., Disp: 20 tablet, Rfl: 0    potassium citrate (UROCIT-K) 10 mEq (1,080 mg) TbSR, Take 2 tablets (20 mEq total) by mouth 2 (two) times a day., Disp: 360 tablet, Rfl: 3    traZODone (DESYREL) 50 MG tablet, Take 50 mg by mouth every evening., Disp: , Rfl:     triamcinolone acetonide 0.1% (KENALOG) 0.1 % cream, , Disp: , Rfl:      SOCIAL HISTORY:   Social History     Socioeconomic History    Marital status:     Number of children: 2     Years of education: 12    Highest education level: 12th grade   Occupational History    Occupation: disabled   Tobacco Use    Smoking status: Never     Passive exposure: Past    Smokeless tobacco: Never   Substance and Sexual Activity    Alcohol use: Never    Drug use: Never    Sexual activity: Not Currently        FAMILY HISTORY:   Family History   Problem Relation Name Age of Onset    Stroke Father      Heart attack Father      Heart attack Brother      Cancer Brother            PHYSICAL EXAM:      There were no vitals filed for this visit.  Body mass index is 37.3 kg/m².    GENERAL: Well-developed, well-nourished female . The patient is alert, oriented and cooperative.    EXTREMITIES:   Bilateral hips skin clean dry and intact, some pain with internal rotation however there is good range of motion of both hips on exam today, she has tenderness to palpation over the trochanteric bursa bilaterally, negative straight leg raise, neurovascularly intact      RADIOGRAPHIC FINDINGS:   EXAMINATION:  XR HIP 3 OR 4 VIEWS BILATERAL     CLINICAL HISTORY:  Pain, unspecified     TECHNIQUE:  AP pelvis, AP bilateral hip, lateral view bilateral hip     COMPARISON:  None     FINDINGS:  AP pelvis: The bilateral hip joint spaces appear well preserved.  The bilateral sacroiliac joints appear symmetrical.  No osseous lesion seen.  The visualized soft tissues appear normal.     Right hip: Within normal limits joint space.  Normal femoral head contour.  No fracture, no osseous lesions.  No advanced degenerative change.     Left hip: Normal left hip joint space. Normal left femoral head contour.  No fracture, no osseous lesions.  No advanced degenerative change.     Impression:     No significant abnormality seen.        Electronically signed by:Jovita Dee MD  Date:                                            02/12/2025  Time:                                           12:47    Patient Active Problem List    Diagnosis Date Noted     Morbid obesity 07/16/2024    Ureteral stone 07/16/2024    Nocturia 07/16/2024    Urgency of urination 07/16/2024    Impingement syndrome, shoulder, unspecified laterality 10/18/2023    Chronic pain of both shoulders 09/06/2023    Bacteremia 03/11/2023    Nausea and vomiting 03/08/2023    Hypokalemia 03/08/2023    Hypomagnesemia 03/08/2023    Volume depletion 03/08/2023    Elevated lactic acid level 03/08/2023    Pain 10/17/2022    Bilateral flank pain 08/19/2022    Calculus of kidney     Thoracic radiculopathy 02/16/2022    Intractable chronic migraine without aura and without status migrainosus 01/10/2022    Chest wall pain, chronic after insertion spinal cord stimulator leads 12/22/2021    Disorder of sacrum 09/20/2021    Chronic pain syndrome 05/17/2021    Postlaminectomy syndrome, lumbar region 05/17/2021    Lumbosacral radiculopathy 05/17/2021       IMPRESSION AND PLAN:    Trochanteric bursitis bilateral hips.  Personally reviewed x-rays today of bilateral hips with minimal degenerative changes, no acute fracture or dislocation.  Discussed all treatment options with the patient today.  Discussed injections of bilateral bursa with home exercises.  See procedural note for details.  2.   Also discussed bilateral shoulder injections however with her getting both bursa injected today recommend follow up in 2 weeks for bilateral shoulder injections.      No follow-ups on file.       Rani Jennings PA-C      (Subject to voice recognition error, transcription service not allowed)

## 2025-02-11 NOTE — PROCEDURES
Large Joint Aspiration/Injection: R greater trochanteric bursa    Date/Time: 2/11/2025 1:00 PM    Performed by: Rani Jennings PA  Authorized by: Rani Jennings PA    Consent Done?:  Yes (Verbal)  Indications:  Pain  Site marked: the procedure site was marked      Details:  Needle Size:  21 G  Approach:  Lateral  Location:  Hip  Site:  R greater trochanteric bursa  Medications:  1 mL BUPivacaine (PF) 0.5% (5 mg/mL) 0.5 % (5 mg/mL); 40 mg triamcinolone acetonide 40 mg/mL  Patient tolerance:  Patient tolerated the procedure well with no immediate complications  Large Joint Aspiration/Injection: L greater trochanteric bursa    Date/Time: 2/11/2025 1:00 PM    Performed by: Rani Jennings PA  Authorized by: Rani Jennings PA    Consent Done?:  Yes (Verbal)  Indications:  Pain  Site marked: the procedure site was marked      Details:  Needle Size:  21 G  Approach:  Lateral  Location:  Hip  Site:  L greater trochanteric bursa  Medications:  2 mL BUPivacaine (PF) 0.5% (5 mg/mL) 0.5 % (5 mg/mL); 40 mg triamcinolone acetonide 40 mg/mL  Patient tolerance:  Patient tolerated the procedure well with no immediate complications

## 2025-02-25 ENCOUNTER — HOSPITAL ENCOUNTER (OUTPATIENT)
Dept: RADIOLOGY | Facility: HOSPITAL | Age: 55
Discharge: HOME | End: 2025-02-25
Payer: MEDICARE

## 2025-02-25 ENCOUNTER — OFFICE VISIT (OUTPATIENT)
Dept: ORTHOPEDICS | Facility: CLINIC | Age: 55
End: 2025-02-25
Payer: MEDICARE

## 2025-02-25 DIAGNOSIS — M75.42 IMPINGEMENT SYNDROME OF BOTH SHOULDERS: Primary | ICD-10-CM

## 2025-02-25 DIAGNOSIS — M75.41 IMPINGEMENT SYNDROME OF BOTH SHOULDERS: Primary | ICD-10-CM

## 2025-02-25 DIAGNOSIS — M79.671 RIGHT FOOT PAIN: ICD-10-CM

## 2025-02-25 PROCEDURE — 20605 DRAIN/INJ JOINT/BURSA W/O US: CPT | Mod: 50,PBBFAC

## 2025-02-25 PROCEDURE — 73630 X-RAY EXAM OF FOOT: CPT | Mod: TC,RT

## 2025-02-25 PROCEDURE — 99999PBSHW PR PBB SHADOW TECHNICAL ONLY FILED TO HB: Mod: PBBFAC,,,

## 2025-02-25 PROCEDURE — 73630 X-RAY EXAM OF FOOT: CPT | Mod: 26,RT,, | Performed by: RADIOLOGY

## 2025-02-25 PROCEDURE — 99213 OFFICE O/P EST LOW 20 MIN: CPT | Mod: PBBFAC,25

## 2025-02-25 PROCEDURE — 99999 PR PBB SHADOW E&M-EST. PATIENT-LVL III: CPT | Mod: PBBFAC,,,

## 2025-02-25 RX ADMIN — TRIAMCINOLONE ACETONIDE 80 MG: 40 INJECTION, SUSPENSION INTRA-ARTICULAR; INTRAMUSCULAR at 01:02

## 2025-02-25 RX ADMIN — BUPIVACAINE HYDROCHLORIDE 2 ML: 2.5 INJECTION, SOLUTION EPIDURAL; INFILTRATION; INTRACAUDAL at 01:02

## 2025-02-25 NOTE — PROCEDURES
Intermediate Joint Aspiration/Injection: L acromioclavicular, R acromioclavicular    Date/Time: 2/25/2025 1:00 PM    Performed by: Rani Jennings PA  Authorized by: Rani Jennings PA    Consent Done?:  Yes (Verbal)  Indications:  Arthritis and pain    Location:  Shoulder  Site:  L acromioclavicular and R acromioclavicular  Ultrasonic Guidance for needle placement: No  Needle size:  22 G  Approach:  Posterolateral  Medications:  2 mL BUPivacaine (PF) 0.25% (2.5 mg/ml) 0.25 % (2.5 mg/mL); 80 mg triamcinolone acetonide 40 mg/mL  Patient tolerance:  Patient tolerated the procedure well with no immediate complications

## 2025-02-26 NOTE — PROGRESS NOTES
CC:   Chief Complaint   Patient presents with    Right Shoulder - Follow-up    Left Shoulder - Follow-up    Right Foot - Pain        Gloria Mitchell is a 54 y.o. female seen today for follow up of Follow-up of the Right Shoulder, Follow-up of the Left Shoulder, and Pain of the Right Foot  Patient presents for follow up of impingement syndrome both shoulders last injected by me in September of 2024.  Patient also reports right foot pain after a fall 10 weeks ago.  She reports previous x-rays were negative for any acute fracture or dislocation.  She reports pain with weight-bearing.  No complaints today.        PAST MEDICAL HISTORY:   Past Medical History:   Diagnosis Date    Acid reflux     Calculus of kidney     Depression     Gastrointestinal ulcerative mucositis 01/04/2023    Endoscopy Dr Ravi    Hypertension     Hypothyroidism     Low back pain     Lumbosacral radiculopathy 05/17/2021    Migraines     Multiple gastric ulcers     Pain     Postlaminectomy syndrome, lumbar region 05/17/2021    UTI (urinary tract infection) 08/23/2022        PAST SURGICAL HISTORY:   Past Surgical History:   Procedure Laterality Date    BACK SURGERY      CAUDAL EPIDURAL STEROID INJECTION Bilateral 11/08/2022    Procedure: CAUDAL BRENDA;  Surgeon: Elba Moon MD;  Location: [facility];  Service: Pain Management;  Laterality: Bilateral;  PT STATES HAD VAC  WILL BRING CARD    ENDOSCOPY  01/04/2023    INSERTION OF DORSAL COLUMN NERVE STIMULATOR FOR TRIAL N/A 07/15/2021    Procedure: INSERTION, NEUROSTIMULATOR, SPINAL CORD, DORSAL COLUMN, FOR TRIAL;  Surgeon: Elba Moon MD;  Location: [facility];  Service: Pain Management;  Laterality: N/A;  spoke to Dr Calin Burgess nurse, urine culture negative (results in Epic), pt cleared.    LITHOTRIPSY          ALLERGIES:   Review of patient's allergies indicates:   Allergen Reactions    Oxycodone-acetaminophen Swelling and Edema    Bupropion hcl      Other  Reaction(s): Not available    Oxycodone hcl         MEDICATIONS :  Current Medications[1]     SOCIAL HISTORY: Social History[2]     FAMILY HISTORY:   Family History   Problem Relation Name Age of Onset    Stroke Father      Heart attack Father      Heart attack Brother      Cancer Brother            PHYSICAL EXAM:      There were no vitals filed for this visit.  There is no height or weight on file to calculate BMI.    GENERAL: Well-developed, well-nourished female . The patient is alert, oriented and cooperative.    EXTREMITIES:   Bilateral shoulder skin clean dry and intact, good range of motion, neurovascularly intact  Right foot tenderness to palpation along 1st and 2nd metatarsals, no pain with dorsiflexion and plantar flexion of the foot, neurovascularly intact      RADIOGRAPHIC FINDINGS:    EXAMINATION:  XR FOOT COMPLETE 3 VIEW RIGHT     CLINICAL HISTORY:  Pain in right foot     FINDINGS:  Three views of right foot show no fracture, dislocation, or destructive osseous lesion. Soft tissues are unremarkable. Small calcaneal enthesophytes arise near Achilles tendon insertion and plantar fascia origin.     Impression:     No acute right foot abnormality.        Electronically signed by:Manuel Gardner  Date:                                            02/27/2025  Time:                                           11:47    Patient Active Problem List    Diagnosis Date Noted    Morbid obesity 07/16/2024    Ureteral stone 07/16/2024    Nocturia 07/16/2024    Urgency of urination 07/16/2024    Impingement syndrome, shoulder, unspecified laterality 10/18/2023    Chronic pain of both shoulders 09/06/2023    Bacteremia 03/11/2023    Nausea and vomiting 03/08/2023    Hypokalemia 03/08/2023    Hypomagnesemia 03/08/2023    Volume depletion 03/08/2023    Elevated lactic acid level 03/08/2023    Pain 10/17/2022    Bilateral flank pain 08/19/2022    Calculus of kidney     Thoracic radiculopathy 02/16/2022    Intractable chronic  migraine without aura and without status migrainosus 01/10/2022    Chest wall pain, chronic after insertion spinal cord stimulator leads 12/22/2021    Disorder of sacrum 09/20/2021    Chronic pain syndrome 05/17/2021    Postlaminectomy syndrome, lumbar region 05/17/2021    Lumbosacral radiculopathy 05/17/2021       IMPRESSION AND PLAN:   Impingement syndrome bilateral shoulders for repeat subacromial steroid injections today, see procedural note for details.  Discussed with the patient that these can be repeated every 4 months as needed.  Follow up with the orthopedic clinic p.r.n..  Chronic right foot pain.  Fall 10 weeks ago.  Previous x-rays at outside facilities reportedly negative for any acute fracture or dislocation, repeat x-ray today.  We will place patient in short walking boot.  Continue anti-inflammatories as needed for pain.  Recheck a to 4 weeks.      No follow-ups on file.       Rani Jennings PA-C      (Subject to voice recognition error, transcription service not allowed)         [1]   Current Outpatient Medications:     AIMOVIG AUTOINJECTOR 140 mg/mL autoinjector, INJECT 1ML INTO THE SKIN EVERY 28 DAYS, Disp: 3 mL, Rfl: 0    buprenorphine-naloxone 2-0.5 mg (SUBOXONE) 2-0.5 mg Subl, Place 1 tablet under the tongue. Patient states she takes twice daily. Prescription bottle is ordered for 3 times daily, Disp: , Rfl:     cyanocobalamin 1,000 mcg/mL injection, Inject 1,000 mcg into the muscle every 30 days., Disp: , Rfl:     ergocalciferol (ERGOCALCIFEROL) 50,000 unit Cap, Take 50,000 Units by mouth every 7 days., Disp: , Rfl:     EScitalopram oxalate (LEXAPRO) 10 MG tablet, Take 10 mg by mouth once daily., Disp: , Rfl:     gabapentin (NEURONTIN) 800 MG tablet, 1 tablet Orally Once a day for 30 day(s), Disp: , Rfl:     levothyroxine (SYNTHROID) 112 MCG tablet, Take 112 mcg by mouth before breakfast., Disp: , Rfl:     losartan (COZAAR) 50 MG tablet, Take 50 mg by mouth once daily., Disp: , Rfl:      metFORMIN (GLUCOPHAGE-XR) 500 MG ER 24hr tablet, Take 500 mg by mouth 2 (two) times daily with meals., Disp: , Rfl:     metoprolol succinate (TOPROL-XL) 50 MG 24 hr tablet, Take 50 mg by mouth once daily., Disp: , Rfl:     minocycline (MINOCIN,DYNACIN) 100 MG capsule, Take 100 mg by mouth once daily., Disp: , Rfl:     nabumetone (RELAFEN) 750 MG tablet, Take 1 tablet (750 mg total) by mouth 2 (two) times daily., Disp: 20 tablet, Rfl: 0    potassium citrate (UROCIT-K) 10 mEq (1,080 mg) TbSR, Take 2 tablets (20 mEq total) by mouth 2 (two) times a day., Disp: 360 tablet, Rfl: 3    traZODone (DESYREL) 50 MG tablet, Take 50 mg by mouth every evening., Disp: , Rfl:     triamcinolone acetonide 0.1% (KENALOG) 0.1 % cream, , Disp: , Rfl:   [2]   Social History  Socioeconomic History    Marital status:     Number of children: 2    Years of education: 12    Highest education level: 12th grade   Occupational History    Occupation: disabled   Tobacco Use    Smoking status: Never     Passive exposure: Past    Smokeless tobacco: Never   Substance and Sexual Activity    Alcohol use: Never    Drug use: Never    Sexual activity: Not Currently

## 2025-02-27 RX ORDER — BUPIVACAINE HYDROCHLORIDE 2.5 MG/ML
2 INJECTION, SOLUTION EPIDURAL; INFILTRATION; INTRACAUDAL
Status: DISCONTINUED | OUTPATIENT
Start: 2025-02-25 | End: 2025-02-27 | Stop reason: HOSPADM

## 2025-02-27 RX ORDER — TRIAMCINOLONE ACETONIDE 40 MG/ML
80 INJECTION, SUSPENSION INTRA-ARTICULAR; INTRAMUSCULAR
Status: DISCONTINUED | OUTPATIENT
Start: 2025-02-25 | End: 2025-02-27 | Stop reason: HOSPADM

## 2025-03-14 DIAGNOSIS — G43.719 INTRACTABLE CHRONIC MIGRAINE WITHOUT AURA AND WITHOUT STATUS MIGRAINOSUS: ICD-10-CM

## 2025-03-15 NOTE — SUBJECTIVE & OBJECTIVE
Interval History: reports she is better. OOB with RR x 3 this Am.     Review of Systems   Constitutional:  Negative for fatigue.   Gastrointestinal:  Negative for diarrhea and nausea.   All other systems reviewed and are negative.  Objective:     Vital Signs (Most Recent):  Temp: 97.4 °F (36.3 °C) (03/12/23 0742)  Pulse: 69 (03/12/23 0742)  Resp: 20 (03/12/23 0742)  BP: (!) 151/92 (03/12/23 0742)  SpO2: 98 % (03/12/23 0742)   Vital Signs (24h Range):  Temp:  [97.4 °F (36.3 °C)-98.9 °F (37.2 °C)] 97.4 °F (36.3 °C)  Pulse:  [66-79] 69  Resp:  [16-20] 20  SpO2:  [94 %-99 %] 98 %  BP: (128-151)/(69-92) 151/92     Weight: 84.8 kg (186 lb 15.2 oz)  Body mass index is 34.19 kg/m².    Intake/Output Summary (Last 24 hours) at 3/12/2023 0841  Last data filed at 3/12/2023 0828  Gross per 24 hour   Intake 1800 ml   Output --   Net 1800 ml      Physical Exam  Vitals and nursing note reviewed. Exam conducted with a chaperone present.   Constitutional:       General: She is not in acute distress.     Appearance: Normal appearance. She is obese. She is not ill-appearing.   Cardiovascular:      Rate and Rhythm: Normal rate and regular rhythm.      Pulses: Normal pulses.      Heart sounds: Normal heart sounds. No murmur heard.  Pulmonary:      Effort: Pulmonary effort is normal. No respiratory distress.      Breath sounds: Normal breath sounds.   Abdominal:      General: Abdomen is flat. Bowel sounds are normal. There is no distension.      Tenderness: There is no abdominal tenderness.   Skin:     General: Skin is warm and dry.      Capillary Refill: Capillary refill takes less than 2 seconds.   Neurological:      General: No focal deficit present.      Mental Status: She is alert and oriented to person, place, and time.   Psychiatric:         Mood and Affect: Mood normal.         Behavior: Behavior normal.         Thought Content: Thought content normal.         Judgment: Judgment normal.       Significant Labs: All pertinent labs  within the past 24 hours have been reviewed.  Recent Lab Results         03/12/23  0745   03/12/23  0541   03/11/23  0756        Albumin/Globulin Ratio   0.7         Albumin   2.0         Alkaline Phosphatase   68         ALT   22         Anion Gap   11         AST   23         Baso #   0.02         Basophil %   0.8         BILIRUBIN TOTAL   0.5         BUN   5         BUN/CREAT RATIO   5         Calcium   7.5         Chloride   112         CO2   22         Creatinine   0.96         Differential Type   Scan Smear         eGFR   71         Eos #   0.20         Eosinophil %   8.4            6         Globulin, Total   2.7         Glucose   92         Hematocrit   30.4         Hemoglobin   10.0         Immature Grans (Abs)   0.01         Immature Granulocytes   0.4         Lymph #   0.81         Lymph %   34.0            31         Magnesium   1.7         MCH   30.7         MCHC   32.9         MCV   93.3         Mono #   0.24         Mono %   10.1            8         MPV   10.7         Neutrophils, Abs   1.10         Neutrophils Relative   46.3         nRBC           Occult Blood Negative     Negative       PLATELET MORPHOLOGY   Normal         Platelets   120         Potassium   3.3         PROTEIN TOTAL   4.7         RBC   3.26         RBC Morphology   Normal         RDW   14.6         Segmented Neutrophils, Man %   55         Sodium   142         WBC   2.38                 Significant Imaging: I have reviewed all pertinent imaging results/findings within the past 24 hours.  No new   Yes

## 2025-03-17 RX ORDER — ERENUMAB-AOOE 140 MG/ML
INJECTION, SOLUTION SUBCUTANEOUS
Qty: 1 ML | Refills: 0 | Status: SHIPPED | OUTPATIENT
Start: 2025-03-17

## 2025-03-19 ENCOUNTER — TELEPHONE (OUTPATIENT)
Dept: NEUROLOGY | Facility: CLINIC | Age: 55
End: 2025-03-19
Payer: MEDICARE

## 2025-03-19 NOTE — TELEPHONE ENCOUNTER
Spoke with pt and let her know that we don't have any PM appointments until May, pt decided to keep scheduled appointment.    ----- Message from Angela sent at 3/18/2025  4:57 PM CDT -----  Regarding: appointment issue  Who Called: Gloria Isbell is the appointment? 03/21 @ 08:15Options offered (Virtual Visit, Urgent Care):   noPreferred Method of Contact: Phone CallPatient's Preferred Phone Number on File: [phone] Additional Information:  Needing an appointment later in the day due to being an hour away from clinic. Please reach out to Mrs. Castro with options.

## 2025-03-20 RX ORDER — BUPIVACAINE HYDROCHLORIDE 5 MG/ML
1 INJECTION, SOLUTION EPIDURAL; INTRACAUDAL; PERINEURAL
Status: DISCONTINUED | OUTPATIENT
Start: 2025-02-11 | End: 2025-03-20 | Stop reason: HOSPADM

## 2025-03-20 RX ORDER — TRIAMCINOLONE ACETONIDE 40 MG/ML
40 INJECTION, SUSPENSION INTRA-ARTICULAR; INTRAMUSCULAR
Status: DISCONTINUED | OUTPATIENT
Start: 2025-02-11 | End: 2025-03-20 | Stop reason: HOSPADM

## 2025-03-20 RX ORDER — BUPIVACAINE HYDROCHLORIDE 5 MG/ML
2 INJECTION, SOLUTION EPIDURAL; INTRACAUDAL; PERINEURAL
Status: DISCONTINUED | OUTPATIENT
Start: 2025-02-11 | End: 2025-03-20 | Stop reason: HOSPADM

## 2025-04-11 DIAGNOSIS — G43.719 INTRACTABLE CHRONIC MIGRAINE WITHOUT AURA AND WITHOUT STATUS MIGRAINOSUS: ICD-10-CM

## 2025-04-11 RX ORDER — ERENUMAB-AOOE 140 MG/ML
140 INJECTION, SOLUTION SUBCUTANEOUS
Qty: 1 ML | Refills: 11 | Status: SHIPPED | OUTPATIENT
Start: 2025-04-11 | End: 2025-04-11

## 2025-04-11 NOTE — TELEPHONE ENCOUNTER
I spoke with pt and got her the next available  appointment scheduled and got a refill request sent to [facility] for Aimovig.        ----- Message from Angie sent at 4/10/2025  4:24 PM CDT -----  Who Called: Gloria MitchellZurdogonzález is requesting a sooner appointment. Caller declined first available appointment listed below. Caller will not accept being placed on the waitlist and is requesting a message be sent to doctor.When is the first available appointment? July 22Symptoms: Pt is needing to come in for a med refill and says she is out of her shots so needed to come in to get that refilled.Preferred Method of Contact: Phone CallPatient's Preferred Phone Number on File: [phone] Best Call Back Number, if different:Additional Information:

## 2025-04-21 ENCOUNTER — TELEPHONE (OUTPATIENT)
Dept: NEUROLOGY | Facility: CLINIC | Age: 55
End: 2025-04-21
Payer: MEDICARE

## 2025-04-21 ENCOUNTER — OFFICE VISIT (OUTPATIENT)
Dept: NEUROLOGY | Facility: CLINIC | Age: 55
End: 2025-04-21
Payer: MEDICARE

## 2025-04-21 VITALS
DIASTOLIC BLOOD PRESSURE: 67 MMHG | HEIGHT: 62 IN | SYSTOLIC BLOOD PRESSURE: 108 MMHG | BODY MASS INDEX: 39.31 KG/M2 | OXYGEN SATURATION: 97 % | WEIGHT: 213.63 LBS | HEART RATE: 71 BPM

## 2025-04-21 DIAGNOSIS — G43.719 INTRACTABLE CHRONIC MIGRAINE WITHOUT AURA AND WITHOUT STATUS MIGRAINOSUS: Primary | ICD-10-CM

## 2025-04-21 PROCEDURE — 1160F RVW MEDS BY RX/DR IN RCRD: CPT | Mod: CPTII,,, | Performed by: NURSE PRACTITIONER

## 2025-04-21 PROCEDURE — 4010F ACE/ARB THERAPY RXD/TAKEN: CPT | Mod: CPTII,,, | Performed by: NURSE PRACTITIONER

## 2025-04-21 PROCEDURE — 3078F DIAST BP <80 MM HG: CPT | Mod: CPTII,,, | Performed by: NURSE PRACTITIONER

## 2025-04-21 PROCEDURE — 1159F MED LIST DOCD IN RCRD: CPT | Mod: CPTII,,, | Performed by: NURSE PRACTITIONER

## 2025-04-21 PROCEDURE — 99215 OFFICE O/P EST HI 40 MIN: CPT | Mod: PBBFAC | Performed by: NURSE PRACTITIONER

## 2025-04-21 PROCEDURE — 3008F BODY MASS INDEX DOCD: CPT | Mod: CPTII,,, | Performed by: NURSE PRACTITIONER

## 2025-04-21 PROCEDURE — 99213 OFFICE O/P EST LOW 20 MIN: CPT | Mod: S$PBB,,, | Performed by: NURSE PRACTITIONER

## 2025-04-21 PROCEDURE — 3074F SYST BP LT 130 MM HG: CPT | Mod: CPTII,,, | Performed by: NURSE PRACTITIONER

## 2025-04-21 PROCEDURE — 99999 PR PBB SHADOW E&M-EST. PATIENT-LVL V: CPT | Mod: PBBFAC,,, | Performed by: NURSE PRACTITIONER

## 2025-04-21 RX ORDER — SEMAGLUTIDE 0.25 MG/.5ML
INJECTION, SOLUTION SUBCUTANEOUS
COMMUNITY
Start: 2025-04-14
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
## 2025-04-21 NOTE — PROGRESS NOTES
Subjective:       Patient ID: Gloria Mitchell is a 55 y.o. female     Chief Complaint:    Chief Complaint   Patient presents with    Follow-up     Pt needs refills on Aimovig.            Allergies:  Oxycodone-acetaminophen, Bupropion hcl, and Oxycodone hcl    Current Medications:    Outpatient Encounter Medications as of 4/21/2025   Medication Sig Dispense Refill    buprenorphine-naloxone 2-0.5 mg (SUBOXONE) 2-0.5 mg Subl Place 1 tablet under the tongue. Patient states she takes twice daily. Prescription bottle is ordered for 3 times daily      cyanocobalamin 1,000 mcg/mL injection Inject 1,000 mcg into the muscle every 30 days.      EScitalopram oxalate (LEXAPRO) 10 MG tablet Take 10 mg by mouth once daily.      gabapentin (NEURONTIN) 800 MG tablet 1 tablet Orally Once a day for 30 day(s)      levothyroxine (SYNTHROID) 112 MCG tablet Take 112 mcg by mouth before breakfast.      losartan (COZAAR) 50 MG tablet Take 50 mg by mouth once daily.      metoprolol succinate (TOPROL-XL) 50 MG 24 hr tablet Take 50 mg by mouth once daily.      minocycline (MINOCIN,DYNACIN) 100 MG capsule Take 100 mg by mouth once daily.      nabumetone (RELAFEN) 750 MG tablet Take 1 tablet (750 mg total) by mouth 2 (two) times daily. 20 tablet 0    traZODone (DESYREL) 50 MG tablet Take 50 mg by mouth every evening.      triamcinolone acetonide 0.1% (KENALOG) 0.1 % cream       WEGOVY 0.25 mg/0.5 mL PnIj Inject 0.25 mg by subcutaneous route.      erenumab-aooe (AIMOVIG) 140 mg/mL autoinjector Inject 1 mL (140 mg total) into the skin every 28 days. 1 each 11    ergocalciferol (ERGOCALCIFEROL) 50,000 unit Cap Take 50,000 Units by mouth every 7 days. (Patient not taking: Reported on 4/21/2025)      metFORMIN (GLUCOPHAGE-XR) 500 MG ER 24hr tablet Take 500 mg by mouth 2 (two) times daily with meals. (Patient not taking: Reported on 4/21/2025)      potassium citrate (UROCIT-K) 10 mEq (1,080 mg) TbSR Take 2 tablets (20 mEq total) by mouth 2  (two) times a day. 360 tablet 3     No facility-administered encounter medications on file as of 4/21/2025.       History of Present Illness  54 yr old white female presents with migraines that started as a teenager.  Her last clinic visit in neurology was 14 months ago, having cancelled several prior visits.    Previously treated with topamax until about 12 months ago, but it was not effective still having 15 migraine days per month and had history of kidney stones and thus was taken off.  Prior was on effexor [id number] also was not effective.  Currently on metoprolol, so in total 3 failed prior preventive medications.  After the failed topamax she was started on Aimovig 140mg monthly and since that time her migraines reduced to one migraine a month and tolerating very well.  I strongly recommend continuing the Aimovig or CGRP treatment.  Not on botox, denies overuse headaches, not on any other CGRP medications.           Review of Systems  Review of Systems   Constitutional:  Negative for diaphoresis and fever.   HENT:  Negative for congestion, hearing loss and tinnitus.    Eyes:  Negative for blurred vision, double vision, photophobia, discharge and redness.   Respiratory:  Negative for cough and shortness of breath.    Cardiovascular:  Negative for chest pain.   Gastrointestinal:  Negative for abdominal pain, nausea and vomiting.   Musculoskeletal:  Negative for back pain, joint pain, myalgias and neck pain.   Skin:  Negative for itching and rash.   Neurological:  Positive for headaches. Negative for dizziness, tremors, sensory change, speech change, focal weakness, seizures, loss of consciousness and weakness.   Psychiatric/Behavioral:  Negative for depression, hallucinations and memory loss. The patient has insomnia.    All other systems reviewed and are negative.     Objective:     NEUROLOGICAL EXAMINATION:     MENTAL STATUS   Oriented to person, place, and time.   Registration: recalls 3 of 3 objects. Recall  at 5 minutes: recalls 3 of 3 objects.   Attention: normal. Concentration: normal.   Speech: speech is normal   Level of consciousness: alert  Knowledge: good and consistent with education.   Normal comprehension.     CRANIAL NERVES     CN II   Visual fields full to confrontation.   Visual acuity: normal  Right visual field deficit: none  Left visual field deficit: none     CN III, IV, VI   Pupils are equal, round, and reactive to light.  Extraocular motions are normal.   Right pupil: Size: 3 mm. Shape: regular. Reactivity: brisk. Consensual response: intact. Accommodation: intact.   Left pupil: Size: 3 mm. Shape: regular. Reactivity: brisk. Consensual response: intact. Accommodation: intact.   CN III: no CN III palsy  CN VI: no CN VI palsy  Nystagmus: none   Diplopia: none  Upgaze: normal  Downgaze: normal  Conjugate gaze: present  Vestibulo-ocular reflex: present    CN V   Facial sensation intact.   Right facial sensation deficit: none  Left facial sensation deficit: none  Right corneal reflex: normal  Left corneal reflex: normal    CN VII   Facial expression full, symmetric.   Right facial weakness: none  Left facial weakness: none  Right taste: normal  Left taste: normal    CN VIII   CN VIII normal.   Hearing: intact    CN IX, X   CN IX normal.   CN X normal.   Palate: symmetric    CN XI   CN XI normal.   Right sternocleidomastoid strength: normal  Left sternocleidomastoid strength: normal  Right trapezius strength: normal  Left trapezius strength: normal    CN XII   CN XII normal.   Tongue: not atrophic  Fasciculations: absent  Tongue deviation: none    MOTOR EXAM   Muscle bulk: normal  Overall muscle tone: normal  Right arm tone: normal  Left arm tone: normal  Right arm pronator drift: absent  Left arm pronator drift: absent  Right leg tone: normal  Left leg tone: normal    Strength   Right neck flexion: 5/5  Left neck flexion: 5/5  Right neck extension: 5/5  Left neck extension: 5/5  Right deltoid: 5/5  Left  deltoid: 5/5  Right biceps: 5/5  Left biceps: 5/5  Right triceps: 5/5  Left triceps: 5/5  Right wrist flexion: 5/5  Left wrist flexion: 5/5  Right wrist extension: 5/5  Left wrist extension: 5/5  Right interossei: 5/5  Left interossei: 5/5  Right iliopsoas: 5/5  Left iliopsoas: 5/5  Right quadriceps: 5/5  Left quadriceps: 5/5  Right hamstrin/5  Left hamstrin/5  Right anterior tibial: 5/5  Left anterior tibial: 5/5  Right posterior tibial: 5/5  Left posterior tibial: 5/5  Right gastroc: 5/5  Left gastroc: 5/5    REFLEXES     Reflexes   Right brachioradialis: 2+  Left brachioradialis: 2+  Right biceps: 2+  Left biceps: 2+  Right triceps: 2+  Left triceps: 2+  Right patellar: 2+  Left patellar: 2+  Right achilles: 2+  Left achilles: 2+  Right plantar: normal  Left plantar: normal  Right Hernandez: absent  Left Hernandez: absent  Right ankle clonus: absent  Left ankle clonus: absent  Right pendular knee jerk: absent  Left pendular knee jerk: absent    SENSORY EXAM   Light touch normal.   Right arm light touch: normal  Left arm light touch: normal  Right leg light touch: normal  Left leg light touch: normal  Vibration normal.   Right arm vibration: normal  Left arm vibration: normal  Right leg vibration: normal  Left leg vibration: normal  Proprioception normal.   Right arm proprioception: normal  Left arm proprioception: normal  Right leg proprioception: normal  Left leg proprioception: normal  Pinprick normal.   Right arm pinprick: normal  Left arm pinprick: normal  Right leg pinprick: normal  Left leg pinprick: normal    GAIT AND COORDINATION     Gait  Gait: normal     Coordination   Finger to nose coordination: normal  Heel to shin coordination: normal  Tandem walking coordination: normal    Tremor   Resting tremor: absent  Intention tremor: absent  Action tremor: absent       Physical Exam  Vitals and nursing note reviewed.   Constitutional:       Appearance: Normal appearance.   HENT:      Head: Normocephalic.    Eyes:      Extraocular Movements: Extraocular movements intact and EOM normal.      Pupils: Pupils are equal, round, and reactive to light.   Cardiovascular:      Rate and Rhythm: Normal rate and regular rhythm.   Pulmonary:      Effort: Pulmonary effort is normal.      Breath sounds: Normal breath sounds.   Musculoskeletal:         General: No swelling or tenderness. Normal range of motion.      Cervical back: Normal range of motion and neck supple.      Right lower leg: No edema.      Left lower leg: No edema.   Skin:     General: Skin is warm and dry.      Coloration: Skin is not jaundiced.      Findings: No rash.   Neurological:      General: No focal deficit present.      Mental Status: She is alert and oriented to person, place, and time.      GCS: GCS eye subscore is 4. GCS verbal subscore is 5. GCS motor subscore is 6.      Cranial Nerves: No cranial nerve deficit.      Sensory: No sensory deficit.      Motor: Motor function is intact. No weakness.      Coordination: Coordination is intact. Coordination normal. Finger-Nose-Finger Test and Heel to Shin Test normal.      Gait: Gait is intact. Gait and tandem walk normal.      Deep Tendon Reflexes: Reflexes normal.      Reflex Scores:       Tricep reflexes are 2+ on the right side and 2+ on the left side.       Bicep reflexes are 2+ on the right side and 2+ on the left side.       Brachioradialis reflexes are 2+ on the right side and 2+ on the left side.       Patellar reflexes are 2+ on the right side and 2+ on the left side.       Achilles reflexes are 2+ on the right side and 2+ on the left side.  Psychiatric:         Mood and Affect: Mood normal.         Speech: Speech normal.         Behavior: Behavior normal.          Assessment:     Problem List Items Addressed This Visit          Neuro    Intractable chronic migraine without aura and without status migrainosus - Primary    Relevant Medications    erenumab-aooe (AIMOVIG) 140 mg/mL autoinjector             Primary Diagnosis and ICD10  Intractable chronic migraine without aura and without status migrainosus [G43.719]    Plan:     Patient Instructions     Continue Aimovig 140mg once monthly, I have sent Rx to [facility]  Continue phenergan as needed as directed    There are no discontinued medications.            Requested Prescriptions     Signed Prescriptions Disp Refills    erenumab-aooe (AIMOVIG) 140 mg/mL autoinjector 1 each 11     Sig: Inject 1 mL (140 mg total) into the skin every 28 days.       No orders of the defined types were placed in this encounter.

## 2025-04-28 ENCOUNTER — PATIENT MESSAGE (OUTPATIENT)
Dept: NEUROLOGY | Facility: CLINIC | Age: 55
End: 2025-04-28
Payer: MEDICARE

## 2025-05-02 ENCOUNTER — TELEPHONE (OUTPATIENT)
Dept: UROLOGY | Facility: CLINIC | Age: 55
End: 2025-05-02
Payer: MEDICARE

## 2025-05-02 NOTE — TELEPHONE ENCOUNTER
I called pt and spoke with her regarding the refill request for her Urocit K.  I told her Dr Ruvalcaba is retired and Dr Rebollar is out of clinic for the next 1 1/2 weeks.  Asked her to ask her PCP to send in some for her, so she does not run out and I will ask Dr Rebollar about it when he returns to clinic.  She voiced understanding.

## 2025-05-06 ENCOUNTER — PATIENT MESSAGE (OUTPATIENT)
Dept: NEUROLOGY | Facility: CLINIC | Age: 55
End: 2025-05-06
Payer: MEDICARE

## 2025-05-06 ENCOUNTER — TELEPHONE (OUTPATIENT)
Dept: NEUROLOGY | Facility: CLINIC | Age: 55
End: 2025-05-06
Payer: MEDICARE

## 2025-05-06 NOTE — TELEPHONE ENCOUNTER
I called pt to see if she is changing pharmacy she said she was going to try [facility], but doesn't want sent in yet. She states it won't be here in time for her next shot. States she will get this months at [facility]. I told her will send rx in about 2 weeks. She v/u.          ----- Message from Manjula sent at 5/6/2025 10:38 AM CDT -----  Regarding: Refill Request  Who Called: [facility] New Rx:RefillRX Name and Strength:erenumab-aooe (AIMOVIG) 140 mg/mL autoinjectorHow is the patient currently taking it? (ex. 1XDay):1xdayLocal or Mail Order:mailList of preferred pharmacies on file (remove unneeded): [unfilled]If different Pharmacy is requested, enter Pharmacy information here including location and phone number: [facility] [address], [place], [place] [address] Phone: [phone]: [phone] Provider:Helder Marte's Preferred Phone Number on File: [phone]

## 2025-05-12 DIAGNOSIS — G43.719 INTRACTABLE CHRONIC MIGRAINE WITHOUT AURA AND WITHOUT STATUS MIGRAINOSUS: ICD-10-CM

## 2025-05-13 DIAGNOSIS — G43.719 INTRACTABLE CHRONIC MIGRAINE WITHOUT AURA AND WITHOUT STATUS MIGRAINOSUS: ICD-10-CM

## 2025-06-03 ENCOUNTER — OFFICE VISIT (OUTPATIENT)
Dept: ORTHOPEDICS | Facility: CLINIC | Age: 55
End: 2025-06-03
Payer: MEDICARE

## 2025-06-03 VITALS — BODY MASS INDEX: 39.31 KG/M2 | HEIGHT: 62 IN | WEIGHT: 213.63 LBS

## 2025-06-03 DIAGNOSIS — M25.512 CHRONIC LEFT SHOULDER PAIN: Primary | ICD-10-CM

## 2025-06-03 DIAGNOSIS — G89.29 CHRONIC LEFT SHOULDER PAIN: Primary | ICD-10-CM

## 2025-06-03 PROCEDURE — 3008F BODY MASS INDEX DOCD: CPT | Mod: CPTII,,,

## 2025-06-03 PROCEDURE — 4010F ACE/ARB THERAPY RXD/TAKEN: CPT | Mod: CPTII,,,

## 2025-06-03 PROCEDURE — 99214 OFFICE O/P EST MOD 30 MIN: CPT | Mod: PBBFAC

## 2025-06-03 PROCEDURE — 1159F MED LIST DOCD IN RCRD: CPT | Mod: CPTII,,,

## 2025-06-03 PROCEDURE — 99213 OFFICE O/P EST LOW 20 MIN: CPT | Mod: S$PBB,,,

## 2025-06-03 PROCEDURE — 99999 PR PBB SHADOW E&M-EST. PATIENT-LVL IV: CPT | Mod: PBBFAC,,,

## 2025-06-03 RX ORDER — DIAZEPAM 5 MG/1
5 TABLET ORAL
Qty: 1 TABLET | Refills: 0 | Status: SHIPPED | OUTPATIENT
Start: 2025-06-03

## 2025-06-05 DIAGNOSIS — G43.719 INTRACTABLE CHRONIC MIGRAINE WITHOUT AURA AND WITHOUT STATUS MIGRAINOSUS: ICD-10-CM

## 2025-06-05 NOTE — TELEPHONE ENCOUNTER
Copied from CRM #[id number]. Topic: Medications - Medication Refill  >> Jun 5, 2025 10:29 AM Sary wrote:  Who Called: Gloriabarb Mitchell    Refill or New Rx:Refill  RX Name and Strength:erenumab-aooe 140 mg  How is the patient currently taking it? (ex. 1XDay):  Is this a 30 day or 90 day RX:  Local or Mail Order:local  List of preferred pharmacies on file (remove unneeded): [facility]  If different Pharmacy is requested, enter Pharmacy information here including location and phone number: [facility]   [place]   Ordering Provider:Helder Jenkins      Preferred Method of Contact: Phone Call  Patient's Preferred Phone Number on File: [phone]   Best Call Back Number, if different:  Additional Information: Patient says she in [place] today

## 2025-06-05 NOTE — TELEPHONE ENCOUNTER
I called pt told her we sent prescription to mail order pharmacy as she requested so that cancelled the prescription at [facility]. She states ok she is getting some mail order in today maybe it will be in it.

## 2025-06-09 DIAGNOSIS — G43.719 INTRACTABLE CHRONIC MIGRAINE WITHOUT AURA AND WITHOUT STATUS MIGRAINOSUS: ICD-10-CM

## 2025-06-10 DIAGNOSIS — G43.719 INTRACTABLE CHRONIC MIGRAINE WITHOUT AURA AND WITHOUT STATUS MIGRAINOSUS: ICD-10-CM

## 2025-06-23 ENCOUNTER — TELEPHONE (OUTPATIENT)
Dept: ORTHOPEDICS | Facility: CLINIC | Age: 55
End: 2025-06-23
Payer: MEDICAID

## 2025-06-23 NOTE — TELEPHONE ENCOUNTER
Copied from CRM #[id number]. Topic: General Inquiry - Patient Advice  >> Jun 23, 2025  8:40 AM Cee wrote:  Who Called: Luz with [facility]    Needing order on this patient faxed over. Fax # [phone] her appt is today at 11.       Preferred Method of Contact: Phone Call  Best Call Back Number, if different:[phone]

## 2025-06-26 ENCOUNTER — TELEPHONE (OUTPATIENT)
Dept: ORTHOPEDICS | Facility: CLINIC | Age: 55
End: 2025-06-26
Payer: MEDICAID

## 2025-06-26 NOTE — TELEPHONE ENCOUNTER
Copied from CRM #[id number]. Topic: General Inquiry - Patient Advice  >> Jun 24, 2025  9:50 AM Destini wrote:  Who Called: Gloria Jorge A Mitchell    Caller is requesting assistance/information from provider's office.    Patient is requesting a phone call back. Asked about test results      Preferred Method of Contact: Phone Call  Patient's Preferred Phone Number on File: [phone]   Best Call Back Number, if different:  Additional Information:  Attempted to contact patient about her mri result, no answer and mail box is full can't leave message.      Thank you,  Gwendolyn Faria,[facility]

## 2025-06-30 ENCOUNTER — TELEPHONE (OUTPATIENT)
Dept: ORTHOPEDICS | Facility: CLINIC | Age: 55
End: 2025-06-30
Payer: MEDICARE

## 2025-06-30 NOTE — TELEPHONE ENCOUNTER
Copied from CRM #[id number]. Topic: General Inquiry - Test Results  >> Jun 30, 2025  9:21 AM Manjula wrote:  Who Called: Gloria Jules Stephen    Caller is requesting information on test results.    Name of Test (Lab/Mammo/Etc): MRI  Date of Test: 6/22  Where the test was performed: [facility]  Ordering Provider: Rani Jennings    Patient's Preferred Phone Number on File: [phone]      Spoken with patient in regards to mri results and she is now scheduled to see Dr. Richardson.      Thank you,  Gwendolyn Faria,[facility]

## 2025-07-07 ENCOUNTER — TELEPHONE (OUTPATIENT)
Dept: ORTHOPEDICS | Facility: CLINIC | Age: 55
End: 2025-07-07
Payer: MEDICARE

## 2025-07-07 ENCOUNTER — OFFICE VISIT (OUTPATIENT)
Dept: ORTHOPEDICS | Facility: CLINIC | Age: 55
End: 2025-07-07
Payer: MEDICARE

## 2025-07-07 DIAGNOSIS — G89.29 CHRONIC LEFT SHOULDER PAIN: Primary | ICD-10-CM

## 2025-07-07 DIAGNOSIS — M70.62 TROCHANTERIC BURSITIS OF BOTH HIPS: ICD-10-CM

## 2025-07-07 DIAGNOSIS — M70.61 TROCHANTERIC BURSITIS OF BOTH HIPS: ICD-10-CM

## 2025-07-07 DIAGNOSIS — M25.512 CHRONIC LEFT SHOULDER PAIN: Primary | ICD-10-CM

## 2025-07-07 PROCEDURE — 99999 PR PBB SHADOW E&M-EST. PATIENT-LVL II: CPT | Mod: PBBFAC,,,

## 2025-07-07 PROCEDURE — 4010F ACE/ARB THERAPY RXD/TAKEN: CPT | Mod: CPTII,,,

## 2025-07-07 PROCEDURE — 99212 OFFICE O/P EST SF 10 MIN: CPT | Mod: PBBFAC

## 2025-07-07 PROCEDURE — 99999PBSHW PR PBB SHADOW TECHNICAL ONLY FILED TO HB: Mod: PBBFAC,,,

## 2025-07-07 PROCEDURE — 20610 DRAIN/INJ JOINT/BURSA W/O US: CPT | Mod: PBBFAC,LT

## 2025-07-07 PROCEDURE — 20605 DRAIN/INJ JOINT/BURSA W/O US: CPT | Mod: PBBFAC,LT

## 2025-07-07 PROCEDURE — 99212 OFFICE O/P EST SF 10 MIN: CPT | Mod: S$PBB,25,,

## 2025-07-07 RX ADMIN — TRIAMCINOLONE ACETONIDE 40 MG: 40 INJECTION, SUSPENSION INTRA-ARTICULAR; INTRAMUSCULAR at 02:07

## 2025-07-07 RX ADMIN — BUPIVACAINE HYDROCHLORIDE 1 ML: 2.5 INJECTION, SOLUTION EPIDURAL; INFILTRATION; INTRACAUDAL; PERINEURAL at 02:07

## 2025-07-07 RX ADMIN — BUPIVACAINE HYDROCHLORIDE 2 ML: 5 INJECTION, SOLUTION EPIDURAL; INTRACAUDAL; PERINEURAL at 02:07

## 2025-07-07 NOTE — PROCEDURES
Large Joint Aspiration/Injection: L greater trochanteric bursa    Date/Time: 7/7/2025 2:20 PM    Performed by: Rani Jennings PA  Authorized by: Rani Jennings PA    Consent Done?:  Yes (Verbal)  Indications:  Pain  Site marked: the procedure site was marked      Details:  Needle Size:  21 G  Approach:  Lateral  Location:  Hip  Site:  L greater trochanteric bursa  Medications:  2 mL BUPivacaine (PF) 0.5% (5 mg/mL) 0.5 % (5 mg/mL); 40 mg triamcinolone acetonide 40 mg/mL  Patient tolerance:  Patient tolerated the procedure well with no immediate complications  Intermediate Joint Aspiration/Injection: L acromioclavicular    Date/Time: 7/7/2025 2:20 PM    Performed by: Rani Jennings PA  Authorized by: Rani Jennings PA    Consent Done?:  Yes (Verbal)  Indications:  Arthritis and pain    Location:  Shoulder  Local anesthesia used?: No    Site:  L acromioclavicular  Ultrasonic Guidance for needle placement: No  Needle size:  22 G  Approach:  Posterolateral  Medications:  1 mL BUPivacaine (PF) 0.25% (2.5 mg/ml) 0.25 % (2.5 mg/mL); 40 mg triamcinolone acetonide 40 mg/mL  Patient tolerance:  Patient tolerated the procedure well with no immediate complications

## 2025-07-07 NOTE — TELEPHONE ENCOUNTER
Patient called. She can come anytime after 12:40 for injection.    Copied from CRM #[id number]. Topic: Appointments - Appointment Access  >> Jul 7, 2025 10:46 AM Melisa wrote:  Gloria OG Mitchell called in to see if she could be seen at an earlier time today for her appt at 2:20 with Rani Jennings. Says that she is already in the area and she is sick and she does not want to stick around in [place] that long for her appt. Please give her a call if she can be seen at an earlier time today. Thank you.

## 2025-07-08 RX ORDER — TRIAMCINOLONE ACETONIDE 40 MG/ML
40 INJECTION, SUSPENSION INTRA-ARTICULAR; INTRAMUSCULAR
Status: DISCONTINUED | OUTPATIENT
Start: 2025-07-07 | End: 2025-07-08 | Stop reason: HOSPADM

## 2025-07-08 RX ORDER — BUPIVACAINE HYDROCHLORIDE 2.5 MG/ML
1 INJECTION, SOLUTION EPIDURAL; INFILTRATION; INTRACAUDAL; PERINEURAL
Status: DISCONTINUED | OUTPATIENT
Start: 2025-07-07 | End: 2025-07-08 | Stop reason: HOSPADM

## 2025-07-08 RX ORDER — BUPIVACAINE HYDROCHLORIDE 5 MG/ML
2 INJECTION, SOLUTION EPIDURAL; INTRACAUDAL; PERINEURAL
Status: DISCONTINUED | OUTPATIENT
Start: 2025-07-07 | End: 2025-07-08 | Stop reason: HOSPADM

## 2025-07-08 NOTE — PROGRESS NOTES
CC:   Chief Complaint   Patient presents with    Left Shoulder - Pain     Pt received a left shoulder steroid injection today     Left Hip - Pain     Pt received a left hip steroid injection today         Gloria Mitchell is a 55 y.o. female seen today for follow up of Pain of the Left Shoulder (Pt received a left shoulder steroid injection today ) and Pain of the Left Hip (Pt received a left hip steroid injection today )  Recent MRI of the left shoulder with partial tear.  Patient presents today hoping for an injection of the left shoulder however she also reports left hip pain.  She has a history of trochanteric bursitis of the bilateral hips.  No new fall or injury.  No other complaints today.        PAST MEDICAL HISTORY:   Past Medical History:   Diagnosis Date    Acid reflux     Calculus of kidney     Depression     Gastrointestinal ulcerative mucositis 01/04/2023    Endoscopy Dr Ravi    Hypertension     Hypothyroidism     Low back pain     Lumbosacral radiculopathy 05/17/2021    Migraines     Multiple gastric ulcers     Pain     Postlaminectomy syndrome, lumbar region 05/17/2021    UTI (urinary tract infection) 08/23/2022        PAST SURGICAL HISTORY:   Past Surgical History:   Procedure Laterality Date    BACK SURGERY      CAUDAL EPIDURAL STEROID INJECTION Bilateral 11/08/2022    Procedure: CAUDAL BRENDA;  Surgeon: Elba Moon MD;  Location: [facility];  Service: Pain Management;  Laterality: Bilateral;  PT STATES HAD VAC  WILL BRING CARD    ENDOSCOPY  01/04/2023    INSERTION OF DORSAL COLUMN NERVE STIMULATOR FOR TRIAL N/A 07/15/2021    Procedure: INSERTION, NEUROSTIMULATOR, SPINAL CORD, DORSAL COLUMN, FOR TRIAL;  Surgeon: Elba Moon MD;  Location: [facility];  Service: Pain Management;  Laterality: N/A;  spoke to Dr Calin Burgess nurse, urine culture negative (results in Epic), pt cleared.    LITHOTRIPSY          ALLERGIES:   Review of patient's allergies indicates:    Allergen Reactions    Oxycodone-acetaminophen Swelling and Edema    Bupropion hcl      Other Reaction(s): Not available    Oxycodone hcl         MEDICATIONS :  Current Medications[1]     SOCIAL HISTORY: Social History[2]     FAMILY HISTORY:   Family History   Problem Relation Name Age of Onset    Stroke Father      Heart attack Father      Heart attack Brother      Cancer Brother            PHYSICAL EXAM:      There were no vitals filed for this visit.  There is no height or weight on file to calculate BMI.    GENERAL: Well-developed, well-nourished female . The patient is alert, oriented and cooperative.    EXTREMITIES:   Left hip tenderness to palpation, good range of motion, neurovascularly intact   Left shoulder tenderness to palpation, decreased range of motion, neurovascularly intact      RADIOGRAPHIC FINDINGS:   MRI Shoulder Without Contrast Left  Result Date: 6/24/2025  History: Shoulder pain, rotator cuff disorder suspected, nondiagnostic xray, CHRONIC LEFT SHOULDER PAIN, Suspect Rotator Cuff Disorder, OPEN machine, Chronic left shoulder pain Date: 6/24/2025 Study: MRI SHOULDER LEFT WO CONTRAST Comparison exam: November 9, 2023 left shoulder MRI The left shoulder was imaged in 3 planes on the 1.5 Rosemary magnet without IV contrast, to include T1, T2, and fat sat proton density and T2 sequences. There is a curved type II acromion process with no anterior downsloping or significant lateral downsloping. There is mild hypertrophic change of the acromioclavicular joint. Bony acromiohumeral interval measures 8 mm. There is some minimal osteophyte formation of the glenohumeral joint. There is a focal area of increased T2 signal compatible with small focal full-thickness tear of the supra humeral rotator cuff tendon at the level of the insertional footprint. The extent of the tear is similar to the November 9, 2023 left shoulder MRI. Subscapularis tendon is intact. Long head of the biceps tendon is intact. Labrum  is grossly unremarkable in noncontrast appearance. There is fluid in the subacromial bursa with trace fluid in the left glenohumeral joint as well. There is mild atrophy of the supraspinatus and infraspinatus muscles.    Impression: Small focal full-thickness tear of the supraspinatus tendon near the insertional footprint, similar in size to the comparison MRI from 2023.       Patient Active Problem List    Diagnosis Date Noted    Morbid obesity 07/16/2024    Ureteral stone 07/16/2024    Nocturia 07/16/2024    Urgency of urination 07/16/2024    Impingement syndrome, shoulder, unspecified laterality 10/18/2023    Chronic pain of both shoulders 09/06/2023    Bacteremia 03/11/2023    Nausea and vomiting 03/08/2023    Hypokalemia 03/08/2023    Hypomagnesemia 03/08/2023    Volume depletion 03/08/2023    Elevated lactic acid level 03/08/2023    Pain 10/17/2022    Bilateral flank pain 08/19/2022    Calculus of kidney     Thoracic radiculopathy 02/16/2022    Intractable chronic migraine without aura and without status migrainosus 01/10/2022    Chest wall pain, chronic after insertion spinal cord stimulator leads 12/22/2021    Disorder of sacrum 09/20/2021    Chronic pain syndrome 05/17/2021    Postlaminectomy syndrome, lumbar region 05/17/2021    Lumbosacral radiculopathy 05/17/2021       IMPRESSION AND PLAN:   Small tear rotator cuff left shoulder.  Injection given today, see procedural note for details.  Discussed with her injections can be repeated in 4 months if needed however recommend follow up with Dr. Richardson if she continues to have pain.  2. Trochanteric bursitis bilateral.  She is wanting injection of the left hip today, see procedural note for details.  Discussed with her that these can be repeated in 4 months if needed.  Follow up with the orthopedic clinic p.r.n..    No follow-ups on file.       Rani Jennings PA-C      (Subject to voice recognition error, transcription service not allowed)         [1]    Current Outpatient Medications:     buprenorphine-naloxone 2-0.5 mg (SUBOXONE) 2-0.5 mg Subl, Place 1 tablet under the tongue. Patient states she takes twice daily. Prescription bottle is ordered for 3 times daily, Disp: , Rfl:     cyanocobalamin 1,000 mcg/mL injection, Inject 1,000 mcg into the muscle every 30 days., Disp: , Rfl:     diazePAM (VALIUM) 5 MG tablet, Take 1 tablet (5 mg total) by mouth On call Procedure for Anxiety., Disp: 1 tablet, Rfl: 0    erenumab-aooe (AIMOVIG) 140 mg/mL autoinjector, Inject 1 mL (140 mg total) into the skin every 28 days., Disp: 1 each, Rfl: 11    ergocalciferol (ERGOCALCIFEROL) 50,000 unit Cap, Take 50,000 Units by mouth every 7 days. (Patient not taking: Reported on 4/21/2025), Disp: , Rfl:     EScitalopram oxalate (LEXAPRO) 10 MG tablet, Take 10 mg by mouth once daily., Disp: , Rfl:     gabapentin (NEURONTIN) 800 MG tablet, 1 tablet Orally Once a day for 30 day(s), Disp: , Rfl:     levothyroxine (SYNTHROID) 112 MCG tablet, Take 112 mcg by mouth before breakfast., Disp: , Rfl:     losartan (COZAAR) 50 MG tablet, Take 50 mg by mouth once daily., Disp: , Rfl:     metFORMIN (GLUCOPHAGE-XR) 500 MG ER 24hr tablet, Take 500 mg by mouth 2 (two) times daily with meals. (Patient not taking: Reported on 4/21/2025), Disp: , Rfl:     metoprolol succinate (TOPROL-XL) 50 MG 24 hr tablet, Take 50 mg by mouth once daily., Disp: , Rfl:     minocycline (MINOCIN,DYNACIN) 100 MG capsule, Take 100 mg by mouth once daily., Disp: , Rfl:     nabumetone (RELAFEN) 750 MG tablet, Take 1 tablet (750 mg total) by mouth 2 (two) times daily., Disp: 20 tablet, Rfl: 0    potassium citrate (UROCIT-K) 10 mEq (1,080 mg) TbSR, Take 2 tablets (20 mEq total) by mouth 2 (two) times a day., Disp: 360 tablet, Rfl: 3    traZODone (DESYREL) 50 MG tablet, Take 50 mg by mouth every evening., Disp: , Rfl:     triamcinolone acetonide 0.1% (KENALOG) 0.1 % cream, , Disp: , Rfl:     WEGOVY 0.25 mg/0.5 mL PnIj, Inject 0.25 mg  by subcutaneous route., Disp: , Rfl:   [2]   Social History  Socioeconomic History    Marital status:     Number of children: 2    Years of education: 12    Highest education level: 12th grade   Occupational History    Occupation: disabled   Tobacco Use    Smoking status: Never     Passive exposure: Past    Smokeless tobacco: Never   Substance and Sexual Activity    Alcohol use: Never    Drug use: Never    Sexual activity: Not Currently

## 2025-08-26 ENCOUNTER — TELEPHONE (OUTPATIENT)
Dept: GASTROENTEROLOGY | Facility: CLINIC | Age: 55
End: 2025-08-26
Payer: MEDICARE

## 2025-09-02 ENCOUNTER — OFFICE VISIT (OUTPATIENT)
Dept: GASTROENTEROLOGY | Facility: CLINIC | Age: 55
End: 2025-09-02
Payer: MEDICARE

## 2025-09-02 VITALS
OXYGEN SATURATION: 99 % | BODY MASS INDEX: 36.07 KG/M2 | SYSTOLIC BLOOD PRESSURE: 101 MMHG | HEIGHT: 62 IN | WEIGHT: 196 LBS | HEART RATE: 67 BPM | DIASTOLIC BLOOD PRESSURE: 67 MMHG

## 2025-09-02 DIAGNOSIS — R10.13 EPIGASTRIC PAIN: ICD-10-CM

## 2025-09-02 DIAGNOSIS — K21.9 GASTROESOPHAGEAL REFLUX DISEASE, UNSPECIFIED WHETHER ESOPHAGITIS PRESENT: ICD-10-CM

## 2025-09-02 DIAGNOSIS — R19.7 DIARRHEA, UNSPECIFIED TYPE: Primary | ICD-10-CM

## 2025-09-02 PROCEDURE — 99214 OFFICE O/P EST MOD 30 MIN: CPT | Mod: PBBFAC

## 2025-09-02 PROCEDURE — 99999 PR PBB SHADOW E&M-EST. PATIENT-LVL IV: CPT | Mod: PBBFAC,,,

## 2025-09-02 RX ORDER — PANTOPRAZOLE SODIUM 40 MG/1
40 TABLET, DELAYED RELEASE ORAL DAILY
Qty: 90 TABLET | Refills: 3 | Status: SHIPPED | OUTPATIENT
Start: 2025-09-02 | End: 2026-09-02

## 2025-09-02 RX ORDER — POLYETHYLENE GLYCOL 3350, SODIUM SULFATE ANHYDROUS, SODIUM BICARBONATE, SODIUM CHLORIDE, POTASSIUM CHLORIDE 236; 22.74; 6.74; 5.86; 2.97 G/4L; G/4L; G/4L; G/4L; G/4L
4 POWDER, FOR SOLUTION ORAL ONCE
Qty: 4000 ML | Refills: 0 | Status: SHIPPED | OUTPATIENT
Start: 2025-09-02 | End: 2025-09-02

## 2025-09-03 ENCOUNTER — TELEPHONE (OUTPATIENT)
Dept: GASTROENTEROLOGY | Facility: CLINIC | Age: 55
End: 2025-09-03
Payer: MEDICARE

## (undated) DEVICE — SET IRRIGATION IR-SERIES DISP -MIN ORDER 2 CASES

## (undated) DEVICE — KIT IV START 849

## (undated) DEVICE — FIBER LASER MOSES 365

## (undated) DEVICE — SET IV SOL CONTIN-FLOW 10 DROP/ML (PRIMARY)

## (undated) DEVICE — TOWEL OR STERILE BLUE 4/PK 20PK/CS

## (undated) DEVICE — GOWN SURGICAL STERILE LEVEL 3 / XX-LARGE

## (undated) DEVICE — CATH AXXCESS URET 6F 70CM

## (undated) DEVICE — Device

## (undated) DEVICE — APPLICATOR CHLORAPREP ORN 26ML

## (undated) DEVICE — CATH IV JELCO 22GX1 IN

## (undated) DEVICE — GLIDEWIRE STRAIGHT .038MMX150CM ZIPWIRE

## (undated) DEVICE — CATH IV 22G X 1 AUTOGUARD

## (undated) DEVICE — BAG DRAINAGE UROLOGY

## (undated) DEVICE — TRAY EPIDURAL SINGLE SHOT PMA

## (undated) DEVICE — SOL CONTINU-FLO SET 2 LAV

## (undated) DEVICE — EXTRACTOR TIPLESS 2.4FRX1115CM

## (undated) DEVICE — GLOVE SURGICAL PROTEXIS PI SIZE 6.5

## (undated) DEVICE — GLOVE PROTEXIS PI SYN SURG 7.5

## (undated) DEVICE — FIBER MOSES 365 DFL

## (undated) DEVICE — GUIDEWIRE ZIPWIRE STR .038 150

## (undated) DEVICE — EXTRACTOR STONE TIPLESS 2.4FR X 115CM

## (undated) DEVICE — GOWN POLY REINF X-LONG 2XL

## (undated) DEVICE — SENSOR PULSE OX ADULT

## (undated) DEVICE — GOWN SURGICAL SMARTGOWN LEVEL 4 / EXTRA LARGE STERILE

## (undated) DEVICE — SPONGE GAUZE 4X4 12 PLY STL AMD 10/TRAY

## (undated) DEVICE — SOL IRRIGATION SALINE 3000ML BAG

## (undated) DEVICE — CATH URETHRAL AXXCESS 6FR

## (undated) DEVICE — KIT IV START RUSH

## (undated) DEVICE — GLIDEWIRE ANGLED .038MM ZIPWIRE

## (undated) DEVICE — CDS CYSTO

## (undated) DEVICE — APPLICATOR CHLORAPREP HI-LITE TINTED ORANGE 26ML

## (undated) DEVICE — GLOVE PROTEXIS PI SYN SURG 6.5

## (undated) DEVICE — KIT CYSTO RUSH

## (undated) DEVICE — SOL NACL IRR 3000ML